# Patient Record
Sex: MALE | Race: WHITE | NOT HISPANIC OR LATINO | URBAN - METROPOLITAN AREA
[De-identification: names, ages, dates, MRNs, and addresses within clinical notes are randomized per-mention and may not be internally consistent; named-entity substitution may affect disease eponyms.]

---

## 2018-05-20 ENCOUNTER — INPATIENT (INPATIENT)
Facility: HOSPITAL | Age: 58
LOS: 14 days | Discharge: HOME | DRG: 693 | End: 2018-06-04
Attending: UROLOGY | Admitting: UROLOGY
Payer: COMMERCIAL

## 2018-05-20 VITALS
WEIGHT: 315 LBS | SYSTOLIC BLOOD PRESSURE: 128 MMHG | OXYGEN SATURATION: 98 % | TEMPERATURE: 98 F | HEIGHT: 69 IN | HEART RATE: 77 BPM | RESPIRATION RATE: 16 BRPM | DIASTOLIC BLOOD PRESSURE: 73 MMHG

## 2018-05-20 DIAGNOSIS — Z87.442 PERSONAL HISTORY OF URINARY CALCULI: ICD-10-CM

## 2018-05-20 LAB
ANION GAP SERPL CALC-SCNC: 18 MMOL/L — HIGH (ref 5–17)
APPEARANCE UR: ABNORMAL
APTT BLD: 22.7 SEC — LOW (ref 27.5–37.4)
BASOPHILS # BLD AUTO: 0 K/UL — SIGNIFICANT CHANGE UP (ref 0–0.2)
BASOPHILS NFR BLD AUTO: 0.3 % — SIGNIFICANT CHANGE UP (ref 0–2)
BILIRUB UR-MCNC: ABNORMAL
BUN SERPL-MCNC: 72 MG/DL — HIGH (ref 7–23)
CALCIUM SERPL-MCNC: 8.2 MG/DL — LOW (ref 8.4–10.5)
CHLORIDE SERPL-SCNC: 96 MMOL/L — SIGNIFICANT CHANGE UP (ref 96–108)
CO2 SERPL-SCNC: 21 MMOL/L — LOW (ref 22–31)
COLOR SPEC: ABNORMAL
CREAT SERPL-MCNC: 5.43 MG/DL — HIGH (ref 0.5–1.3)
DIFF PNL FLD: ABNORMAL
EOSINOPHIL # BLD AUTO: 0.1 K/UL — SIGNIFICANT CHANGE UP (ref 0–0.5)
EOSINOPHIL NFR BLD AUTO: 0.8 % — SIGNIFICANT CHANGE UP (ref 0–6)
GLUCOSE SERPL-MCNC: 102 MG/DL — HIGH (ref 70–99)
GLUCOSE UR QL: NEGATIVE — SIGNIFICANT CHANGE UP
HCT VFR BLD CALC: 38.5 % — LOW (ref 39–50)
HGB BLD-MCNC: 12.6 G/DL — LOW (ref 13–17)
INR BLD: 1.05 RATIO — SIGNIFICANT CHANGE UP (ref 0.88–1.16)
KETONES UR-MCNC: ABNORMAL
LEUKOCYTE ESTERASE UR-ACNC: ABNORMAL
LYMPHOCYTES # BLD AUTO: 1.4 K/UL — SIGNIFICANT CHANGE UP (ref 1–3.3)
LYMPHOCYTES # BLD AUTO: 11.4 % — LOW (ref 13–44)
MCHC RBC-ENTMCNC: 30.8 PG — SIGNIFICANT CHANGE UP (ref 27–34)
MCHC RBC-ENTMCNC: 32.9 GM/DL — SIGNIFICANT CHANGE UP (ref 32–36)
MCV RBC AUTO: 93.6 FL — SIGNIFICANT CHANGE UP (ref 80–100)
MONOCYTES # BLD AUTO: 1 K/UL — HIGH (ref 0–0.9)
MONOCYTES NFR BLD AUTO: 8.7 % — SIGNIFICANT CHANGE UP (ref 2–14)
NEUTROPHILS # BLD AUTO: 9.6 K/UL — HIGH (ref 1.8–7.4)
NEUTROPHILS NFR BLD AUTO: 78.9 % — HIGH (ref 43–77)
NITRITE UR-MCNC: NEGATIVE — SIGNIFICANT CHANGE UP
PH UR: 6 — SIGNIFICANT CHANGE UP (ref 5–8)
PLATELET # BLD AUTO: 246 K/UL — SIGNIFICANT CHANGE UP (ref 150–400)
POTASSIUM SERPL-MCNC: 4.2 MMOL/L — SIGNIFICANT CHANGE UP (ref 3.5–5.3)
POTASSIUM SERPL-SCNC: 4.2 MMOL/L — SIGNIFICANT CHANGE UP (ref 3.5–5.3)
PROT UR-MCNC: 300 MG/DL
PROTHROM AB SERPL-ACNC: 11.5 SEC — SIGNIFICANT CHANGE UP (ref 9.8–12.7)
RBC # BLD: 4.11 M/UL — LOW (ref 4.2–5.8)
RBC # FLD: 13.4 % — SIGNIFICANT CHANGE UP (ref 10.3–14.5)
SODIUM SERPL-SCNC: 135 MMOL/L — SIGNIFICANT CHANGE UP (ref 135–145)
SP GR SPEC: 1.01 — LOW (ref 1.01–1.02)
UROBILINOGEN FLD QL: NEGATIVE — SIGNIFICANT CHANGE UP
WBC # BLD: 12.1 K/UL — HIGH (ref 3.8–10.5)
WBC # FLD AUTO: 12.1 K/UL — HIGH (ref 3.8–10.5)

## 2018-05-20 RX ORDER — ACETAMINOPHEN 500 MG
650 TABLET ORAL EVERY 6 HOURS
Qty: 0 | Refills: 0 | Status: DISCONTINUED | OUTPATIENT
Start: 2018-05-20 | End: 2018-06-04

## 2018-05-20 RX ORDER — SENNA PLUS 8.6 MG/1
2 TABLET ORAL AT BEDTIME
Qty: 0 | Refills: 0 | Status: DISCONTINUED | OUTPATIENT
Start: 2018-05-20 | End: 2018-06-04

## 2018-05-20 RX ORDER — SODIUM CHLORIDE 9 MG/ML
1000 INJECTION, SOLUTION INTRAVENOUS
Qty: 0 | Refills: 0 | Status: DISCONTINUED | OUTPATIENT
Start: 2018-05-20 | End: 2018-05-21

## 2018-05-20 RX ORDER — HEPARIN SODIUM 5000 [USP'U]/ML
5000 INJECTION INTRAVENOUS; SUBCUTANEOUS EVERY 8 HOURS
Qty: 0 | Refills: 0 | Status: DISCONTINUED | OUTPATIENT
Start: 2018-05-20 | End: 2018-06-04

## 2018-05-20 RX ORDER — SODIUM CHLORIDE 9 MG/ML
1000 INJECTION, SOLUTION INTRAVENOUS
Qty: 0 | Refills: 0 | Status: DISCONTINUED | OUTPATIENT
Start: 2018-05-20 | End: 2018-06-04

## 2018-05-20 RX ORDER — OXYCODONE AND ACETAMINOPHEN 5; 325 MG/1; MG/1
2 TABLET ORAL EVERY 6 HOURS
Qty: 0 | Refills: 0 | Status: DISCONTINUED | OUTPATIENT
Start: 2018-05-20 | End: 2018-05-24

## 2018-05-20 RX ORDER — OXYCODONE AND ACETAMINOPHEN 5; 325 MG/1; MG/1
1 TABLET ORAL EVERY 4 HOURS
Qty: 0 | Refills: 0 | Status: DISCONTINUED | OUTPATIENT
Start: 2018-05-20 | End: 2018-05-23

## 2018-05-20 RX ORDER — INSULIN LISPRO 100/ML
VIAL (ML) SUBCUTANEOUS EVERY 6 HOURS
Qty: 0 | Refills: 0 | Status: DISCONTINUED | OUTPATIENT
Start: 2018-05-20 | End: 2018-05-23

## 2018-05-20 RX ORDER — DEXTROSE 50 % IN WATER 50 %
15 SYRINGE (ML) INTRAVENOUS ONCE
Qty: 0 | Refills: 0 | Status: DISCONTINUED | OUTPATIENT
Start: 2018-05-20 | End: 2018-06-04

## 2018-05-20 RX ORDER — DEXTROSE 50 % IN WATER 50 %
12.5 SYRINGE (ML) INTRAVENOUS ONCE
Qty: 0 | Refills: 0 | Status: DISCONTINUED | OUTPATIENT
Start: 2018-05-20 | End: 2018-06-04

## 2018-05-20 RX ORDER — ONDANSETRON 8 MG/1
4 TABLET, FILM COATED ORAL EVERY 6 HOURS
Qty: 0 | Refills: 0 | Status: DISCONTINUED | OUTPATIENT
Start: 2018-05-20 | End: 2018-06-04

## 2018-05-20 RX ORDER — DOCUSATE SODIUM 100 MG
100 CAPSULE ORAL THREE TIMES A DAY
Qty: 0 | Refills: 0 | Status: DISCONTINUED | OUTPATIENT
Start: 2018-05-20 | End: 2018-06-04

## 2018-05-20 RX ORDER — GLUCAGON INJECTION, SOLUTION 0.5 MG/.1ML
1 INJECTION, SOLUTION SUBCUTANEOUS ONCE
Qty: 0 | Refills: 0 | Status: DISCONTINUED | OUTPATIENT
Start: 2018-05-20 | End: 2018-06-04

## 2018-05-20 RX ADMIN — SODIUM CHLORIDE 125 MILLILITER(S): 9 INJECTION, SOLUTION INTRAVENOUS at 21:46

## 2018-05-20 RX ADMIN — HEPARIN SODIUM 5000 UNIT(S): 5000 INJECTION INTRAVENOUS; SUBCUTANEOUS at 23:09

## 2018-05-21 LAB
ANION GAP SERPL CALC-SCNC: 19 MMOL/L — HIGH (ref 5–17)
BUN SERPL-MCNC: 76 MG/DL — HIGH (ref 7–23)
CALCIUM SERPL-MCNC: 8.2 MG/DL — LOW (ref 8.4–10.5)
CHLORIDE SERPL-SCNC: 96 MMOL/L — SIGNIFICANT CHANGE UP (ref 96–108)
CO2 SERPL-SCNC: 20 MMOL/L — LOW (ref 22–31)
CREAT SERPL-MCNC: 6.28 MG/DL — HIGH (ref 0.5–1.3)
CULTURE RESULTS: NO GROWTH — SIGNIFICANT CHANGE UP
GAS PNL BLDA: SIGNIFICANT CHANGE UP
GLUCOSE BLDC GLUCOMTR-MCNC: 111 MG/DL — HIGH (ref 70–99)
GLUCOSE BLDC GLUCOMTR-MCNC: 119 MG/DL — HIGH (ref 70–99)
GLUCOSE BLDC GLUCOMTR-MCNC: 87 MG/DL — SIGNIFICANT CHANGE UP (ref 70–99)
GLUCOSE SERPL-MCNC: 102 MG/DL — HIGH (ref 70–99)
HBA1C BLD-MCNC: 6 % — HIGH (ref 4–5.6)
HCT VFR BLD CALC: 42.4 % — SIGNIFICANT CHANGE UP (ref 39–50)
HGB BLD-MCNC: 13.6 G/DL — SIGNIFICANT CHANGE UP (ref 13–17)
MCHC RBC-ENTMCNC: 30.3 PG — SIGNIFICANT CHANGE UP (ref 27–34)
MCHC RBC-ENTMCNC: 32.2 GM/DL — SIGNIFICANT CHANGE UP (ref 32–36)
MCV RBC AUTO: 94 FL — SIGNIFICANT CHANGE UP (ref 80–100)
PLATELET # BLD AUTO: 257 K/UL — SIGNIFICANT CHANGE UP (ref 150–400)
POTASSIUM SERPL-MCNC: 4.6 MMOL/L — SIGNIFICANT CHANGE UP (ref 3.5–5.3)
POTASSIUM SERPL-SCNC: 4.6 MMOL/L — SIGNIFICANT CHANGE UP (ref 3.5–5.3)
RBC # BLD: 4.5 M/UL — SIGNIFICANT CHANGE UP (ref 4.2–5.8)
RBC # FLD: 13.4 % — SIGNIFICANT CHANGE UP (ref 10.3–14.5)
SODIUM SERPL-SCNC: 135 MMOL/L — SIGNIFICANT CHANGE UP (ref 135–145)
SPECIMEN SOURCE: SIGNIFICANT CHANGE UP
WBC # BLD: 6.9 K/UL — SIGNIFICANT CHANGE UP (ref 3.8–10.5)
WBC # FLD AUTO: 6.9 K/UL — SIGNIFICANT CHANGE UP (ref 3.8–10.5)

## 2018-05-21 PROCEDURE — 99222 1ST HOSP IP/OBS MODERATE 55: CPT

## 2018-05-21 PROCEDURE — 50432 PLMT NEPHROSTOMY CATHETER: CPT | Mod: 50

## 2018-05-21 RX ORDER — SODIUM CHLORIDE 9 MG/ML
1000 INJECTION, SOLUTION INTRAVENOUS
Qty: 0 | Refills: 0 | Status: DISCONTINUED | OUTPATIENT
Start: 2018-05-21 | End: 2018-05-22

## 2018-05-21 RX ORDER — TAMSULOSIN HYDROCHLORIDE 0.4 MG/1
0.4 CAPSULE ORAL DAILY
Qty: 0 | Refills: 0 | Status: DISCONTINUED | OUTPATIENT
Start: 2018-05-21 | End: 2018-06-04

## 2018-05-21 RX ORDER — PIPERACILLIN AND TAZOBACTAM 4; .5 G/20ML; G/20ML
3.38 INJECTION, POWDER, LYOPHILIZED, FOR SOLUTION INTRAVENOUS EVERY 12 HOURS
Qty: 0 | Refills: 0 | Status: DISCONTINUED | OUTPATIENT
Start: 2018-05-21 | End: 2018-05-23

## 2018-05-21 RX ORDER — ACETAMINOPHEN 500 MG
1000 TABLET ORAL ONCE
Qty: 0 | Refills: 0 | Status: COMPLETED | OUTPATIENT
Start: 2018-05-21 | End: 2018-05-21

## 2018-05-21 RX ORDER — AMLODIPINE BESYLATE 2.5 MG/1
10 TABLET ORAL DAILY
Qty: 0 | Refills: 0 | Status: DISCONTINUED | OUTPATIENT
Start: 2018-05-21 | End: 2018-06-04

## 2018-05-21 RX ORDER — ACETAMINOPHEN 500 MG
650 TABLET ORAL ONCE
Qty: 0 | Refills: 0 | Status: COMPLETED | OUTPATIENT
Start: 2018-05-21 | End: 2018-05-21

## 2018-05-21 RX ADMIN — Medication 100 MILLIGRAM(S): at 22:36

## 2018-05-21 RX ADMIN — HEPARIN SODIUM 5000 UNIT(S): 5000 INJECTION INTRAVENOUS; SUBCUTANEOUS at 05:41

## 2018-05-21 RX ADMIN — PIPERACILLIN AND TAZOBACTAM 25 GRAM(S): 4; .5 INJECTION, POWDER, LYOPHILIZED, FOR SOLUTION INTRAVENOUS at 13:00

## 2018-05-21 RX ADMIN — Medication 650 MILLIGRAM(S): at 05:49

## 2018-05-21 RX ADMIN — Medication 400 MILLIGRAM(S): at 11:41

## 2018-05-21 RX ADMIN — Medication 100 MILLIGRAM(S): at 13:34

## 2018-05-21 RX ADMIN — AMLODIPINE BESYLATE 10 MILLIGRAM(S): 2.5 TABLET ORAL at 05:41

## 2018-05-21 RX ADMIN — HEPARIN SODIUM 5000 UNIT(S): 5000 INJECTION INTRAVENOUS; SUBCUTANEOUS at 22:36

## 2018-05-21 RX ADMIN — HEPARIN SODIUM 5000 UNIT(S): 5000 INJECTION INTRAVENOUS; SUBCUTANEOUS at 13:34

## 2018-05-21 RX ADMIN — Medication 1000 MILLIGRAM(S): at 12:15

## 2018-05-21 RX ADMIN — Medication 650 MILLIGRAM(S): at 06:20

## 2018-05-21 RX ADMIN — TAMSULOSIN HYDROCHLORIDE 0.4 MILLIGRAM(S): 0.4 CAPSULE ORAL at 13:34

## 2018-05-21 RX ADMIN — Medication 650 MILLIGRAM(S): at 22:36

## 2018-05-21 NOTE — PROGRESS NOTE ADULT - SUBJECTIVE AND OBJECTIVE BOX
I had discussed with Dr Brooks Navarrete at 2AM regarding the placement of bilateral nephrostomy tubes for patient having oliguria, in the setting ot bilateral ureteral obstruction.  He deemed that bilateral nephrostomy tubes were not emergent at this time, and could be performed in the morning.  He had discussed this with Dr Dupont as well.  Will try to have patient undergo the procedure early this AM.

## 2018-05-21 NOTE — H&P ADULT - FAMILY HISTORY
Father  Still living? Unknown  Family history of prostate cancer in father, Age at diagnosis: Age Unknown  Family history of coronary artery disease, Age at diagnosis: Age Unknown

## 2018-05-21 NOTE — PROGRESS NOTE ADULT - ASSESSMENT
Bilateral ureteral stones and obstruction  - NPO  - labs  - IR for bilat NTs  - urine culture  - pain control prn

## 2018-05-21 NOTE — PROGRESS NOTE ADULT - SUBJECTIVE AND OBJECTIVE BOX
Post op Check    Called by Dr. Hoyt, pt with rigors post procedure. pt seen and examined, also complaining of back spasms and appears uncomfortable. Pt was transferred to PACU from IR recovery suite. blood cultures were sent and patient was started on zosyn and IV tylenol. Pt reassessed and now feeling much better, no longer with rigors, denies any more back spasms. Denies SOB/CP/N/V.     Vital Signs Last 24 Hrs  T(C): 37.1 (21 May 2018 11:41), Max: 37.1 (21 May 2018 11:41)  T(F): 98.8 (21 May 2018 11:41), Max: 98.8 (21 May 2018 11:41)  HR: 101 (21 May 2018 12:00) (75 - 112)  BP: 130/59 (21 May 2018 12:00) (120/75 - 166/110)  BP(mean): 131 (21 May 2018 11:50) (131 - 131)  RR: 25 (21 May 2018 12:00) (16 - 25)  SpO2: 94% (21 May 2018 12:00) (88% - 98%)    I&O's Summary    holman -  R NT-  L NT-    Physical Exam  Gen: NAD, A&Ox3  Pulm: No respiratory distress, no subcostal retractions  CV: RRR, no JVD  Abd: Soft, NT, ND  Back: right NT draining translucent red urine, Left nephrostomy tube draining very purulent material   : +holman                           13.6   6.9   )-----------( 257      ( 21 May 2018 12:03 )             42.4       05-20    135  |  96  |  72<H>  ----------------------------<  102<H>  4.2   |  21<L>  |  5.43<H>    Ca    8.2<L>      20 May 2018 21:46        A/P: 57y Male s/p b/l IR NT placement  DVT prophylaxis/OOB  Incentive spirometry  Strict I&O's via holman and b/l NT  Analgesia and antiemetics as needed  NPO for now  f/u urine and blood cultures  continue zosyn   AM labs  Post op Check    Called by Dr. Hoyt, pt with rigors post procedure. pt seen and examined, also complaining of back spasms and appears uncomfortable. Pt was transferred to PACU from IR recovery suite. blood cultures were sent and patient was started on zosyn and IV tylenol. Pt reassessed and now feeling much better, no longer with rigors, denies any more back spasms. Denies SOB/CP/N/V.     Vital Signs Last 24 Hrs  T(C): 37.1 (21 May 2018 11:41), Max: 37.1 (21 May 2018 11:41)  T(F): 98.8 (21 May 2018 11:41), Max: 98.8 (21 May 2018 11:41)  HR: 101 (21 May 2018 12:00) (75 - 112)  BP: 130/59 (21 May 2018 12:00) (120/75 - 166/110)  BP(mean): 131 (21 May 2018 11:50) (131 - 131)  RR: 25 (21 May 2018 12:00) (16 - 25)  SpO2: 94% (21 May 2018 12:00) (88% - 98%)    I&O's Summary    holman - approx 100cc  R NT- 20cc  L NT- 200cc    Physical Exam  Gen: NAD, A&Ox3  Pulm: No respiratory distress, no subcostal retractions  CV: RRR, no JVD  Abd: Soft, NT, ND  Back: right NT draining translucent red urine, Left nephrostomy tube draining very purulent material   : +holman cloudy yellow                           13.6   6.9   )-----------( 257      ( 21 May 2018 12:03 )             42.4       05-20    135  |  96  |  72<H>  ----------------------------<  102<H>  4.2   |  21<L>  |  5.43<H>    Ca    8.2<L>      20 May 2018 21:46        A/P: 57y Male s/p b/l IR NT placement  DVT prophylaxis/OOB  Incentive spirometry  Strict I&O's via holman and b/l NT  Analgesia and antiemetics as needed  NPO for now  f/u urine and blood cultures  continue zosyn   AM labs

## 2018-05-21 NOTE — PROGRESS NOTE ADULT - SUBJECTIVE AND OBJECTIVE BOX
Interventional Radiology  Pre-Procedure Note      HPI:  This is a 57 year old male with PMHx of nephrolithiasis, DM, gout, lymphedema, obesity, who was transferred from an OSH after found to have bilateral ureteral calculi and SMITH.  Pt fell at home 2 days ago and presented to the OSH, where he had a CT scan done showing a left 1cm distal calculus and a 7mm right proximal calculus.  The urology team had attempted a bilateral stent placement, but had difficulty with cystoscope insertion.  He was transferred to [facility] for bilateral nephrostomy tube placement. Pt presents to IR for bilateral percutaneous nephrostomy tube placement with anesthesia. Pt a&o x 3. Informed consent obtained by Dr Hoyt after risks, benefits, alternatives discussion with pt's comprehension confirmed.    NPO since 8 pm 5/20/18    PAST MEDICAL & SURGICAL HISTORY:  Hx of peripheral neuropathy  Morbid obesity  H/O sleep apnea  Acquired lymphedema of leg  Calculus of kidney and ureter  Diabetes mellitus type II  HTN (hypertension)  Ureteral stents placement, bilateral  Hx of tonsillectomy: at 6 years old      FAMILY HISTORY:  Family history of coronary artery disease (Father)  Family history of prostate cancer in father (Father)      Allergies: No Known Allergies      Current Medications: acetaminophen   Tablet 650 milliGRAM(s) Oral every 6 hours PRN  amLODIPine   Tablet 10 milliGRAM(s) Oral daily  dextrose 40% Gel 15 Gram(s) Oral once PRN  dextrose 5% + sodium chloride 0.45%. 1000 milliLiter(s) IV Continuous <Continuous>  dextrose 5%. 1000 milliLiter(s) IV Continuous <Continuous>  dextrose 50% Injectable 12.5 Gram(s) IV Push once  docusate sodium 100 milliGRAM(s) Oral three times a day  glucagon  Injectable 1 milliGRAM(s) IntraMuscular once PRN  heparin  Injectable 5000 Unit(s) SubCutaneous every 8 hours  insulin lispro (HumaLOG) corrective regimen sliding scale   SubCutaneous every 6 hours  ondansetron Injectable 4 milliGRAM(s) IV Push every 6 hours PRN  oxyCODONE    5 mG/acetaminophen 325 mG 1 Tablet(s) Oral every 4 hours PRN  oxyCODONE    5 mG/acetaminophen 325 mG 2 Tablet(s) Oral every 6 hours PRN  piperacillin/tazobactam IVPB. 3.375 Gram(s) IV Intermittent every 12 hours  senna 2 Tablet(s) Oral at bedtime PRN  tamsulosin 0.4 milliGRAM(s) Oral daily      Labs:   GFR 11 5/20                        12.6   12.1  )-----------( 246      ( 20 May 2018 21:46 )             38.5       05-20    135  |  96  |  72<H>  ----------------------------<  102<H>  4.2   |  21<L>  |  5.43<H>    Ca    8.2<L>      20 May 2018 21:46        PT/INR - ( 20 May 2018 21:46 )   PT: 11.5 sec;   INR: 1.05 ratio         PTT - ( 20 May 2018 21:46 )  PTT:22.7 sec    Blood Bank:   Blood Available Until:    Assessment/Plan:   This is a 57 year old male with CT identified bilateral ureteral calculi, leukocytosis, s/p unsuccessful urologic attempt to place bilateral stents cystoscopically.  Patient presents to IR for bilateral percutaneous nephrostomy tube insertion with anesthesia.    Alvina BARKER BC  ext [id number]  # [phone]

## 2018-05-21 NOTE — H&P ADULT - NSHPLABSRESULTS_GEN_ALL_CORE
12.6   12.1  )-----------( 246      ( 20 May 2018 21:46 )             38.5     05-20    135  |  96  |  72<H>  ----------------------------<  102<H>  4.2   |  21<L>  |  5.43<H>    Ca    8.2<L>      20 May 2018 21:46      PT/INR - ( 20 May 2018 21:46 )   PT: 11.5 sec;   INR: 1.05 ratio         PTT - ( 20 May 2018 21:46 )  PTT:22.7 sec    Urinalysis Basic - ( 20 May 2018 21:46 )    Color: Red / Appearance: Turbid / S.007 / pH: x  Gluc: x / Ketone: Trace  / Bili: Small / Urobili: Negative   Blood: x / Protein: 300 mg/dL / Nitrite: Negative   Leuk Esterase: Large / RBC: >50 /HPF / WBC >50 /HPF   Sq Epi: x / Non Sq Epi: x / Bacteria: Few /HPF    Urine culture: pending results, 100k GNR from OSH    CT (OSH) : cholelithiasis; prominent left hydronephrosis extending to distal ureter where a 1cm calculus is seen.  7mm right proximal calculus with mild hydronephrosis; moderate fecal retention. 12.6   12.1  )-----------( 246      ( 20 May 2018 21:46 )             38.5     05-20    135  |  96  |  72<H>  ----------------------------<  102<H>  4.2   |  21<L>  |  5.43<H>    Ca    8.2<L>      20 May 2018 21:46      PT/INR - ( 20 May 2018 21:46 )   PT: 11.5 sec;   INR: 1.05 ratio         PTT - ( 20 May 2018 21:46 )  PTT:22.7 sec    Urinalysis Basic - ( 20 May 2018 21:46 )    Color: Red / Appearance: Turbid / S.007 / pH: x  Gluc: x / Ketone: Trace  / Bili: Small / Urobili: Negative   Blood: x / Protein: 300 mg/dL / Nitrite: Negative   Leuk Esterase: Large / RBC: >50 /HPF / WBC >50 /HPF   Sq Epi: x / Non Sq Epi: x / Bacteria: Few /HPF    Urine culture: pending results, 100k GNR from OSH    CT chest: no acute intrathoracic pathology    CT abd (OSH) : cholelithiasis; prominent left hydronephrosis extending to distal ureter where a 1cm calculus is seen.  7mm right proximal calculus with mild hydronephrosis; moderate fecal retention.

## 2018-05-21 NOTE — PROGRESS NOTE ADULT - SUBJECTIVE AND OBJECTIVE BOX
Interventional Radiology Brief- Operative Note    Procedure: Bilateral nephrostomy tube placement    Operators: Janay Hoyt    Anesthesia (type): Sedation per anesthesiology attending    Contrast: 25 cc    EBL: Minimal    Findings/Follow up Plan of Care: Under ultrasound and fluoroscopic guidance, bilateral nephrostomy tubes placed via interpolar region on left (8.5 FR APD) and lower pole on right (8.5 FR Colin Carmen) and left to bag drainage. Cloudy brown fluid aspirated from left and clear yellow fluid from right, sent for microbiological analysis. Findings discussed by Dr. Hoyt with Urology at conclusion of procedure.    Specimens Removed: Cloudy brown fluid aspirated from left and clear yellow fluid from right, sent for microbiological analysis.     Implants: left 8.5 FR APD, right 8.5 FR Colin Carmen    Complications: None    Condition/Disposition: Stable/Recovery    Please call Interventional Radiology x [phone] with any questions, concerns, or issues. Interventional Radiology Brief- Operative Note    Procedure: Bilateral nephrostomy tube placement    Operators: Janay Hoyt    Anesthesia (type): Sedation per anesthesiology attending    Contrast: 25 cc    EBL: Minimal    Findings/Follow up Plan of Care: Under ultrasound and fluoroscopic guidance, bilateral nephrostomy tubes placed via interpolar region on left (8.5 FR APD) and lower pole on right (8.5 FR Colin Carmen) and left to bag drainage. Cloudy brown fluid aspirated from left and clear yellow fluid from right, sent for microbiological analysis. There was moderate to severe left hydronephrosis and mild right hydronephrosis. Findings discussed by Dr. Hoyt with Urology at conclusion of procedure.    Specimens Removed: Cloudy brown fluid aspirated from left and clear yellow fluid from right, sent for microbiological analysis.     Implants: left 8.5 FR APD, right 8.5 FR Colin Carmen    Complications: None    Condition/Disposition: Stable/Recovery    Please call Interventional Radiology x [phone] with any questions, concerns, or issues. Interventional Radiology Brief- Operative Note    Procedure: Bilateral nephrostomy tube placement    Operators: Janay Hoyt    Anesthesia (type): Sedation per anesthesiology attending    Contrast: 25 cc    EBL: Minimal    Findings/Follow up Plan of Care: Under ultrasound and fluoroscopic guidance, bilateral nephrostomy tubes placed via interpolar region on left (8.5 FR APD) and lower pole on right (8.5 FR Colin Carmen) and left to bag drainage. Cloudy brown fluid aspirated from left and clear yellow fluid from right, sent for microbiological analysis. There was moderate to severe left hydronephrosis and mild right hydronephrosis. Findings discussed by Dr. Hoyt with Urology at conclusion of procedure.    Specimens Removed: Cloudy brown fluid aspirated from left and clear yellow fluid from right, sent for microbiological analysis.     Implants: left 8.5 FR APD, right 8.5 FR Colin Carmen    Complications: None    Condition/Disposition: Stable/Recovery    Please call Interventional Radiology x [phone] with any questions, concerns, or issues.      Addendum:  Pt seen in PACU at appx 11 am with complaint of chills and lower back pain. he described the pain as similar to muscle spasms he has experienced before. HR 102bpm. /70. Oxygen saturation 93%. Contacted urology PA who arrived to assess patient. IV fluids initiated and IV acetaminophen single dose for pain. Pt to continue to be monitored in PACU.

## 2018-05-21 NOTE — PROGRESS NOTE ADULT - SUBJECTIVE AND OBJECTIVE BOX
UROLOGY DAILY PROGRESS NOTE:     Subjective: Patient seen and examined at bedside. C/o neck pain, which he says is related to fall at outside hospital.  No fevers or chills. No flank or abdominal pain.      Objective:  Vital signs  T(F): , Max: 98.3 (05-20-18 @ 19:55)  HR: 88 (05-21-18 @ 06:12)  BP: 147/78 (05-21-18 @ 06:12)  SpO2: 96% (05-21-18 @ 06:12)  Wt(kg): --    I&O's Summary    20 May 2018 07:01  -  21 May 2018 07:00  --------------------------------------------------------  IN: 875 mL / OUT: 200 mL / NET: 675 mL    I&O's Detail    20 May 2018 07:01  -  21 May 2018 07:00  --------------------------------------------------------  IN:    dextrose 5% + sodium chloride 0.45%.: 875 mL  Total IN: 875 mL    OUT:    Indwelling Catheter - Urethral: 200 mL  Total OUT: 200 mL    Total NET: 675 mL          Gen: NAD  Pulm: No respiratory distress, no subcostal retractions  CV: RRR, no JVD  Abd: Soft, NT, ND  : Bae- scant concentrated urine     Labs:  05-20  12.1  / 38.5  /5.43                           12.6   12.1  )-----------( 246      ( 20 May 2018 21:46 )             38.5     05-20    135  |  96  |  72<H>  ----------------------------<  102<H>  4.2   |  21<L>  |  5.43<H>    Ca    8.2<L>      20 May 2018 21:46      PT/INR - ( 20 May 2018 21:46 )   PT: 11.5 sec;   INR: 1.05 ratio         PTT - ( 20 May 2018 21:46 )  PTT:22.7 sec    Urine Cx:p

## 2018-05-21 NOTE — H&P ADULT - ASSESSMENT
57 year old male with bilateral ureteral obstruction secondary to renal calculi, SMITH    -NPO for IR nephrostomy tube procedure in AM -- discussed with Dr Navarrete  -strict I's and O's, continue foely catheter  -IVF hydration  -insulin sliding scale  -hold losartan in setting of SMITH  -IV antibiotics  -case d/w Dr Dupont

## 2018-05-21 NOTE — H&P ADULT - NSHPPHYSICALEXAM_GEN_ALL_CORE
PHYSICAL EXAM:      Constitutional: well nourished, unkempt    Neck: no JVD    Chest: +right upper chest ecchymosis    Back: no CVAT bilaterally    Gastrointestinal: +upper abdominal ecchymosis, obese, NT/ND    Genitourinary: uncircumcised penis, +holman draining pink urine    Neurological: NAD, A+Ox3    Skin: warm dry

## 2018-05-21 NOTE — H&P ADULT - HISTORY OF PRESENT ILLNESS
This is a 57 year old male with PMHx of nephrolithiasis, DM, gout, lymphedema, obesity, who was transferred from an OSH after found to have bilateral ureteral calculi and SMITH.  Pt fell at home 2 days ago and presented to the OSH, where he had a CT scan done showing a left 1cm distal calculus and a 7mm right proximal calculus.  Patient fell another time and broke the commode.  The urology team had attempted a bilateral stent placement, but had difficulty with cystoscope insertion.  He was transferred to [facility] for bilateral nephrostomy tube placement.  Pt denies fevers/chills, nausea, vomiting, pain.  Currently without any complaints at this time.  Walks with 2 canes at home.

## 2018-05-21 NOTE — H&P ADULT - PMH
Acquired lymphedema of leg    Calculus of kidney and ureter    Diabetes mellitus type II    H/O sleep apnea    HTN (hypertension)    Hx of peripheral neuropathy    Morbid obesity

## 2018-05-22 DIAGNOSIS — N17.9 ACUTE KIDNEY FAILURE, UNSPECIFIED: ICD-10-CM

## 2018-05-22 LAB
ANION GAP SERPL CALC-SCNC: 20 MMOL/L — HIGH (ref 5–17)
BUN SERPL-MCNC: 78 MG/DL — HIGH (ref 7–23)
CALCIUM SERPL-MCNC: 8.3 MG/DL — LOW (ref 8.4–10.5)
CHLORIDE SERPL-SCNC: 96 MMOL/L — SIGNIFICANT CHANGE UP (ref 96–108)
CHLORIDE UR-SCNC: 56 MMOL/L — SIGNIFICANT CHANGE UP
CK SERPL-CCNC: 4975 U/L — HIGH (ref 30–200)
CO2 SERPL-SCNC: 18 MMOL/L — LOW (ref 22–31)
CREAT SERPL-MCNC: 7.09 MG/DL — HIGH (ref 0.5–1.3)
GLUCOSE BLDC GLUCOMTR-MCNC: 102 MG/DL — HIGH (ref 70–99)
GLUCOSE BLDC GLUCOMTR-MCNC: 108 MG/DL — HIGH (ref 70–99)
GLUCOSE BLDC GLUCOMTR-MCNC: 117 MG/DL — HIGH (ref 70–99)
GLUCOSE BLDC GLUCOMTR-MCNC: 123 MG/DL — HIGH (ref 70–99)
GLUCOSE BLDC GLUCOMTR-MCNC: 99 MG/DL — SIGNIFICANT CHANGE UP (ref 70–99)
GLUCOSE SERPL-MCNC: 99 MG/DL — SIGNIFICANT CHANGE UP (ref 70–99)
HCT VFR BLD CALC: 39.2 % — SIGNIFICANT CHANGE UP (ref 39–50)
HGB BLD-MCNC: 12.3 G/DL — LOW (ref 13–17)
MCHC RBC-ENTMCNC: 29.4 PG — SIGNIFICANT CHANGE UP (ref 27–34)
MCHC RBC-ENTMCNC: 31.3 GM/DL — LOW (ref 32–36)
MCV RBC AUTO: 93.9 FL — SIGNIFICANT CHANGE UP (ref 80–100)
PLATELET # BLD AUTO: 271 K/UL — SIGNIFICANT CHANGE UP (ref 150–400)
POTASSIUM SERPL-MCNC: 4.6 MMOL/L — SIGNIFICANT CHANGE UP (ref 3.5–5.3)
POTASSIUM SERPL-SCNC: 4.6 MMOL/L — SIGNIFICANT CHANGE UP (ref 3.5–5.3)
POTASSIUM UR-SCNC: 13 MMOL/L — SIGNIFICANT CHANGE UP
RBC # BLD: 4.17 M/UL — LOW (ref 4.2–5.8)
RBC # FLD: 13.7 % — SIGNIFICANT CHANGE UP (ref 10.3–14.5)
SODIUM SERPL-SCNC: 134 MMOL/L — LOW (ref 135–145)
SODIUM UR-SCNC: 68 MMOL/L — SIGNIFICANT CHANGE UP
WBC # BLD: 11.8 K/UL — HIGH (ref 3.8–10.5)
WBC # FLD AUTO: 11.8 K/UL — HIGH (ref 3.8–10.5)

## 2018-05-22 PROCEDURE — 99231 SBSQ HOSP IP/OBS SF/LOW 25: CPT

## 2018-05-22 PROCEDURE — 76775 US EXAM ABDO BACK WALL LIM: CPT | Mod: 26

## 2018-05-22 PROCEDURE — 99223 1ST HOSP IP/OBS HIGH 75: CPT | Mod: GC

## 2018-05-22 RX ORDER — SODIUM CHLORIDE 9 MG/ML
1000 INJECTION, SOLUTION INTRAVENOUS
Qty: 0 | Refills: 0 | Status: DISCONTINUED | OUTPATIENT
Start: 2018-05-22 | End: 2018-05-27

## 2018-05-22 RX ADMIN — AMLODIPINE BESYLATE 10 MILLIGRAM(S): 2.5 TABLET ORAL at 06:34

## 2018-05-22 RX ADMIN — OXYCODONE AND ACETAMINOPHEN 1 TABLET(S): 5; 325 TABLET ORAL at 10:45

## 2018-05-22 RX ADMIN — HEPARIN SODIUM 5000 UNIT(S): 5000 INJECTION INTRAVENOUS; SUBCUTANEOUS at 14:27

## 2018-05-22 RX ADMIN — PIPERACILLIN AND TAZOBACTAM 25 GRAM(S): 4; .5 INJECTION, POWDER, LYOPHILIZED, FOR SOLUTION INTRAVENOUS at 00:29

## 2018-05-22 RX ADMIN — PIPERACILLIN AND TAZOBACTAM 25 GRAM(S): 4; .5 INJECTION, POWDER, LYOPHILIZED, FOR SOLUTION INTRAVENOUS at 14:28

## 2018-05-22 RX ADMIN — TAMSULOSIN HYDROCHLORIDE 0.4 MILLIGRAM(S): 0.4 CAPSULE ORAL at 14:27

## 2018-05-22 RX ADMIN — OXYCODONE AND ACETAMINOPHEN 1 TABLET(S): 5; 325 TABLET ORAL at 10:12

## 2018-05-22 RX ADMIN — HEPARIN SODIUM 5000 UNIT(S): 5000 INJECTION INTRAVENOUS; SUBCUTANEOUS at 06:34

## 2018-05-22 RX ADMIN — HEPARIN SODIUM 5000 UNIT(S): 5000 INJECTION INTRAVENOUS; SUBCUTANEOUS at 22:41

## 2018-05-22 NOTE — PHYSICAL THERAPY INITIAL EVALUATION ADULT - PLANNED THERAPY INTERVENTIONS, PT EVAL
gait training/GOALS Stair Negotiation Training: Patient will be able to negotiate up & down 1 flight of stairs independently with bilateral rails, step to gait pattern, in 4 weeks./transfer training/bed mobility training

## 2018-05-22 NOTE — CONSULT NOTE ADULT - ATTENDING COMMENTS
CKD, ARF, obstructive uropathy  1.  ARF--not uremic at present but borderline function.  Agrees to acute HD if required.  High potential for rhabdomyolysis contributing to ATN.  Ulytes.  Will chech urine microscopic  2.  Obstructive uropathy--unilateral dilitation.  Mininmal drainage on side of non hydronephrosis  3.  Rhabdomyolysis--volume optimization.  In setting of CKD + 1,2 may determine HD requirement

## 2018-05-22 NOTE — PROGRESS NOTE ADULT - ASSESSMENT
This is a 58 y/o M with bilateral obstructing ureteral calculi s/p bilateral NT placement.    - trend Cr  - follow up NT Cultures and urine Cultures, continue Zosyn (renally dosed)  - Reg diet  - OOB/Amb, Physical therapy consultation

## 2018-05-22 NOTE — PHYSICAL THERAPY INITIAL EVALUATION ADULT - PERTINENT HX OF CURRENT PROBLEM, REHAB EVAL
as per chart review: PMHx of nephrolithiasis, DM, gout, lymphedema, obesity, who was transferred from an OSH after found to have bilateral ureteral calculi and SMITH.  Pt fell at home 2 days ago and presented to the OSH, where he had a CT scan done showing a left 1cm distal calculus and a 7mm right proximal calculus.  Patient fell another time and broke the commode.

## 2018-05-22 NOTE — CONSULT NOTE ADULT - SUBJECTIVE AND OBJECTIVE BOX
[facility] DIVISION OF KIDNEY DISEASES AND HYPERTENSION -- [phone]  -- INITIAL CONSULT NOTE  --------------------------------------------------------------------------------  HPI:  57 year old male with PMHx of nephrolithiasis, DM, gout, lymphedema, obesity, who was transferred from an OSH after found to have bilateral ureteral calculi and SMITH. Pt fell at home(was on floor for 6 hours)  2 days ago and presented to the OSH, where he had a CT scan done showing a left 1cm distal calculus and a 7mm right proximal calculus. The urology team had attempted a bilateral stent placement, but had difficulty with cystoscope insertion. He was transferred to [facility] for bilateral nephrostomy tube placement. Pt had bilateral stent placed by IR on (5/21) and received contrast. Nephrology consulted as pt found to have worsening serum creatinine. As per urology team, pt had initial Scr at 2.6 on (5/18). Pt with Scr 5.4 on (5/20) which further trended up to 7.09 on (5/22).  Pt states he had blood work done at PMD office , 4months ago and was told all labs were WNL. Pt denies any prior kidney disease or seeing a nephrologist. Pt states he is DM for 10yrs and HTN all his life ( currently takes losartan and amlodipine). Denies family history of kidney disease, denies NSAIDs use. Denies history of blood transfusions or known hepatitis disease.     PAST HISTORY  --------------------------------------------------------------------------------  PAST MEDICAL & SURGICAL HISTORY:  Hx of peripheral neuropathy  Morbid obesity  H/O sleep apnea  Acquired lymphedema of leg  Calculus of kidney and ureter  Diabetes mellitus type II  HTN (hypertension)  Ureteral stents placement, bilateral  Hx of tonsillectomy: at 6 years old    FAMILY HISTORY:  Family history of coronary artery disease (Father)  Family history of prostate cancer in father (Father)    PAST SOCIAL HISTORY:    ALLERGIES & MEDICATIONS  --------------------------------------------------------------------------------  Allergies    No Known Allergies    Intolerances      Standing Inpatient Medications  amLODIPine   Tablet 10 milliGRAM(s) Oral daily  dextrose 5%. 1000 milliLiter(s) IV Continuous <Continuous>  dextrose 50% Injectable 12.5 Gram(s) IV Push once  docusate sodium 100 milliGRAM(s) Oral three times a day  heparin  Injectable 5000 Unit(s) SubCutaneous every 8 hours  insulin lispro (HumaLOG) corrective regimen sliding scale   SubCutaneous every 6 hours  lactated ringers. 1000 milliLiter(s) IV Continuous <Continuous>  piperacillin/tazobactam IVPB. 3.375 Gram(s) IV Intermittent every 12 hours  tamsulosin 0.4 milliGRAM(s) Oral daily    PRN Inpatient Medications  acetaminophen   Tablet 650 milliGRAM(s) Oral every 6 hours PRN  dextrose 40% Gel 15 Gram(s) Oral once PRN  glucagon  Injectable 1 milliGRAM(s) IntraMuscular once PRN  ondansetron Injectable 4 milliGRAM(s) IV Push every 6 hours PRN  oxyCODONE    5 mG/acetaminophen 325 mG 1 Tablet(s) Oral every 4 hours PRN  oxyCODONE    5 mG/acetaminophen 325 mG 2 Tablet(s) Oral every 6 hours PRN  senna 2 Tablet(s) Oral at bedtime PRN      REVIEW OF SYSTEMS  --------------------------------------------------------------------------------  Gen: No fevers/chills  Skin: No rashes  Head/Eyes/Ears: Normal hearing,  Normal vision   Respiratory: No dyspnea, cough  CV: No chest pain  GI: No abdominal pain, diarrhea, constipation, nausea, vomiting  MSK: + edema  Heme: No easy bruising or bleeding  Psych: No significant depression    All other systems were reviewed and are negative, except as noted.    VITALS/PHYSICAL EXAM  --------------------------------------------------------------------------------  T(C): 36.9 (05-22-18 @ 06:25), Max: 37.6 (05-22-18 @ 00:35)  HR: 90 (05-22-18 @ 09:16) (89 - 112)  BP: 110/67 (05-22-18 @ 06:25) (107/67 - 166/110)  RR: 18 (05-22-18 @ 06:25) (14 - 25)  SpO2: 96% (05-22-18 @ 09:16) (88% - 100%)  Wt(kg): --  Height (cm): 175.26 (05-20-18 @ 19:55)  Weight (kg): 205 (05-20-18 @ 19:55)  BMI (kg/m2): 66.7 (05-20-18 @ 19:55)  BSA (m2): 2.92 (05-20-18 @ 19:55)      05-21-18 @ 07:01  -  05-22-18 @ 07:00  --------------------------------------------------------  IN: 2475 mL / OUT: 1290 mL / NET: 1185 mL    05-22-18 @ 07:01  -  05-22-18 @ 11:04  --------------------------------------------------------  IN: 0 mL / OUT: 350 mL / NET: -350 mL      Physical Exam:  	Gen: NAD,   	HEENT: MMM  	Pulm: Decreased breath sounds  B/L  	CV: S1S2  	Abd: Obese, soft  	Ext: + non-pitting  LE edema B/L  	Neuro: Awake, alert  	Skin: Warm and dry  	: B/L Nephrostomy tubes and holman catheter    LABS/STUDIES  --------------------------------------------------------------------------------              12.3   11.8  >-----------<  271      [05-22-18 @ 07:53]              39.2     134  |  96  |  78  ----------------------------<  99      [05-22-18 @ 07:53]  4.6   |  18  |  7.09        Ca     8.3     [05-22-18 @ 07:53]      PT/INR: PT 11.5 , INR 1.05       [05-20-18 @ 21:46]  PTT: 22.7       [05-20-18 @ 21:46]      Creatinine Trend:  SCr 7.09 [05-22 @ 07:53]  SCr 6.28 [05-21 @ 12:03]  SCr 5.43 [05-20 @ 21:46]    Urinalysis - [05-20-18 @ 21:46]      Color Red / Appearance Turbid / SG 1.007 / pH 6.0      Gluc Negative / Ketone Trace  / Bili Small / Urobili Negative       Blood Large / Protein 300 / Leuk Est Large / Nitrite Negative      RBC >50 / WBC >50 / Hyaline  / Gran  / Sq Epi  / Non Sq Epi  / Bacteria Few      HbA1c 6.0      [05-21-18 @ 07:53]

## 2018-05-22 NOTE — PROGRESS NOTE ADULT - SUBJECTIVE AND OBJECTIVE BOX
Interventional Radiology Follow- Up Note    S: No acute events overnight.  No further rigors. No pain.    O:  Vitals: T(F): 98.4 (05-22-18 @ 06:25), Max: 99.7 (05-22-18 @ 00:35)  HR: 90 (05-22-18 @ 06:26) (89 - 112)  BP: 110/67 (05-22-18 @ 06:25) (107/67 - 166/110)  RR: 18 (05-22-18 @ 06:25) (14 - 25)  SpO2: 95% (05-22-18 @ 06:26) (88% - 100%)    LABS:  Awaiting AM labs    I&O's Detail    21 May 2018 07:01  -  22 May 2018 07:00  --------------------------------------------------------  IN:    lactated ringers.: 2375 mL  Total IN: 2375 mL    OUT:    Indwelling Catheter - Urethral: 130 mL    Nephrostomy Tube: 950 mL    Nephrostomy Tube: 210 mL  Total OUT: 1290 mL    Total NET: 1085 mL    PHYSICAL EXAM:    General: Awake, alert, in NAD  : Abe. Bilateral nephrostomy tubes to bag. Clearing drainage on left, red tinged drainage on right.    Impression: 57 year old gentleman with bilateral obstructing ureteral calculi s/p bilateral nephrostomy tube placement.    Plan:  -Continue antibiotics  -Follow up urine culture  -Follow up AM labs     Please call IR at extension [phone] with any questions, concerns, or issues regarding above.

## 2018-05-22 NOTE — CONSULT NOTE ADULT - ASSESSMENT
57 year old male with PMHx of nephrolithiasis, DM, gout, lymphedema, obesity, who was transferred from an OSH after found to have bilateral ureteral calculi and SMITH

## 2018-05-22 NOTE — PHYSICAL THERAPY INITIAL EVALUATION ADULT - GAIT TRAINING, PT EVAL
Patient will ambulate 300 feet independently with appropriate assistive device as needed, in 4 weeks.

## 2018-05-22 NOTE — PROGRESS NOTE ADULT - SUBJECTIVE AND OBJECTIVE BOX
Subjective  Left NT with resolving purulence overnight  no fevers or chills  patient reports significantly improved malaise    Objective  Vital signs  T(F): , Max: 99.7 (05-22-18 @ 00:35)  HR: 92 (05-22-18 @ 01:16)  BP: 114/65 (05-22-18 @ 00:35)  SpO2: 96% (05-22-18 @ 01:16)  Bae 50 - clear yellow  Right  - mark anthony yellow  Left  - mark anthony yellow        Gen: NAD  Abd: Soft, non-tender  : flank dressings c/d/i with NTs secure    Labs      05-21 @ 12:03    WBC 6.9   / Hct 42.4  / SCr 6.28     05-20 @ 21:46    WBC 12.1  / Hct 38.5  / SCr 5.43       Urine Cx: ?  Blood Cx: ?    Imaging

## 2018-05-23 LAB
-  AMIKACIN: SIGNIFICANT CHANGE UP
-  AMIKACIN: SIGNIFICANT CHANGE UP
-  AMOXICILLIN/CLAVULANIC ACID: SIGNIFICANT CHANGE UP
-  AMOXICILLIN/CLAVULANIC ACID: SIGNIFICANT CHANGE UP
-  AMPICILLIN/SULBACTAM: SIGNIFICANT CHANGE UP
-  AMPICILLIN/SULBACTAM: SIGNIFICANT CHANGE UP
-  AMPICILLIN: SIGNIFICANT CHANGE UP
-  AMPICILLIN: SIGNIFICANT CHANGE UP
-  AZTREONAM: SIGNIFICANT CHANGE UP
-  AZTREONAM: SIGNIFICANT CHANGE UP
-  CEFAZOLIN: SIGNIFICANT CHANGE UP
-  CEFAZOLIN: SIGNIFICANT CHANGE UP
-  CEFEPIME: SIGNIFICANT CHANGE UP
-  CEFEPIME: SIGNIFICANT CHANGE UP
-  CEFOXITIN: SIGNIFICANT CHANGE UP
-  CEFOXITIN: SIGNIFICANT CHANGE UP
-  CEFTRIAXONE: SIGNIFICANT CHANGE UP
-  CEFTRIAXONE: SIGNIFICANT CHANGE UP
-  CIPROFLOXACIN: SIGNIFICANT CHANGE UP
-  CIPROFLOXACIN: SIGNIFICANT CHANGE UP
-  ERTAPENEM: SIGNIFICANT CHANGE UP
-  ERTAPENEM: SIGNIFICANT CHANGE UP
-  GENTAMICIN: SIGNIFICANT CHANGE UP
-  GENTAMICIN: SIGNIFICANT CHANGE UP
-  LEVOFLOXACIN: SIGNIFICANT CHANGE UP
-  LEVOFLOXACIN: SIGNIFICANT CHANGE UP
-  MEROPENEM: SIGNIFICANT CHANGE UP
-  MEROPENEM: SIGNIFICANT CHANGE UP
-  NITROFURANTOIN: SIGNIFICANT CHANGE UP
-  NITROFURANTOIN: SIGNIFICANT CHANGE UP
-  PIPERACILLIN/TAZOBACTAM: SIGNIFICANT CHANGE UP
-  PIPERACILLIN/TAZOBACTAM: SIGNIFICANT CHANGE UP
-  TOBRAMYCIN: SIGNIFICANT CHANGE UP
-  TOBRAMYCIN: SIGNIFICANT CHANGE UP
-  TRIMETHOPRIM/SULFAMETHOXAZOLE: SIGNIFICANT CHANGE UP
-  TRIMETHOPRIM/SULFAMETHOXAZOLE: SIGNIFICANT CHANGE UP
ANION GAP SERPL CALC-SCNC: 18 MMOL/L — HIGH (ref 5–17)
BUN SERPL-MCNC: 86 MG/DL — HIGH (ref 7–23)
CALCIUM SERPL-MCNC: 8.3 MG/DL — LOW (ref 8.4–10.5)
CALCIUM UR-MCNC: 3 MG/DL — SIGNIFICANT CHANGE UP
CHLORIDE SERPL-SCNC: 96 MMOL/L — SIGNIFICANT CHANGE UP (ref 96–108)
CK SERPL-CCNC: 2698 U/L — HIGH (ref 30–200)
CO2 SERPL-SCNC: 20 MMOL/L — LOW (ref 22–31)
CREAT SERPL-MCNC: 7.03 MG/DL — HIGH (ref 0.5–1.3)
CULTURE RESULTS: SIGNIFICANT CHANGE UP
CULTURE RESULTS: SIGNIFICANT CHANGE UP
GLUCOSE BLDC GLUCOMTR-MCNC: 121 MG/DL — HIGH (ref 70–99)
GLUCOSE BLDC GLUCOMTR-MCNC: 131 MG/DL — HIGH (ref 70–99)
GLUCOSE BLDC GLUCOMTR-MCNC: 137 MG/DL — HIGH (ref 70–99)
GLUCOSE BLDC GLUCOMTR-MCNC: 141 MG/DL — HIGH (ref 70–99)
GLUCOSE SERPL-MCNC: 148 MG/DL — HIGH (ref 70–99)
HCT VFR BLD CALC: 37.1 % — LOW (ref 39–50)
HGB BLD-MCNC: 12.1 G/DL — LOW (ref 13–17)
MCHC RBC-ENTMCNC: 30.5 PG — SIGNIFICANT CHANGE UP (ref 27–34)
MCHC RBC-ENTMCNC: 32.5 GM/DL — SIGNIFICANT CHANGE UP (ref 32–36)
MCV RBC AUTO: 93.9 FL — SIGNIFICANT CHANGE UP (ref 80–100)
METHOD TYPE: SIGNIFICANT CHANGE UP
METHOD TYPE: SIGNIFICANT CHANGE UP
ORGANISM # SPEC MICROSCOPIC CNT: SIGNIFICANT CHANGE UP
PLATELET # BLD AUTO: 279 K/UL — SIGNIFICANT CHANGE UP (ref 150–400)
POTASSIUM SERPL-MCNC: 4.3 MMOL/L — SIGNIFICANT CHANGE UP (ref 3.5–5.3)
POTASSIUM SERPL-SCNC: 4.3 MMOL/L — SIGNIFICANT CHANGE UP (ref 3.5–5.3)
RBC # BLD: 3.95 M/UL — LOW (ref 4.2–5.8)
RBC # FLD: 13.5 % — SIGNIFICANT CHANGE UP (ref 10.3–14.5)
SODIUM SERPL-SCNC: 134 MMOL/L — LOW (ref 135–145)
SPECIMEN SOURCE: SIGNIFICANT CHANGE UP
SPECIMEN SOURCE: SIGNIFICANT CHANGE UP
WBC # BLD: 10.3 K/UL — SIGNIFICANT CHANGE UP (ref 3.8–10.5)
WBC # FLD AUTO: 10.3 K/UL — SIGNIFICANT CHANGE UP (ref 3.8–10.5)

## 2018-05-23 PROCEDURE — 99233 SBSQ HOSP IP/OBS HIGH 50: CPT

## 2018-05-23 PROCEDURE — 99233 SBSQ HOSP IP/OBS HIGH 50: CPT | Mod: GC

## 2018-05-23 RX ORDER — INSULIN LISPRO 100/ML
VIAL (ML) SUBCUTANEOUS
Qty: 0 | Refills: 0 | Status: DISCONTINUED | OUTPATIENT
Start: 2018-05-23 | End: 2018-06-01

## 2018-05-23 RX ADMIN — OXYCODONE AND ACETAMINOPHEN 1 TABLET(S): 5; 325 TABLET ORAL at 21:22

## 2018-05-23 RX ADMIN — PIPERACILLIN AND TAZOBACTAM 25 GRAM(S): 4; .5 INJECTION, POWDER, LYOPHILIZED, FOR SOLUTION INTRAVENOUS at 14:05

## 2018-05-23 RX ADMIN — ONDANSETRON 4 MILLIGRAM(S): 8 TABLET, FILM COATED ORAL at 02:20

## 2018-05-23 RX ADMIN — HEPARIN SODIUM 5000 UNIT(S): 5000 INJECTION INTRAVENOUS; SUBCUTANEOUS at 14:05

## 2018-05-23 RX ADMIN — OXYCODONE AND ACETAMINOPHEN 1 TABLET(S): 5; 325 TABLET ORAL at 22:52

## 2018-05-23 RX ADMIN — Medication 100 MILLIGRAM(S): at 14:05

## 2018-05-23 RX ADMIN — Medication 1 TABLET(S): at 21:22

## 2018-05-23 RX ADMIN — AMLODIPINE BESYLATE 10 MILLIGRAM(S): 2.5 TABLET ORAL at 05:32

## 2018-05-23 RX ADMIN — TAMSULOSIN HYDROCHLORIDE 0.4 MILLIGRAM(S): 0.4 CAPSULE ORAL at 14:05

## 2018-05-23 RX ADMIN — HEPARIN SODIUM 5000 UNIT(S): 5000 INJECTION INTRAVENOUS; SUBCUTANEOUS at 05:32

## 2018-05-23 RX ADMIN — PIPERACILLIN AND TAZOBACTAM 25 GRAM(S): 4; .5 INJECTION, POWDER, LYOPHILIZED, FOR SOLUTION INTRAVENOUS at 00:33

## 2018-05-23 RX ADMIN — HEPARIN SODIUM 5000 UNIT(S): 5000 INJECTION INTRAVENOUS; SUBCUTANEOUS at 21:22

## 2018-05-23 NOTE — PROGRESS NOTE ADULT - SUBJECTIVE AND OBJECTIVE BOX
[facility] DIVISION OF KIDNEY DISEASES AND HYPERTENSION -- [phone]   FOLLOW UP NOTE  --------------------------------------------------------------------------------  HPI:  57 year old male with PMHx of nephrolithiasis, DM, gout, lymphedema, obesity, who was transferred from an OSH after found to have bilateral ureteral calculi and SMITH. Pt fell at home(was on floor for 6 hours)  2 days ago and presented to the OSH, where he had a CT scan done showing a left 1cm distal calculus and a 7mm right proximal calculus. The urology team had attempted a bilateral stent placement, but had difficulty with cystoscope insertion. He was transferred to [facility] for bilateral nephrostomy tube placement. Pt had bilateral stent placed by IR on (5/21) and received contrast.  Pt seen and examined at bedside.   PAST HISTORY  --------------------------------------------------------------------------------  No significant changes to PMH, PSH, FHx, SHx, unless otherwise noted    ALLERGIES & MEDICATIONS  --------------------------------------------------------------------------------  Allergies    No Known Allergies    Intolerances      Standing Inpatient Medications  amLODIPine   Tablet 10 milliGRAM(s) Oral daily  dextrose 5% + sodium chloride 0.45%. 1000 milliLiter(s) IV Continuous <Continuous>  dextrose 5%. 1000 milliLiter(s) IV Continuous <Continuous>  dextrose 50% Injectable 12.5 Gram(s) IV Push once  docusate sodium 100 milliGRAM(s) Oral three times a day  heparin  Injectable 5000 Unit(s) SubCutaneous every 8 hours  insulin lispro (HumaLOG) corrective regimen sliding scale   SubCutaneous three times a day before meals  piperacillin/tazobactam IVPB. 3.375 Gram(s) IV Intermittent every 12 hours  tamsulosin 0.4 milliGRAM(s) Oral daily    PRN Inpatient Medications  acetaminophen   Tablet 650 milliGRAM(s) Oral every 6 hours PRN  dextrose 40% Gel 15 Gram(s) Oral once PRN  glucagon  Injectable 1 milliGRAM(s) IntraMuscular once PRN  ondansetron Injectable 4 milliGRAM(s) IV Push every 6 hours PRN  oxyCODONE    5 mG/acetaminophen 325 mG 1 Tablet(s) Oral every 4 hours PRN  oxyCODONE    5 mG/acetaminophen 325 mG 2 Tablet(s) Oral every 6 hours PRN  senna 2 Tablet(s) Oral at bedtime PRN      REVIEW OF SYSTEMS  --------------------------------------------------------------------------------  General: no fever  CVS: no chest pain  RESP: no sob, no cough  ABD: no abdominal pain  : no dysuria,  MSK: + edema     VITALS/PHYSICAL EXAM  --------------------------------------------------------------------------------  T(C): 37 (05-23-18 @ 05:24), Max: 37.4 (05-23-18 @ 00:37)  HR: 84 (05-23-18 @ 06:27) (78 - 92)  BP: 165/73 (05-23-18 @ 05:24) (133/72 - 165/73)  RR: 18 (05-23-18 @ 05:24) (18 - 18)  SpO2: 94% (05-23-18 @ 06:27) (92% - 98%)  Wt(kg): --        05-22-18 @ 07:01  -  05-23-18 @ 07:00  --------------------------------------------------------  IN: 4135 mL / OUT: 1545 mL / NET: 2590 mL      Physical Exam:  	Gen: NAD,   	HEENT: MMM  	Pulm: Decreased breath sounds  B/L  	CV: S1S2  	Abd: Obese, soft  	Ext: + non-pitting  LE edema B/L  	Neuro: Awake, alert  	Skin: Warm and dry  	: B/L Nephrostomy tubes and holman catheter    LABS/STUDIES  --------------------------------------------------------------------------------              12.3   11.8  >-----------<  271      [05-22-18 @ 07:53]              39.2     134  |  96  |  78  ----------------------------<  99      [05-22-18 @ 07:53]  4.6   |  18  |  7.09        Ca     8.3     [05-22-18 @ 07:53]          CK 4975      [05-22-18 @ 14:21]    Creatinine Trend:  SCr 7.09 [05-22 @ 07:53]  SCr 6.28 [05-21 @ 12:03]  SCr 5.43 [05-20 @ 21:46]    Urinalysis - [05-20-18 @ 21:46]      Color Red / Appearance Turbid / SG 1.007 / pH 6.0      Gluc Negative / Ketone Trace  / Bili Small / Urobili Negative       Blood Large / Protein 300 / Leuk Est Large / Nitrite Negative      RBC >50 / WBC >50 / Hyaline  / Gran  / Sq Epi  / Non Sq Epi  / Bacteria Few    Urine Sodium 68      [05-22-18 @ 17:16]  Urine Potassium 13      [05-22-18 @ 17:16]  Urine Chloride 56      [05-22-18 @ 17:16]    HbA1c 6.0      [05-21-18 @ 07:53]

## 2018-05-23 NOTE — PROGRESS NOTE ADULT - ASSESSMENT
s/p Bilat NT for bilat obstructive stones, SMITH  - trend creat  - f/u renal sono  - labs, CK level  - f/u Renal  - PT   - Cont abx

## 2018-05-23 NOTE — PROGRESS NOTE ADULT - SUBJECTIVE AND OBJECTIVE BOX
UROLOGY DAILY PROGRESS NOTE:     Subjective: Patient seen and examined at bedside. Creat karley yesterday despite bilat NT, CK elevated, Nephro consulted.       Objective:  Vital signs  T(F): , Max: 99.3 (05-23-18 @ 00:37)  HR: 84 (05-23-18 @ 06:27)  BP: 165/73 (05-23-18 @ 05:24)  SpO2: 94% (05-23-18 @ 06:27)  Wt(kg): --    I&O's Summary    21 May 2018 07:01  -  22 May 2018 07:00  --------------------------------------------------------  IN: 2475 mL / OUT: 1290 mL / NET: 1185 mL    22 May 2018 07:01  -  23 May 2018 06:38  --------------------------------------------------------  IN: 4135 mL / OUT: 1545 mL / NET: 2590 mL    I&O's Detail    21 May 2018 07:01  -  22 May 2018 07:00  --------------------------------------------------------  IN:    IV PiggyBack: 100 mL    lactated ringers.: 2375 mL  Total IN: 2475 mL    OUT:    Indwelling Catheter - Urethral: 130 mL    Nephrostomy Tube: 950 mL    Nephrostomy Tube: 210 mL  Total OUT: 1290 mL    Total NET: 1185 mL      22 May 2018 07:01  -  23 May 2018 06:39  --------------------------------------------------------  IN:    dextrose 5% + sodium chloride 0.45%.: 1800 mL    IV PiggyBack: 200 mL    lactated ringers.: 1375 mL    Oral Fluid: 760 mL  Total IN: 4135 mL    OUT:    Indwelling Catheter - Urethral: 420 mL    Nephrostomy Tube: 675 mL    Nephrostomy Tube: 450 mL  Total OUT: 1545 mL    Total NET: 2590 mL          Gen: NAD  Pulm: No respiratory distress, no subcostal retractions  CV: RRR, no JVD  Abd: Soft, NT, ND  Back: bilat NTs draining clear yellow urine  : Bae- clear yellow urine    Labs:  05-22  11.8  / 39.2  /7.09   05-21  6.9   / 42.4  /6.28                           12.3   11.8  )-----------( 271      ( 22 May 2018 07:53 )             39.2     05-22    134<L>  |  96  |  78<H>  ----------------------------<  99  4.6   |  18<L>  |  7.09<H>    Ca    8.3<L>      22 May 2018 07:53          Urine Cx:  Culture - Urine (05.21.18 @ 12:22)    Specimen Source: .Urine Nephrostomy - Left    Culture Results:   50,000 - 99,000 CFU/mL Proteus mirabilis    Culture - Blood (05.21.18 @ 15:48)    Specimen Source: .Blood Blood-Peripheral    Culture Results:   No growth to date.

## 2018-05-23 NOTE — PROGRESS NOTE ADULT - PROBLEM SELECTOR PLAN 1
Pt with SMITH likely ATN now. SMITH multifactorial  in setting of obstructive uropathy, losartan use, contrast use on (5/21)  and rhabdomyolysis.  Pt had initial Scr at 2.6 on (5/18) at OSH. Pt with Scr 5.4 on (5/20) which further trended up to 7.09 on (5/22). No new labs available today for reveiw. Pt non-oliguric.  Follow up renal sonogram. Monitor BMP, strict I/O. No plan for urgent HD at present. Pt agreeable if needed and consent obtained. Avoid any further nephrotoxics, NSAIDs, RCA.

## 2018-05-23 NOTE — PROGRESS NOTE ADULT - SUBJECTIVE AND OBJECTIVE BOX
Interventional Radiology Follow- Up Note    S: No acute events overnight.    O:  Vitals: T(F): 98.6 (05-23-18 @ 05:24), Max: 99.3 (05-23-18 @ 00:37)  HR: 84 (05-23-18 @ 06:27) (78 - 92)  BP: 165/73 (05-23-18 @ 05:24) (133/72 - 165/73)  RR: 18 (05-23-18 @ 05:24) (18 - 18)  SpO2: 94% (05-23-18 @ 06:27) (92% - 98%)    LABS:                        12.3   11.8  )-----------( 271      ( 22 May 2018 07:53 )             39.2     05-22    134<L>  |  96  |  78<H>  ----------------------------<  99  4.6   |  18<L>  |  7.09<H>    Culture - Urine (collected 21 May 2018 12:22)  Source: .Urine Nephrostomy - Left  Final Report (23 May 2018 08:03):    50,000 - 99,000 CFU/mL Proteus mirabilis  Organism: Proteus mirabilis (23 May 2018 08:03)  Organism: Proteus mirabilis (23 May 2018 08:03)    Culture - Urine (collected 21 May 2018 12:21)  Source: .Urine Nephrostomy - Right  Preliminary Report (22 May 2018 11:20):    Moderate Proteus mirabilis    I&O's Detail    OUT:    Indwelling Catheter - Urethral: 420 mL    Nephrostomy Tube: 675 mL    Nephrostomy Tube: 450 mL  Total OUT: 1545 mL    Renal US: Decreased hydronephrosis.    PHYSICAL EXAM:    General: Awake, in NAD  Abdomen: Soft, NT, ND. Bilateral NT and Bae with clear urine    Impression: 57 year old gentleman obstructing bilateral ureteral calculi s/p bilateral NT tube placement. US shows decreased hydronephrosis, although Cr trending up.    Plan:  -Flush NT 5 cc NS daily  -     Please call IR at extension [phone] with any questions, concerns, or issues regarding above. Interventional Radiology Follow- Up Note    S: No acute events overnight.    O:  Vitals: T(F): 98.6 (05-23-18 @ 05:24), Max: 99.3 (05-23-18 @ 00:37)  HR: 84 (05-23-18 @ 06:27) (78 - 92)  BP: 165/73 (05-23-18 @ 05:24) (133/72 - 165/73)  RR: 18 (05-23-18 @ 05:24) (18 - 18)  SpO2: 94% (05-23-18 @ 06:27) (92% - 98%)    LABS:                        12.3   11.8  )-----------( 271      ( 22 May 2018 07:53 )             39.2     05-22    134<L>  |  96  |  78<H>  ----------------------------<  99  4.6   |  18<L>  |  7.09<H>    Culture - Urine (collected 21 May 2018 12:22)  Source: .Urine Nephrostomy - Left  Final Report (23 May 2018 08:03):    50,000 - 99,000 CFU/mL Proteus mirabilis  Organism: Proteus mirabilis (23 May 2018 08:03)  Organism: Proteus mirabilis (23 May 2018 08:03)    Culture - Urine (collected 21 May 2018 12:21)  Source: .Urine Nephrostomy - Right  Preliminary Report (22 May 2018 11:20):    Moderate Proteus mirabilis    I&O's Detail    OUT:    Indwelling Catheter - Urethral: 420 mL    Nephrostomy Tube: 675 mL    Nephrostomy Tube: 450 mL  Total OUT: 1545 mL    Renal US: Decreased hydronephrosis.    PHYSICAL EXAM:    General: Awake, in NAD  Abdomen: Soft, NT, ND. Bilateral NT and Bae with clear urine    Impression: 57 year old gentleman obstructing bilateral ureteral calculi s/p bilateral NT tube placement. US shows decreased hydronephrosis, although Cr trending up.    Plan:  -Flush NT 5 cc NS daily  -Continue antibiotics  -Follow up renal reccs     Please call IR at extension [phone] with any questions, concerns, or issues regarding above. Interventional Radiology Follow- Up Note    S: No acute events overnight.    O:  Vitals: T(F): 98.6 (05-23-18 @ 05:24), Max: 99.3 (05-23-18 @ 00:37)  HR: 84 (05-23-18 @ 06:27) (78 - 92)  BP: 165/73 (05-23-18 @ 05:24) (133/72 - 165/73)  RR: 18 (05-23-18 @ 05:24) (18 - 18)  SpO2: 94% (05-23-18 @ 06:27) (92% - 98%)    LABS:                        12.3   11.8  )-----------( 271      ( 22 May 2018 07:53 )             39.2     05-22    134<L>  |  96  |  78<H>  ----------------------------<  99  4.6   |  18<L>  |  7.09<H>    Culture - Urine (collected 21 May 2018 12:22)  Source: .Urine Nephrostomy - Left  Final Report (23 May 2018 08:03):    50,000 - 99,000 CFU/mL Proteus mirabilis  Organism: Proteus mirabilis (23 May 2018 08:03)  Organism: Proteus mirabilis (23 May 2018 08:03)    Culture - Urine (collected 21 May 2018 12:21)  Source: .Urine Nephrostomy - Right  Preliminary Report (22 May 2018 11:20):    Moderate Proteus mirabilis    I&O's Detail    OUT:    Indwelling Catheter - Urethral: 420 mL    Nephrostomy Tube: 675 mL    Nephrostomy Tube: 450 mL  Total OUT: 1545 mL    Renal US: Decreased hydronephrosis.    PHYSICAL EXAM:    General: Awake, in NAD  Abdomen: Soft, NT, ND. Bilateral NT and Bae with clear urine    Impression: 57 year old gentleman obstructing bilateral ureteral calculi s/p bilateral NT tube placement. US shows decreased hydronephrosis, although Cr trending up.    Plan:  -Flush NT 5 cc NS daily  -Continue antibiotics  -Follow up final culture  -Follow up renal reccs     Please call IR at extension [phone] with any questions, concerns, or issues regarding above.

## 2018-05-24 LAB
CHLORIDE BLDA-SCNC: 106 MMOL/L — SIGNIFICANT CHANGE UP (ref 96–108)
GAS PNL BLDA: SIGNIFICANT CHANGE UP
GLUCOSE BLDA-MCNC: 113 MG/DL — HIGH (ref 70–99)
GLUCOSE BLDC GLUCOMTR-MCNC: 108 MG/DL — HIGH (ref 70–99)
GLUCOSE BLDC GLUCOMTR-MCNC: 126 MG/DL — HIGH (ref 70–99)
GLUCOSE BLDC GLUCOMTR-MCNC: 148 MG/DL — HIGH (ref 70–99)
GLUCOSE BLDC GLUCOMTR-MCNC: 160 MG/DL — HIGH (ref 70–99)
HCT VFR BLDA CALC: 38 % — LOW (ref 39–50)
HGB BLD CALC-MCNC: 12.5 G/DL — LOW (ref 13–17)
POTASSIUM BLDA-SCNC: 4.1 MMOL/L — SIGNIFICANT CHANGE UP (ref 3.5–5.3)
SODIUM BLDA-SCNC: 131 MMOL/L — LOW (ref 135–145)

## 2018-05-24 PROCEDURE — 99233 SBSQ HOSP IP/OBS HIGH 50: CPT | Mod: GC

## 2018-05-24 PROCEDURE — 99233 SBSQ HOSP IP/OBS HIGH 50: CPT

## 2018-05-24 RX ADMIN — HEPARIN SODIUM 5000 UNIT(S): 5000 INJECTION INTRAVENOUS; SUBCUTANEOUS at 15:00

## 2018-05-24 RX ADMIN — OXYCODONE AND ACETAMINOPHEN 2 TABLET(S): 5; 325 TABLET ORAL at 22:37

## 2018-05-24 RX ADMIN — HEPARIN SODIUM 5000 UNIT(S): 5000 INJECTION INTRAVENOUS; SUBCUTANEOUS at 22:07

## 2018-05-24 RX ADMIN — OXYCODONE AND ACETAMINOPHEN 2 TABLET(S): 5; 325 TABLET ORAL at 22:07

## 2018-05-24 RX ADMIN — HEPARIN SODIUM 5000 UNIT(S): 5000 INJECTION INTRAVENOUS; SUBCUTANEOUS at 05:16

## 2018-05-24 RX ADMIN — TAMSULOSIN HYDROCHLORIDE 0.4 MILLIGRAM(S): 0.4 CAPSULE ORAL at 12:23

## 2018-05-24 RX ADMIN — Medication 1 TABLET(S): at 12:23

## 2018-05-24 RX ADMIN — AMLODIPINE BESYLATE 10 MILLIGRAM(S): 2.5 TABLET ORAL at 05:16

## 2018-05-24 RX ADMIN — Medication 1: at 12:26

## 2018-05-24 NOTE — PROGRESS NOTE ADULT - PROBLEM SELECTOR PLAN 1
Pt with SMITH likely ATN now. SMITH multifactorial  in setting of obstructive uropathy, losartan use, contrast use on (5/21)  and rhabdomyolysis.  Pt had initial Scr at 2.6 on (5/18) at OSH. Pt with Scr 5.4 on (5/20) which further trended up to 7.09 on (5/22). Scr elevated however stable on labs yesterday.   Pt non-oliguric.   Monitor BMP, strict I/O. No plan for urgent HD at present. Pt agreeable if needed and consent obtained. Avoid any further nephrotoxics, NSAIDs, RCA

## 2018-05-24 NOTE — PROGRESS NOTE ADULT - SUBJECTIVE AND OBJECTIVE BOX
[facility] DIVISION OF KIDNEY DISEASES AND HYPERTENSION -- [phone]   FOLLOW UP NOTE  --------------------------------------------------------------------------------  HPI:  57 year old male with PMHx of nephrolithiasis, DM, gout, lymphedema, obesity, who was transferred from an OSH after found to have bilateral ureteral calculi and SMITH. Pt fell at home(was on floor for 6 hours) and presented to the OSH, where he had a CT scan done showing a left 1cm distal calculus and a 7mm right proximal calculus. The urology team had attempted a bilateral stent placement, but had difficulty with cystoscope insertion. He was transferred to [facility] for bilateral nephrostomy tube placement. Pt had bilateral stent placed by IR on (5/21) and received contrast. Nephrology consulted for SMITH.  Pt seen and examined at bedside.     PAST HISTORY  --------------------------------------------------------------------------------  No significant changes to PMH, PSH, FHx, SHx, unless otherwise noted    ALLERGIES & MEDICATIONS  --------------------------------------------------------------------------------  Allergies    No Known Allergies    Intolerances      Standing Inpatient Medications  amLODIPine   Tablet 10 milliGRAM(s) Oral daily  amoxicillin  500 milliGRAM(s)/clavulanate 1 Tablet(s) Oral daily  dextrose 5% + sodium chloride 0.45%. 1000 milliLiter(s) IV Continuous <Continuous>  dextrose 5%. 1000 milliLiter(s) IV Continuous <Continuous>  dextrose 50% Injectable 12.5 Gram(s) IV Push once  docusate sodium 100 milliGRAM(s) Oral three times a day  heparin  Injectable 5000 Unit(s) SubCutaneous every 8 hours  insulin lispro (HumaLOG) corrective regimen sliding scale   SubCutaneous three times a day before meals  tamsulosin 0.4 milliGRAM(s) Oral daily    PRN Inpatient Medications  acetaminophen   Tablet 650 milliGRAM(s) Oral every 6 hours PRN  dextrose 40% Gel 15 Gram(s) Oral once PRN  glucagon  Injectable 1 milliGRAM(s) IntraMuscular once PRN  ondansetron Injectable 4 milliGRAM(s) IV Push every 6 hours PRN  oxyCODONE    5 mG/acetaminophen 325 mG 1 Tablet(s) Oral every 4 hours PRN  oxyCODONE    5 mG/acetaminophen 325 mG 2 Tablet(s) Oral every 6 hours PRN  senna 2 Tablet(s) Oral at bedtime PRN      REVIEW OF SYSTEMS  --------------------------------------------------------------------------------  General: no fever  CVS: no chest pain  RESP: no sob, no cough  ABD: no abdominal pain  MSK: + edema     VITALS/PHYSICAL EXAM  --------------------------------------------------------------------------------  T(C): 36.8 (05-24-18 @ 05:14), Max: 36.9 (05-23-18 @ 14:21)  HR: 79 (05-24-18 @ 09:09) (79 - 95)  BP: 126/66 (05-24-18 @ 05:14) (126/66 - 165/74)  RR: 18 (05-24-18 @ 05:14) (18 - 18)  SpO2: 95% (05-24-18 @ 09:09) (93% - 96%)  Wt(kg): --        05-23-18 @ 07:01  -  05-24-18 @ 07:00  --------------------------------------------------------  IN: 4800 mL / OUT: 4025 mL / NET: 775 mL      Physical Exam:  	Gen: NAD,   	HEENT: MMM  	Pulm: Decreased breath sounds  B/L  	CV: S1S2  	Abd: Obese, soft  	Ext: + non-pitting  LE edema B/L  	Neuro: Awake, alert  	Skin: Warm and dry  	: B/L Nephrostomy tubes and holman catheter    LABS/STUDIES  --------------------------------------------------------------------------------              12.1   10.3  >-----------<  279      [05-23-18 @ 08:01]              37.1     134  |  96  |  86  ----------------------------<  148      [05-23-18 @ 08:01]  4.3   |  20  |  7.03        Ca     8.3     [05-23-18 @ 08:01]          CK 2698      [05-23-18 @ 08:01]    Creatinine Trend:  SCr 7.03 [05-23 @ 08:01]  SCr 7.09 [05-22 @ 07:53]  SCr 6.28 [05-21 @ 12:03]  SCr 5.43 [05-20 @ 21:46]    Urinalysis - [05-20-18 @ 21:46]      Color Red / Appearance Turbid / SG 1.007 / pH 6.0      Gluc Negative / Ketone Trace  / Bili Small / Urobili Negative       Blood Large / Protein 300 / Leuk Est Large / Nitrite Negative      RBC >50 / WBC >50 / Hyaline  / Gran  / Sq Epi  / Non Sq Epi  / Bacteria Few    Urine Sodium 68      [05-22-18 @ 17:16]  Urine Potassium 13      [05-22-18 @ 17:16]  Urine Chloride 56      [05-22-18 @ 17:16]    HbA1c 6.0      [05-21-18 @ 07:53]

## 2018-05-24 NOTE — PROGRESS NOTE ADULT - ASSESSMENT
s/p Bilat NT for bilat obstructive stones, SMITH  - trend creat    - labs, CK level  - f/u Renal  - PT - has not been OOB  - Cont abx

## 2018-05-24 NOTE — PROGRESS NOTE ADULT - SUBJECTIVE AND OBJECTIVE BOX
Subjective  feeling well; has not been oob   Objective    Vital signs  T(F): , Max: 98.4 (05-23-18 @ 14:21)  HR: 81 (05-24-18 @ 06:43)  BP: 126/66 (05-24-18 @ 05:14)  SpO2: 95% (05-24-18 @ 06:43)  Wt(kg): --    Output     05-22 @ 07:01  -  05-23 @ 07:00  --------------------------------------------------------  IN: 4135 mL / OUT: 1545 mL / NET: 2590 mL    05-23 @ 07:01  -  05-24 @ 06:51  --------------------------------------------------------  IN: 4800 mL / OUT: 4025 mL / NET: 775 mL        Gen awake alert nad axox3  Abdobese soft ntnd   Back tube in place no hematoma/ ecchymosis        Labs      05-23 @ 08:01    WBC 10.3  / Hct 37.1  / SCr 7.03     05-22 @ 07:53    WBC 11.8  / Hct 39.2  / SCr 7.09       Urine Cx: Culture - Urine (05.21.18 @ 12:22)    -  Tobramycin: S <=2    -  Trimethoprim/Sulfamethoxazole: S <=0.5/9.5    -  Ceftriaxone: S <=1 Enterobacter, Citrobacter, and Serratia may develop resistance during prolonged therapy    -  Ciprofloxacin: S <=0.5    -  Ertapenem: S <=0.5    -  Gentamicin: S 2    -  Levofloxacin: S <=1    -  Meropenem: S <=1    -  Nitrofurantoin: R >64 Should not be used to treat pyelonephritis    -  Piperacillin/Tazobactam: S <=8    -  Amikacin: S <=8    -  Amoxicillin/Clavulanic Acid: S <=8/4    -  Ampicillin: S <=2 These ampicillin results predict results for amoxicillin    -  Ampicillin/Sulbactam: S <=4/2    -  Aztreonam: S <=4    -  Cefazolin: S <=2 This predicts results for oral agents cefaclor, cefdinir, cefpodoxime, cefprozil, cefuroxime axetil, cephalexin and locarbef for uncomplicated UTI. Note that some isolates may be susceptible to these agents while testing resistant to cefazolin.    -  Cefepime: S <=2    -  Cefoxitin: S <=4    Specimen Source: .Urine Nephrostomy - Left    Culture Results:   50,000 - 99,000 CFU/mL Proteus mirabilis    Organism Identification: Proteus mirabilis    Organism: Proteus mirabilis    Method Type: ARTHUR      Blood Cx: Culture - Blood (05.21.18 @ 15:48)    Specimen Source: .Blood Blood-Peripheral    Culture Results:   No growth to date.

## 2018-05-25 LAB
ANION GAP SERPL CALC-SCNC: 15 MMOL/L — SIGNIFICANT CHANGE UP (ref 5–17)
BUN SERPL-MCNC: 96 MG/DL — HIGH (ref 7–23)
CALCIUM SERPL-MCNC: 8.4 MG/DL — SIGNIFICANT CHANGE UP (ref 8.4–10.5)
CHLORIDE SERPL-SCNC: 99 MMOL/L — SIGNIFICANT CHANGE UP (ref 96–108)
CK SERPL-CCNC: 577 U/L — HIGH (ref 30–200)
CO2 SERPL-SCNC: 23 MMOL/L — SIGNIFICANT CHANGE UP (ref 22–31)
CREAT SERPL-MCNC: 6.44 MG/DL — HIGH (ref 0.5–1.3)
GLUCOSE BLDC GLUCOMTR-MCNC: 104 MG/DL — HIGH (ref 70–99)
GLUCOSE BLDC GLUCOMTR-MCNC: 107 MG/DL — HIGH (ref 70–99)
GLUCOSE BLDC GLUCOMTR-MCNC: 117 MG/DL — HIGH (ref 70–99)
GLUCOSE SERPL-MCNC: 137 MG/DL — HIGH (ref 70–99)
HCT VFR BLD CALC: 36.3 % — LOW (ref 39–50)
HGB BLD-MCNC: 11.8 G/DL — LOW (ref 13–17)
MCHC RBC-ENTMCNC: 30.4 PG — SIGNIFICANT CHANGE UP (ref 27–34)
MCHC RBC-ENTMCNC: 32.6 GM/DL — SIGNIFICANT CHANGE UP (ref 32–36)
MCV RBC AUTO: 93.1 FL — SIGNIFICANT CHANGE UP (ref 80–100)
PLATELET # BLD AUTO: 321 K/UL — SIGNIFICANT CHANGE UP (ref 150–400)
POTASSIUM SERPL-MCNC: 4 MMOL/L — SIGNIFICANT CHANGE UP (ref 3.5–5.3)
POTASSIUM SERPL-SCNC: 4 MMOL/L — SIGNIFICANT CHANGE UP (ref 3.5–5.3)
RBC # BLD: 3.9 M/UL — LOW (ref 4.2–5.8)
RBC # FLD: 13.3 % — SIGNIFICANT CHANGE UP (ref 10.3–14.5)
SODIUM SERPL-SCNC: 137 MMOL/L — SIGNIFICANT CHANGE UP (ref 135–145)
WBC # BLD: 10 K/UL — SIGNIFICANT CHANGE UP (ref 3.8–10.5)
WBC # FLD AUTO: 10 K/UL — SIGNIFICANT CHANGE UP (ref 3.8–10.5)

## 2018-05-25 PROCEDURE — 99233 SBSQ HOSP IP/OBS HIGH 50: CPT | Mod: GC

## 2018-05-25 PROCEDURE — 99232 SBSQ HOSP IP/OBS MODERATE 35: CPT

## 2018-05-25 RX ADMIN — TAMSULOSIN HYDROCHLORIDE 0.4 MILLIGRAM(S): 0.4 CAPSULE ORAL at 12:14

## 2018-05-25 RX ADMIN — AMLODIPINE BESYLATE 10 MILLIGRAM(S): 2.5 TABLET ORAL at 05:48

## 2018-05-25 RX ADMIN — Medication 1 TABLET(S): at 12:14

## 2018-05-25 RX ADMIN — HEPARIN SODIUM 5000 UNIT(S): 5000 INJECTION INTRAVENOUS; SUBCUTANEOUS at 05:48

## 2018-05-25 RX ADMIN — Medication 100 MILLIGRAM(S): at 22:08

## 2018-05-25 RX ADMIN — HEPARIN SODIUM 5000 UNIT(S): 5000 INJECTION INTRAVENOUS; SUBCUTANEOUS at 22:08

## 2018-05-25 RX ADMIN — HEPARIN SODIUM 5000 UNIT(S): 5000 INJECTION INTRAVENOUS; SUBCUTANEOUS at 14:03

## 2018-05-25 NOTE — PROGRESS NOTE ADULT - PROBLEM SELECTOR PLAN 1
Pt with SMITH likely ATN now. SMITH multifactorial  in setting of obstructive uropathy, losartan use, contrast use on (5/21)  and rhabdomyolysis.  Pt had initial Scr at 2.6 on (5/18) at OSH. Pt with Scr 5.4 on (5/20) which further trended up to 7.09 on (5/22). Scr improving today.   Pt non-oliguric.   Monitor BMP, strict I/O. No plan for urgent HD at present. Pt agreeable if needed and consent obtained. Avoid any further nephrotoxics, NSAIDs, RCA.

## 2018-05-25 NOTE — PROGRESS NOTE ADULT - SUBJECTIVE AND OBJECTIVE BOX
Subjective  feeling well without complaints   Objective    Vital signs  T(F): , Max: 98.5 (05-24-18 @ 10:09)  HR: 85 (05-25-18 @ 04:25)  BP: 129/70 (05-25-18 @ 04:25)  SpO2: 98% (05-25-18 @ 04:25)  Wt(kg): --    Output     05-24 @ 07:01  -  05-25 @ 07:00  --------------------------------------------------------  IN: 4920 mL / OUT: 3550 mL / NET: 1370 mL        Gen awake alert nad axox3  Abd obese soft ntnd   Back tube in place no hematoma/ ecchymosis     urines yellow     Labs      05-25 @ 05:03    WBC 10.0  / Hct 36.3  / SCr 6.44     05-23 @ 08:01    WBC 10.3  / Hct 37.1  / SCr 7.03       Urine Cx: Culture - Urine (05.21.18 @ 12:22)    -  Piperacillin/Tazobactam: S <=8    -  Tobramycin: S <=2    -  Trimethoprim/Sulfamethoxazole: S <=0.5/9.5    -  Cefoxitin: S <=4    -  Ceftriaxone: S <=1 Enterobacter, Citrobacter, and Serratia may develop resistance during prolonged therapy    -  Ciprofloxacin: S <=0.5    -  Ertapenem: S <=0.5    -  Gentamicin: S 2    -  Levofloxacin: S <=1    -  Meropenem: S <=1    -  Nitrofurantoin: R >64 Should not be used to treat pyelonephritis    -  Amikacin: S <=8    -  Amoxicillin/Clavulanic Acid: S <=8/4    -  Ampicillin: S <=2 These ampicillin results predict results for amoxicillin    -  Ampicillin/Sulbactam: S <=4/2    -  Aztreonam: S <=4    -  Cefazolin: S <=2 This predicts results for oral agents cefaclor, cefdinir, cefpodoxime, cefprozil, cefuroxime axetil, cephalexin and locarbef for uncomplicated UTI. Note that some isolates may be susceptible to these agents while testing resistant to cefazolin.    -  Cefepime: S <=2    Specimen Source: .Urine Nephrostomy - Left    Culture Results:   50,000 - 99,000 CFU/mL Proteus mirabilis    Organism Identification: Proteus mirabilis    Organism: Proteus mirabilis    Method Type: ARTHUR    Culture - Blood (05.21.18 @ 15:48)    Specimen Source: .Blood Blood-Peripheral    Culture Results:   No growth to date.

## 2018-05-25 NOTE — PROGRESS NOTE ADULT - ASSESSMENT
s/p Bilat NT for bilat obstructive stones, SMITH now improving   - trend creat  - labs, CK level  - f/u Renal  - PT - subacute rehab   - Cont oral  abx  dc planning

## 2018-05-25 NOTE — PROGRESS NOTE ADULT - SUBJECTIVE AND OBJECTIVE BOX
[facility] DIVISION OF KIDNEY DISEASES AND HYPERTENSION -- [phone]   FOLLOW UP NOTE  --------------------------------------------------------------------------------  HPI:  57 year old male with PMHx of nephrolithiasis, DM, gout, lymphedema, obesity, who was transferred from an OSH after found to have bilateral ureteral calculi and SMITH. Pt fell at home(was on floor for 6 hours) and presented to the OSH, where he had a CT scan done showing a left 1cm distal calculus and a 7mm right proximal calculus. The urology team had attempted a bilateral stent placement, but had difficulty with cystoscope insertion. He was transferred to [facility] for bilateral nephrostomy tube placement. Pt had bilateral stent placed by IR on (5/21) and received contrast. Nephrology consulted for SMITH.  Pt seen and examined at bedside.     PAST HISTORY  --------------------------------------------------------------------------------  No significant changes to PMH, PSH, FHx, SHx, unless otherwise noted    ALLERGIES & MEDICATIONS  --------------------------------------------------------------------------------  Allergies    No Known Allergies    Intolerances      Standing Inpatient Medications  amLODIPine   Tablet 10 milliGRAM(s) Oral daily  amoxicillin  500 milliGRAM(s)/clavulanate 1 Tablet(s) Oral daily  dextrose 5% + sodium chloride 0.45%. 1000 milliLiter(s) IV Continuous <Continuous>  dextrose 5%. 1000 milliLiter(s) IV Continuous <Continuous>  dextrose 50% Injectable 12.5 Gram(s) IV Push once  docusate sodium 100 milliGRAM(s) Oral three times a day  heparin  Injectable 5000 Unit(s) SubCutaneous every 8 hours  insulin lispro (HumaLOG) corrective regimen sliding scale   SubCutaneous three times a day before meals  tamsulosin 0.4 milliGRAM(s) Oral daily    PRN Inpatient Medications  acetaminophen   Tablet 650 milliGRAM(s) Oral every 6 hours PRN  dextrose 40% Gel 15 Gram(s) Oral once PRN  glucagon  Injectable 1 milliGRAM(s) IntraMuscular once PRN  ondansetron Injectable 4 milliGRAM(s) IV Push every 6 hours PRN  oxyCODONE    5 mG/acetaminophen 325 mG 1 Tablet(s) Oral every 4 hours PRN  oxyCODONE    5 mG/acetaminophen 325 mG 2 Tablet(s) Oral every 6 hours PRN  senna 2 Tablet(s) Oral at bedtime PRN      REVIEW OF SYSTEMS  --------------------------------------------------------------------------------  General: no fever  CVS: no chest pain  RESP: no sob, no cough  ABD: no abdominal pain  : no dysuria,  MSK: + edema     VITALS/PHYSICAL EXAM  --------------------------------------------------------------------------------  T(C): 36.4 (05-25-18 @ 04:25), Max: 36.9 (05-24-18 @ 10:09)  HR: 85 (05-25-18 @ 04:25) (71 - 87)  BP: 129/70 (05-25-18 @ 04:25) (104/67 - 144/88)  RR: 18 (05-25-18 @ 04:25) (18 - 20)  SpO2: 98% (05-25-18 @ 04:25) (92% - 98%)  Wt(kg): --        05-24-18 @ 07:01  -  05-25-18 @ 07:00  --------------------------------------------------------  IN: 4920 mL / OUT: 3550 mL / NET: 1370 mL      Physical Exam:  	  Gen: NAD,   	HEENT: MMM  	Pulm: Decreased breath sounds  B/L  	CV: S1S2  	Abd: Obese, soft  	Ext: + non-pitting  LE edema B/L  	Neuro: Awake, alert  	Skin: Warm and dry  	: B/L Nephrostomy tubes and holman catheter  LABS/STUDIES  --------------------------------------------------------------------------------              11.8   10.0  >-----------<  321      [05-25-18 @ 05:03]              36.3     137  |  99  |  96  ----------------------------<  137      [05-25-18 @ 05:03]  4.0   |  23  |  6.44        Ca     8.4     [05-25-18 @ 05:03]                [05-25-18 @ 05:03]    Creatinine Trend:  SCr 6.44 [05-25 @ 05:03]  SCr 7.03 [05-23 @ 08:01]  SCr 7.09 [05-22 @ 07:53]  SCr 6.28 [05-21 @ 12:03]  SCr 5.43 [05-20 @ 21:46]    Urinalysis - [05-20-18 @ 21:46]      Color Red / Appearance Turbid / SG 1.007 / pH 6.0      Gluc Negative / Ketone Trace  / Bili Small / Urobili Negative       Blood Large / Protein 300 / Leuk Est Large / Nitrite Negative      RBC >50 / WBC >50 / Hyaline  / Gran  / Sq Epi  / Non Sq Epi  / Bacteria Few    Urine Sodium 68      [05-22-18 @ 17:16]  Urine Potassium 13      [05-22-18 @ 17:16]  Urine Chloride 56      [05-22-18 @ 17:16]    HbA1c 6.0      [05-21-18 @ 07:53]

## 2018-05-26 LAB
ANION GAP SERPL CALC-SCNC: 13 MMOL/L — SIGNIFICANT CHANGE UP (ref 5–17)
BUN SERPL-MCNC: 90 MG/DL — HIGH (ref 7–23)
CALCIUM SERPL-MCNC: 8.4 MG/DL — SIGNIFICANT CHANGE UP (ref 8.4–10.5)
CHLORIDE SERPL-SCNC: 100 MMOL/L — SIGNIFICANT CHANGE UP (ref 96–108)
CK SERPL-CCNC: 356 U/L — HIGH (ref 30–200)
CO2 SERPL-SCNC: 24 MMOL/L — SIGNIFICANT CHANGE UP (ref 22–31)
CREAT SERPL-MCNC: 5.24 MG/DL — HIGH (ref 0.5–1.3)
CULTURE RESULTS: SIGNIFICANT CHANGE UP
CULTURE RESULTS: SIGNIFICANT CHANGE UP
GLUCOSE BLDC GLUCOMTR-MCNC: 107 MG/DL — HIGH (ref 70–99)
GLUCOSE BLDC GLUCOMTR-MCNC: 108 MG/DL — HIGH (ref 70–99)
GLUCOSE BLDC GLUCOMTR-MCNC: 108 MG/DL — HIGH (ref 70–99)
GLUCOSE BLDC GLUCOMTR-MCNC: 123 MG/DL — HIGH (ref 70–99)
GLUCOSE SERPL-MCNC: 113 MG/DL — HIGH (ref 70–99)
POTASSIUM SERPL-MCNC: 4.8 MMOL/L — SIGNIFICANT CHANGE UP (ref 3.5–5.3)
POTASSIUM SERPL-SCNC: 4.8 MMOL/L — SIGNIFICANT CHANGE UP (ref 3.5–5.3)
SODIUM SERPL-SCNC: 137 MMOL/L — SIGNIFICANT CHANGE UP (ref 135–145)
SPECIMEN SOURCE: SIGNIFICANT CHANGE UP
SPECIMEN SOURCE: SIGNIFICANT CHANGE UP

## 2018-05-26 RX ORDER — METOPROLOL TARTRATE 50 MG
25 TABLET ORAL ONCE
Qty: 0 | Refills: 0 | Status: COMPLETED | OUTPATIENT
Start: 2018-05-26 | End: 2018-05-26

## 2018-05-26 RX ADMIN — SODIUM CHLORIDE 150 MILLILITER(S): 9 INJECTION, SOLUTION INTRAVENOUS at 06:39

## 2018-05-26 RX ADMIN — Medication 100 MILLIGRAM(S): at 06:39

## 2018-05-26 RX ADMIN — ONDANSETRON 4 MILLIGRAM(S): 8 TABLET, FILM COATED ORAL at 22:40

## 2018-05-26 RX ADMIN — AMLODIPINE BESYLATE 10 MILLIGRAM(S): 2.5 TABLET ORAL at 06:39

## 2018-05-26 RX ADMIN — HEPARIN SODIUM 5000 UNIT(S): 5000 INJECTION INTRAVENOUS; SUBCUTANEOUS at 13:57

## 2018-05-26 RX ADMIN — Medication 1 TABLET(S): at 11:49

## 2018-05-26 RX ADMIN — HEPARIN SODIUM 5000 UNIT(S): 5000 INJECTION INTRAVENOUS; SUBCUTANEOUS at 21:22

## 2018-05-26 RX ADMIN — HEPARIN SODIUM 5000 UNIT(S): 5000 INJECTION INTRAVENOUS; SUBCUTANEOUS at 06:39

## 2018-05-26 RX ADMIN — Medication 25 MILLIGRAM(S): at 18:01

## 2018-05-26 RX ADMIN — TAMSULOSIN HYDROCHLORIDE 0.4 MILLIGRAM(S): 0.4 CAPSULE ORAL at 11:50

## 2018-05-26 NOTE — PROGRESS NOTE ADULT - ASSESSMENT
s/p Bilat NT for bilat obstructive stones, SMITH now improving   - OOB/ambulate with assistance  - Continue to trend Cr and CK level  - F/u Renal recs  - Discharge pending subacute rehab placement

## 2018-05-26 NOTE — PROGRESS NOTE ADULT - SUBJECTIVE AND OBJECTIVE BOX
UROLOGY DAILY PROGRESS NOTE:     Subjective: Patient seen and examined at bedside. No overnight events.       Objective:  Vital signs  T(F): , Max: 98.6 (05-26-18 @ 06:41)  HR: 78 (05-26-18 @ 06:41)  BP: 127/69 (05-26-18 @ 06:41)  SpO2: 95% (05-26-18 @ 06:41)  Wt(kg): --    I&O's Summary    25 May 2018 07:01  -  26 May 2018 07:00  --------------------------------------------------------  IN: 2490 mL / OUT: 5100 mL / NET: -2610 mL      Gen: NAD  Abd: Soft, NT, ND  : Bae draining yellow urine     Labs:  05-25  10.0  / 36.3  /6.44                           11.8   10.0  )-----------( 321      ( 25 May 2018 05:03 )             36.3     05-25    137  |  99  |  96<H>  ----------------------------<  137<H>  4.0   |  23  |  6.44<H>    Ca    8.4      25 May 2018 05:03 UROLOGY DAILY PROGRESS NOTE:     Subjective: Patient seen and examined at bedside. No overnight events.       Objective:  Vital signs  T(F): , Max: 98.6 (05-26-18 @ 06:41)  HR: 78 (05-26-18 @ 06:41)  BP: 127/69 (05-26-18 @ 06:41)  SpO2: 95% (05-26-18 @ 06:41)  Wt(kg): --    I&O's Summary    25 May 2018 07:01  -  26 May 2018 07:00  --------------------------------------------------------  IN: 2490 mL / OUT: 5100 mL / NET: -2610 mL      Gen: AAOx3  Abd: obese, soft, NT   Back: NT in place draining clear urine   : Draining yellow urine       Labs:  05-25  10.0  / 36.3  /6.44                           11.8   10.0  )-----------( 321      ( 25 May 2018 05:03 )             36.3     05-25    137  |  99  |  96<H>  ----------------------------<  137<H>  4.0   |  23  |  6.44<H>    Ca    8.4      25 May 2018 05:03

## 2018-05-27 LAB
ANION GAP SERPL CALC-SCNC: 14 MMOL/L — SIGNIFICANT CHANGE UP (ref 5–17)
BUN SERPL-MCNC: 96 MG/DL — HIGH (ref 7–23)
CALCIUM SERPL-MCNC: 8.6 MG/DL — SIGNIFICANT CHANGE UP (ref 8.4–10.5)
CHLORIDE SERPL-SCNC: 101 MMOL/L — SIGNIFICANT CHANGE UP (ref 96–108)
CO2 SERPL-SCNC: 25 MMOL/L — SIGNIFICANT CHANGE UP (ref 22–31)
CREAT SERPL-MCNC: 4.98 MG/DL — HIGH (ref 0.5–1.3)
GLUCOSE BLDC GLUCOMTR-MCNC: 106 MG/DL — HIGH (ref 70–99)
GLUCOSE BLDC GLUCOMTR-MCNC: 116 MG/DL — HIGH (ref 70–99)
GLUCOSE BLDC GLUCOMTR-MCNC: 124 MG/DL — HIGH (ref 70–99)
GLUCOSE BLDC GLUCOMTR-MCNC: 131 MG/DL — HIGH (ref 70–99)
GLUCOSE SERPL-MCNC: 132 MG/DL — HIGH (ref 70–99)
POTASSIUM SERPL-MCNC: 4.2 MMOL/L — SIGNIFICANT CHANGE UP (ref 3.5–5.3)
POTASSIUM SERPL-SCNC: 4.2 MMOL/L — SIGNIFICANT CHANGE UP (ref 3.5–5.3)
SODIUM SERPL-SCNC: 140 MMOL/L — SIGNIFICANT CHANGE UP (ref 135–145)

## 2018-05-27 RX ORDER — SODIUM CHLORIDE 9 MG/ML
1000 INJECTION, SOLUTION INTRAVENOUS
Qty: 0 | Refills: 0 | Status: DISCONTINUED | OUTPATIENT
Start: 2018-05-27 | End: 2018-05-28

## 2018-05-27 RX ORDER — NYSTATIN CREAM 100000 [USP'U]/G
1 CREAM TOPICAL THREE TIMES A DAY
Qty: 0 | Refills: 0 | Status: DISCONTINUED | OUTPATIENT
Start: 2018-05-27 | End: 2018-06-04

## 2018-05-27 RX ADMIN — AMLODIPINE BESYLATE 10 MILLIGRAM(S): 2.5 TABLET ORAL at 05:18

## 2018-05-27 RX ADMIN — HEPARIN SODIUM 5000 UNIT(S): 5000 INJECTION INTRAVENOUS; SUBCUTANEOUS at 21:59

## 2018-05-27 RX ADMIN — HEPARIN SODIUM 5000 UNIT(S): 5000 INJECTION INTRAVENOUS; SUBCUTANEOUS at 05:18

## 2018-05-27 RX ADMIN — Medication 1 TABLET(S): at 13:40

## 2018-05-27 RX ADMIN — TAMSULOSIN HYDROCHLORIDE 0.4 MILLIGRAM(S): 0.4 CAPSULE ORAL at 13:40

## 2018-05-27 RX ADMIN — HEPARIN SODIUM 5000 UNIT(S): 5000 INJECTION INTRAVENOUS; SUBCUTANEOUS at 13:40

## 2018-05-27 RX ADMIN — Medication 100 MILLIGRAM(S): at 13:40

## 2018-05-27 NOTE — PROGRESS NOTE ADULT - SUBJECTIVE AND OBJECTIVE BOX
No acute events overnight.    T(F): 98.1, Max: 98.8 (05-26-18 @ 21:06)  HR: 80  BP: 165/88  SpO2: 97%    OUT:    Indwelling Catheter - Urethral: 3750 mL    Nephrostomy Tube: 450 mL    Nephrostomy Tube: 2950 mL  Total OUT: 7150 mL    Gen NAD  Abd Soft, obese, NT   Bae, b/l NT clear yellow    05-27 @ 06:44  WBC --    / Hct --    / SCr 4.98     05-26 @ 08:26  WBC --    / Hct --    / SCr 5.24

## 2018-05-28 LAB
ANION GAP SERPL CALC-SCNC: 13 MMOL/L — SIGNIFICANT CHANGE UP (ref 5–17)
BUN SERPL-MCNC: 91 MG/DL — HIGH (ref 7–23)
CALCIUM SERPL-MCNC: 8.8 MG/DL — SIGNIFICANT CHANGE UP (ref 8.4–10.5)
CHLORIDE SERPL-SCNC: 101 MMOL/L — SIGNIFICANT CHANGE UP (ref 96–108)
CO2 SERPL-SCNC: 23 MMOL/L — SIGNIFICANT CHANGE UP (ref 22–31)
CREAT SERPL-MCNC: 4.26 MG/DL — HIGH (ref 0.5–1.3)
GLUCOSE BLDC GLUCOMTR-MCNC: 111 MG/DL — HIGH (ref 70–99)
GLUCOSE BLDC GLUCOMTR-MCNC: 124 MG/DL — HIGH (ref 70–99)
GLUCOSE BLDC GLUCOMTR-MCNC: 98 MG/DL — SIGNIFICANT CHANGE UP (ref 70–99)
GLUCOSE BLDC GLUCOMTR-MCNC: 99 MG/DL — SIGNIFICANT CHANGE UP (ref 70–99)
GLUCOSE SERPL-MCNC: 104 MG/DL — HIGH (ref 70–99)
POTASSIUM SERPL-MCNC: 4.1 MMOL/L — SIGNIFICANT CHANGE UP (ref 3.5–5.3)
POTASSIUM SERPL-SCNC: 4.1 MMOL/L — SIGNIFICANT CHANGE UP (ref 3.5–5.3)
SODIUM SERPL-SCNC: 137 MMOL/L — SIGNIFICANT CHANGE UP (ref 135–145)

## 2018-05-28 RX ADMIN — AMLODIPINE BESYLATE 10 MILLIGRAM(S): 2.5 TABLET ORAL at 04:58

## 2018-05-28 RX ADMIN — HEPARIN SODIUM 5000 UNIT(S): 5000 INJECTION INTRAVENOUS; SUBCUTANEOUS at 13:52

## 2018-05-28 RX ADMIN — HEPARIN SODIUM 5000 UNIT(S): 5000 INJECTION INTRAVENOUS; SUBCUTANEOUS at 04:58

## 2018-05-28 RX ADMIN — HEPARIN SODIUM 5000 UNIT(S): 5000 INJECTION INTRAVENOUS; SUBCUTANEOUS at 21:30

## 2018-05-28 RX ADMIN — Medication 1 TABLET(S): at 13:52

## 2018-05-28 RX ADMIN — TAMSULOSIN HYDROCHLORIDE 0.4 MILLIGRAM(S): 0.4 CAPSULE ORAL at 13:53

## 2018-05-28 NOTE — PROGRESS NOTE ADULT - ASSESSMENT
s/p Bilat NT for bilat obstructive stones, SMITH now improving     - OOB/ambulate with assistance  - Continue to trend Cr and CK level  - F/u Renal recs  - Discharge pending subacute rehab placement s/p Bilat NT for bilat obstructive stones, SMITH now improving     - OOB/ambulate with assistance  - Continue to trend Cr and CK level  - F/u Renal recs  - abx until tomorrow  - Discharge pending subacute rehab placement

## 2018-05-28 NOTE — PROGRESS NOTE ADULT - SUBJECTIVE AND OBJECTIVE BOX
UROLOGY DAILY PROGRESS NOTE:     Subjective: Patient seen and examined at bedside. No overnight events.   Voiding well     Objective:  Vital signs  T(F): , Max: 98.2 (05-27-18 @ 22:07)  HR: 84 (05-28-18 @ 05:13)  BP: 135/83 (05-28-18 @ 05:13)  SpO2: 95% (05-28-18 @ 05:13)  Wt(kg): --    I&O's Summary    27 May 2018 07:01  -  28 May 2018 07:00  --------------------------------------------------------  IN: 2990 mL / OUT: 5725 mL / NET: -2735 mL        Gen: NAD  Pulm: No respiratory distress, no subcostal retractions  CV: RRR, no JVD  Abd: Soft, NT, ND  Back: Bilat NT's draining clear yellow urine, dressings changed    Labs:  05-28  x     / x     /4.26   05-27  x     / x     /4.98       05-28    137  |  101  |  91<H>  ----------------------------<  104<H>  4.1   |  23  |  4.26<H>    Ca    8.8      28 May 2018 06:57          Urine Cx:  Culture - Urine (05.21.18 @ 12:22)    -  Amikacin: S <=8    -  Amoxicillin/Clavulanic Acid: S <=8/4    -  Ampicillin: S <=2 These ampicillin results predict results for amoxicillin    -  Ampicillin/Sulbactam: S <=4/2    -  Aztreonam: S <=4    -  Cefazolin: S <=2 This predicts results for oral agents cefaclor, cefdinir, cefpodoxime, cefprozil, cefuroxime axetil, cephalexin and locarbef for uncomplicated UTI. Note that some isolates may be susceptible to these agents while testing resistant to cefazolin.    -  Cefepime: S <=2    -  Cefoxitin: S <=4    -  Ceftriaxone: S <=1 Enterobacter, Citrobacter, and Serratia may develop resistance during prolonged therapy    -  Ciprofloxacin: S <=0.5    -  Ertapenem: S <=0.5    -  Gentamicin: S 2    -  Levofloxacin: S <=1    -  Meropenem: S <=1    -  Nitrofurantoin: R >64 Should not be used to treat pyelonephritis    -  Piperacillin/Tazobactam: S <=8    -  Tobramycin: S <=2    -  Trimethoprim/Sulfamethoxazole: S <=0.5/9.5    Specimen Source: .Urine Nephrostomy - Left    Culture Results:   50,000 - 99,000 CFU/mL Proteus mirabilis    Organism Identification: Proteus mirabilis    Organism: Proteus mirabilis    Method Type: ARTHUR

## 2018-05-29 ENCOUNTER — TRANSCRIPTION ENCOUNTER (OUTPATIENT)
Age: 58
End: 2018-05-29

## 2018-05-29 LAB
ANION GAP SERPL CALC-SCNC: 13 MMOL/L — SIGNIFICANT CHANGE UP (ref 5–17)
BUN SERPL-MCNC: 87 MG/DL — HIGH (ref 7–23)
CALCIUM SERPL-MCNC: 8.7 MG/DL — SIGNIFICANT CHANGE UP (ref 8.4–10.5)
CHLORIDE SERPL-SCNC: 100 MMOL/L — SIGNIFICANT CHANGE UP (ref 96–108)
CO2 SERPL-SCNC: 25 MMOL/L — SIGNIFICANT CHANGE UP (ref 22–31)
CREAT SERPL-MCNC: 3.61 MG/DL — HIGH (ref 0.5–1.3)
GLUCOSE BLDC GLUCOMTR-MCNC: 102 MG/DL — HIGH (ref 70–99)
GLUCOSE BLDC GLUCOMTR-MCNC: 113 MG/DL — HIGH (ref 70–99)
GLUCOSE BLDC GLUCOMTR-MCNC: 114 MG/DL — HIGH (ref 70–99)
GLUCOSE BLDC GLUCOMTR-MCNC: 165 MG/DL — HIGH (ref 70–99)
GLUCOSE SERPL-MCNC: 121 MG/DL — HIGH (ref 70–99)
POTASSIUM SERPL-MCNC: 4.3 MMOL/L — SIGNIFICANT CHANGE UP (ref 3.5–5.3)
POTASSIUM SERPL-SCNC: 4.3 MMOL/L — SIGNIFICANT CHANGE UP (ref 3.5–5.3)
SODIUM SERPL-SCNC: 138 MMOL/L — SIGNIFICANT CHANGE UP (ref 135–145)

## 2018-05-29 PROCEDURE — 99232 SBSQ HOSP IP/OBS MODERATE 35: CPT | Mod: GC

## 2018-05-29 PROCEDURE — 99232 SBSQ HOSP IP/OBS MODERATE 35: CPT

## 2018-05-29 RX ORDER — DOCUSATE SODIUM 100 MG
1 CAPSULE ORAL
Qty: 0 | Refills: 0 | COMMUNITY
Start: 2018-05-29

## 2018-05-29 RX ORDER — SENNA PLUS 8.6 MG/1
2 TABLET ORAL
Qty: 0 | Refills: 0 | COMMUNITY
Start: 2018-05-29

## 2018-05-29 RX ADMIN — Medication 1 TABLET(S): at 13:11

## 2018-05-29 RX ADMIN — HEPARIN SODIUM 5000 UNIT(S): 5000 INJECTION INTRAVENOUS; SUBCUTANEOUS at 21:05

## 2018-05-29 RX ADMIN — TAMSULOSIN HYDROCHLORIDE 0.4 MILLIGRAM(S): 0.4 CAPSULE ORAL at 13:12

## 2018-05-29 RX ADMIN — HEPARIN SODIUM 5000 UNIT(S): 5000 INJECTION INTRAVENOUS; SUBCUTANEOUS at 05:38

## 2018-05-29 RX ADMIN — HEPARIN SODIUM 5000 UNIT(S): 5000 INJECTION INTRAVENOUS; SUBCUTANEOUS at 13:12

## 2018-05-29 RX ADMIN — AMLODIPINE BESYLATE 10 MILLIGRAM(S): 2.5 TABLET ORAL at 05:38

## 2018-05-29 NOTE — PROGRESS NOTE ADULT - SUBJECTIVE AND OBJECTIVE BOX
UROLOGY DAILY PROGRESS NOTE:     Subjective: Patient seen and examined at bedside. No acute events overnight      Objective:  Vital signs  T(F): , Max: 98.6 (05-28-18 @ 10:31)  HR: 89 (05-29-18 @ 05:36)  BP: 161/85 (05-29-18 @ 05:36)  SpO2: 97% (05-29-18 @ 05:36)  Wt(kg): --    Output     I&O's Detail    27 May 2018 07:01  -  28 May 2018 07:00  --------------------------------------------------------  IN:    Oral Fluid: 2090 mL    sodium chloride 0.45%: 900 mL  Total IN: 2990 mL    OUT:    Indwelling Catheter - Urethral: 1050 mL    Nephrostomy Tube: 300 mL    Nephrostomy Tube: 2400 mL    Voided: 1975 mL  Total OUT: 5725 mL    Total NET: -2735 mL      28 May 2018 07:01  -  29 May 2018 06:22  --------------------------------------------------------  IN:    Oral Fluid: 2460 mL  Total IN: 2460 mL    OUT:    Nephrostomy Tube: 2325 mL    Nephrostomy Tube: 175 mL    Voided: 2400 mL  Total OUT: 4900 mL    Total NET: -2440 mL          Physical Exam:  Gen: NAD  Abd: obese soft ntnd  Back: L nephrostomy in place clear urine r nephrostmy in place light pink   : voiding     Labs:  05-28  x     / x     /4.26   05-27  x     / x     /4.98       05-28    137  |  101  |  91<H>  ----------------------------<  104<H>  4.1   |  23  |  4.26<H>    Ca    8.8      28 May 2018 06:57            Urine Cx:

## 2018-05-29 NOTE — DISCHARGE NOTE ADULT - CARE PROVIDERS DIRECT ADDRESSES
[email or web address]

## 2018-05-29 NOTE — DISCHARGE NOTE ADULT - HOSPITAL COURSE
This is a 57 year old male who was transferred from an OSH after found to have bilateral obstructing ureteral calculi.  He had bilateral nephrostomy tubes placed the day after admission, and did well after the procedure.  His creatinine initially karley from 5.4 to 7.09 at its peak.  Nephrology was consulted and mentioned no urgent HD needed and that his SMITH was multifactorial.  Eventually, his creatinine has come down to 3.6.  Bae catheter was removed and patient was voiding well.  He was started on zosyn and switched to augmentin for proteus in his urine cultures; he finished his antibiotic course during admission.  Physical therapy recommended subacute rehab.  He was discharged to rehab with instructions for follow up. This is a 57 year old male who was transferred from an OSH after found to have bilateral obstructing ureteral calculi.  He had bilateral nephrostomy tubes placed the day after admission, and did well after the procedure.  His creatinine initially karley from 5.4 to 7.09 at its peak.  Nephrology was consulted and mentioned no urgent HD needed and that his SMITH was multifactorial.  Eventually, his creatinine has come down to 3.6.  Nephrostomy tubes were found to be out of place, and were replaced with bilateral nephroureteral tubes 6/1.  B/l tubes were capped, and patient had mild back pain that was tolerable.  Bae catheter was removed and patient was voiding well.  He was started on zosyn and switched to augmentin for proteus in his urine cultures; he finished his antibiotic course during admission.  Physical therapy recommended subacute rehab.  He was discharged to rehab with instructions for follow up.

## 2018-05-29 NOTE — DISCHARGE NOTE ADULT - MEDICATION SUMMARY - MEDICATIONS TO STOP TAKING
I will STOP taking the medications listed below when I get home from the hospital:    penicillin V potassium 500 mg oral tablet  -- 1 tab(s) by mouth 3 times a day  -- Finish all this medication unless otherwise directed by prescriber.  Take medication on an empty stomach 1 hour before or 2 to 3 hours after a meal unless otherwise directed by your doctor.

## 2018-05-29 NOTE — DISCHARGE NOTE ADULT - REASON FOR ADMISSION
bilateral ureteral calculi, SMITH bilateral ureteral calculi causing renal failure requiring nephrostomy tube placement

## 2018-05-29 NOTE — DISCHARGE NOTE ADULT - PROVIDER TOKENS
TOKEN:'[id number]' TOKEN:'[id number]',FREE:[LAST:[Radiology],FIRST:[Interventional],PHONE:[phone]],FAX:[(   )    -],ADDRESS:[[address] [place] [address]]] TOKEN:'[id number]',FREE:[LAST:[Radiology],FIRST:[Interventional],PHONE:[phone]],FAX:[(   )    -],ADDRESS:[[address] [place] [address]]],TOKEN:'[id number]'

## 2018-05-29 NOTE — DISCHARGE NOTE ADULT - PLAN OF CARE
s/p bilateral ureteral nephrostomy tube placement keep bilateral nephrostomy tubes, and follow up as an outpatient with Dr Teixeira for definitive kidney stone management creatinine decreasing appropriately, avoid nephrotoxic drugs continue amlodipine consistent carbohydrate diet creatinine decreasing appropriately keep bilateral nephroureteral tubes, and follow up as an outpatient with Dr Teixeira for definitive kidney stone management.  Follow up with interventional radiology in 3-4 months for nephroureteral tube exchange. Follow up with department of nephrology within 1 month

## 2018-05-29 NOTE — PROGRESS NOTE ADULT - SUBJECTIVE AND OBJECTIVE BOX
[facility] DIVISION OF KIDNEY DISEASES AND HYPERTENSION -- [phone]   FOLLOW UP NOTE  --------------------------------------------------------------------------------  HPI:  57 year old male with PMHx of nephrolithiasis, DM, gout, lymphedema, obesity, who was transferred from an OSH after found to have bilateral ureteral calculi and SMITH. Pt fell at home(was on floor for 6 hours) and presented to the OSH, where he had a CT scan done showing a left 1cm distal calculus and a 7mm right proximal calculus. The urology team had attempted a bilateral stent placement, but had difficulty with cystoscope insertion. He was transferred to [facility] for bilateral nephrostomy tube placement. Pt had bilateral stent placed by IR on (5/21) and received contrast. Nephrology consulted for SMITH.  Pt seen and examined at bedside.     PAST HISTORY  --------------------------------------------------------------------------------  No significant changes to PMH, PSH, FHx, SHx, unless otherwise noted    ALLERGIES & MEDICATIONS  --------------------------------------------------------------------------------  Allergies    No Known Allergies    Intolerances      Standing Inpatient Medications  amLODIPine   Tablet 10 milliGRAM(s) Oral daily  amoxicillin  500 milliGRAM(s)/clavulanate 1 Tablet(s) Oral daily  dextrose 5%. 1000 milliLiter(s) IV Continuous <Continuous>  dextrose 50% Injectable 12.5 Gram(s) IV Push once  docusate sodium 100 milliGRAM(s) Oral three times a day  heparin  Injectable 5000 Unit(s) SubCutaneous every 8 hours  insulin lispro (HumaLOG) corrective regimen sliding scale   SubCutaneous three times a day before meals  tamsulosin 0.4 milliGRAM(s) Oral daily    PRN Inpatient Medications  acetaminophen   Tablet 650 milliGRAM(s) Oral every 6 hours PRN  dextrose 40% Gel 15 Gram(s) Oral once PRN  glucagon  Injectable 1 milliGRAM(s) IntraMuscular once PRN  nystatin Powder 1 Application(s) Topical three times a day PRN  ondansetron Injectable 4 milliGRAM(s) IV Push every 6 hours PRN  senna 2 Tablet(s) Oral at bedtime PRN      REVIEW OF SYSTEMS  --------------------------------------------------------------------------------  General: no fever  CVS: no chest pain  RESP: no sob, no cough  ABD: no abdominal pain  : no dysuria,  MSK: + edema     VITALS/PHYSICAL EXAM  --------------------------------------------------------------------------------  T(C): 36.8 (05-29-18 @ 05:36), Max: 37 (05-28-18 @ 10:31)  HR: 89 (05-29-18 @ 05:36) (79 - 93)  BP: 161/85 (05-29-18 @ 05:36) (140/87 - 170/76)  RR: 17 (05-29-18 @ 05:36) (16 - 19)  SpO2: 97% (05-29-18 @ 05:36) (94% - 98%)  Wt(kg): --        05-28-18 @ 07:01  -  05-29-18 @ 07:00  --------------------------------------------------------  IN: 2460 mL / OUT: 4900 mL / NET: -2440 mL      Physical Exam:  	Gen: NAD,   	HEENT: MMM  	Pulm: Decreased breath sounds  B/L  	CV: S1S2  	Abd: Obese, soft  	Ext: + non-pitting  LE edema B/L  	Neuro: Awake, alert  	Skin: Warm and dry  	: B/L Nephrostomy tubes and holman catheter  LABS/STUDIES  --------------------------------------------------------------------------------    138  |  100  |  87  ----------------------------<  121      [05-29-18 @ 06:34]  4.3   |  25  |  3.61        Ca     8.7     [05-29-18 @ 06:34]            Creatinine Trend:  SCr 3.61 [05-29 @ 06:34]  SCr 4.26 [05-28 @ 06:57]  SCr 4.98 [05-27 @ 06:44]  SCr 5.24 [05-26 @ 08:26]  SCr 6.44 [05-25 @ 05:03]    Urinalysis - [05-20-18 @ 21:46]      Color Red / Appearance Turbid / SG 1.007 / pH 6.0      Gluc Negative / Ketone Trace  / Bili Small / Urobili Negative       Blood Large / Protein 300 / Leuk Est Large / Nitrite Negative      RBC >50 / WBC >50 / Hyaline  / Gran  / Sq Epi  / Non Sq Epi  / Bacteria Few    Urine Sodium 68      [05-22-18 @ 17:16]  Urine Potassium 13      [05-22-18 @ 17:16]  Urine Chloride 56      [05-22-18 @ 17:16]    HbA1c 6.0      [05-21-18 @ 07:53]

## 2018-05-29 NOTE — DISCHARGE NOTE ADULT - MEDICATION SUMMARY - MEDICATIONS TO TAKE
I will START or STAY ON the medications listed below when I get home from the hospital:    Flomax 0.4 mg oral capsule  -- 1 cap(s) by mouth once a day  -- Indication: For Home medication    Hyzaar 100 mg-25 mg oral tablet  -- 1 tab(s) by mouth once a day  -- Indication: For Home medication     amLODIPine 10 mg oral tablet  -- 1 tab(s) by mouth once a day  -- Indication: For Home medication     docusate sodium 100 mg oral capsule  -- 1 cap(s) by mouth 3 times a day  -- Indication: For stool softener    senna oral tablet  -- 2 tab(s) by mouth once a day (at bedtime), As needed, Constipation  -- Indication: For stool softener I will START or STAY ON the medications listed below when I get home from the hospital:    Flomax 0.4 mg oral capsule  -- 1 cap(s) by mouth once a day  -- Indication: For Home medication    amLODIPine 10 mg oral tablet  -- 1 tab(s) by mouth once a day  -- Indication: For Home medication     docusate sodium 100 mg oral capsule  -- 1 cap(s) by mouth 3 times a day  -- Indication: For stool softener    senna oral tablet  -- 2 tab(s) by mouth once a day (at bedtime), As needed, Constipation  -- Indication: For stool softener

## 2018-05-29 NOTE — DISCHARGE NOTE ADULT - CARE PROVIDER_API CALL
Thierry Teixeira), Urology  [address] [place] [address]  Phone: [phone]  Fax: [phone] Thierry Teixeira), Urology  [address] [address]  Phone: [phone]  Fax: [phone]    Radiology, Interventional  [address] [facility] [address]  Phone: [phone]  Fax: (   )    - Thierry Teixeira), Urology  [address] [facility]  [address]  [place] [address]  Phone: [phone]  Fax: [phone]    Radiology, Interventional  [address] [facility] [address]  Phone: [phone]  Fax: (   )    -    Yarelis Beaver), Nephrology  [address]  [address] [place] [address]  Phone: [phone]  Fax: [phone]

## 2018-05-29 NOTE — PROGRESS NOTE ADULT - ASSESSMENT
s/p Bilat NT for bilat obstructive stones, SMITH now improving     - OOB/ambulate with assistance  - Continue to trend Cr and CK level  - F/u Renal recs  - abx complete today   - Discharge pending subacute rehab placement  DVT prophylaxis

## 2018-05-29 NOTE — DISCHARGE NOTE ADULT - ADDITIONAL INSTRUCTIONS
Please follow up with Dr Teixeira for definitive kidney stone management Please follow up with Dr Teixeira for definitive kidney stone management, call [phone] to set up an appointment Please follow up with Dr Teixeira for definitive kidney stone management, call [phone] to set up an appointment.    IMPORTANT: You have bilateral nephroureteral tubes that are capped.  Please be mindful that they need to be changed in 3-4 months by interventional radiology.  Follow up with interventional radiology in 3-4 months for nephroureteral tube exchange.  The number is [phone]. Please follow up with Dr Teixeira for definitive kidney stone management, call [phone] to set up an appointment.    IMPORTANT: You have bilateral nephroureteral tubes that are capped.  Please be mindful that they need to be changed in 3-4 months by interventional radiology.  Follow up with interventional radiology in 3-4 months for nephroureteral tube exchange.  The number is [phone].    Please also follow up with the department of nephrology for elevated creatinine level.  Call [phone] to set up an appointment within 1 month.

## 2018-05-29 NOTE — DISCHARGE NOTE ADULT - PATIENT PORTAL LINK FT
You can access the [facility] Patient Portal, offered by [facility], by registering with the following website: [email or web address]

## 2018-05-29 NOTE — PROGRESS NOTE ADULT - PROBLEM SELECTOR PLAN 1
Pt with SMITH likely ATN now. SMITH multifactorial  in setting of obstructive uropathy, losartan use, contrast use on (5/21)  and rhabdomyolysis.  Pt had initial Scr at 2.6 on (5/18) at OSH. Pt with Scr 5.4 on (5/20) which further trended up to 7.09 on (5/22). Scr improving.   Pt non-oliguric.   Monitor BMP, strict I/O.  Avoid any further nephrotoxics, NSAIDs, RCA

## 2018-05-30 LAB
ANION GAP SERPL CALC-SCNC: 14 MMOL/L — SIGNIFICANT CHANGE UP (ref 5–17)
BUN SERPL-MCNC: 80 MG/DL — HIGH (ref 7–23)
CALCIUM SERPL-MCNC: 9 MG/DL — SIGNIFICANT CHANGE UP (ref 8.4–10.5)
CHLORIDE SERPL-SCNC: 100 MMOL/L — SIGNIFICANT CHANGE UP (ref 96–108)
CO2 SERPL-SCNC: 25 MMOL/L — SIGNIFICANT CHANGE UP (ref 22–31)
CREAT SERPL-MCNC: 3.04 MG/DL — HIGH (ref 0.5–1.3)
GLUCOSE BLDC GLUCOMTR-MCNC: 104 MG/DL — HIGH (ref 70–99)
GLUCOSE BLDC GLUCOMTR-MCNC: 109 MG/DL — HIGH (ref 70–99)
GLUCOSE BLDC GLUCOMTR-MCNC: 116 MG/DL — HIGH (ref 70–99)
GLUCOSE BLDC GLUCOMTR-MCNC: 128 MG/DL — HIGH (ref 70–99)
GLUCOSE SERPL-MCNC: 116 MG/DL — HIGH (ref 70–99)
POTASSIUM SERPL-MCNC: 4.4 MMOL/L — SIGNIFICANT CHANGE UP (ref 3.5–5.3)
POTASSIUM SERPL-SCNC: 4.4 MMOL/L — SIGNIFICANT CHANGE UP (ref 3.5–5.3)
SODIUM SERPL-SCNC: 139 MMOL/L — SIGNIFICANT CHANGE UP (ref 135–145)

## 2018-05-30 PROCEDURE — 99232 SBSQ HOSP IP/OBS MODERATE 35: CPT

## 2018-05-30 RX ADMIN — HEPARIN SODIUM 5000 UNIT(S): 5000 INJECTION INTRAVENOUS; SUBCUTANEOUS at 14:41

## 2018-05-30 RX ADMIN — HEPARIN SODIUM 5000 UNIT(S): 5000 INJECTION INTRAVENOUS; SUBCUTANEOUS at 22:04

## 2018-05-30 RX ADMIN — HEPARIN SODIUM 5000 UNIT(S): 5000 INJECTION INTRAVENOUS; SUBCUTANEOUS at 06:07

## 2018-05-30 RX ADMIN — TAMSULOSIN HYDROCHLORIDE 0.4 MILLIGRAM(S): 0.4 CAPSULE ORAL at 14:40

## 2018-05-30 RX ADMIN — AMLODIPINE BESYLATE 10 MILLIGRAM(S): 2.5 TABLET ORAL at 06:07

## 2018-05-30 NOTE — PROGRESS NOTE ADULT - ASSESSMENT
s/p Bilat NT for bilat obstructive stones, SMITH now improving     - OOB/ambulate with assistance  - Continue to trend Cr and CK level  - F/u Renal recs  - Discharge pending subacute rehab placement  DVT prophylaxis  shower/sponge bath encouraged

## 2018-05-30 NOTE — PROGRESS NOTE ADULT - SUBJECTIVE AND OBJECTIVE BOX
UROLOGY DAILY PROGRESS NOTE:     Subjective: Patient seen and examined at bedside. Awaiting rehab placement no acute events overnight       Objective:  Vital signs  T(F): , Max: 98.4 (05-29-18 @ 22:18)  HR: 90 (05-30-18 @ 06:02)  BP: 129/82 (05-30-18 @ 06:02)  SpO2: 93% (05-30-18 @ 06:02)  Wt(kg): --    Output     I&O's Detail    28 May 2018 07:01  -  29 May 2018 07:00  --------------------------------------------------------  IN:    Oral Fluid: 2460 mL  Total IN: 2460 mL    OUT:    Nephrostomy Tube: 2325 mL    Nephrostomy Tube: 175 mL    Voided: 2400 mL  Total OUT: 4900 mL    Total NET: -2440 mL      29 May 2018 07:01  -  30 May 2018 06:37  --------------------------------------------------------  IN:    Oral Fluid: 1380 mL  Total IN: 1380 mL    OUT:    Nephrostomy Tube: 150 mL    Nephrostomy Tube: 2050 mL    Voided: 1600 mL  Total OUT: 3800 mL    Total NET: -2420 mL          Physical Exam:  Gen: NAD  Abd: obeses soft NTND   Back: B/l nephrostomy tubes C/D/I clear yellow output   : voiding     Labs:  05-29  x     / x     /3.61   05-28  x     / x     /4.26       05-29    138  |  100  |  87<H>  ----------------------------<  121<H>  4.3   |  25  |  3.61<H>    Ca    8.7      29 May 2018 06:34            Urine Cx:

## 2018-05-31 LAB
GLUCOSE BLDC GLUCOMTR-MCNC: 100 MG/DL — HIGH (ref 70–99)
GLUCOSE BLDC GLUCOMTR-MCNC: 105 MG/DL — HIGH (ref 70–99)
GLUCOSE BLDC GLUCOMTR-MCNC: 111 MG/DL — HIGH (ref 70–99)
GLUCOSE BLDC GLUCOMTR-MCNC: 147 MG/DL — HIGH (ref 70–99)

## 2018-05-31 PROCEDURE — 99232 SBSQ HOSP IP/OBS MODERATE 35: CPT

## 2018-05-31 PROCEDURE — 74176 CT ABD & PELVIS W/O CONTRAST: CPT | Mod: 26

## 2018-05-31 RX ORDER — NYSTATIN CREAM 100000 [USP'U]/G
1 CREAM TOPICAL
Qty: 0 | Refills: 0 | Status: DISCONTINUED | OUTPATIENT
Start: 2018-05-31 | End: 2018-06-04

## 2018-05-31 RX ORDER — SODIUM CHLORIDE 9 MG/ML
1000 INJECTION, SOLUTION INTRAVENOUS
Qty: 0 | Refills: 0 | Status: DISCONTINUED | OUTPATIENT
Start: 2018-05-31 | End: 2018-06-02

## 2018-05-31 RX ORDER — ACETAMINOPHEN 500 MG
650 TABLET ORAL EVERY 6 HOURS
Qty: 0 | Refills: 0 | Status: DISCONTINUED | OUTPATIENT
Start: 2018-05-31 | End: 2018-06-04

## 2018-05-31 RX ADMIN — Medication 650 MILLIGRAM(S): at 14:03

## 2018-05-31 RX ADMIN — NYSTATIN CREAM 1 APPLICATION(S): 100000 CREAM TOPICAL at 05:39

## 2018-05-31 RX ADMIN — AMLODIPINE BESYLATE 10 MILLIGRAM(S): 2.5 TABLET ORAL at 05:38

## 2018-05-31 RX ADMIN — HEPARIN SODIUM 5000 UNIT(S): 5000 INJECTION INTRAVENOUS; SUBCUTANEOUS at 21:51

## 2018-05-31 RX ADMIN — HEPARIN SODIUM 5000 UNIT(S): 5000 INJECTION INTRAVENOUS; SUBCUTANEOUS at 13:33

## 2018-05-31 RX ADMIN — TAMSULOSIN HYDROCHLORIDE 0.4 MILLIGRAM(S): 0.4 CAPSULE ORAL at 13:33

## 2018-05-31 RX ADMIN — HEPARIN SODIUM 5000 UNIT(S): 5000 INJECTION INTRAVENOUS; SUBCUTANEOUS at 05:38

## 2018-05-31 RX ADMIN — Medication 650 MILLIGRAM(S): at 13:33

## 2018-05-31 RX ADMIN — NYSTATIN CREAM 1 APPLICATION(S): 100000 CREAM TOPICAL at 21:52

## 2018-05-31 NOTE — PROGRESS NOTE ADULT - SUBJECTIVE AND OBJECTIVE BOX
Subjective  feeling well without complaints; right nt with dec output   Objective    Vital signs  T(F): , Max: 99.5 (05-30-18 @ 16:54)  HR: 93 (05-31-18 @ 05:33)  BP: 155/82 (05-31-18 @ 05:33)  SpO2: 94% (05-31-18 @ 05:33)  Wt(kg): --    Output     05-30 @ 07:01  -  05-31 @ 07:00  --------------------------------------------------------  IN: 800 mL / OUT: 3375 mL / NET: -2575 mL        Gen awake alert nad axox3  Abd obese soft ntnd   Back tube in place no hematoma/ ecchymosis  both flush easily       Labs      05-30 @ 08:21    WBC --    / Hct --    / SCr 3.04           Imaging

## 2018-05-31 NOTE — PROGRESS NOTE ADULT - ASSESSMENT
s/p Bilat NT for bilat obstructive stones, SMITH now improving   - noncon ct to ck location of nt prior to dc   - OOB/ambulate with assistance  - Continue to trend Cr and CK level  - F/u Renal recs  - Discharge pending subacute rehab placement  DVT prophylaxis  shower/sponge bath encouraged

## 2018-06-01 LAB
ANION GAP SERPL CALC-SCNC: 12 MMOL/L — SIGNIFICANT CHANGE UP (ref 5–17)
APTT BLD: 26.3 SEC — LOW (ref 27.5–37.4)
BUN SERPL-MCNC: 71 MG/DL — HIGH (ref 7–23)
CALCIUM SERPL-MCNC: 8.5 MG/DL — SIGNIFICANT CHANGE UP (ref 8.4–10.5)
CHLORIDE SERPL-SCNC: 102 MMOL/L — SIGNIFICANT CHANGE UP (ref 96–108)
CO2 SERPL-SCNC: 25 MMOL/L — SIGNIFICANT CHANGE UP (ref 22–31)
CREAT SERPL-MCNC: 2.58 MG/DL — HIGH (ref 0.5–1.3)
GLUCOSE BLDC GLUCOMTR-MCNC: 104 MG/DL — HIGH (ref 70–99)
GLUCOSE BLDC GLUCOMTR-MCNC: 113 MG/DL — HIGH (ref 70–99)
GLUCOSE BLDC GLUCOMTR-MCNC: 140 MG/DL — HIGH (ref 70–99)
GLUCOSE BLDC GLUCOMTR-MCNC: 153 MG/DL — HIGH (ref 70–99)
GLUCOSE SERPL-MCNC: 123 MG/DL — HIGH (ref 70–99)
HCT VFR BLD CALC: 31.4 % — LOW (ref 39–50)
HGB BLD-MCNC: 10.2 G/DL — LOW (ref 13–17)
INR BLD: 1.19 RATIO — HIGH (ref 0.88–1.16)
MCHC RBC-ENTMCNC: 30.6 PG — SIGNIFICANT CHANGE UP (ref 27–34)
MCHC RBC-ENTMCNC: 32.6 GM/DL — SIGNIFICANT CHANGE UP (ref 32–36)
MCV RBC AUTO: 94 FL — SIGNIFICANT CHANGE UP (ref 80–100)
PLATELET # BLD AUTO: 303 K/UL — SIGNIFICANT CHANGE UP (ref 150–400)
POTASSIUM SERPL-MCNC: 4.4 MMOL/L — SIGNIFICANT CHANGE UP (ref 3.5–5.3)
POTASSIUM SERPL-SCNC: 4.4 MMOL/L — SIGNIFICANT CHANGE UP (ref 3.5–5.3)
PROTHROM AB SERPL-ACNC: 12.9 SEC — HIGH (ref 9.8–12.7)
RBC # BLD: 3.34 M/UL — LOW (ref 4.2–5.8)
RBC # FLD: 12.9 % — SIGNIFICANT CHANGE UP (ref 10.3–14.5)
SODIUM SERPL-SCNC: 139 MMOL/L — SIGNIFICANT CHANGE UP (ref 135–145)
WBC # BLD: 15.5 K/UL — HIGH (ref 3.8–10.5)
WBC # FLD AUTO: 15.5 K/UL — HIGH (ref 3.8–10.5)

## 2018-06-01 PROCEDURE — 99232 SBSQ HOSP IP/OBS MODERATE 35: CPT | Mod: GC

## 2018-06-01 PROCEDURE — 99232 SBSQ HOSP IP/OBS MODERATE 35: CPT

## 2018-06-01 PROCEDURE — 50434 CONVERT NEPHROSTOMY CATHETER: CPT | Mod: RT

## 2018-06-01 RX ORDER — DEXTROSE 50 % IN WATER 50 %
12.5 SYRINGE (ML) INTRAVENOUS ONCE
Qty: 0 | Refills: 0 | Status: DISCONTINUED | OUTPATIENT
Start: 2018-06-01 | End: 2018-06-01

## 2018-06-01 RX ORDER — DEXTROSE 50 % IN WATER 50 %
12.5 SYRINGE (ML) INTRAVENOUS ONCE
Qty: 0 | Refills: 0 | Status: DISCONTINUED | OUTPATIENT
Start: 2018-06-01 | End: 2018-06-04

## 2018-06-01 RX ORDER — INSULIN LISPRO 100/ML
VIAL (ML) SUBCUTANEOUS
Qty: 0 | Refills: 0 | Status: DISCONTINUED | OUTPATIENT
Start: 2018-06-01 | End: 2018-06-04

## 2018-06-01 RX ORDER — DEXTROSE 50 % IN WATER 50 %
25 SYRINGE (ML) INTRAVENOUS ONCE
Qty: 0 | Refills: 0 | Status: DISCONTINUED | OUTPATIENT
Start: 2018-06-01 | End: 2018-06-01

## 2018-06-01 RX ORDER — INSULIN LISPRO 100/ML
VIAL (ML) SUBCUTANEOUS EVERY 6 HOURS
Qty: 0 | Refills: 0 | Status: DISCONTINUED | OUTPATIENT
Start: 2018-06-01 | End: 2018-06-01

## 2018-06-01 RX ORDER — DEXTROSE 50 % IN WATER 50 %
15 SYRINGE (ML) INTRAVENOUS ONCE
Qty: 0 | Refills: 0 | Status: DISCONTINUED | OUTPATIENT
Start: 2018-06-01 | End: 2018-06-01

## 2018-06-01 RX ORDER — SODIUM CHLORIDE 9 MG/ML
1000 INJECTION, SOLUTION INTRAVENOUS
Qty: 0 | Refills: 0 | Status: DISCONTINUED | OUTPATIENT
Start: 2018-06-01 | End: 2018-06-01

## 2018-06-01 RX ORDER — SODIUM CHLORIDE 9 MG/ML
1000 INJECTION, SOLUTION INTRAVENOUS
Qty: 0 | Refills: 0 | Status: DISCONTINUED | OUTPATIENT
Start: 2018-06-01 | End: 2018-06-04

## 2018-06-01 RX ORDER — INSULIN LISPRO 100/ML
VIAL (ML) SUBCUTANEOUS AT BEDTIME
Qty: 0 | Refills: 0 | Status: DISCONTINUED | OUTPATIENT
Start: 2018-06-01 | End: 2018-06-04

## 2018-06-01 RX ORDER — GLUCAGON INJECTION, SOLUTION 0.5 MG/.1ML
1 INJECTION, SOLUTION SUBCUTANEOUS ONCE
Qty: 0 | Refills: 0 | Status: DISCONTINUED | OUTPATIENT
Start: 2018-06-01 | End: 2018-06-04

## 2018-06-01 RX ORDER — GLUCAGON INJECTION, SOLUTION 0.5 MG/.1ML
1 INJECTION, SOLUTION SUBCUTANEOUS ONCE
Qty: 0 | Refills: 0 | Status: DISCONTINUED | OUTPATIENT
Start: 2018-06-01 | End: 2018-06-01

## 2018-06-01 RX ORDER — DEXTROSE 50 % IN WATER 50 %
25 SYRINGE (ML) INTRAVENOUS ONCE
Qty: 0 | Refills: 0 | Status: DISCONTINUED | OUTPATIENT
Start: 2018-06-01 | End: 2018-06-04

## 2018-06-01 RX ORDER — METOCLOPRAMIDE HCL 10 MG
10 TABLET ORAL ONCE
Qty: 0 | Refills: 0 | Status: COMPLETED | OUTPATIENT
Start: 2018-06-01 | End: 2018-06-01

## 2018-06-01 RX ORDER — DEXTROSE 50 % IN WATER 50 %
15 SYRINGE (ML) INTRAVENOUS ONCE
Qty: 0 | Refills: 0 | Status: DISCONTINUED | OUTPATIENT
Start: 2018-06-01 | End: 2018-06-04

## 2018-06-01 RX ORDER — OXYBUTYNIN CHLORIDE 5 MG
5 TABLET ORAL ONCE
Qty: 0 | Refills: 0 | Status: COMPLETED | OUTPATIENT
Start: 2018-06-01 | End: 2018-06-01

## 2018-06-01 RX ADMIN — Medication 5 MILLIGRAM(S): at 18:49

## 2018-06-01 RX ADMIN — AMLODIPINE BESYLATE 10 MILLIGRAM(S): 2.5 TABLET ORAL at 05:49

## 2018-06-01 RX ADMIN — HEPARIN SODIUM 5000 UNIT(S): 5000 INJECTION INTRAVENOUS; SUBCUTANEOUS at 05:49

## 2018-06-01 RX ADMIN — ONDANSETRON 4 MILLIGRAM(S): 8 TABLET, FILM COATED ORAL at 00:07

## 2018-06-01 RX ADMIN — TAMSULOSIN HYDROCHLORIDE 0.4 MILLIGRAM(S): 0.4 CAPSULE ORAL at 18:43

## 2018-06-01 RX ADMIN — SODIUM CHLORIDE 125 MILLILITER(S): 9 INJECTION, SOLUTION INTRAVENOUS at 12:25

## 2018-06-01 RX ADMIN — NYSTATIN CREAM 1 APPLICATION(S): 100000 CREAM TOPICAL at 18:44

## 2018-06-01 RX ADMIN — Medication 650 MILLIGRAM(S): at 22:30

## 2018-06-01 RX ADMIN — HEPARIN SODIUM 5000 UNIT(S): 5000 INJECTION INTRAVENOUS; SUBCUTANEOUS at 22:30

## 2018-06-01 RX ADMIN — Medication 650 MILLIGRAM(S): at 23:00

## 2018-06-01 RX ADMIN — Medication 10 MILLIGRAM(S): at 01:58

## 2018-06-01 NOTE — PROGRESS NOTE ADULT - NSHPATTENDINGPLANDISCUSS_GEN_ALL_CORE
patient
patient
team
med team
urology team
urology team
urology team, hospitalist
urology team, hospitalist

## 2018-06-01 NOTE — PROGRESS NOTE ADULT - PROBLEM SELECTOR PLAN 1
Pt with SMITH likely ATN now. SMITH multifactorial  in setting of obstructive uropathy, losartan use, contrast use on (5/21)  and rhabdomyolysis.  Pt had initial Scr at 2.6 on (5/18) at OSH. Pt with Scr 5.4 on (5/20) which peaked to  7.09 on (5/22). Scr now improving. No new labs today for review.   Pt non-oliguric.   Monitor BMP, strict I/O.  Avoid any further nephrotoxics, NSAIDs, RCA

## 2018-06-01 NOTE — PROGRESS NOTE ADULT - SUBJECTIVE AND OBJECTIVE BOX
[facility] DIVISION OF KIDNEY DISEASES AND HYPERTENSION -- [phone]   FOLLOW UP NOTE  --------------------------------------------------------------------------------  HPI:  57 year old male with PMHx of nephrolithiasis, DM, gout, lymphedema, obesity, who was transferred from an OSH after found to have bilateral ureteral calculi and SMITH. Pt fell at home(was on floor for 6 hours) and presented to the OSH, where he had a CT scan done showing a left 1cm distal calculus and a 7mm right proximal calculus. The urology team had attempted a bilateral stent placement, but had difficulty with cystoscope insertion. He was transferred to [facility] for bilateral nephrostomy tube placement. Pt had bilateral stent placed by IR on (5/21) and received contrast. Nephrology consulted for SMITH.  Pt seen and examined at bedside.     PAST HISTORY  --------------------------------------------------------------------------------  No significant changes to PMH, PSH, FHx, SHx, unless otherwise noted    ALLERGIES & MEDICATIONS  --------------------------------------------------------------------------------  Allergies    No Known Allergies    Intolerances      Standing Inpatient Medications  amLODIPine   Tablet 10 milliGRAM(s) Oral daily  dextrose 5% + sodium chloride 0.45%. 1000 milliLiter(s) IV Continuous <Continuous>  dextrose 5%. 1000 milliLiter(s) IV Continuous <Continuous>  dextrose 50% Injectable 12.5 Gram(s) IV Push once  docusate sodium 100 milliGRAM(s) Oral three times a day  heparin  Injectable 5000 Unit(s) SubCutaneous every 8 hours  insulin lispro (HumaLOG) corrective regimen sliding scale   SubCutaneous three times a day before meals  nystatin Powder 1 Application(s) Topical two times a day  tamsulosin 0.4 milliGRAM(s) Oral daily    PRN Inpatient Medications  acetaminophen   Tablet 650 milliGRAM(s) Oral every 6 hours PRN  acetaminophen   Tablet. 650 milliGRAM(s) Oral every 6 hours PRN  dextrose 40% Gel 15 Gram(s) Oral once PRN  glucagon  Injectable 1 milliGRAM(s) IntraMuscular once PRN  nystatin Powder 1 Application(s) Topical three times a day PRN  ondansetron Injectable 4 milliGRAM(s) IV Push every 6 hours PRN  senna 2 Tablet(s) Oral at bedtime PRN      REVIEW OF SYSTEMS  --------------------------------------------------------------------------------  General: no fever  CVS: no chest pain  RESP: no sob, no cough  ABD: no abdominal pain  : no dysuria,  MSK: + edema     VITALS/PHYSICAL EXAM  --------------------------------------------------------------------------------  T(C): 36.8 (06-01-18 @ 05:43), Max: 36.9 (06-01-18 @ 01:23)  HR: 91 (06-01-18 @ 05:43) (84 - 97)  BP: 145/82 (06-01-18 @ 05:43) (132/70 - 154/85)  RR: 18 (06-01-18 @ 05:43) (16 - 18)  SpO2: 96% (06-01-18 @ 05:43) (94% - 96%)  Wt(kg): --  Height (cm): 175.26 (06-01-18 @ 07:43)  Weight (kg): 205 (06-01-18 @ 07:43)  BMI (kg/m2): 66.7 (06-01-18 @ 07:43)  BSA (m2): 2.92 (06-01-18 @ 07:43)      05-31-18 @ 07:01  -  06-01-18 @ 07:00  --------------------------------------------------------  IN: 1960 mL / OUT: 3675 mL / NET: -1715 mL      Physical Exam:  	Gen: NAD,   	HEENT: MMM  	Pulm: Decreased breath sounds  B/L  	CV: S1S2  	Abd: Obese, soft  	Ext: + non-pitting  LE edema B/L  	Neuro: Awake, alert  	Skin: Warm and dry  	: B/L Nephrostomy tubes and holman catheter    LABS/STUDIES  --------------------------------------------------------------------------------                Creatinine Trend:  SCr 3.04 [05-30 @ 08:21]  SCr 3.61 [05-29 @ 06:34]  SCr 4.26 [05-28 @ 06:57]  SCr 4.98 [05-27 @ 06:44]  SCr 5.24 [05-26 @ 08:26]    Urinalysis - [05-20-18 @ 21:46]      Color Red / Appearance Turbid / SG 1.007 / pH 6.0      Gluc Negative / Ketone Trace  / Bili Small / Urobili Negative       Blood Large / Protein 300 / Leuk Est Large / Nitrite Negative      RBC >50 / WBC >50 / Hyaline  / Gran  / Sq Epi  / Non Sq Epi  / Bacteria Few      HbA1c 6.0      [05-21-18 @ 07:53]

## 2018-06-01 NOTE — PROGRESS NOTE ADULT - SUBJECTIVE AND OBJECTIVE BOX
Interventional Radiology Brief- Operative Note    Procedure: Bilateral nephroureteral catheter placement    Operators: Evelina    Anesthesia (type): MAC    Contrast: 20cc Omni    EBL: minimal    Findings/Follow up Plan of Care: Previously inserted left nephrostomy was withdrawn from the renal collecting system. Successful insertion of an 8 Fr 26cm nephrosureterostomy via new percutaneous access. Filling defect in left renal collecting system seen post insertion consistent with thrombus. Right 8 Fr nephrostomy successfully converted to an 8 FR 26cm nephroureterostomy. Pt tolerated procedure without difficulty. Recommend flushing each drain with 10cc sterile NS every 8 hours. Full report to follow.    Specimens Removed: samples of urine collected for microbiology    Implants: 8 Fr 26 cm nephroureterostomy catheters    Complications: nonocclusive thrombus in left renal collecting system post insertion. no major complications    Condition/Disposition: stable / pacu    Please call Interventional Radiology x [phone] with any questions, concerns, or issues.

## 2018-06-01 NOTE — PROGRESS NOTE ADULT - SUBJECTIVE AND OBJECTIVE BOX
UROLOGY DAILY PROGRESS NOTE:     Subjective: Patient seen and examined at bedside.   CT scan b/l nephrostomy tubes out of place    Objective:  Vital signs  T(F): , Max: 98.5 (06-01-18 @ 01:23)  HR: 91 (06-01-18 @ 05:43)  BP: 145/82 (06-01-18 @ 05:43)  SpO2: 96% (06-01-18 @ 05:43)  Wt(kg): --    Output     I&O's Detail    31 May 2018 07:01  -  01 Jun 2018 07:00  --------------------------------------------------------  IN:    dextrose 5% + sodium chloride 0.45%.: 500 mL    Oral Fluid: 960 mL  Total IN: 1460 mL    OUT:    Nephrostomy Tube: 75 mL    Voided: 3600 mL  Total OUT: 3675 mL    Total NET: -2215 mL          Physical Exam:  Gen: NAD  Abd: obese soft NTND  Back: nephrostomy tubes in back dressing C/D/I out of place minimal drainage   : voiding     Labs:  05-30  x     / x     /3.04       05-30    139  |  100  |  80<H>  ----------------------------<  116<H>  4.4   |  25  |  3.04<H>    Ca    9.0      30 May 2018 08:21            Urine Cx:

## 2018-06-01 NOTE — PROGRESS NOTE ADULT - ASSESSMENT
s/p Bilat NT for bilat obstructive stones, SMITH now improving   - noncon ct nephrostomy out of place  -IR for b/l nephro ureteral stents    OOB/ambulate with assistance  - Continue to trend Cr and CK level  - F/u Renal recs  - Discharge pending subacute rehab placement  -DVT prophylaxis

## 2018-06-01 NOTE — DIETITIAN INITIAL EVALUATION ADULT. - +GENDER
This note was copied from the mother's chart.  LC left phone number on mother's white board for mother to call for asst as needed.Told mother what time LC leaves the floor. Mother also told that LC can see when she calls [employer] phone and if LC does not answer, she is busy but will come as soon as possible.     Statement Selected

## 2018-06-01 NOTE — CHART NOTE - NSCHARTNOTEFT_GEN_A_CORE
Interventional Radiology Pre-Procedure Note      This is a 58yo male who presents s/p bilateral NT for bilateral obstructive stones, SMITH now improving   CT demonstrates nephrostomy out of place, patient presents today to IR for bilateral nephro-ureteral stents placements.     PAST MEDICAL & SURGICAL HISTORY:  Hx of peripheral neuropathy  Morbid obesity  H/O sleep apnea  Acquired lymphedema of leg  Calculus of kidney and ureter  Diabetes mellitus type II  HTN (hypertension)  Ureteral stents placement, bilateral  Hx of tonsillectomy: at 6 years old       Allergies: No Known Allergies      LABS:                      10.2   15.5  )-----------( 303      ( 01 Jun 2018 08:49 )             31.4     06-01    139  |  102  |  71<H>  ----------------------------<  123<H>  4.4   |  25  |  2.58<H>    Ca    8.5      01 Jun 2018 08:49      PT/INR - ( 01 Jun 2018 08:49 )   PT: 12.9 sec;   INR: 1.19 ratio         PTT - ( 01 Jun 2018 08:49 )  PTT:26.3 sec    Procedure: Bilateral nephro-ureteral stents placements    Procedure/ risks/ benefits were explained, informed consent obtained from patient, verbalizes understanding.    Paula Staples PA-C  [phone]

## 2018-06-01 NOTE — CHART NOTE - NSCHARTNOTEFT_GEN_A_CORE
Upon Nutritional Assessment by the Registered Dietitian your patient was determined to meet criteria / has evidence of the following diagnosis/diagnoses:          [ ]  Mild Protein Calorie Malnutrition        [ ]  Moderate Protein Calorie Malnutrition        [ ] Severe Protein Calorie Malnutrition        [ ] Unspecified Protein Calorie Malnutrition        [ ] Underweight / BMI <19        [x ] Morbid Obesity / BMI > 40      Findings as based on:  [x ] Comprehensive nutrition assessment: BMI 66.7  [ ] Nutrition Focused Physical Exam  [ ] Other:       Nutrition Plan/Recommendations:  Weight Management diet ed        PROVIDER Section:     By signing this assessment you are acknowledging and agree with the diagnosis/diagnoses assigned by the Registered Dietitian    Comments:

## 2018-06-02 LAB
GLUCOSE BLDC GLUCOMTR-MCNC: 123 MG/DL — HIGH (ref 70–99)
GLUCOSE BLDC GLUCOMTR-MCNC: 134 MG/DL — HIGH (ref 70–99)
GLUCOSE BLDC GLUCOMTR-MCNC: 138 MG/DL — HIGH (ref 70–99)
GLUCOSE BLDC GLUCOMTR-MCNC: 157 MG/DL — HIGH (ref 70–99)

## 2018-06-02 PROCEDURE — 99232 SBSQ HOSP IP/OBS MODERATE 35: CPT

## 2018-06-02 RX ADMIN — Medication 650 MILLIGRAM(S): at 05:26

## 2018-06-02 RX ADMIN — Medication 650 MILLIGRAM(S): at 17:19

## 2018-06-02 RX ADMIN — HEPARIN SODIUM 5000 UNIT(S): 5000 INJECTION INTRAVENOUS; SUBCUTANEOUS at 05:26

## 2018-06-02 RX ADMIN — Medication 650 MILLIGRAM(S): at 05:56

## 2018-06-02 RX ADMIN — Medication 100 MILLIGRAM(S): at 14:01

## 2018-06-02 RX ADMIN — AMLODIPINE BESYLATE 10 MILLIGRAM(S): 2.5 TABLET ORAL at 05:26

## 2018-06-02 RX ADMIN — Medication 650 MILLIGRAM(S): at 17:49

## 2018-06-02 RX ADMIN — NYSTATIN CREAM 1 APPLICATION(S): 100000 CREAM TOPICAL at 17:18

## 2018-06-02 RX ADMIN — TAMSULOSIN HYDROCHLORIDE 0.4 MILLIGRAM(S): 0.4 CAPSULE ORAL at 11:43

## 2018-06-02 RX ADMIN — HEPARIN SODIUM 5000 UNIT(S): 5000 INJECTION INTRAVENOUS; SUBCUTANEOUS at 22:06

## 2018-06-02 RX ADMIN — NYSTATIN CREAM 1 APPLICATION(S): 100000 CREAM TOPICAL at 05:25

## 2018-06-02 RX ADMIN — HEPARIN SODIUM 5000 UNIT(S): 5000 INJECTION INTRAVENOUS; SUBCUTANEOUS at 14:01

## 2018-06-02 NOTE — PROGRESS NOTE ADULT - ASSESSMENT
s/p Bilat NT for bilat obstructive stones, converted to Neph-U tubes      OOB/ambulate with assistance  - Continue to trend Cr and CK level  - F/u Renal recs  - Discharge pending subacute rehab placement  -DVT prophylaxis

## 2018-06-02 NOTE — PROGRESS NOTE ADULT - SUBJECTIVE AND OBJECTIVE BOX
UROLOGY DAILY PROGRESS NOTE:     Subjective: Patient seen and examined at bedside. No overnight events. Bilateral neph-u tubes placed yesterday, clamped.       Objective:  Vital signs  T(F): , Max: 98.5 (06-01-18 @ 21:17)  HR: 90 (06-02-18 @ 05:00)  BP: 172/82 (06-02-18 @ 05:00)  SpO2: 93% (06-02-18 @ 05:00)  Wt(kg): --    I&O's Summary    31 May 2018 07:01  -  01 Jun 2018 07:00  --------------------------------------------------------  IN: 1960 mL / OUT: 3675 mL / NET: -1715 mL    01 Jun 2018 07:01  -  02 Jun 2018 06:18  --------------------------------------------------------  IN: 3295 mL / OUT: 3600 mL / NET: -305 mL    I&O's Detail    31 May 2018 07:01  -  01 Jun 2018 07:00  --------------------------------------------------------  IN:    dextrose 5% + sodium chloride 0.45%.: 1000 mL    Oral Fluid: 960 mL  Total IN: 1960 mL    OUT:    Nephrostomy Tube: 75 mL    Voided: 3600 mL  Total OUT: 3675 mL    Total NET: -1715 mL      01 Jun 2018 07:01  -  02 Jun 2018 06:18  --------------------------------------------------------  IN:    dextrose 5% + sodium chloride 0.45%.: 2375 mL    Oral Fluid: 920 mL  Total IN: 3295 mL    OUT:    Nephrostomy Tube: 200 mL    Nephrostomy Tube: 400 mL    Voided: 3000 mL  Total OUT: 3600 mL    Total NET: -305 mL          Gen: NAD  Pulm: No respiratory distress, no subcostal retractions  CV: RRR, no JVD  Abd: Soft, NT, ND  Back: Bilat NTs clamped nontender     Labs:  06-01  15.5  / 31.4  /2.58                           10.2   15.5  )-----------( 303      ( 01 Jun 2018 08:49 )             31.4     06-01    139  |  102  |  71<H>  ----------------------------<  123<H>  4.4   |  25  |  2.58<H>    Ca    8.5      01 Jun 2018 08:49      PT/INR - ( 01 Jun 2018 08:49 )   PT: 12.9 sec;   INR: 1.19 ratio         PTT - ( 01 Jun 2018 08:49 )  PTT:26.3 sec

## 2018-06-03 LAB
CULTURE RESULTS: SIGNIFICANT CHANGE UP
CULTURE RESULTS: SIGNIFICANT CHANGE UP
GLUCOSE BLDC GLUCOMTR-MCNC: 113 MG/DL — HIGH (ref 70–99)
GLUCOSE BLDC GLUCOMTR-MCNC: 121 MG/DL — HIGH (ref 70–99)
SPECIMEN SOURCE: SIGNIFICANT CHANGE UP
SPECIMEN SOURCE: SIGNIFICANT CHANGE UP

## 2018-06-03 PROCEDURE — 99232 SBSQ HOSP IP/OBS MODERATE 35: CPT

## 2018-06-03 RX ORDER — ACETAMINOPHEN 500 MG
1000 TABLET ORAL ONCE
Qty: 0 | Refills: 0 | Status: COMPLETED | OUTPATIENT
Start: 2018-06-03 | End: 2018-06-03

## 2018-06-03 RX ADMIN — NYSTATIN CREAM 1 APPLICATION(S): 100000 CREAM TOPICAL at 05:22

## 2018-06-03 RX ADMIN — HEPARIN SODIUM 5000 UNIT(S): 5000 INJECTION INTRAVENOUS; SUBCUTANEOUS at 21:31

## 2018-06-03 RX ADMIN — Medication 1000 MILLIGRAM(S): at 01:03

## 2018-06-03 RX ADMIN — AMLODIPINE BESYLATE 10 MILLIGRAM(S): 2.5 TABLET ORAL at 05:28

## 2018-06-03 RX ADMIN — ONDANSETRON 4 MILLIGRAM(S): 8 TABLET, FILM COATED ORAL at 00:22

## 2018-06-03 RX ADMIN — HEPARIN SODIUM 5000 UNIT(S): 5000 INJECTION INTRAVENOUS; SUBCUTANEOUS at 12:22

## 2018-06-03 RX ADMIN — Medication 650 MILLIGRAM(S): at 16:37

## 2018-06-03 RX ADMIN — NYSTATIN CREAM 1 APPLICATION(S): 100000 CREAM TOPICAL at 18:02

## 2018-06-03 RX ADMIN — Medication 650 MILLIGRAM(S): at 17:07

## 2018-06-03 RX ADMIN — TAMSULOSIN HYDROCHLORIDE 0.4 MILLIGRAM(S): 0.4 CAPSULE ORAL at 12:22

## 2018-06-03 RX ADMIN — Medication 400 MILLIGRAM(S): at 00:33

## 2018-06-03 RX ADMIN — HEPARIN SODIUM 5000 UNIT(S): 5000 INJECTION INTRAVENOUS; SUBCUTANEOUS at 05:28

## 2018-06-03 NOTE — PROGRESS NOTE ADULT - ASSESSMENT
57 year old male 57 year old male s/p bilateral nephrostomy tube placement replaced with nephroureteral tubes for bilateral obstructing calculi, SMITH resolving    -monitor voids  -analgesia as needed  -OOB, DVT prophylaxis  -disposition planning to rehab

## 2018-06-03 NOTE — PROGRESS NOTE ADULT - SUBJECTIVE AND OBJECTIVE BOX
The patient was seen and examined at bedside.  Denies complaints of chest pain, shortness of breath, nausea, acute pain.    T(C): 37.1 (06-03-18 @ 05:14), Max: 37.1 (06-03-18 @ 05:14)  HR: 93 (06-03-18 @ 05:14) (68 - 98)  BP: 156/84 (06-03-18 @ 05:14) (132/85 - 156/84)  RR: 18 (06-03-18 @ 05:14) (17 - 18)  SpO2: 93% (06-03-18 @ 05:14) (91% - 94%)  Wt(kg): --    Physical Exam:    General: NAD, A+Ox3  Abdomen: soft, non-tender, non-distended  Back: bilateral nephrostomy tube dressings changed      06-02 @ 07:01  -  06-03 @ 07:00  --------------------------------------------------------  IN: 2890 mL / OUT: 3100 mL / NET: -210 mL The patient was seen and examined at bedside.  Denies complaints of chest pain, shortness of breath, nausea, acute pain.    T(C): 37.1 (06-03-18 @ 05:14), Max: 37.1 (06-03-18 @ 05:14)  HR: 93 (06-03-18 @ 05:14) (68 - 98)  BP: 156/84 (06-03-18 @ 05:14) (132/85 - 156/84)  RR: 18 (06-03-18 @ 05:14) (17 - 18)  SpO2: 93% (06-03-18 @ 05:14) (91% - 94%)  Wt(kg): --    Physical Exam:    General: NAD, A+Ox3  Abdomen: soft, non-tender, non-distended  Back: bilateral nephrostomy tube dressings changed    CAPILLARY BLOOD GLUCOSE    POCT Blood Glucose.: 113 mg/dL (03 Jun 2018 07:40)  POCT Blood Glucose.: 157 mg/dL (02 Jun 2018 21:48)  POCT Blood Glucose.: 123 mg/dL (02 Jun 2018 17:40)  POCT Blood Glucose.: 138 mg/dL (02 Jun 2018 11:47)        06-02 @ 07:01  -  06-03 @ 07:00  --------------------------------------------------------  IN: 2890 mL / OUT: 3100 mL / NET: -210 mL    void-1500cc

## 2018-06-04 VITALS
SYSTOLIC BLOOD PRESSURE: 154 MMHG | DIASTOLIC BLOOD PRESSURE: 81 MMHG | RESPIRATION RATE: 18 BRPM | OXYGEN SATURATION: 95 % | TEMPERATURE: 98 F | HEART RATE: 82 BPM

## 2018-06-04 LAB
ANION GAP SERPL CALC-SCNC: 11 MMOL/L — SIGNIFICANT CHANGE UP (ref 5–17)
BUN SERPL-MCNC: 58 MG/DL — HIGH (ref 7–23)
CALCIUM SERPL-MCNC: 8.8 MG/DL — SIGNIFICANT CHANGE UP (ref 8.4–10.5)
CHLORIDE SERPL-SCNC: 100 MMOL/L — SIGNIFICANT CHANGE UP (ref 96–108)
CO2 SERPL-SCNC: 26 MMOL/L — SIGNIFICANT CHANGE UP (ref 22–31)
CREAT SERPL-MCNC: 2.31 MG/DL — HIGH (ref 0.5–1.3)
GLUCOSE BLDC GLUCOMTR-MCNC: 109 MG/DL — HIGH (ref 70–99)
GLUCOSE BLDC GLUCOMTR-MCNC: 116 MG/DL — HIGH (ref 70–99)
GLUCOSE BLDC GLUCOMTR-MCNC: 117 MG/DL — HIGH (ref 70–99)
GLUCOSE SERPL-MCNC: 126 MG/DL — HIGH (ref 70–99)
POTASSIUM SERPL-MCNC: 4.7 MMOL/L — SIGNIFICANT CHANGE UP (ref 3.5–5.3)
POTASSIUM SERPL-SCNC: 4.7 MMOL/L — SIGNIFICANT CHANGE UP (ref 3.5–5.3)
SODIUM SERPL-SCNC: 137 MMOL/L — SIGNIFICANT CHANGE UP (ref 135–145)

## 2018-06-04 PROCEDURE — 82330 ASSAY OF CALCIUM: CPT

## 2018-06-04 PROCEDURE — 85014 HEMATOCRIT: CPT

## 2018-06-04 PROCEDURE — 82550 ASSAY OF CK (CPK): CPT

## 2018-06-04 PROCEDURE — 94660 CPAP INITIATION&MGMT: CPT

## 2018-06-04 PROCEDURE — C1729: CPT

## 2018-06-04 PROCEDURE — 87040 BLOOD CULTURE FOR BACTERIA: CPT

## 2018-06-04 PROCEDURE — 82435 ASSAY OF BLOOD CHLORIDE: CPT

## 2018-06-04 PROCEDURE — C1769: CPT

## 2018-06-04 PROCEDURE — 97530 THERAPEUTIC ACTIVITIES: CPT

## 2018-06-04 PROCEDURE — 81001 URINALYSIS AUTO W/SCOPE: CPT

## 2018-06-04 PROCEDURE — 85730 THROMBOPLASTIN TIME PARTIAL: CPT

## 2018-06-04 PROCEDURE — 97161 PT EVAL LOW COMPLEX 20 MIN: CPT

## 2018-06-04 PROCEDURE — 80048 BASIC METABOLIC PNL TOTAL CA: CPT

## 2018-06-04 PROCEDURE — 82803 BLOOD GASES ANY COMBINATION: CPT

## 2018-06-04 PROCEDURE — C1887: CPT

## 2018-06-04 PROCEDURE — 74176 CT ABD & PELVIS W/O CONTRAST: CPT

## 2018-06-04 PROCEDURE — 84133 ASSAY OF URINE POTASSIUM: CPT

## 2018-06-04 PROCEDURE — C1894: CPT

## 2018-06-04 PROCEDURE — 85027 COMPLETE CBC AUTOMATED: CPT

## 2018-06-04 PROCEDURE — 50434 CONVERT NEPHROSTOMY CATHETER: CPT

## 2018-06-04 PROCEDURE — 97116 GAIT TRAINING THERAPY: CPT

## 2018-06-04 PROCEDURE — 83605 ASSAY OF LACTIC ACID: CPT

## 2018-06-04 PROCEDURE — 82340 ASSAY OF CALCIUM IN URINE: CPT

## 2018-06-04 PROCEDURE — 82436 ASSAY OF URINE CHLORIDE: CPT

## 2018-06-04 PROCEDURE — C2625: CPT

## 2018-06-04 PROCEDURE — 82947 ASSAY GLUCOSE BLOOD QUANT: CPT

## 2018-06-04 PROCEDURE — 82962 GLUCOSE BLOOD TEST: CPT

## 2018-06-04 PROCEDURE — 36600 WITHDRAWAL OF ARTERIAL BLOOD: CPT

## 2018-06-04 PROCEDURE — 84132 ASSAY OF SERUM POTASSIUM: CPT

## 2018-06-04 PROCEDURE — 83036 HEMOGLOBIN GLYCOSYLATED A1C: CPT

## 2018-06-04 PROCEDURE — 84295 ASSAY OF SERUM SODIUM: CPT

## 2018-06-04 PROCEDURE — 87086 URINE CULTURE/COLONY COUNT: CPT

## 2018-06-04 PROCEDURE — 99232 SBSQ HOSP IP/OBS MODERATE 35: CPT | Mod: GC

## 2018-06-04 PROCEDURE — 99231 SBSQ HOSP IP/OBS SF/LOW 25: CPT

## 2018-06-04 PROCEDURE — 87186 SC STD MICRODIL/AGAR DIL: CPT

## 2018-06-04 PROCEDURE — 84300 ASSAY OF URINE SODIUM: CPT

## 2018-06-04 PROCEDURE — 85610 PROTHROMBIN TIME: CPT

## 2018-06-04 PROCEDURE — 50432 PLMT NEPHROSTOMY CATHETER: CPT

## 2018-06-04 PROCEDURE — 76775 US EXAM ABDO BACK WALL LIM: CPT

## 2018-06-04 RX ORDER — ONDANSETRON 8 MG/1
4 TABLET, FILM COATED ORAL ONCE
Qty: 0 | Refills: 0 | Status: COMPLETED | OUTPATIENT
Start: 2018-06-04 | End: 2018-06-04

## 2018-06-04 RX ORDER — LOSARTAN/HYDROCHLOROTHIAZIDE 100MG-25MG
1 TABLET ORAL
Qty: 0 | Refills: 0 | COMMUNITY

## 2018-06-04 RX ADMIN — HEPARIN SODIUM 5000 UNIT(S): 5000 INJECTION INTRAVENOUS; SUBCUTANEOUS at 05:12

## 2018-06-04 RX ADMIN — ONDANSETRON 4 MILLIGRAM(S): 8 TABLET, FILM COATED ORAL at 19:26

## 2018-06-04 RX ADMIN — TAMSULOSIN HYDROCHLORIDE 0.4 MILLIGRAM(S): 0.4 CAPSULE ORAL at 12:00

## 2018-06-04 RX ADMIN — Medication 650 MILLIGRAM(S): at 05:13

## 2018-06-04 RX ADMIN — NYSTATIN CREAM 1 APPLICATION(S): 100000 CREAM TOPICAL at 18:01

## 2018-06-04 RX ADMIN — NYSTATIN CREAM 1 APPLICATION(S): 100000 CREAM TOPICAL at 05:15

## 2018-06-04 RX ADMIN — AMLODIPINE BESYLATE 10 MILLIGRAM(S): 2.5 TABLET ORAL at 05:13

## 2018-06-04 RX ADMIN — HEPARIN SODIUM 5000 UNIT(S): 5000 INJECTION INTRAVENOUS; SUBCUTANEOUS at 12:00

## 2018-06-04 NOTE — PROGRESS NOTE ADULT - SUBJECTIVE AND OBJECTIVE BOX
[facility] DIVISION OF KIDNEY DISEASES AND HYPERTENSION -- FOLLOW UP NOTE  --------------------------------------------------------------------------------  Chief Complaint:  SMITH    24 hour events/subjective:  Patient reports feeling well, reports some improving back pain and has been urinating without discomfort.        PAST HISTORY  --------------------------------------------------------------------------------  No significant changes to PMH, PSH, FHx, SHx, unless otherwise noted    ALLERGIES & MEDICATIONS  --------------------------------------------------------------------------------  Allergies    No Known Allergies    Intolerances      Standing Inpatient Medications  amLODIPine   Tablet 10 milliGRAM(s) Oral daily  dextrose 5%. 1000 milliLiter(s) IV Continuous <Continuous>  dextrose 5%. 1000 milliLiter(s) IV Continuous <Continuous>  dextrose 50% Injectable 12.5 Gram(s) IV Push once  dextrose 50% Injectable 25 Gram(s) IV Push once  dextrose 50% Injectable 12.5 Gram(s) IV Push once  dextrose 50% Injectable 25 Gram(s) IV Push once  dextrose 50% Injectable 25 Gram(s) IV Push once  docusate sodium 100 milliGRAM(s) Oral three times a day  heparin  Injectable 5000 Unit(s) SubCutaneous every 8 hours  insulin lispro (HumaLOG) corrective regimen sliding scale   SubCutaneous three times a day before meals  insulin lispro (HumaLOG) corrective regimen sliding scale   SubCutaneous at bedtime  nystatin Powder 1 Application(s) Topical two times a day  tamsulosin 0.4 milliGRAM(s) Oral daily    PRN Inpatient Medications  acetaminophen   Tablet 650 milliGRAM(s) Oral every 6 hours PRN  acetaminophen   Tablet. 650 milliGRAM(s) Oral every 6 hours PRN  dextrose 40% Gel 15 Gram(s) Oral once PRN  dextrose 40% Gel 15 Gram(s) Oral once PRN  glucagon  Injectable 1 milliGRAM(s) IntraMuscular once PRN  glucagon  Injectable 1 milliGRAM(s) IntraMuscular once PRN  nystatin Powder 1 Application(s) Topical three times a day PRN  ondansetron Injectable 4 milliGRAM(s) IV Push every 6 hours PRN  senna 2 Tablet(s) Oral at bedtime PRN      REVIEW OF SYSTEMS  --------------------------------------------------------------------------------  Gen: No fevers/chills  Skin: No rashes  Head/Eyes/Ears/Mouth: No headache  Respiratory: No dyspnea  CV: No chest pain  GI: No abdominal pain  : No dysuria  MSK: No edema, + back pain  Neuro: No dizziness/lightheadedness    VITALS/PHYSICAL EXAM  --------------------------------------------------------------------------------  T(C): 37 (06-04-18 @ 05:22), Max: 37.2 (06-03-18 @ 21:35)  HR: 93 (06-04-18 @ 05:22) (80 - 93)  BP: 147/84 (06-04-18 @ 05:22) (117/75 - 147/84)  RR: 18 (06-04-18 @ 05:22) (18 - 18)  SpO2: 93% (06-04-18 @ 05:22) (90% - 94%)  Wt(kg): --        06-03-18 @ 07:01  -  06-04-18 @ 07:00  --------------------------------------------------------  IN: 1000 mL / OUT: 3650 mL / NET: -2650 mL    06-04-18 @ 07:01  -  06-04-18 @ 09:00  --------------------------------------------------------  IN: 0 mL / OUT: 400 mL / NET: -400 mL      Physical Exam:  	Gen: NAD, morbidly obese  	HEENT: MMM  	Pulm: CTA B/L  	CV: RRR, S1S2  	Abd: +BS, soft, nontender/nondistended  	: No suprapubic tenderness  	UE:  no edema  	LE:  +nonpitting edema bilaterally with compression stockinsg  	Neuro: No focal deficits  	Psych: Normal affect and mood  	Skin: Warm    LABS/STUDIES  --------------------------------------------------------------------------------      Creatinine Trend:  SCr 2.58 [06-01 @ 08:49]  SCr 3.04 [05-30 @ 08:21]  SCr 3.61 [05-29 @ 06:34]  SCr 4.26 [05-28 @ 06:57]  SCr 4.98 [05-27 @ 06:44]

## 2018-06-04 NOTE — PROGRESS NOTE ADULT - ATTENDING COMMENTS
Patient seen and examined.  Agree with above  Encouraged patient to work with Physical Therapy.
Patient seen and examined.  Agree with above evaluation and management plan.
Patient seen and examined.  Agree with above.  Awaiting SNF placement.
Patient seen and examined.  Agree with above.  Encouraged OOB.
patient seen and above noted, agree with plan. Will monitor labs and NT output.
patient seen and examined, agree with plan as above.
patient will be seen and followed up by Dr. Teixeira
Feeling improved.  Robust UO  1.  ARF--improving.  NO HD required  2.  Obstructive uropathy--decompression effective  3.  Rhabdomyolysis--likely contributor to 1.  Volume optimization.  Avoid hypoPO4.  Check TSH
I have seen this patient with the fellow and agree with their assessment and plan. In addition, crt peaked at 7mg/dl and last crt checked few days ago was 2.5 range. Please check a renal panel before dc to see if needs CKD and Nephrology follow up and for drug dosing.     Yarelis Beaver MD  Cell   Pager   Office 
No new complaints  1.  ARF--likely multifactorial including rhabdomyolysis and obstructive uropathy.  Non oliguric.  Significant improvement toward chronic baseline
No new complaints  1.  ARF--non oliguric.  UO from nephrostomies, holman catheter.  Likely combined ATN+ obstructive uropathy  2.  Obstructive uropathy--urinary decompression.  May need resolution of 3/other related ATN  3.  Rhabdomyolysis--volume optimization
No new complaints  1.  ARF--trending renal function.  NO HD requirements at present.  Following, Cr, symptoms  2.  Obstructive uropathy--good urine output from holman, nephrostomies  3.  Rhabdomyolysis--likely ATN contributor.  Volume optimization to reduce further pigment damage
Patient seen and examined.  Agree with above evaluation and management plan.
Patient seen and examined.  Agree with above evaluation and management plan.
feeling improved  1.  Obstructive uropathy--considerably improved with urinary decompression  2.  ARF--non oliguric, no HD required

## 2018-06-04 NOTE — PROGRESS NOTE ADULT - SUBJECTIVE AND OBJECTIVE BOX
Interventional Radiology Follow- Up Note      57y Male bilateral obstructing calculi, SMITH s/p bilateral nephrostomy tube placement replaced with nephroureteral tubes  on 6/1   in Interventional Radiology with Dr. Hoyt. Reports back pain with urination 8/10. Denies hematuria.      Vitals: T(F): 98.1 (06-04-18 @ 09:07), Max: 99 (06-03-18 @ 21:35)  HR: 95 (06-04-18 @ 09:07) (80 - 95)  BP: 109/62 (06-04-18 @ 09:07) (109/62 - 147/84)  RR: 18 (06-04-18 @ 09:07) (18 - 18)  SpO2: 92% (06-04-18 @ 09:07) (90% - 94%)  Wt(kg): --    output : 3100cc of urine output.     PHYSICAL EXAM:  General: Nontoxic, in NAD  Neuro:  Alert & oriented x 3  b/l Flank: dressing c/d/i. B/l nephroureteral stents capped.      Impression: 57y Male PMHX of bilateral obstructing calculi, SMITH s/p bilateral nephrostomy tube placement replaced with nephroureteral tubes  on 6/1   Plan:  -continue to monitor urine out put    - consider repeating BMP  --trend vitals, labs  -will discuss with IR attending  -Care per primary team     Please call IR at extension [phone] or [phone] with any questions, concerns, or issues regarding above. Interventional Radiology Follow- Up Note      57y Male bilateral obstructing calculi, SMITH s/p bilateral nephrostomy tube placement replaced with nephroureteral tubes  on 6/1   in Interventional Radiology with Dr. Hoyt. Reports back pain with urination 8/10. Denies hematuria.      Vitals: T(F): 98.1 (06-04-18 @ 09:07), Max: 99 (06-03-18 @ 21:35)  HR: 95 (06-04-18 @ 09:07) (80 - 95)  BP: 109/62 (06-04-18 @ 09:07) (109/62 - 147/84)  RR: 18 (06-04-18 @ 09:07) (18 - 18)  SpO2: 92% (06-04-18 @ 09:07) (90% - 94%)  Wt(kg): --    output : 3100cc of urine output.     PHYSICAL EXAM:  General: Nontoxic, in NAD  Neuro:  Alert & oriented x 3  b/l Flank: dressing c/d/i. B/l nephroureteral stents capped.      Impression: 57y Male PMHX of bilateral obstructing calculi, SMITH s/p bilateral nephrostomy tube placement replaced with nephroureteral tubes  on 6/1   Plan:  -continue to monitor urine out put    - consider repeating BMP  --trend vitals, labs  -will discuss with IR attending  - Follow up with IR in 3 to 4 months for exchange. Call [phone]  -Care per primary team     Please call IR at extension [phone] or [phone] with any questions, concerns, or issues regarding above. Interventional Radiology Follow- Up Note      57y Male bilateral obstructing calculi, SMITH s/p bilateral nephrostomy tube placement replaced with nephroureteral tubes  on 6/1   in Interventional Radiology with Dr. Hoyt. Reports back pain with urination 8/10. Denies hematuria.      Vitals: T(F): 98.1 (06-04-18 @ 09:07), Max: 99 (06-03-18 @ 21:35)  HR: 95 (06-04-18 @ 09:07) (80 - 95)  BP: 109/62 (06-04-18 @ 09:07) (109/62 - 147/84)  RR: 18 (06-04-18 @ 09:07) (18 - 18)  SpO2: 92% (06-04-18 @ 09:07) (90% - 94%)  Wt(kg): --    output : 3100cc of urine output.     PHYSICAL EXAM:  General: Nontoxic, in NAD  Neuro:  Alert & oriented x 3  b/l Flank: dressing c/d/i. B/l nephroureteral stents capped.      Impression: 57y Male PMHX of bilateral obstructing calculi, SMITH s/p bilateral nephrostomy tube placement replaced with nephroureteral tubes  on 6/1   Plan:  -continue to monitor urine out put    - consider repeating BMP.   --trend vitals, labs  -will discuss with IR attending  - Follow up with IR in 3 to 4 months for exchange. Call [phone]  - d/w Team  -Care per primary team     Please call IR at extension [phone] or [phone] with any questions, concerns, or issues regarding above.

## 2018-06-04 NOTE — PROGRESS NOTE ADULT - SUBJECTIVE AND OBJECTIVE BOX
UROLOGY DAILY PROGRESS NOTE:     Subjective: Patient seen and examined at bedside. No overnight events.       Objective:  Vital signs  T(F): , Max: 99 (06-03-18 @ 21:35)  HR: 93 (06-04-18 @ 05:22)  BP: 147/84 (06-04-18 @ 05:22)  SpO2: 93% (06-04-18 @ 05:22)  Wt(kg): --    I&O's Summary    02 Jun 2018 07:01  -  03 Jun 2018 07:00  --------------------------------------------------------  IN: 2890 mL / OUT: 3100 mL / NET: -210 mL    03 Jun 2018 07:01  -  04 Jun 2018 06:47  --------------------------------------------------------  IN: 1000 mL / OUT: 3650 mL / NET: -2650 mL        Gen: NAD  Pulm: No respiratory distress, no subcostal retractions  CV: RRR, no JVD  Abd: Soft, NT, ND  Back: bilateral clamped NT in place

## 2018-06-23 ENCOUNTER — EMERGENCY (EMERGENCY)
Facility: HOSPITAL | Age: 58
LOS: 1 days | Discharge: HOME | End: 2018-06-23
Attending: EMERGENCY MEDICINE | Admitting: EMERGENCY MEDICINE
Payer: COMMERCIAL

## 2018-06-23 VITALS
WEIGHT: 315 LBS | RESPIRATION RATE: 20 BRPM | OXYGEN SATURATION: 97 % | TEMPERATURE: 98 F | SYSTOLIC BLOOD PRESSURE: 124 MMHG | HEIGHT: 69 IN | HEART RATE: 88 BPM | DIASTOLIC BLOOD PRESSURE: 60 MMHG

## 2018-06-23 VITALS
RESPIRATION RATE: 18 BRPM | SYSTOLIC BLOOD PRESSURE: 138 MMHG | TEMPERATURE: 98 F | DIASTOLIC BLOOD PRESSURE: 77 MMHG | HEART RATE: 91 BPM

## 2018-06-23 LAB
ABO RH CONFIRMATION: SIGNIFICANT CHANGE UP
ALBUMIN SERPL ELPH-MCNC: 2.8 G/DL — LOW (ref 3.3–5)
ALP SERPL-CCNC: 61 U/L — SIGNIFICANT CHANGE UP (ref 30–120)
ALT FLD-CCNC: 22 U/L DA — SIGNIFICANT CHANGE UP (ref 10–60)
ANION GAP SERPL CALC-SCNC: 9 MMOL/L — SIGNIFICANT CHANGE UP (ref 5–17)
APPEARANCE UR: ABNORMAL
APTT BLD: 29.7 SEC — SIGNIFICANT CHANGE UP (ref 27.5–37.4)
AST SERPL-CCNC: 12 U/L — SIGNIFICANT CHANGE UP (ref 10–40)
BACTERIA # UR AUTO: ABNORMAL
BASOPHILS # BLD AUTO: 0.1 K/UL — SIGNIFICANT CHANGE UP (ref 0–0.2)
BASOPHILS NFR BLD AUTO: 0.6 % — SIGNIFICANT CHANGE UP (ref 0–2)
BILIRUB SERPL-MCNC: 0.2 MG/DL — SIGNIFICANT CHANGE UP (ref 0.2–1.2)
BILIRUB UR-MCNC: NEGATIVE — SIGNIFICANT CHANGE UP
BLD GP AB SCN SERPL QL: SIGNIFICANT CHANGE UP
BUN SERPL-MCNC: 39 MG/DL — HIGH (ref 7–23)
CALCIUM SERPL-MCNC: 8.4 MG/DL — SIGNIFICANT CHANGE UP (ref 8.4–10.5)
CHLORIDE SERPL-SCNC: 102 MMOL/L — SIGNIFICANT CHANGE UP (ref 96–108)
CO2 SERPL-SCNC: 26 MMOL/L — SIGNIFICANT CHANGE UP (ref 22–31)
COLOR SPEC: YELLOW — SIGNIFICANT CHANGE UP
CREAT SERPL-MCNC: 2.17 MG/DL — HIGH (ref 0.5–1.3)
DIFF PNL FLD: ABNORMAL
EOSINOPHIL # BLD AUTO: 0.3 K/UL — SIGNIFICANT CHANGE UP (ref 0–0.5)
EOSINOPHIL NFR BLD AUTO: 2.4 % — SIGNIFICANT CHANGE UP (ref 0–6)
EPI CELLS # UR: SIGNIFICANT CHANGE UP
GLUCOSE SERPL-MCNC: 107 MG/DL — HIGH (ref 70–99)
GLUCOSE UR QL: NEGATIVE MG/DL — SIGNIFICANT CHANGE UP
HCT VFR BLD CALC: 22.5 % — LOW (ref 39–50)
HGB BLD-MCNC: 7 G/DL — CRITICAL LOW (ref 13–17)
INR BLD: 1.13 RATIO — SIGNIFICANT CHANGE UP (ref 0.88–1.16)
KETONES UR-MCNC: NEGATIVE — SIGNIFICANT CHANGE UP
LEUKOCYTE ESTERASE UR-ACNC: ABNORMAL
LYMPHOCYTES # BLD AUTO: 1.7 K/UL — SIGNIFICANT CHANGE UP (ref 1–3.3)
LYMPHOCYTES # BLD AUTO: 16.3 % — SIGNIFICANT CHANGE UP (ref 13–44)
MCHC RBC-ENTMCNC: 28.8 PG — SIGNIFICANT CHANGE UP (ref 27–34)
MCHC RBC-ENTMCNC: 31.1 GM/DL — LOW (ref 32–36)
MCV RBC AUTO: 92.7 FL — SIGNIFICANT CHANGE UP (ref 80–100)
MONOCYTES # BLD AUTO: 0.7 K/UL — SIGNIFICANT CHANGE UP (ref 0–0.9)
MONOCYTES NFR BLD AUTO: 6.7 % — SIGNIFICANT CHANGE UP (ref 2–14)
NEUTROPHILS # BLD AUTO: 7.8 K/UL — HIGH (ref 1.8–7.4)
NEUTROPHILS NFR BLD AUTO: 74 % — SIGNIFICANT CHANGE UP (ref 43–77)
NITRITE UR-MCNC: NEGATIVE — SIGNIFICANT CHANGE UP
PH UR: 7 — SIGNIFICANT CHANGE UP (ref 5–8)
PLATELET # BLD AUTO: 306 K/UL — SIGNIFICANT CHANGE UP (ref 150–400)
POTASSIUM SERPL-MCNC: 3.9 MMOL/L — SIGNIFICANT CHANGE UP (ref 3.5–5.3)
POTASSIUM SERPL-SCNC: 3.9 MMOL/L — SIGNIFICANT CHANGE UP (ref 3.5–5.3)
PROT SERPL-MCNC: 6.9 G/DL — SIGNIFICANT CHANGE UP (ref 6–8.3)
PROT UR-MCNC: 100 MG/DL
PROTHROM AB SERPL-ACNC: 12.4 SEC — SIGNIFICANT CHANGE UP (ref 9.8–12.7)
RBC # BLD: 2.43 M/UL — LOW (ref 4.2–5.8)
RBC # FLD: 13.7 % — SIGNIFICANT CHANGE UP (ref 10.3–14.5)
RBC CASTS # UR COMP ASSIST: ABNORMAL /HPF (ref 0–4)
SODIUM SERPL-SCNC: 137 MMOL/L — SIGNIFICANT CHANGE UP (ref 135–145)
SP GR SPEC: 1 — LOW (ref 1.01–1.02)
UROBILINOGEN FLD QL: NEGATIVE MG/DL — SIGNIFICANT CHANGE UP
WBC # BLD: 10.6 K/UL — HIGH (ref 3.8–10.5)
WBC # FLD AUTO: 10.6 K/UL — HIGH (ref 3.8–10.5)
WBC UR QL: ABNORMAL

## 2018-06-23 PROCEDURE — 99284 EMERGENCY DEPT VISIT MOD MDM: CPT

## 2018-06-23 PROCEDURE — 81001 URINALYSIS AUTO W/SCOPE: CPT

## 2018-06-23 PROCEDURE — 86850 RBC ANTIBODY SCREEN: CPT

## 2018-06-23 PROCEDURE — P9016: CPT

## 2018-06-23 PROCEDURE — 86900 BLOOD TYPING SEROLOGIC ABO: CPT

## 2018-06-23 PROCEDURE — 36415 COLL VENOUS BLD VENIPUNCTURE: CPT

## 2018-06-23 PROCEDURE — 80053 COMPREHEN METABOLIC PANEL: CPT

## 2018-06-23 PROCEDURE — 93010 ELECTROCARDIOGRAM REPORT: CPT

## 2018-06-23 PROCEDURE — 36430 TRANSFUSION BLD/BLD COMPNT: CPT

## 2018-06-23 PROCEDURE — 85027 COMPLETE CBC AUTOMATED: CPT

## 2018-06-23 PROCEDURE — 93005 ELECTROCARDIOGRAM TRACING: CPT

## 2018-06-23 PROCEDURE — 86901 BLOOD TYPING SEROLOGIC RH(D): CPT

## 2018-06-23 PROCEDURE — 85730 THROMBOPLASTIN TIME PARTIAL: CPT

## 2018-06-23 PROCEDURE — 99285 EMERGENCY DEPT VISIT HI MDM: CPT | Mod: 25

## 2018-06-23 PROCEDURE — 86923 COMPATIBILITY TEST ELECTRIC: CPT

## 2018-06-23 PROCEDURE — 85610 PROTHROMBIN TIME: CPT

## 2018-06-23 RX ORDER — SODIUM CHLORIDE 9 MG/ML
1000 INJECTION INTRAMUSCULAR; INTRAVENOUS; SUBCUTANEOUS
Qty: 0 | Refills: 0 | Status: DISCONTINUED | OUTPATIENT
Start: 2018-06-23 | End: 2018-06-27

## 2018-06-23 RX ADMIN — SODIUM CHLORIDE 75 MILLILITER(S): 9 INJECTION INTRAMUSCULAR; INTRAVENOUS; SUBCUTANEOUS at 10:26

## 2018-06-23 NOTE — ED ADULT NURSE NOTE - OBJECTIVE STATEMENT
Presents to ED via amb from Rehab. Pt states "I have abnormal labs & I might need a transfusion" O/E pt is A & O . Color fair. Skin warm & dry to touch. Pt is obese with bilat lower extremity lymphodema. Lungs clear - diminished at bases. Pt has Hx: bilat renal colic-has bilat nephrostomy tubes in place. Initially told he was in renal failure which is correcting itself. No dialysis required as yet.

## 2018-06-23 NOTE — ED PROVIDER NOTE - CHPI ED SYMPTOMS NEG
no tingling/no pain/no fever/no vomiting/no numbness/no nausea/no chills/no decreased eating/drinking/no dizziness

## 2018-06-23 NOTE — ED PROVIDER NOTE - OBJECTIVE STATEMENT
59 yo male with anemia sent from [facility] for transfusion today. Was hospitalized last month with obstructive uropathy creatinine of 6, bilateral kidney stones, had bilateral nephrostomy tubes placed. Admitted to [facility] for rehab in preparation for stone surgery. Denies fever, pain, hematuria, or other symptom.  MD Zacarias

## 2018-06-23 NOTE — ED ADULT NURSE REASSESSMENT NOTE - NS ED NURSE REASSESS COMMENT FT1
1148-Transfusion initiated- 1U  #[id number] O+  - VSS    1205- Transfusion in progress without averse affects noted. VSS

## 2018-06-23 NOTE — ED PROVIDER NOTE - NONTENDER LOCATION
left upper quadrant/right upper quadrant/bilat neph tubes in place/right lower quadrant/left costovertebral angle/left lower quadrant/umbilical/suprapubic/right costovertebral angle/periumbilical

## 2018-06-23 NOTE — ED PROVIDER NOTE - MEDICAL DECISION MAKING DETAILS
57 yo male with anemia 2ndary to Renal failure sent from [facility] for transfusion. Plan - labs, transfuse. May or may not need admission depending on other lab results.

## 2018-06-28 ENCOUNTER — INPATIENT (INPATIENT)
Facility: HOSPITAL | Age: 58
LOS: 15 days | Discharge: HOME | DRG: 356 | End: 2018-07-14
Attending: INTERNAL MEDICINE | Admitting: INTERNAL MEDICINE
Payer: COMMERCIAL

## 2018-06-28 VITALS
DIASTOLIC BLOOD PRESSURE: 92 MMHG | OXYGEN SATURATION: 96 % | WEIGHT: 315 LBS | HEIGHT: 69 IN | SYSTOLIC BLOOD PRESSURE: 140 MMHG | TEMPERATURE: 98 F | RESPIRATION RATE: 16 BRPM | HEART RATE: 97 BPM

## 2018-06-28 DIAGNOSIS — D64.9 ANEMIA, UNSPECIFIED: ICD-10-CM

## 2018-06-28 DIAGNOSIS — E66.01 MORBID (SEVERE) OBESITY DUE TO EXCESS CALORIES: ICD-10-CM

## 2018-06-28 DIAGNOSIS — Z86.69 PERSONAL HISTORY OF OTHER DISEASES OF THE NERVOUS SYSTEM AND SENSE ORGANS: ICD-10-CM

## 2018-06-28 DIAGNOSIS — K92.2 GASTROINTESTINAL HEMORRHAGE, UNSPECIFIED: ICD-10-CM

## 2018-06-28 DIAGNOSIS — I10 ESSENTIAL (PRIMARY) HYPERTENSION: ICD-10-CM

## 2018-06-28 DIAGNOSIS — E11.21 TYPE 2 DIABETES MELLITUS WITH DIABETIC NEPHROPATHY: ICD-10-CM

## 2018-06-28 DIAGNOSIS — I89.0 LYMPHEDEMA, NOT ELSEWHERE CLASSIFIED: ICD-10-CM

## 2018-06-28 LAB
ALBUMIN SERPL ELPH-MCNC: 2.6 G/DL — LOW (ref 3.3–5)
ALP SERPL-CCNC: 50 U/L — SIGNIFICANT CHANGE UP (ref 30–120)
ALT FLD-CCNC: 16 U/L DA — SIGNIFICANT CHANGE UP (ref 10–60)
ANION GAP SERPL CALC-SCNC: 10 MMOL/L — SIGNIFICANT CHANGE UP (ref 5–17)
APPEARANCE UR: CLEAR — SIGNIFICANT CHANGE UP
APTT BLD: 26.2 SEC — LOW (ref 27.5–37.4)
AST SERPL-CCNC: 11 U/L — SIGNIFICANT CHANGE UP (ref 10–40)
BASOPHILS # BLD AUTO: 0.02 K/UL — SIGNIFICANT CHANGE UP (ref 0–0.2)
BASOPHILS NFR BLD AUTO: 0.1 % — SIGNIFICANT CHANGE UP (ref 0–2)
BILIRUB SERPL-MCNC: 0.2 MG/DL — SIGNIFICANT CHANGE UP (ref 0.2–1.2)
BILIRUB UR-MCNC: NEGATIVE — SIGNIFICANT CHANGE UP
BLD GP AB SCN SERPL QL: SIGNIFICANT CHANGE UP
BUN SERPL-MCNC: 54 MG/DL — HIGH (ref 7–23)
CALCIUM SERPL-MCNC: 8.4 MG/DL — SIGNIFICANT CHANGE UP (ref 8.4–10.5)
CHLORIDE SERPL-SCNC: 104 MMOL/L — SIGNIFICANT CHANGE UP (ref 96–108)
CO2 SERPL-SCNC: 24 MMOL/L — SIGNIFICANT CHANGE UP (ref 22–31)
COLOR SPEC: YELLOW — SIGNIFICANT CHANGE UP
CREAT SERPL-MCNC: 2.12 MG/DL — HIGH (ref 0.5–1.3)
DIFF PNL FLD: ABNORMAL
EOSINOPHIL # BLD AUTO: 0.09 K/UL — SIGNIFICANT CHANGE UP (ref 0–0.5)
EOSINOPHIL NFR BLD AUTO: 0.6 % — SIGNIFICANT CHANGE UP (ref 0–6)
EPI CELLS # UR: SIGNIFICANT CHANGE UP
GLUCOSE SERPL-MCNC: 124 MG/DL — HIGH (ref 70–99)
GLUCOSE UR QL: NEGATIVE MG/DL — SIGNIFICANT CHANGE UP
HCT VFR BLD CALC: 16.1 % — CRITICAL LOW (ref 39–50)
HGB BLD-MCNC: 4.9 G/DL — CRITICAL LOW (ref 13–17)
IMM GRANULOCYTES NFR BLD AUTO: 1.3 % — SIGNIFICANT CHANGE UP (ref 0–1.5)
INR BLD: 1.15 RATIO — SIGNIFICANT CHANGE UP (ref 0.88–1.16)
KETONES UR-MCNC: NEGATIVE — SIGNIFICANT CHANGE UP
LEUKOCYTE ESTERASE UR-ACNC: ABNORMAL
LYMPHOCYTES # BLD AUTO: 14.4 % — SIGNIFICANT CHANGE UP (ref 13–44)
LYMPHOCYTES # BLD AUTO: 2.12 K/UL — SIGNIFICANT CHANGE UP (ref 1–3.3)
MCHC RBC-ENTMCNC: 28.7 PG — SIGNIFICANT CHANGE UP (ref 27–34)
MCHC RBC-ENTMCNC: 30.4 GM/DL — LOW (ref 32–36)
MCV RBC AUTO: 94.2 FL — SIGNIFICANT CHANGE UP (ref 80–100)
MONOCYTES # BLD AUTO: 1.02 K/UL — HIGH (ref 0–0.9)
MONOCYTES NFR BLD AUTO: 6.9 % — SIGNIFICANT CHANGE UP (ref 2–14)
NEUTROPHILS # BLD AUTO: 11.27 K/UL — HIGH (ref 1.8–7.4)
NEUTROPHILS NFR BLD AUTO: 76.7 % — SIGNIFICANT CHANGE UP (ref 43–77)
NITRITE UR-MCNC: NEGATIVE — SIGNIFICANT CHANGE UP
NRBC # BLD: 0 /100 WBCS — SIGNIFICANT CHANGE UP (ref 0–0)
OB PNL STL: POSITIVE
PH UR: 7 — SIGNIFICANT CHANGE UP (ref 5–8)
PLATELET # BLD AUTO: 281 K/UL — SIGNIFICANT CHANGE UP (ref 150–400)
POTASSIUM SERPL-MCNC: 4.7 MMOL/L — SIGNIFICANT CHANGE UP (ref 3.5–5.3)
POTASSIUM SERPL-SCNC: 4.7 MMOL/L — SIGNIFICANT CHANGE UP (ref 3.5–5.3)
PROT SERPL-MCNC: 6.1 G/DL — SIGNIFICANT CHANGE UP (ref 6–8.3)
PROT UR-MCNC: 30 MG/DL
PROTHROM AB SERPL-ACNC: 12.6 SEC — SIGNIFICANT CHANGE UP (ref 9.8–12.7)
RBC # BLD: 1.71 M/UL — LOW (ref 4.2–5.8)
RBC # FLD: 15.1 % — HIGH (ref 10.3–14.5)
RBC CASTS # UR COMP ASSIST: SIGNIFICANT CHANGE UP /HPF (ref 0–4)
SODIUM SERPL-SCNC: 138 MMOL/L — SIGNIFICANT CHANGE UP (ref 135–145)
SP GR SPEC: 1 — LOW (ref 1.01–1.02)
UROBILINOGEN FLD QL: NEGATIVE MG/DL — SIGNIFICANT CHANGE UP
WBC # BLD: 14.71 K/UL — HIGH (ref 3.8–10.5)
WBC # FLD AUTO: 14.71 K/UL — HIGH (ref 3.8–10.5)
WBC UR QL: ABNORMAL

## 2018-06-28 PROCEDURE — 99285 EMERGENCY DEPT VISIT HI MDM: CPT

## 2018-06-28 PROCEDURE — 71045 X-RAY EXAM CHEST 1 VIEW: CPT | Mod: 26,77

## 2018-06-28 PROCEDURE — 71045 X-RAY EXAM CHEST 1 VIEW: CPT | Mod: 26

## 2018-06-28 PROCEDURE — 93010 ELECTROCARDIOGRAM REPORT: CPT

## 2018-06-28 RX ORDER — DEXTROSE 50 % IN WATER 50 %
15 SYRINGE (ML) INTRAVENOUS ONCE
Qty: 0 | Refills: 0 | Status: DISCONTINUED | OUTPATIENT
Start: 2018-06-28 | End: 2018-06-30

## 2018-06-28 RX ORDER — INSULIN LISPRO 100/ML
VIAL (ML) SUBCUTANEOUS
Qty: 0 | Refills: 0 | Status: DISCONTINUED | OUTPATIENT
Start: 2018-06-28 | End: 2018-06-30

## 2018-06-28 RX ORDER — SODIUM CHLORIDE 9 MG/ML
1000 INJECTION, SOLUTION INTRAVENOUS
Qty: 0 | Refills: 0 | Status: DISCONTINUED | OUTPATIENT
Start: 2018-06-28 | End: 2018-06-30

## 2018-06-28 RX ORDER — AMLODIPINE BESYLATE 2.5 MG/1
10 TABLET ORAL DAILY
Qty: 0 | Refills: 0 | Status: DISCONTINUED | OUTPATIENT
Start: 2018-06-28 | End: 2018-06-30

## 2018-06-28 RX ORDER — PANTOPRAZOLE SODIUM 20 MG/1
40 TABLET, DELAYED RELEASE ORAL EVERY 12 HOURS
Qty: 0 | Refills: 0 | Status: DISCONTINUED | OUTPATIENT
Start: 2018-06-28 | End: 2018-06-30

## 2018-06-28 RX ORDER — DEXTROSE 50 % IN WATER 50 %
12.5 SYRINGE (ML) INTRAVENOUS ONCE
Qty: 0 | Refills: 0 | Status: DISCONTINUED | OUTPATIENT
Start: 2018-06-28 | End: 2018-06-30

## 2018-06-28 RX ORDER — TAMSULOSIN HYDROCHLORIDE 0.4 MG/1
0.4 CAPSULE ORAL AT BEDTIME
Qty: 0 | Refills: 0 | Status: DISCONTINUED | OUTPATIENT
Start: 2018-06-28 | End: 2018-06-30

## 2018-06-28 RX ORDER — SERTRALINE 25 MG/1
25 TABLET, FILM COATED ORAL DAILY
Qty: 0 | Refills: 0 | Status: DISCONTINUED | OUTPATIENT
Start: 2018-06-28 | End: 2018-06-29

## 2018-06-28 RX ORDER — ONDANSETRON 8 MG/1
4 TABLET, FILM COATED ORAL ONCE
Qty: 0 | Refills: 0 | Status: COMPLETED | OUTPATIENT
Start: 2018-06-28 | End: 2018-06-30

## 2018-06-28 RX ORDER — DEXTROSE 50 % IN WATER 50 %
25 SYRINGE (ML) INTRAVENOUS ONCE
Qty: 0 | Refills: 0 | Status: DISCONTINUED | OUTPATIENT
Start: 2018-06-28 | End: 2018-06-30

## 2018-06-28 RX ORDER — GLUCAGON INJECTION, SOLUTION 0.5 MG/.1ML
1 INJECTION, SOLUTION SUBCUTANEOUS ONCE
Qty: 0 | Refills: 0 | Status: DISCONTINUED | OUTPATIENT
Start: 2018-06-28 | End: 2018-06-30

## 2018-06-28 RX ORDER — SODIUM CHLORIDE 9 MG/ML
3 INJECTION INTRAMUSCULAR; INTRAVENOUS; SUBCUTANEOUS ONCE
Qty: 0 | Refills: 0 | Status: COMPLETED | OUTPATIENT
Start: 2018-06-28 | End: 2018-06-28

## 2018-06-28 RX ORDER — SODIUM CHLORIDE 9 MG/ML
1000 INJECTION INTRAMUSCULAR; INTRAVENOUS; SUBCUTANEOUS
Qty: 0 | Refills: 0 | Status: DISCONTINUED | OUTPATIENT
Start: 2018-06-28 | End: 2018-06-30

## 2018-06-28 RX ADMIN — SODIUM CHLORIDE 3 MILLILITER(S): 9 INJECTION INTRAMUSCULAR; INTRAVENOUS; SUBCUTANEOUS at 18:52

## 2018-06-28 RX ADMIN — SODIUM CHLORIDE 125 MILLILITER(S): 9 INJECTION INTRAMUSCULAR; INTRAVENOUS; SUBCUTANEOUS at 18:51

## 2018-06-28 NOTE — ED PROVIDER NOTE - PROGRESS NOTE DETAILS
case dw Tristan and Selma.  All results were explained to patient and/or family and a copy of all available results given.  pt wife and sister were present.

## 2018-06-28 NOTE — H&P ADULT - ASSESSMENT
59 y/o M pt w/ PMHx morbid obesity, DM, HTN, peripheral neuropathy, calculus of kidney and ureter, sleep apnea, acquired lymphedema of leg and PSHx nephrostomy bilateral kidneys presents to the ED BIBA from [facility] c/o bloody diarrhea x 3 since yesterday, describes it as red. Also endorsing nausea. States that he had a blood transfusion at [facility] on 6/23 and was given an injection of Procrit yesterday. States hemoglobin was above 7 yesterday and today it is below 7, approx 5.  Pt states he resides at [facility] for rehabilitation due to a fall 2 months ago. Pt denies vomiting, abd pain, fever or any other complaints at this time.  IN [facility] ED hemoglobin 4.9, and had central line placed and ordered 2 units PRBC.vital signs stable    admitted for acute on ch anemia due to GI bl;eed with SMITH with CKD with h/o ureteral stents with MOrbid obesity , DM2 , HTN, peripheral neuropathy ,    for PRBC, GI consult , npo  , pantoprazole, started in ED, nephro consult

## 2018-06-28 NOTE — ED PROVIDER NOTE - OBJECTIVE STATEMENT
59 y/o M pt w/ PMHx morbid obesity, DM, HTN, peripheral neuropathy, calculus of kidney and ureter, sleep apnea, acquired lymphedema of leg and PSHx nephrostomy bilateral kidneys presents to the ED BIBA from [facility] c/o bloody diarrhea x 3 since yesterday, describes it as red. Also endorsing nausea. States that he had a blood transfusion at [facility] on 6/23 and was given an injection of Procrit yesterday. States hemoglobin was above 7 yesterday and today it is below 7, approx 5.  Pt states he resides at [facility] for rehabilitation due to a fall 2 months ago. Pt denies vomiting, abd pain, fever or any other complaints at this time.    PMD: Dr. Ivory ([place])

## 2018-06-28 NOTE — PROCEDURE NOTE - NSPROCDETAILS_GEN_ALL_CORE
sterile dressing applied/ultrasound guidance/guidewire recovered/lumen(s) aspirated and flushed/sterile technique, catheter placed

## 2018-06-28 NOTE — H&P ADULT - NEUROLOGICAL DETAILS
deep reflexes intact/cranial nerves intact/responds to pain/responds to verbal commands/sensation intact/alert and oriented x 3

## 2018-06-28 NOTE — ED ADULT NURSE NOTE - OBJECTIVE STATEMENT
Presents to ED via amb from [facility]. Pt is A & O. Pt has HX: anemia. States he has been having bloody diarrhea  Color pale . Skin warm & dry to touch. Lungs clear - diminished at bases. Abd- obese , soft, nontender. CM- RSR without ectopics. Pt has lymphedema bilat lower extremities. Has bilat renal stents in place. Dsgs dry.

## 2018-06-28 NOTE — H&P ADULT - HISTORY OF PRESENT ILLNESS
57 y/o M pt w/ PMHx morbid obesity, DM, HTN, peripheral neuropathy, calculus of kidney and ureter, sleep apnea, acquired lymphedema of leg and PSHx nephrostomy bilateral kidneys presents to the ED BIBA from [facility] c/o bloody diarrhea x 3 since yesterday, describes it as red. Also endorsing nausea. States that he had a blood transfusion at [facility] on 6/23 and was given an injection of Procrit yesterday. States hemoglobin was above 7 yesterday and today it is below 7, approx 5.  Pt states he resides at [facility] for rehabilitation due to a fall 2 months ago. Pt denies vomiting, abd pain, fever or any other complaints at this time.  IN [facility] ED hemoglobin 4.9, and had central line placed and ordered 2 units PRBC.vital signs stable

## 2018-06-28 NOTE — ED PROVIDER NOTE - NS_ ATTENDINGSCRIBEDETAILS _ED_A_ED_FT
Discharge Call Back      Person Contacted: patient    Patient Condition: Improved    If applicable, have you made a follow-up appointment or received a call for follow up? No    Recommendation: Other     Do you have any questions about your care in the Emergency Room, pain control or discharge instructions? no              
Rocael Mon MD - The scribe's documentation has been prepared under my direction and personally reviewed by me in its entirety. I confirm that the note above accurately reflects all work, treatment, procedures, and medical decision making performed by me.

## 2018-06-28 NOTE — ED PROVIDER NOTE - CRITICAL CARE PROVIDED
additional history taking/consult w/ pt's family directly relating to pts condition/documentation/direct patient care (not related to procedure)/interpretation of diagnostic studies/consultation with other physicians

## 2018-06-28 NOTE — ED PROVIDER NOTE - CONSTITUTIONAL, MLM
normal... Well appearing, obese, awake, alert, oriented to person, place, time/situation and in no apparent distress. Well appearing, morbidly obese, awake, alert, oriented to person, place, time/situation and in no apparent distress. - - -

## 2018-06-29 ENCOUNTER — TRANSCRIPTION ENCOUNTER (OUTPATIENT)
Age: 58
End: 2018-06-29

## 2018-06-29 DIAGNOSIS — G47.30 SLEEP APNEA, UNSPECIFIED: ICD-10-CM

## 2018-06-29 DIAGNOSIS — D50.8 OTHER IRON DEFICIENCY ANEMIAS: ICD-10-CM

## 2018-06-29 LAB
ANION GAP SERPL CALC-SCNC: 9 MMOL/L — SIGNIFICANT CHANGE UP (ref 5–17)
BUN SERPL-MCNC: 53 MG/DL — HIGH (ref 7–23)
CALCIUM SERPL-MCNC: 8.2 MG/DL — LOW (ref 8.4–10.5)
CHLORIDE SERPL-SCNC: 106 MMOL/L — SIGNIFICANT CHANGE UP (ref 96–108)
CHOLEST SERPL-MCNC: 103 MG/DL — SIGNIFICANT CHANGE UP (ref 10–199)
CO2 SERPL-SCNC: 25 MMOL/L — SIGNIFICANT CHANGE UP (ref 22–31)
CREAT SERPL-MCNC: 2.13 MG/DL — HIGH (ref 0.5–1.3)
GLUCOSE BLDC GLUCOMTR-MCNC: 101 MG/DL — HIGH (ref 70–99)
GLUCOSE BLDC GLUCOMTR-MCNC: 113 MG/DL — HIGH (ref 70–99)
GLUCOSE BLDC GLUCOMTR-MCNC: 136 MG/DL — HIGH (ref 70–99)
GLUCOSE SERPL-MCNC: 123 MG/DL — HIGH (ref 70–99)
HBA1C BLD-MCNC: 5.5 % — SIGNIFICANT CHANGE UP (ref 4–5.6)
HCT VFR BLD CALC: 16.4 % — CRITICAL LOW (ref 39–50)
HCT VFR BLD CALC: 18.6 % — CRITICAL LOW (ref 39–50)
HDLC SERPL-MCNC: 17 MG/DL — LOW (ref 40–125)
HGB BLD-MCNC: 5 G/DL — CRITICAL LOW (ref 13–17)
HGB BLD-MCNC: 5.8 G/DL — CRITICAL LOW (ref 13–17)
IRON SATN MFR SERPL: 16 % — SIGNIFICANT CHANGE UP (ref 16–55)
IRON SATN MFR SERPL: 38 UG/DL — LOW (ref 45–165)
LIPID PNL WITH DIRECT LDL SERPL: 49 MG/DL — SIGNIFICANT CHANGE UP
MCHC RBC-ENTMCNC: 28.4 PG — SIGNIFICANT CHANGE UP (ref 27–34)
MCHC RBC-ENTMCNC: 28.9 PG — SIGNIFICANT CHANGE UP (ref 27–34)
MCHC RBC-ENTMCNC: 30.5 GM/DL — LOW (ref 32–36)
MCHC RBC-ENTMCNC: 31.2 GM/DL — LOW (ref 32–36)
MCV RBC AUTO: 92.5 FL — SIGNIFICANT CHANGE UP (ref 80–100)
MCV RBC AUTO: 93.2 FL — SIGNIFICANT CHANGE UP (ref 80–100)
NRBC # BLD: 0 /100 WBCS — SIGNIFICANT CHANGE UP (ref 0–0)
NRBC # BLD: 0 /100 WBCS — SIGNIFICANT CHANGE UP (ref 0–0)
PLATELET # BLD AUTO: 220 K/UL — SIGNIFICANT CHANGE UP (ref 150–400)
PLATELET # BLD AUTO: 248 K/UL — SIGNIFICANT CHANGE UP (ref 150–400)
POTASSIUM SERPL-MCNC: 4.4 MMOL/L — SIGNIFICANT CHANGE UP (ref 3.5–5.3)
POTASSIUM SERPL-SCNC: 4.4 MMOL/L — SIGNIFICANT CHANGE UP (ref 3.5–5.3)
RBC # BLD: 1.74 M/UL — LOW (ref 4.2–5.8)
RBC # BLD: 1.76 M/UL — LOW (ref 4.2–5.8)
RBC # BLD: 2.01 M/UL — LOW (ref 4.2–5.8)
RBC # FLD: 15.4 % — HIGH (ref 10.3–14.5)
RBC # FLD: 15.5 % — HIGH (ref 10.3–14.5)
RETICS #: 120.8 K/UL — SIGNIFICANT CHANGE UP (ref 25–125)
RETICS/RBC NFR: 6.9 % — HIGH (ref 0.5–2.5)
SODIUM SERPL-SCNC: 140 MMOL/L — SIGNIFICANT CHANGE UP (ref 135–145)
TIBC SERPL-MCNC: 238 UG/DL — SIGNIFICANT CHANGE UP (ref 220–430)
TOTAL CHOLESTEROL/HDL RATIO MEASUREMENT: 6.1 RATIO — SIGNIFICANT CHANGE UP (ref 3.4–9.6)
TRANSFERRIN SERPL-MCNC: 195 MG/DL — LOW (ref 200–360)
TRIGL SERPL-MCNC: 187 MG/DL — HIGH (ref 10–149)
UIBC SERPL-MCNC: 200 UG/DL — SIGNIFICANT CHANGE UP (ref 110–370)
WBC # BLD: 11.62 K/UL — HIGH (ref 3.8–10.5)
WBC # BLD: 9.99 K/UL — SIGNIFICANT CHANGE UP (ref 3.8–10.5)
WBC # FLD AUTO: 11.62 K/UL — HIGH (ref 3.8–10.5)
WBC # FLD AUTO: 9.99 K/UL — SIGNIFICANT CHANGE UP (ref 3.8–10.5)

## 2018-06-29 PROCEDURE — 74176 CT ABD & PELVIS W/O CONTRAST: CPT | Mod: 26

## 2018-06-29 RX ORDER — LACTOBACILLUS ACIDOPHILUS 100MM CELL
1 CAPSULE ORAL
Qty: 0 | Refills: 0 | Status: DISCONTINUED | OUTPATIENT
Start: 2018-06-29 | End: 2018-06-30

## 2018-06-29 RX ORDER — SERTRALINE 25 MG/1
1 TABLET, FILM COATED ORAL
Qty: 0 | Refills: 0 | COMMUNITY

## 2018-06-29 RX ORDER — NYSTATIN CREAM 100000 [USP'U]/G
1 CREAM TOPICAL
Qty: 0 | Refills: 0 | Status: DISCONTINUED | OUTPATIENT
Start: 2018-06-29 | End: 2018-06-30

## 2018-06-29 RX ORDER — BACITRACIN ZINC 500 UNIT/G
1 OINTMENT IN PACKET (EA) TOPICAL EVERY 12 HOURS
Qty: 0 | Refills: 0 | Status: DISCONTINUED | OUTPATIENT
Start: 2018-06-29 | End: 2018-06-30

## 2018-06-29 RX ADMIN — Medication 1 APPLICATION(S): at 21:53

## 2018-06-29 RX ADMIN — AMLODIPINE BESYLATE 10 MILLIGRAM(S): 2.5 TABLET ORAL at 05:32

## 2018-06-29 RX ADMIN — PANTOPRAZOLE SODIUM 40 MILLIGRAM(S): 20 TABLET, DELAYED RELEASE ORAL at 05:32

## 2018-06-29 RX ADMIN — PANTOPRAZOLE SODIUM 40 MILLIGRAM(S): 20 TABLET, DELAYED RELEASE ORAL at 17:20

## 2018-06-29 RX ADMIN — NYSTATIN CREAM 1 APPLICATION(S): 100000 CREAM TOPICAL at 17:21

## 2018-06-29 RX ADMIN — Medication 1 TABLET(S): at 09:54

## 2018-06-29 RX ADMIN — Medication 1 TABLET(S): at 17:21

## 2018-06-29 RX ADMIN — Medication 1 TABLET(S): at 21:53

## 2018-06-29 RX ADMIN — TAMSULOSIN HYDROCHLORIDE 0.4 MILLIGRAM(S): 0.4 CAPSULE ORAL at 21:53

## 2018-06-29 RX ADMIN — Medication 1 TABLET(S): at 12:47

## 2018-06-29 NOTE — CONSULT NOTE ADULT - SUBJECTIVE AND OBJECTIVE BOX
PULMONARY/CRITICAL CARE        Patient is a 58y old  Male who presents with a chief complaint of "bloody diarrhea (2018 04:16)    BRIEF HOSPITAL COURSE: ***History of Present Illness: The patient is a 58 year old male with a history of HTN, DM, lymphedema, peripheral neuropathy, kidney stone, MEGHA who presents from rehab with bloody diarrhea. He was in hospital , noted to be anemic to 7, received 1 unit pRBC. He noted bloody stool for past 2-3 days. No chest pain, shortness of breath, dizziness, palpitations. Prior to this, walking without symptoms. No prior cardiac testing.  Intolerant of cpap in past.    Events last 24 hours: ***    PAST MEDICAL & SURGICAL HISTORY:  Hx of peripheral neuropathy  Morbid obesity  H/O sleep apnea  Acquired lymphedema of leg  Calculus of kidney and ureter  Diabetes mellitus type II  HTN (hypertension)  Ureteral stents placement, bilateral  Hx of tonsillectomy: at 6 years old    Allergies    No Known Allergies    Intolerances      FAMILY HISTORY: Denies cigs, etoh  Family history of coronary artery disease  Family history of prostate cancer in father      Review of Systems:  CONSTITUTIONAL: No fever, chills, or fatigue  EYES: No eye pain, visual disturbances, or discharge  ENMT:  No difficulty hearing, tinnitus, vertigo; No sinus or throat pain  NECK: No pain or stiffness  RESPIRATORY: No cough, wheezing, chills or hemoptysis; No shortness of breath  CARDIOVASCULAR: No chest pain, palpitations, dizziness, or leg swelling  GASTROINTESTINAL: No abdominal or epigastric pain. No nausea, vomiting, or hematemesis; No diarrhea or constipation. had melena  and  hematochezia.  GENITOURINARY: No dysuria, frequency, hematuria, or incontinence  NEUROLOGICAL: No headaches, memory loss, loss of strength, numbness, or tremors  SKIN: No itching, burning, rashes, or lesions   MUSCULOSKELETAL: No joint pain or swelling; No muscle, back, or extremity pain  PSYCHIATRIC: No depression, anxiety, mood swings, or difficulty sleeping      Medications:    amLODIPine   Tablet 10 milliGRAM(s) Oral daily  tamsulosin 0.4 milliGRAM(s) Oral at bedtime      ondansetron Injectable 4 milliGRAM(s) IV Push once        pantoprazole  Injectable 40 milliGRAM(s) IV Push every 12 hours      dextrose 40% Gel 15 Gram(s) Oral once PRN  dextrose 50% Injectable 12.5 Gram(s) IV Push once  dextrose 50% Injectable 25 Gram(s) IV Push once  dextrose 50% Injectable 25 Gram(s) IV Push once  glucagon  Injectable 1 milliGRAM(s) IntraMuscular once PRN  insulin lispro (HumaLOG) corrective regimen sliding scale   SubCutaneous three times a day before meals    dextrose 5%. 1000 milliLiter(s) IV Continuous <Continuous>  sodium chloride 0.9%. 1000 milliLiter(s) IV Continuous <Continuous>      nystatin Powder 1 Application(s) Topical two times a day    lactobacillus acidophilus 1 Tablet(s) Oral four times a day with meals          ICU Vital Signs Last 24 Hrs  T(C): 36.7 (2018 12:54), Max: 36.8 (2018 16:56)  T(F): 98.1 (2018 12:54), Max: 98.3 (2018 01:42)  HR: 80 (2018 11:06) (80 - 98)  BP: 147/70 (2018 11:06) (131/58 - 159/69)  BP(mean): 91 (2018 11:06) (78 - 91)  ABP: --  ABP(mean): --  RR: 15 (2018 11:06) (13 - 21)  SpO2: 100% (2018 11:06) (96% - 100%)    Vital Signs Last 24 Hrs  T(C): 36.7 (2018 12:54), Max: 36.8 (2018 16:56)  T(F): 98.1 (2018 12:54), Max: 98.3 (2018 01:42)  HR: 80 (2018 11:06) (80 - 98)  BP: 147/70 (2018 11:06) (131/58 - 159/69)  BP(mean): 91 (2018 11:06) (78 - 91)  RR: 15 (2018 11:06) (13 - 21)  SpO2: 100% (2018 11:06) (96% - 100%)        I&O's Detail    2018 07:01  -  2018 07:00  --------------------------------------------------------  IN:    Packed Red Blood Cells: 235 mL    sodium chloride 0.9%.: 500 mL  Total IN: 735 mL    OUT:    Voided: 300 mL  Total OUT: 300 mL    Total NET: 435 mL            LABS:                        5.0    11.62 )-----------( 248      ( 2018 05:16 )             16.4         140  |  106  |  53<H>  ----------------------------<  123<H>  4.4   |  25  |  2.13<H>    Ca    8.2<L>      2018 05:16    TPro  6.1  /  Alb  2.6<L>  /  TBili  0.2  /  DBili  x   /  AST  11  /  ALT  16  /  AlkPhos  50            CAPILLARY BLOOD GLUCOSE      POCT Blood Glucose.: 113 mg/dL (2018 12:39)    PT/INR - ( 2018 18:49 )   PT: 12.6 sec;   INR: 1.15 ratio         PTT - ( 2018 18:49 )  PTT:26.2 sec  Urinalysis Basic - ( 2018 18:49 )    Color: Yellow / Appearance: Clear / S.005 / pH: x  Gluc: x / Ketone: Negative  / Bili: Negative / Urobili: Negative mg/dL   Blood: x / Protein: 30 mg/dL / Nitrite: Negative   Leuk Esterase: Moderate / RBC: 0-2 /HPF / WBC 6-10   Sq Epi: x / Non Sq Epi: Occasional / Bacteria: x      CULTURES:      Physical Examination:  OBese male, nad    General: No acute distress.      HEENT: Pupils equal, reactive to light.  Symmetric. Crowded oropharynx    PULM: Clear to auscultation bilaterally, no significant sputum production    CVS: Regular rate and rhythm, no murmurs, rubs, or gallops    ABD: Soft, nondistended, nontender, normoactive bowel sounds, no masses    EXT: brawny edema, nontender    SKIN: Warm and well perfused, no rashes noted.    NEURO: Alert, oriented, interactive, nonfocal    RADIOLOGY: ***< from: Xray Chest 1 View- PORTABLE-Urgent (18 @ 22:22) >    EXAM:  XR CHEST PORTABLE URGENT 1V                                  PROCEDURE DATE:  2018          INTERPRETATION:  XR CHEST PORTABLE URGENT 1V    Single AP view    HISTORY:  Shortness of Breath, Central line placement    Comparison:  Same day chest x-ray    The cardiac silhouette is within normal limits. The lungs are clear. No   pleural abnormality. Right IJ line tip in the SVC. No pneumothorax.    IMPRESSION: Right IJ line tip in the SVC. No pneumothorax.                        KATERYNA VEGA M.D., ATTENDING RADIOLOGIST    < end of copied text >      CRITICAL CARE TIME SPENT: ***

## 2018-06-29 NOTE — CONSULT NOTE ADULT - SUBJECTIVE AND OBJECTIVE BOX
[facility]  Rasheed Jacobson PA-C  [address] [place] [address]  [phone]      Chief Complaint:  Patient is a 58y old  Male who presents with a chief complaint of "bloody diarrhea 59 y/o M pt w/ PMHx morbid obesity, DM, HTN, peripheral neuropathy, calculus of kidney and ureter, sleep apnea, acquired lymphedema of leg and PSHx nephrostomy bilateral kidneys presents to the ED BIBA from [facility] c/o bloody diarrhea x 3 since yesterday, describes it as red. Also endorsing nausea. States that he had a blood transfusion at [facility] on  and was given an injection of Procrit yesterday. States hemoglobin was above 7 yesterday and today it is below 7, approx 5.  Pt states he resides at [facility] for rehabilitation due to a fall 2 months ago. Pt denies vomiting, abd pain, fever or any other complaints at this time.  IN [facility] ED hemoglobin 4.9, and had central line placed and ordered 2 units PRBC.vital signs stable    Allergies:  No Known Allergies      Medications:  amLODIPine   Tablet 10 milliGRAM(s) Oral daily  dextrose 40% Gel 15 Gram(s) Oral once PRN  dextrose 5%. 1000 milliLiter(s) IV Continuous <Continuous>  dextrose 50% Injectable 12.5 Gram(s) IV Push once  dextrose 50% Injectable 25 Gram(s) IV Push once  dextrose 50% Injectable 25 Gram(s) IV Push once  glucagon  Injectable 1 milliGRAM(s) IntraMuscular once PRN  insulin lispro (HumaLOG) corrective regimen sliding scale   SubCutaneous three times a day before meals  nystatin Powder 1 Application(s) Topical two times a day  ondansetron Injectable 4 milliGRAM(s) IV Push once  pantoprazole  Injectable 40 milliGRAM(s) IV Push every 12 hours  sertraline 25 milliGRAM(s) Oral daily  sodium chloride 0.9%. 1000 milliLiter(s) IV Continuous <Continuous>  tamsulosin 0.4 milliGRAM(s) Oral at bedtime      PMHX/PSHX:  Hx of peripheral neuropathy  Morbid obesity  H/O sleep apnea  Acquired lymphedema of leg  Calculus of kidney and ureter  Diabetes mellitus type II  HTN (hypertension)  Ureteral stents placement, bilateral  Hx of tonsillectomy      Family history:  Family history of coronary artery disease  Family history of prostate cancer in father      Social History:     ROS:     General:  No wt loss, fevers, chills, night sweats, fatigue,   Eyes:  Good vision, no reported pain  ENT:  No sore throat, pain, runny nose, dysphagia  CV:  No pain, palpitations, hypo/hypertension  Resp:  No dyspnea, cough, tachypnea, wheezing  GI:  No pain, No nausea, No vomiting, No diarrhea, No constipation, No weight loss, No fever, No pruritis, No rectal bleeding, No tarry stools, No dysphagia,  :  No pain, bleeding, incontinence, nocturia  Muscle:  No pain, weakness  Neuro:  No weakness, tingling, memory problems  Psych:  No fatigue, insomnia, mood problems, depression  Endocrine:  No polyuria, polydipsia, cold/heat intolerance  Heme:  No petechiae, ecchymosis, easy bruisability  Skin:  No rash, tattoos, scars, edema      PHYSICAL EXAM:   Vital Signs:  Vital Signs Last 24 Hrs  T(C): 36.8 (2018 04:00), Max: 36.8 (2018 16:56)  T(F): 98.2 (2018 04:00), Max: 98.3 (2018 01:42)  HR: 81 (2018 06:00) (81 - 98)  BP: 134/74 (2018 06:00) (131/58 - 159/69)  BP(mean): 90 (2018 06:00) (78 - 90)  RR: 16 (2018 06:00) (16 - 21)  SpO2: 97% (2018 06:00) (96% - 100%)  Daily Height in cm: 175.26 (2018 16:56)    Daily Weight in k.3 (2018 06:00)    GENERAL:  Appears stated age, well-groomed, well-nourished, no distress  HEENT:  NC/AT,  conjunctivae clear and pink, no thyromegaly, nodules, adenopathy, no JVD, sclera -anicteric  CHEST:  Full & symmetric excursion, no increased effort, breath sounds clear  HEART:  Regular rhythm, S1, S2, no murmur/rub/S3/S4, no abdominal bruit, no edema  ABDOMEN:  Soft, non-tender, non-distended, normoactive bowel sounds,  no masses ,no hepato-splenomegaly, no signs of chronic liver disease  EXTEREMITIES:  no cyanosis,clubbing or edema  SKIN:  No rash/erythema/ecchymoses/petechiae/wounds/abscess/warm/dry  NEURO:  Alert, oriented, no asterixis, no tremor, no encephalopathy    LABS:                        4.9    14.71 )-----------( 281      ( 2018 18:49 )             16.1     06-29    140  |  106  |  53<H>  ----------------------------<  123<H>  4.4   |  25  |  2.13<H>    Ca    8.2<L>      2018 05:16    TPro  6.1  /  Alb  2.6<L>  /  TBili  0.2  /  DBili  x   /  AST  11  /  ALT  16  /  AlkPhos  50  06-28    LIVER FUNCTIONS - ( 2018 18:49 )  Alb: 2.6 g/dL / Pro: 6.1 g/dL / ALK PHOS: 50 U/L / ALT: 16 U/L DA / AST: 11 U/L / GGT: x           PT/INR - ( 2018 18:49 )   PT: 12.6 sec;   INR: 1.15 ratio         PTT - ( 2018 18:49 )  PTT:26.2 sec  Urinalysis Basic - ( 2018 18:49 )    Color: Yellow / Appearance: Clear / S.005 / pH: x  Gluc: x / Ketone: Negative  / Bili: Negative / Urobili: Negative mg/dL   Blood: x / Protein: 30 mg/dL / Nitrite: Negative   Leuk Esterase: Moderate / RBC: 0-2 /HPF / WBC 6-10   Sq Epi: x / Non Sq Epi: Occasional / Bacteria: x          Imaging:

## 2018-06-29 NOTE — PROGRESS NOTE ADULT - ASSESSMENT
59 y/o M pt w/ PMHx morbid obesity, DM, HTN, peripheral neuropathy, calculus of kidney and ureter, sleep apnea, acquired lymphedema of leg and PSHx nephrostomy bilateral kidneys presents to the ED BIBA from [facility] c/o bloody diarrhea x 3 since yesterday, describes it as red. Also endorsing nausea. States that he had a blood transfusion at [facility] on 6/23 and was given an injection of Procrit yesterday. States hemoglobin was above 7 yesterday and today it is below 7, approx 5.  Pt states he resides at [facility] for rehabilitation due to a fall 2 months ago. Pt denies vomiting, abd pain, fever or any other complaints at this time.  IN [facility] ED hemoglobin 4.9, and had central line placed and ordered 2 units PRBC.vital signs stable    admitted for acute on ch anemia due to GI bl;eed with SMITH with CKD with h/o ureteral stents with MOrbid obesity , DM2 , HTN, peripheral neuropathy ,    for PRBC, GI consult , npo  , pantoprazole, started in ED, nephro consult , and pulm consult called   6/29 hemoglobin still low 2 units PRBc,   patient is hemodyanamically stable for emegency endoscopy, and is intermediat risk for procedure, pulmonary clearance Dr Feng

## 2018-06-29 NOTE — PROGRESS NOTE ADULT - PROBLEM SELECTOR PLAN 3
Subjective:      Patient ID: Lesa Holder is a 89 y.o. female.    Chief Complaint: Cough (coughing off and on) and Leg Pain (both legs off and on)    HPI: 89y/oAAF, here for follow up of:  -dementia  -HTN  Here with her .  He ensures she takes all her medicines correctly.  At present, pt has no complaints.  No CP,VILLARREAL,PND,orthopnea,edema.    Review of Systems   All other systems reviewed and are negative.      Objective:   /74 (BP Location: Left arm, Patient Position: Sitting, BP Method: Manual)   Pulse 72   Temp 98.6 °F (37 °C) (Oral)   Resp 16   Ht 5' (1.524 m)   Wt 70.3 kg (154 lb 15.7 oz)   SpO2 97%   BMI 30.27 kg/m²     Physical Exam   Constitutional: She appears well-developed and well-nourished.   HENT:   Head: Normocephalic and atraumatic.   Right Ear: External ear normal.   Left Ear: External ear normal.   Nose: Nose normal.   Mouth/Throat: Oropharynx is clear and moist.   Eyes: Conjunctivae and EOM are normal. Pupils are equal, round, and reactive to light.   Neck: Normal range of motion. Neck supple.   Cardiovascular: Normal rate, regular rhythm and normal heart sounds.    Pulmonary/Chest: Effort normal and breath sounds normal.   Abdominal: Soft. Bowel sounds are normal.   Musculoskeletal: Normal range of motion.   Neurological: She is alert.   Skin: Skin is warm and dry.   Psychiatric: She has a normal mood and affect. Her behavior is normal.   Nursing note and vitals reviewed.      Assessment:     1. Essential hypertension    2. Alzheimer's dementia without behavioral disturbance, unspecified timing of dementia onset    Stable, so far....  Plan:     Essential hypertension  -     Comprehensive metabolic panel; Future; Expected date: 11/07/2017  -     CBC auto differential; Future; Expected date: 11/07/2017  -     Urinalysis; Future; Expected date: 11/05/2017    Alzheimer's dementia without behavioral disturbance, unspecified timing of dementia onset  -     Comprehensive metabolic  panel; Future; Expected date: 11/07/2017  -     CBC auto differential; Future; Expected date: 11/07/2017       ssi

## 2018-06-29 NOTE — CONSULT NOTE ADULT - PROBLEM SELECTOR RECOMMENDATION 3
gi pcr   stool studies   upper gastrointestinal endoscopy and colon when stable  moinitor cbc and transfuse
GI fu

## 2018-06-29 NOTE — CONSULT NOTE ADULT - SUBJECTIVE AND OBJECTIVE BOX
Patient is a 58y old  Male who presents with a chief complaint of "bloody diarrhea (2018 04:16)    HPI:  59 y/o M pt w/ PMHx morbid obesity, DM, HTN, peripheral neuropathy, calculus of kidney and ureter, sleep apnea, acquired lymphedema of leg and PSHx nephrostomy bilateral kidneys presents to the ED BIBA from [facility] c/o bloody diarrhea x 3 since yesterday, describes it as red. Also endorsing nausea. States that he had a blood transfusion at [facility] on  and was given an injection of Procrit yesterday. States hemoglobin was above 7 yesterday and today it is below 7, approx 5.  Pt states he resides at [facility] for rehabilitation due to a fall 2 months ago. Pt denies vomiting, abd pain, fever or any other complaints at this time.  [place] [facility] [place] 4.9, and had central line placed and ordered 2 units PRBC.vital signs stable (2018 22:42)    Renal consult called for SMITH. Notes from last admission at [facility] noted. Pt had b/l PCN and ureteral stents for obstructive uropathy. PCN now capped.    + Anemia, GI bleed. Pt's wife at bedside.       PAST MEDICAL HISTORY:  Hx of peripheral neuropathy  Morbid obesity  H/O sleep apnea  Acquired lymphedema of leg  Calculus of kidney and ureter  Diabetes mellitus type II  HTN (hypertension)      PAST SURGICAL HISTORY:  Ureteral stents placement, bilateral  Hx of tonsillectomy      FAMILY HISTORY:  Family history of coronary artery disease  Family history of prostate cancer in father      SOCIAL HISTORY:    Allergies    No Known Allergies    Intolerances      Home Medications:  amLODIPine 10 mg oral tablet: 1 tab(s) orally once a day (2018 12:43)  bacitracin 500 units/g topical ointment: Apply topically to affected area 2 times a day nephrostomy tube (2018 12:43)  Bactrim  mg-160 mg oral tablet: 1 tab(s) orally 2 times a day (2018 12:43)  Colace 100 mg oral capsule: 1 cap(s) orally 3 times a day (2018 12:43)  Flomax 0.4 mg oral capsule: 1 cap(s) orally once a day (2018 12:43)  gas relief: 180 milligram(s)  2 times a day (2018 12:43)  lactulose: 30 milliliter(s) orally 3 times a week, As Needed (2018 12:43)  Milk of Magnesia: 30 milliliter(s) orally 3 times a week, As Needed (2018 12:43)  Procrit 10,000 units/mL preservative-free injectable solution: injectable every 7 days (2018 12:43)  Senna 8.6 mg oral tablet: 2 tab(s) orally once a day (2018 12:43)  Tylenol 500 mg oral tablet: 1 tab(s) orally every 6 hours, As Needed (2018 12:43)  Zofran ODT 4 mg oral tablet, disintegratin tab(s) orally 6 times a day, As Needed (2018 12:43)    MEDICATIONS  (STANDING):  amLODIPine   Tablet 10 milliGRAM(s) Oral daily  BACItracin   Ointment 1 Application(s) Topical every 12 hours  dextrose 5%. 1000 milliLiter(s) (50 mL/Hr) IV Continuous <Continuous>  dextrose 50% Injectable 12.5 Gram(s) IV Push once  dextrose 50% Injectable 25 Gram(s) IV Push once  dextrose 50% Injectable 25 Gram(s) IV Push once  insulin lispro (HumaLOG) corrective regimen sliding scale   SubCutaneous three times a day before meals  lactobacillus acidophilus 1 Tablet(s) Oral four times a day with meals  nystatin Powder 1 Application(s) Topical two times a day  ondansetron Injectable 4 milliGRAM(s) IV Push once  pantoprazole  Injectable 40 milliGRAM(s) IV Push every 12 hours  sodium chloride 0.9%. 1000 milliLiter(s) (125 mL/Hr) IV Continuous <Continuous>  tamsulosin 0.4 milliGRAM(s) Oral at bedtime    MEDICATIONS  (PRN):  dextrose 40% Gel 15 Gram(s) Oral once PRN Blood Glucose LESS THAN 70 milliGRAM(s)/deciliter  glucagon  Injectable 1 milliGRAM(s) IntraMuscular once PRN Glucose LESS THAN 70 milligrams/deciliter      REVIEW OF SYSTEMS:  General: NAD, obese  Respiratory: No cough, SOB  Cardiovascular: No CP or Palpitations	  Gastrointestinal: obese   Genitourinary: No urinary complaints	  Musculoskeletal: No new rash or lesions	      T(F): 98.1 (18 @ 12:54), Max: 98.3 (18 @ 01:42)  HR: 87 (18 @ 14:00) (79 - 98)  BP: 109/49 (18 @ 14:00) (109/49 - 159/69)  RR: 16 (18 @ 14:00) (13 - 21)  SpO2: 100% (18 @ 14:00) (96% - 100%)  Wt(kg): --    PHYSICAL EXAM:  General: NAD  Respiratory: b/l air entry  Cardiovascular: S1 S2  Gastrointestinal: soft, obese, b/l PCN  Extremities: edema            140  |  106  |  53<H>  ----------------------------<  123<H>  4.4   |  25  |  2.13<H>    Ca    8.2<L>      2018 05:16    TPro  6.1  /  Alb  2.6<L>  /  TBili  0.2  /  DBili  x   /  AST  11  /  ALT  16  /  AlkPhos  50                            5.0    11.62 )-----------( 248      ( 2018 05:16 )             16.4       Hematocrit: 16.4 % ( @ 05:16)  Hemoglobin: 5.0 g/dL ( @ 05:16)  Calcium, Total Serum: 8.2 mg/dL ( @ 05:16)  Potassium, Serum: 4.4 mmol/L ( @ 05:16)      Creatinine, Serum: 2.13 ( @ 05:16)  Creatinine, Serum: 2.12 ( @ 18:49)      Urinalysis Basic - ( 2018 18:49 )    Color: Yellow / Appearance: Clear / S.005 / pH: x  Gluc: x / Ketone: Negative  / Bili: Negative / Urobili: Negative mg/dL   Blood: x / Protein: 30 mg/dL / Nitrite: Negative   Leuk Esterase: Moderate / RBC: 0-2 /HPF / WBC 6-10   Sq Epi: x / Non Sq Epi: Occasional / Bacteria: x      LIVER FUNCTIONS - ( 2018 18:49 )  Alb: 2.6 g/dL / Pro: 6.1 g/dL / ALK PHOS: 50 U/L / ALT: 16 U/L DA / AST: 11 U/L / GGT: x                       I&O's Detail    2018 07:01  -  2018 07:00  --------------------------------------------------------  IN:    Packed Red Blood Cells: 235 mL    sodium chloride 0.9%.: 500 mL  Total IN: 735 mL    OUT:    Voided: 300 mL  Total OUT: 300 mL    Total NET: 435 mL

## 2018-06-29 NOTE — CONSULT NOTE ADULT - SUBJECTIVE AND OBJECTIVE BOX
History of Present Illness: The patient is a 58 year old male with a history of HTN, DM, lymphedema, peripheral neuropathy, kidney stone, MEGHA who presents from rehab with bloody diarrhea. He was in hospital 6/23, noted to be anemic to 7, received 1 unit pRBC. He noted bloody stool for past 2-3 days. No chest pain, shortness of breath, dizziness, palpitations. Prior to this, walking without symptoms. No prior cardiac testing.    Past Medical/Surgical History:    Medications:    Family History: Non-contributory family history of premature cardiovascular atherosclerotic disease    Social History: No tobacco, alcohol or drug use    Review of Systems:  General: No fevers, chills, weight loss or gain  Skin: No rashes, color changes  Cardiovascular: No chest pain, orthopnea  Respiratory: No shortness of breath, cough  Gastrointestinal: No nausea, abdominal pain  Genitourinary: No incontinence, pain with urination  Musculoskeletal: No pain, swelling, decreased range of motion  Neurological: No headache, weakness  Psychiatric: No depression, anxiety  Endocrine: No weight loss or gain, increased thirst  All other systems are comprehensively negative.    Physical Exam:  Vitals:        Vital Signs Last 24 Hrs  T(C): 36.7 (29 Jun 2018 07:47), Max: 36.8 (28 Jun 2018 16:56)  T(F): 98 (29 Jun 2018 07:47), Max: 98.3 (29 Jun 2018 01:42)  HR: 81 (29 Jun 2018 08:00) (81 - 98)  BP: 138/66 (29 Jun 2018 08:00) (131/58 - 159/69)  BP(mean): 86 (29 Jun 2018 08:00) (78 - 90)  RR: 13 (29 Jun 2018 08:00) (13 - 21)  SpO2: 100% (29 Jun 2018 08:00) (96% - 100%)  General: NAD  HEENT: MMM  Neck: No JVD, no carotid bruit  Lungs: CTAB  CV: RRR, nl S1/S2, no M/R/G  Abdomen: S/NT/ND, +BS  Extremities: 1+ LE edema, no cyanosis  Neuro: AAOx3, non-focal  Skin: No rash    Labs:                        5.0    11.62 )-----------( 248      ( 29 Jun 2018 05:16 )             16.4     06-29    140  |  106  |  53<H>  ----------------------------<  123<H>  4.4   |  25  |  2.13<H>    Ca    8.2<L>      29 Jun 2018 05:16    TPro  6.1  /  Alb  2.6<L>  /  TBili  0.2  /  DBili  x   /  AST  11  /  ALT  16  /  AlkPhos  50  06-28        PT/INR - ( 28 Jun 2018 18:49 )   PT: 12.6 sec;   INR: 1.15 ratio         PTT - ( 28 Jun 2018 18:49 )  PTT:26.2 sec    ECG: NSR, normal axis, no ST abnormality

## 2018-06-29 NOTE — PROGRESS NOTE ADULT - SUBJECTIVE AND OBJECTIVE BOX
Patient is a 58y old  Male who presents with a chief complaint of "bloody diarrhea (2018 04:16)      INTERVAL HPI/OVERNIGHT EVENTS:  no acute event overnight  Home Medications:  amLODIPine 10 mg oral tablet: 1 tab(s) orally once a day (2018 17:06)  bacitracin 500 units/g topical ointment: Apply topically to affected area 2 times a day nephrostomy tube (2018 22:10)  Bactrim  mg-160 mg oral tablet: 1 tab(s) orally 2 times a day (2018 17:06)  Colace 100 mg oral capsule: 1 cap(s) orally 3 times a day (2018 17:06)  Flomax 0.4 mg oral capsule: 1 cap(s) orally once a day (2018 17:06)  gas relief: 180 milligram(s)  2 times a day (2018 22:10)  lactulose: 30 milliliter(s) orally 3 times a week, As Needed (2018 17:06)  Milk of Magnesia: 30 milliliter(s) orally 3 times a week, As Needed (2018 17:06)  Procrit 10,000 units/mL preservative-free injectable solution: injectable every 7 days (2018 17:06)  Senna 8.6 mg oral tablet: 2 tab(s) orally once a day (2018 17:06)  sertraline 25 mg oral tablet: 1 tab(s) orally once a day (at bedtime) (2018 17:06)  Tylenol 500 mg oral tablet: 1 tab(s) orally every 6 hours, As Needed (2018 17:06)  Zofran ODT 4 mg oral tablet, disintegratin tab(s) orally 6 times a day, As Needed (2018 17:06)      MEDICATIONS  (STANDING):  amLODIPine   Tablet 10 milliGRAM(s) Oral daily  dextrose 5%. 1000 milliLiter(s) (50 mL/Hr) IV Continuous <Continuous>  dextrose 50% Injectable 12.5 Gram(s) IV Push once  dextrose 50% Injectable 25 Gram(s) IV Push once  dextrose 50% Injectable 25 Gram(s) IV Push once  insulin lispro (HumaLOG) corrective regimen sliding scale   SubCutaneous three times a day before meals  lactobacillus acidophilus 1 Tablet(s) Oral four times a day with meals  nystatin Powder 1 Application(s) Topical two times a day  ondansetron Injectable 4 milliGRAM(s) IV Push once  pantoprazole  Injectable 40 milliGRAM(s) IV Push every 12 hours  sertraline 25 milliGRAM(s) Oral daily  sodium chloride 0.9%. 1000 milliLiter(s) (125 mL/Hr) IV Continuous <Continuous>  tamsulosin 0.4 milliGRAM(s) Oral at bedtime    MEDICATIONS  (PRN):  dextrose 40% Gel 15 Gram(s) Oral once PRN Blood Glucose LESS THAN 70 milliGRAM(s)/deciliter  glucagon  Injectable 1 milliGRAM(s) IntraMuscular once PRN Glucose LESS THAN 70 milligrams/deciliter      Allergies    No Known Allergies    Intolerances        REVIEW OF SYSTEMS:  CONSTITUTIONAL: No fever, has  fatigue  EYES: No eye pain, visual disturbances, or discharge  ENMT:  No difficulty hearing, tinnitus, vertigo; No sinus or throat pain  NECK: No pain or stiffness  BREASTS: No pain, masses, or nipple discharge  RESPIRATORY: No cough, wheezing, chills or hemoptysis; No shortness of breath  CARDIOVASCULAR: No chest pain, palpitations, dizziness, or leg swelling  GASTROINTESTINAL: No abdominal or epigastric pain. No nausea, vomiting, or hematemesis; No diarrhea or constipation. No melena or hematochezia.  GENITOURINARY: No dysuria, frequency, hematuria, or incontinence  NEUROLOGICAL: No headaches, memory loss, loss of strength, numbness, or tremors  SKIN: No itching, burning, rashes, or lesions   ENDOCRINE: No heat or cold intolerance; No hair loss  MUSCULOSKELETAL: No joint pain or swelling; No muscle, back, or extremity pain  PSYCHIATRIC: No depression, anxiety, mood swings, or difficulty sleeping  HEME/LYMPH: No easy bruising, or bleeding gums  ALLERGY AND IMMUNOLOGIC: No hives or eczema    Vital Signs Last 24 Hrs  T(C): 36.7 (2018 07:47), Max: 36.8 (2018 16:56)  T(F): 98 (2018 07:47), Max: 98.3 (2018 01:42)  HR: 81 (2018 08:00) (81 - 98)  BP: 138/66 (2018 08:00) (131/58 - 159/69)  BP(mean): 86 (2018 08:00) (78 - 90)  RR: 13 (2018 08:00) (13 - 21)  SpO2: 100% (2018 08:00) (96% - 100%)    PHYSICAL EXAM:  GENERAL: morbidly obese well-groomed, well-developed  HEAD:  Atraumatic, Normocephalic  EYES: EOMI, PERRLA, conjunctiva and sclera clear  ENMT: Moist mucous membranes,   NECK: Supple, No JVD, Normal thyroid  NERVOUS SYSTEM:  Alert & Oriented X3, Good concentration; Motor Strength 5/5 B/L upper and lower extremities; DTRs 2+ intact and symmetric  CHEST/LUNG: Clear to percussion bilaterally; No rales, rhonchi, wheezing, or rubs  HEART: Regular rate and rhythm; No murmurs, rubs, or gallops  ABDOMEN: Soft, Nontender, Nondistended; Bowel sounds present  EXTREMITIES:  2+ Peripheral Pulses, No clubbing, cyanosis, has ch edema legs  SKIN: No rashes or lesions    LABS:                        5.0    11.62 )-----------( 248      ( 2018 05:16 )             16.4     06-29    140  |  106  |  53<H>  ----------------------------<  123<H>  4.4   |  25  |  2.13<H>    Ca    8.2<L>      2018 05:16    TPro  6.1  /  Alb  2.6<L>  /  TBili  0.2  /  DBili  x   /  AST  11  /  ALT  16  /  AlkPhos  50  06-28    PT/INR - ( 2018 18:49 )   PT: 12.6 sec;   INR: 1.15 ratio         PTT - ( 2018 18:49 )  PTT:26.2 sec  Urinalysis Basic - ( 2018 18:49 )    Color: Yellow / Appearance: Clear / S.005 / pH: x  Gluc: x / Ketone: Negative  / Bili: Negative / Urobili: Negative mg/dL   Blood: x / Protein: 30 mg/dL / Nitrite: Negative   Leuk Esterase: Moderate / RBC: 0-2 /HPF / WBC 6-10   Sq Epi: x / Non Sq Epi: Occasional / Bacteria: x      CAPILLARY BLOOD GLUCOSE      POCT Blood Glucose.: 136 mg/dL (2018 05:55)          I&O's Summary    2018 07:01  -  2018 07:00  --------------------------------------------------------  IN: 735 mL / OUT: 300 mL / NET: 435 mL        RADIOLOGY & ADDITIONAL TESTS:    Imaging Personally Reviewed:  [x ] YES  [ ] NO    Consultant(s) Notes Reviewed:  [ x] YES  [ ] NO    Care Discussed with Consultants/Other Providers [x ] YES  [ ] NO

## 2018-06-30 ENCOUNTER — RESULT REVIEW (OUTPATIENT)
Age: 58
End: 2018-06-30

## 2018-06-30 LAB
ANION GAP SERPL CALC-SCNC: 7 MMOL/L — SIGNIFICANT CHANGE UP (ref 5–17)
BUN SERPL-MCNC: 41 MG/DL — HIGH (ref 7–23)
CALCIUM SERPL-MCNC: 8.1 MG/DL — LOW (ref 8.4–10.5)
CHLORIDE SERPL-SCNC: 110 MMOL/L — HIGH (ref 96–108)
CO2 SERPL-SCNC: 26 MMOL/L — SIGNIFICANT CHANGE UP (ref 22–31)
CREAT SERPL-MCNC: 1.62 MG/DL — HIGH (ref 0.5–1.3)
CULTURE RESULTS: SIGNIFICANT CHANGE UP
FERRITIN SERPL-MCNC: 79 NG/ML — SIGNIFICANT CHANGE UP (ref 30–400)
FOLATE SERPL-MCNC: 12.8 NG/ML — SIGNIFICANT CHANGE UP
GLUCOSE BLDC GLUCOMTR-MCNC: 110 MG/DL — HIGH (ref 70–99)
GLUCOSE BLDC GLUCOMTR-MCNC: 114 MG/DL — HIGH (ref 70–99)
GLUCOSE BLDC GLUCOMTR-MCNC: 133 MG/DL — HIGH (ref 70–99)
GLUCOSE BLDC GLUCOMTR-MCNC: 139 MG/DL — HIGH (ref 70–99)
GLUCOSE SERPL-MCNC: 105 MG/DL — HIGH (ref 70–99)
HAPTOGLOB SERPL-MCNC: 272 MG/DL — HIGH (ref 34–200)
HCT VFR BLD CALC: 21 % — CRITICAL LOW (ref 39–50)
HCT VFR BLD CALC: 21.5 % — LOW (ref 39–50)
HCT VFR BLD CALC: 21.6 % — LOW (ref 39–50)
HCT VFR BLD CALC: 22 % — LOW (ref 39–50)
HGB BLD-MCNC: 6.6 G/DL — CRITICAL LOW (ref 13–17)
HGB BLD-MCNC: 6.9 G/DL — CRITICAL LOW (ref 13–17)
HGB BLD-MCNC: 7 G/DL — CRITICAL LOW (ref 13–17)
HGB BLD-MCNC: 7.1 G/DL — LOW (ref 13–17)
LDH SERPL L TO P-CCNC: 158 U/L — SIGNIFICANT CHANGE UP (ref 50–242)
LDH SERPL L TO P-CCNC: 357 U/L — HIGH (ref 50–242)
MCHC RBC-ENTMCNC: 29.5 PG — SIGNIFICANT CHANGE UP (ref 27–34)
MCHC RBC-ENTMCNC: 29.5 PG — SIGNIFICANT CHANGE UP (ref 27–34)
MCHC RBC-ENTMCNC: 29.9 PG — SIGNIFICANT CHANGE UP (ref 27–34)
MCHC RBC-ENTMCNC: 31.9 GM/DL — LOW (ref 32–36)
MCHC RBC-ENTMCNC: 32.3 GM/DL — SIGNIFICANT CHANGE UP (ref 32–36)
MCHC RBC-ENTMCNC: 32.6 GM/DL — SIGNIFICANT CHANGE UP (ref 32–36)
MCV RBC AUTO: 91.3 FL — SIGNIFICANT CHANGE UP (ref 80–100)
MCV RBC AUTO: 91.9 FL — SIGNIFICANT CHANGE UP (ref 80–100)
MCV RBC AUTO: 92.3 FL — SIGNIFICANT CHANGE UP (ref 80–100)
NRBC # BLD: 0 /100 WBCS — SIGNIFICANT CHANGE UP (ref 0–0)
NRBC # BLD: 0 /100 WBCS — SIGNIFICANT CHANGE UP (ref 0–0)
PLATELET # BLD AUTO: 186 K/UL — SIGNIFICANT CHANGE UP (ref 150–400)
PLATELET # BLD AUTO: 190 K/UL — SIGNIFICANT CHANGE UP (ref 150–400)
PLATELET # BLD AUTO: 203 K/UL — SIGNIFICANT CHANGE UP (ref 150–400)
POTASSIUM SERPL-MCNC: 4.4 MMOL/L — SIGNIFICANT CHANGE UP (ref 3.5–5.3)
POTASSIUM SERPL-SCNC: 4.4 MMOL/L — SIGNIFICANT CHANGE UP (ref 3.5–5.3)
RBC # BLD: 2.34 M/UL — LOW (ref 4.2–5.8)
RBC # BLD: 2.34 M/UL — LOW (ref 4.2–5.8)
RBC # BLD: 2.41 M/UL — LOW (ref 4.2–5.8)
RBC # FLD: 16 % — HIGH (ref 10.3–14.5)
RBC # FLD: 16.1 % — HIGH (ref 10.3–14.5)
RBC # FLD: 16.3 % — HIGH (ref 10.3–14.5)
SODIUM SERPL-SCNC: 143 MMOL/L — SIGNIFICANT CHANGE UP (ref 135–145)
SPECIMEN SOURCE: SIGNIFICANT CHANGE UP
VIT B12 SERPL-MCNC: 375 PG/ML — SIGNIFICANT CHANGE UP (ref 232–1245)
WBC # BLD: 11.78 K/UL — HIGH (ref 3.8–10.5)
WBC # BLD: 12.01 K/UL — HIGH (ref 3.8–10.5)
WBC # BLD: 9.72 K/UL — SIGNIFICANT CHANGE UP (ref 3.8–10.5)
WBC # FLD AUTO: 11.78 K/UL — HIGH (ref 3.8–10.5)
WBC # FLD AUTO: 12.01 K/UL — HIGH (ref 3.8–10.5)
WBC # FLD AUTO: 9.72 K/UL — SIGNIFICANT CHANGE UP (ref 3.8–10.5)

## 2018-06-30 PROCEDURE — 12345: CPT | Mod: NC

## 2018-06-30 PROCEDURE — 88312 SPECIAL STAINS GROUP 1: CPT | Mod: 26

## 2018-06-30 PROCEDURE — 71045 X-RAY EXAM CHEST 1 VIEW: CPT | Mod: 26

## 2018-06-30 PROCEDURE — 88305 TISSUE EXAM BY PATHOLOGIST: CPT | Mod: 26

## 2018-06-30 RX ORDER — ONDANSETRON 8 MG/1
4 TABLET, FILM COATED ORAL ONCE
Qty: 0 | Refills: 0 | Status: DISCONTINUED | OUTPATIENT
Start: 2018-06-30 | End: 2018-07-05

## 2018-06-30 RX ORDER — PREGABALIN 225 MG/1
1000 CAPSULE ORAL DAILY
Qty: 0 | Refills: 0 | Status: DISCONTINUED | OUTPATIENT
Start: 2018-06-30 | End: 2018-06-30

## 2018-06-30 RX ORDER — FOLIC ACID 0.8 MG
1 TABLET ORAL DAILY
Qty: 0 | Refills: 0 | Status: DISCONTINUED | OUTPATIENT
Start: 2018-06-30 | End: 2018-06-30

## 2018-06-30 RX ORDER — DEXTROSE 50 % IN WATER 50 %
12.5 SYRINGE (ML) INTRAVENOUS ONCE
Qty: 0 | Refills: 0 | Status: DISCONTINUED | OUTPATIENT
Start: 2018-06-30 | End: 2018-07-11

## 2018-06-30 RX ORDER — PROPOFOL 10 MG/ML
20 INJECTION, EMULSION INTRAVENOUS
Qty: 1000 | Refills: 0 | Status: DISCONTINUED | OUTPATIENT
Start: 2018-06-30 | End: 2018-06-30

## 2018-06-30 RX ORDER — NYSTATIN CREAM 100000 [USP'U]/G
1 CREAM TOPICAL
Qty: 0 | Refills: 0 | Status: DISCONTINUED | OUTPATIENT
Start: 2018-06-30 | End: 2018-07-11

## 2018-06-30 RX ORDER — CALCIUM GLUCONATE 100 MG/ML
2 VIAL (ML) INTRAVENOUS ONCE
Qty: 0 | Refills: 0 | Status: COMPLETED | OUTPATIENT
Start: 2018-06-30 | End: 2018-06-30

## 2018-06-30 RX ORDER — SODIUM CHLORIDE 9 MG/ML
1000 INJECTION INTRAMUSCULAR; INTRAVENOUS; SUBCUTANEOUS
Qty: 0 | Refills: 0 | Status: DISCONTINUED | OUTPATIENT
Start: 2018-06-30 | End: 2018-06-30

## 2018-06-30 RX ORDER — TAMSULOSIN HYDROCHLORIDE 0.4 MG/1
0.4 CAPSULE ORAL AT BEDTIME
Qty: 0 | Refills: 0 | Status: DISCONTINUED | OUTPATIENT
Start: 2018-06-30 | End: 2018-07-11

## 2018-06-30 RX ORDER — SODIUM CHLORIDE 9 MG/ML
1000 INJECTION, SOLUTION INTRAVENOUS
Qty: 0 | Refills: 0 | Status: DISCONTINUED | OUTPATIENT
Start: 2018-06-30 | End: 2018-07-05

## 2018-06-30 RX ORDER — INSULIN LISPRO 100/ML
VIAL (ML) SUBCUTANEOUS EVERY 6 HOURS
Qty: 0 | Refills: 0 | Status: DISCONTINUED | OUTPATIENT
Start: 2018-06-30 | End: 2018-06-30

## 2018-06-30 RX ORDER — BACITRACIN ZINC 500 UNIT/G
1 OINTMENT IN PACKET (EA) TOPICAL EVERY 12 HOURS
Qty: 0 | Refills: 0 | Status: DISCONTINUED | OUTPATIENT
Start: 2018-06-30 | End: 2018-07-11

## 2018-06-30 RX ORDER — FENTANYL CITRATE 50 UG/ML
50 INJECTION INTRAVENOUS
Qty: 0 | Refills: 0 | Status: DISCONTINUED | OUTPATIENT
Start: 2018-06-30 | End: 2018-07-03

## 2018-06-30 RX ORDER — SUCRALFATE 1 G
1 TABLET ORAL EVERY 6 HOURS
Qty: 0 | Refills: 0 | Status: DISCONTINUED | OUTPATIENT
Start: 2018-06-30 | End: 2018-07-11

## 2018-06-30 RX ORDER — PREGABALIN 225 MG/1
1000 CAPSULE ORAL DAILY
Qty: 0 | Refills: 0 | Status: COMPLETED | OUTPATIENT
Start: 2018-06-30 | End: 2018-07-04

## 2018-06-30 RX ORDER — ACETAMINOPHEN 500 MG
1000 TABLET ORAL ONCE
Qty: 0 | Refills: 0 | Status: COMPLETED | OUTPATIENT
Start: 2018-06-30 | End: 2018-06-30

## 2018-06-30 RX ORDER — PANTOPRAZOLE SODIUM 20 MG/1
8 TABLET, DELAYED RELEASE ORAL
Qty: 80 | Refills: 0 | Status: DISCONTINUED | OUTPATIENT
Start: 2018-06-30 | End: 2018-07-05

## 2018-06-30 RX ORDER — SODIUM CHLORIDE 9 MG/ML
1000 INJECTION INTRAMUSCULAR; INTRAVENOUS; SUBCUTANEOUS
Qty: 0 | Refills: 0 | Status: DISCONTINUED | OUTPATIENT
Start: 2018-06-30 | End: 2018-07-01

## 2018-06-30 RX ORDER — INSULIN LISPRO 100/ML
VIAL (ML) SUBCUTANEOUS
Qty: 0 | Refills: 0 | Status: DISCONTINUED | OUTPATIENT
Start: 2018-06-30 | End: 2018-07-11

## 2018-06-30 RX ORDER — FOLIC ACID 0.8 MG
1 TABLET ORAL DAILY
Qty: 0 | Refills: 0 | Status: DISCONTINUED | OUTPATIENT
Start: 2018-06-30 | End: 2018-07-11

## 2018-06-30 RX ORDER — SODIUM CHLORIDE 9 MG/ML
1000 INJECTION, SOLUTION INTRAVENOUS
Qty: 0 | Refills: 0 | Status: DISCONTINUED | OUTPATIENT
Start: 2018-06-30 | End: 2018-06-30

## 2018-06-30 RX ORDER — PROPOFOL 10 MG/ML
20 INJECTION, EMULSION INTRAVENOUS
Qty: 1000 | Refills: 0 | Status: DISCONTINUED | OUTPATIENT
Start: 2018-06-30 | End: 2018-07-02

## 2018-06-30 RX ORDER — GLUCAGON INJECTION, SOLUTION 0.5 MG/.1ML
1 INJECTION, SOLUTION SUBCUTANEOUS ONCE
Qty: 0 | Refills: 0 | Status: DISCONTINUED | OUTPATIENT
Start: 2018-06-30 | End: 2018-07-11

## 2018-06-30 RX ORDER — DEXTROSE 50 % IN WATER 50 %
15 SYRINGE (ML) INTRAVENOUS ONCE
Qty: 0 | Refills: 0 | Status: DISCONTINUED | OUTPATIENT
Start: 2018-06-30 | End: 2018-07-11

## 2018-06-30 RX ORDER — AMLODIPINE BESYLATE 2.5 MG/1
10 TABLET ORAL DAILY
Qty: 0 | Refills: 0 | Status: DISCONTINUED | OUTPATIENT
Start: 2018-06-30 | End: 2018-07-11

## 2018-06-30 RX ADMIN — SODIUM CHLORIDE 75 MILLILITER(S): 9 INJECTION, SOLUTION INTRAVENOUS at 14:00

## 2018-06-30 RX ADMIN — Medication 1 MILLIGRAM(S): at 17:08

## 2018-06-30 RX ADMIN — SODIUM CHLORIDE 75 MILLILITER(S): 9 INJECTION INTRAMUSCULAR; INTRAVENOUS; SUBCUTANEOUS at 17:29

## 2018-06-30 RX ADMIN — Medication 1 APPLICATION(S): at 17:11

## 2018-06-30 RX ADMIN — PANTOPRAZOLE SODIUM 40 MILLIGRAM(S): 20 TABLET, DELAYED RELEASE ORAL at 05:51

## 2018-06-30 RX ADMIN — TAMSULOSIN HYDROCHLORIDE 0.4 MILLIGRAM(S): 0.4 CAPSULE ORAL at 23:01

## 2018-06-30 RX ADMIN — SODIUM CHLORIDE 150 MILLILITER(S): 9 INJECTION INTRAMUSCULAR; INTRAVENOUS; SUBCUTANEOUS at 12:00

## 2018-06-30 RX ADMIN — FENTANYL CITRATE 50 MICROGRAM(S): 50 INJECTION INTRAVENOUS at 22:18

## 2018-06-30 RX ADMIN — FENTANYL CITRATE 50 MICROGRAM(S): 50 INJECTION INTRAVENOUS at 21:43

## 2018-06-30 RX ADMIN — Medication 1 TABLET(S): at 08:53

## 2018-06-30 RX ADMIN — Medication 1 GRAM(S): at 17:10

## 2018-06-30 RX ADMIN — Medication 1 APPLICATION(S): at 05:51

## 2018-06-30 RX ADMIN — NYSTATIN CREAM 1 APPLICATION(S): 100000 CREAM TOPICAL at 17:07

## 2018-06-30 RX ADMIN — NYSTATIN CREAM 1 APPLICATION(S): 100000 CREAM TOPICAL at 05:51

## 2018-06-30 RX ADMIN — PREGABALIN 1000 MICROGRAM(S): 225 CAPSULE ORAL at 12:00

## 2018-06-30 RX ADMIN — PANTOPRAZOLE SODIUM 10 MG/HR: 20 TABLET, DELAYED RELEASE ORAL at 15:15

## 2018-06-30 RX ADMIN — Medication 1 GRAM(S): at 23:01

## 2018-06-30 RX ADMIN — PROPOFOL 22.69 MICROGRAM(S)/KG/MIN: 10 INJECTION, EMULSION INTRAVENOUS at 17:07

## 2018-06-30 RX ADMIN — Medication 400 MILLIGRAM(S): at 20:29

## 2018-06-30 RX ADMIN — PREGABALIN 1000 MICROGRAM(S): 225 CAPSULE ORAL at 17:06

## 2018-06-30 RX ADMIN — Medication 200 GRAM(S): at 20:59

## 2018-06-30 RX ADMIN — PROPOFOL 22.69 MICROGRAM(S)/KG/MIN: 10 INJECTION, EMULSION INTRAVENOUS at 20:07

## 2018-06-30 RX ADMIN — ONDANSETRON 4 MILLIGRAM(S): 8 TABLET, FILM COATED ORAL at 08:52

## 2018-06-30 RX ADMIN — PROPOFOL 22.69 MICROGRAM(S)/KG/MIN: 10 INJECTION, EMULSION INTRAVENOUS at 22:17

## 2018-06-30 RX ADMIN — PROPOFOL 22.69 MICROGRAM(S)/KG/MIN: 10 INJECTION, EMULSION INTRAVENOUS at 13:58

## 2018-06-30 NOTE — PROGRESS NOTE ADULT - SUBJECTIVE AND OBJECTIVE BOX
PULMONARY/CRITICAL CARE      INTERVAL HPI/OVERNIGHT EVENTS: Had zoe stool with drop in HCT. No sob, pain. Wore cpap.    58y MaleHPI:  59 y/o M pt w/ PMHx morbid obesity, DM, HTN, peripheral neuropathy, calculus of kidney and ureter, sleep apnea, acquired lymphedema of leg and PSHx nephrostomy bilateral kidneys presents to the ED BIBA from [facility] c/o bloody diarrhea x 3 since yesterday, describes it as red. Also endorsing nausea. States that he had a blood transfusion at [facility] on  and was given an injection of Procrit yesterday. States hemoglobin was above 7 yesterday and today it is below 7, approx 5.  Pt states he resides at [facility] for rehabilitation due to a fall 2 months ago. Pt denies vomiting, abd pain, fever or any other complaints at this time.  [place] [facility], and had central line placed and ordered 2 units PRBC.vital signs stable (2018 22:42)        PAST MEDICAL & SURGICAL HISTORY:  Hx of peripheral neuropathy  Morbid obesity  H/O sleep apnea  Acquired lymphedema of leg  Calculus of kidney and ureter  Diabetes mellitus type II  HTN (hypertension)  Ureteral stents placement, bilateral  Hx of tonsillectomy: at 6 years old        ICU Vital Signs Last 24 Hrs  T(C): 36.9 (2018 04:44), Max: 36.9 (2018 16:03)  T(F): 98.5 (2018 04:44), Max: 98.5 (2018 16:03)  HR: 82 (2018 06:00) (71 - 88)  BP: 108/60 (2018 06:00) (108/60 - 161/72)  BP(mean): 75 (2018 06:00) (66 - 99)  ABP: --  ABP(mean): --  RR: 21 (2018 06:00) (9 - 21)  SpO2: 95% (2018 06:00) (95% - 100%)    Qtts:     I&O's Summary    2018 07:01  -  2018 07:00  --------------------------------------------------------  IN: 1508 mL / OUT: 2475 mL / NET: -967 mL            REVIEW OF SYSTEMS:    CONSTITUTIONAL: No fever, weight loss, or fatigue  EYES: No eye pain, visual disturbances, or discharge  ENMT:  No difficulty hearing, tinnitus, vertigo; No sinus or throat pain  NECK: No pain or stiffness  BREASTS: No pain, masses, or nipple discharge  RESPIRATORY: No cough, wheezing, chills or hemoptysis; No shortness of breath  CARDIOVASCULAR: No chest pain, palpitations, dizziness, or leg swelling  GASTROINTESTINAL: No abdominal or epigastric pain. No nausea, vomiting, or hematemesis; No diarrhea or constipation. had  melena or hematochezia.  GENITOURINARY: No dysuria, frequency, hematuria, or incontinence  NEUROLOGICAL: No headaches, memory loss, loss of strength, numbness, or tremors  SKIN: No itching, burning, rashes, or lesions   LYMPH NODES: No enlarged glands  ENDOCRINE: No heat or cold intolerance; No hair loss  MUSCULOSKELETAL: No joint pain or swelling; No muscle, back, or extremity pain, no calf tenderness  PSYCHIATRIC: No depression, anxiety, mood swings, or difficulty sleeping  HEME/LYMPH: No easy bruising, or bleeding gums  ALLERGY AND IMMUNOLOGIC: No hives or eczema      PHYSICAL EXAM:    GENERAL: NAD, well-groomed, well-developed, NAD OBese male  HEAD:  Atraumatic, Normocephalic  EYES: EOMI, PERRLA, conjunctiva and sclera clear  ENMT: No tonsillar erythema, exudates, or enlargement; Moist mucous membranes, Good dentition, No lesions  NECK: Supple, No JVD, Normal thyroid  NERVOUS SYSTEM:  Alert & Oriented X3, Good concentration; Motor Strength 5/5 B/L upper and lower extremities  CHEST/LUNG: Clear to percussion bilaterally; No rales, rhonchi, wheezing, or rubs  HEART: Regular rate and rhythm; No murmurs, rubs, or gallops  ABDOMEN: Soft, Nontender, Nondistended; Bowel sounds present  EXTREMITIES:  2+ Peripheral Pulses, No clubbing, cyanosis, Has brawny edema  LYMPH: No lymphadenopathy noted  SKIN: No rashes or lesions        LABS:                        6.6    x     )-----------( x        ( 2018 02:56 )             21.0         140  |  106  |  53<H>  ----------------------------<  123<H>  4.4   |  25  |  2.13<H>    Ca    8.2<L>      2018 05:16    TPro  6.1  /  Alb  2.6<L>  /  TBili  0.2  /  DBili  x   /  AST  11  /  ALT  16  /  AlkPhos  50      PT/INR - ( 2018 18:49 )   PT: 12.6 sec;   INR: 1.15 ratio         PTT - ( 2018 18:49 )  PTT:26.2 sec  Urinalysis Basic - ( 2018 18:49 )    Color: Yellow / Appearance: Clear / S.005 / pH: x  Gluc: x / Ketone: Negative  / Bili: Negative / Urobili: Negative mg/dL   Blood: x / Protein: 30 mg/dL / Nitrite: Negative   Leuk Esterase: Moderate / RBC: 0-2 /HPF / WBC 6-10   Sq Epi: x / Non Sq Epi: Occasional / Bacteria: x        vanco through     RADIOLOGY & ADDITIONAL STUDIES:      CRITICAL CARE TIME SPENT:

## 2018-06-30 NOTE — PROGRESS NOTE ADULT - SUBJECTIVE AND OBJECTIVE BOX
Patient is a 58y Male with a known history of :  Sleep apnea (G47.30)  Other iron deficiency anemia (D50.8)  Morbid obesity (E66.01)  H/O sleep apnea (Z86.69)  Lymphedema (I89.0)  Essential hypertension (I10)  Type 2 diabetes mellitus with diabetic nephropathy, unspecified whether long term insulin use (E11.21)  Anemia (D64.9)  GI bleeding (K92.2)    HPI:  57 y/o M pt w/ PMHx morbid obesity, DM, HTN, peripheral neuropathy, calculus of kidney and ureter, sleep apnea, acquired lymphedema of leg and PSHx nephrostomy bilateral kidneys presents to the ED BIBA from [facility] c/o bloody diarrhea x 3 since yesterday, describes it as red. Also endorsing nausea. States that he had a blood transfusion at [facility] on 6/23 and was given an injection of Procrit yesterday. States hemoglobin was above 7 yesterday and today it is below 7, approx 5.  Pt states he resides at [facility] for rehabilitation due to a fall 2 months ago. Pt denies vomiting, abd pain, fever or any other complaints at this time.  [place] [facility] [place].9, and had central line placed and ordered 2 units PRBC.vital signs stable (28 Jun 2018 22:42)      REVIEW OF SYSTEMS:    CONSTITUTIONAL: No fever, weight loss, or fatigue  EYES: No eye pain, visual disturbances, or discharge  ENMT:  No difficulty hearing, tinnitus, vertigo; No sinus or throat pain  NECK: No pain or stiffness  BREASTS: No pain, masses, or nipple discharge  RESPIRATORY: No cough, wheezing, chills or hemoptysis; No shortness of breath  CARDIOVASCULAR: No chest pain, palpitations, dizziness, or leg swelling  GASTROINTESTINAL: No abdominal or epigastric pain. No nausea, vomiting, or hematemesis; No diarrhea or constipation. No melena or hematochezia.  GENITOURINARY: No dysuria, frequency, hematuria, or incontinence  NEUROLOGICAL: No headaches, memory loss, loss of strength, numbness, or tremors  SKIN: No itching, burning, rashes, or lesions   LYMPH NODES: No enlarged glands  ENDOCRINE: No heat or cold intolerance; No hair loss  MUSCULOSKELETAL: No joint pain or swelling; No muscle, back, or extremity pain  PSYCHIATRIC: No depression, anxiety, mood swings, or difficulty sleeping  HEME/LYMPH: No easy bruising, or bleeding gums  ALLERGY AND IMMUNOLOGIC: No hives or eczema    MEDICATIONS  (STANDING):  lactated ringers. 1000 milliLiter(s) (75 mL/Hr) IV Continuous <Continuous>  pantoprazole Infusion 8 mG/Hr (10 mL/Hr) IV Continuous <Continuous>  propofol Infusion 20 MICROgram(s)/kG/Min (22.692 mL/Hr) IV Continuous <Continuous>  sucralfate suspension 1 Gram(s) Oral every 6 hours    MEDICATIONS  (PRN):      ALLERGIES: No Known Allergies      FAMILY HISTORY:  Family history of coronary artery disease  Family history of prostate cancer in father      Social history:  Alochol:   Smoking:   Drug Use:   Marital Status:     PHYSICAL EXAMINATION:  -----------------------------  T(C): 36.7 (06-30-18 @ 13:45), Max: 36.9 (06-29-18 @ 16:03)  HR: 105 (06-30-18 @ 15:00) (66 - 134)  BP: 141/80 (06-30-18 @ 15:00) (103/59 - 187/112)  RR: 14 (06-30-18 @ 15:00) (9 - 21)  SpO2: 100% (06-30-18 @ 15:00) (93% - 100%)  Wt(kg): --    06-29 @ 07:01  -  06-30 @ 07:00  --------------------------------------------------------  IN:    Packed Red Blood Cells: 1272 mL    sodium chloride 0.9%: 640 mL  Total IN: 1912 mL    OUT:    Voided: 2475 mL  Total OUT: 2475 mL    Total NET: -563 mL      06-30 @ 07:01  -  06-30 @ 15:16  --------------------------------------------------------  IN:    lactated ringers.: 1500 mL    sodium chloride 0.9%: 750 mL  Total IN: 2250 mL    OUT:    Estimated Blood Loss: 500 mL    Voided: 775 mL  Total OUT: 1275 mL    Total NET: 975 mL            Constitutional: well developed, normal appearance, well groomed, well nourished, no deformities and no acute distress.   Eyes: the conjunctiva exhibited no abnormalities and the eyelids demonstrated no xanthelasmas.   HEENT: normal oral mucosa, no oral pallor and no oral cyanosis.   Neck: normal jugular venous A waves present, normal jugular venous V waves present and no jugular venous rodríguez A waves.   Pulmonary: no respiratory distress, normal respiratory rhythm and effort, no accessory muscle use and lungs were clear to auscultation bilaterally. Anteriorly  Cardiovascular: heart rate and rhythm were normal, normal S1 and S2 and no murmur, gallop, rub, heave or thrill are present.    Musculoskeletal: the gait could not be assessed.   Extremities: B/L lymphedema   Skin: normal skin color and pigmentation, no rash, no venous stasis, no skin lesions, no skin ulcer and no xanthoma was observed.      LABS:   --------  06-30    143  |  110<H>  |  41<H>  ----------------------------<  105<H>  4.4   |  26  |  1.62<H>    Ca    8.1<L>      30 Jun 2018 09:37    TPro  6.1  /  Alb  2.6<L>  /  TBili  0.2  /  DBili  x   /  AST  11  /  ALT  16  /  AlkPhos  50  06-28                         6.9    9.72  )-----------( 203      ( 30 Jun 2018 09:37 )             21.6     PT/INR - ( 28 Jun 2018 18:49 )   PT: 12.6 sec;   INR: 1.15 ratio         PTT - ( 28 Jun 2018 18:49 )  PTT:26.2 sec      103 mg/dL, 49 mg/dL, 17 mg/dL<L>, 187 mg/dL<H>    Culture Results:   GI PCR Results: NOT detected  *******Please Note:*******  GI panel PCR evaluates for:  Campylobacter, Plesiomonas shigelloides, Salmonella,  Vibrio, Yersinia enterocolitica, Enteroaggregative  Escherichia coli (EAEC), Enteropathogenic E.coli (EPEC),  Enterotoxigenic E. coli (ETEC) lt/st, Shiga-like  toxin-producing E. coli (STEC) stx1/stx2,  Shigella/ Enteroinvasive E. coli (EIEC), Cryptosporidium,  Cyclospora cayetanensis, Entamoeba histolytica,  Giardia lamblia, Adenovirus F 40/41, Astrovirus,  Norovirus GI/GII, Rotavirus A, Sapovirus (06-30 @ 12:05)    06-30 @ 12:05    Organism --   Gram Stain Blood -- Gram Stain --  Specimen Source .Stool Feces  Culture-Blood --        Radiology:

## 2018-06-30 NOTE — PROGRESS NOTE ADULT - SUBJECTIVE AND OBJECTIVE BOX
[INTERVAL HX: ]  Patient seen and examined;  Chart reviewed and events noted;   Since in hospital s/p 6U PRBC.   Hgb this 3AM was 6.6 but was after 5U PRBC. Pt received additional 1U after CBC to complete 6th unit.   This AM however had 2 further dark bowel movements.   Pt awake, alert, denies CP, no SOB, no pain.   Used CPAP overnight    MEDICATIONS  (STANDING):  amLODIPine   Tablet 10 milliGRAM(s) Oral daily  BACItracin   Ointment 1 Application(s) Topical every 12 hours  cyanocobalamin Injectable 1000 MICROGram(s) IntraMuscular daily  dextrose 5%. 1000 milliLiter(s) (50 mL/Hr) IV Continuous <Continuous>  dextrose 50% Injectable 12.5 Gram(s) IV Push once  dextrose 50% Injectable 25 Gram(s) IV Push once  dextrose 50% Injectable 25 Gram(s) IV Push once  insulin lispro (HumaLOG) corrective regimen sliding scale   SubCutaneous every 6 hours  lactobacillus acidophilus 1 Tablet(s) Oral four times a day with meals  nystatin Powder 1 Application(s) Topical two times a day  ondansetron Injectable 4 milliGRAM(s) IV Push once  pantoprazole  Injectable 40 milliGRAM(s) IV Push every 12 hours  sodium chloride 0.9%. 1000 milliLiter(s) (150 mL/Hr) IV Continuous <Continuous>  tamsulosin 0.4 milliGRAM(s) Oral at bedtime    MEDICATIONS  (PRN):  dextrose 40% Gel 15 Gram(s) Oral once PRN Blood Glucose LESS THAN 70 milliGRAM(s)/deciliter  glucagon  Injectable 1 milliGRAM(s) IntraMuscular once PRN Glucose LESS THAN 70 milligrams/deciliter      Vital Signs Last 24 Hrs  T(C): 36.9 (30 Jun 2018 04:44), Max: 36.9 (29 Jun 2018 16:03)  T(F): 98.5 (30 Jun 2018 04:44), Max: 98.5 (29 Jun 2018 16:03)  HR: 82 (30 Jun 2018 06:00) (71 - 88)  BP: 108/60 (30 Jun 2018 06:00) (108/60 - 161/72)  BP(mean): 75 (30 Jun 2018 06:00) (66 - 99)  RR: 21 (30 Jun 2018 06:00) (9 - 21)  SpO2: 95% (30 Jun 2018 06:00) (95% - 100%)    [PHYSICAL EXAM]  General: adult in NAD,  WN,  WD, morbidley obeses  HEENT: clear oropharynx, anicteric sclera, pink conjunctivae.  Neck: supple, no masses.  CV: normal S1S2, no murmur, no rubs, no gallops.  Lungs: clear to auscultation, no wheezes, no rales, no rhonchi.  Abdomen: soft, non-tender, moderately-distended, no hepatosplenomegaly, normal BS, no guarding. BL nephrostomy drains.  Ext: no clubbing, no cyanosis, +edema, venous stasis changes  Skin: no rashes,  no petechiae, no venous stasis changes.  Neuro: alert and oriented X3, no focal motor deficits.  LN: no SC JENNIFFER.      [LABS:]                        6.6    x     )-----------( x        ( 30 Jun 2018 02:56 )             21.0     06-29    140  |  106  |  53<H>  ----------------------------<  123<H>  4.4   |  25  |  2.13<H>    Ca    8.2<L>      29 Jun 2018 05:16    TPro  6.1  /  Alb  2.6<L>  /  TBili  0.2  /  DBili  x   /  AST  11  /  ALT  16  /  AlkPhos  50  06-28    PT/INR - ( 28 Jun 2018 18:49 )   PT: 12.6 sec;   INR: 1.15 ratio    PTT - ( 28 Jun 2018 18:49 )  PTT:26.2 sec    Ferritin, Serum (06.29.18 @ 14:57)    Ferritin, Serum: 79: Note: New reference ranges effective 04/16/2018. ng/mL  Iron with Total Binding Capacity (06.29.18 @ 15:08)    Iron - Total Binding Capacity.: 238 ug/dL    % Saturation, Iron: 16 %    Iron Total, Serum: 38 ug/dL    Unsaturated Iron Binding Capacity: 200 ug/dL    Reticulocyte Count (06.29.18 @ 08:51)    RBC Count: 1.74 M/uL    Reticulocyte Percent: 6.9 %    Absolute Reticulocytes: 120.8 K/uL    Vitamin B12, Serum (06.29.18 @ 14:57)    Vitamin B12, Serum: 375: Note: Reference Range Change on 12/18/2017. pg/mL    Folate, Serum (06.29.18 @ 14:57)    Folate, Serum: 12.8: Note: New reference ranges effective 04/16/2018. ng/mL        [RADIOLOGY STUDIES:]    < from: CT Abdomen and Pelvis No Cont (06.29.18 @ 18:40) >  EXAM:  CT ABDOMEN AND PELVIS                        PROCEDURE DATE:  06/29/2018    INTERPRETATION:  Non contrast CT of the abdomen and pelvis     COMPARISON: 5/31/2018.    CLINICAL HISTORY: Anemia. Assess for intra-abdominal retroperitoneal   hemorrhage.. Recent bilateral nephrostomies and nephroureteral stents.    Technique: contiguous axial images were obtained with 5.0 mm slice   thickness without intravenous contrast administration, which limits the   visualization of the intra-abdominal structures.      Coronal and sagittal reformats were also submitted for interpretation.      FINDINGS:   Bilateral percutaneous nephrostomy catheters in place. No hydronephrosis or malpositioning seen. Bilateral nephroureteral stents noted with proximal and within the renal pelvis and distal and within bladder.   Bladder not distended.  No cortical medullary mass or perinephric hematoma.  The lung bases are clear.     The visualized portions of the heart are normal.    There is no free intra-abdominal air or ascites.   No evidence of  intraperitoneal or retroperitoneal hematoma seen.  There is cholelithiasis without secondary signs of acute cholecystitis.  The unopacified liver, spleen, pancreas, adrenal glandsare normal.     There is no intra or extrahepatic biliary ductal dilatation.    The stomach, duodenum, small, and large bowel and appendix are normal.    Prostate and seminal vesicles within normal limits.    There are no retroperitoneal masses or abnormal lymphadenopathy.  The retroperitoneal vascular structures are normal.     The bones and soft tissues are within normal limits.    IMPRESSION:         No evidence of abdominal pelvic intraperitoneal or retroperitoneal   hemorrhage.    HANSEL SLATER, ATTENDING RADIOLOGIST  This document has been electronically signed. Jun 29 2018  6:53PM  < end of copied text >

## 2018-06-30 NOTE — PROGRESS NOTE ADULT - ASSESSMENT
57 y/o man with PMH of nephrolithiasis s/p BL nephrostomies, Bactrim who presented with Hgb 4.9 after 2 weeks of melanotic stools.    no h/o anemia in the past. Recently transfused 1 unit PRBC, 1 week ago for Hgb of 7 ( sent to ER for transfusion from [place]) and was given dose of Procrit.     Likely GIB, as melanotic stools , s/p 6th unit of PRBC this AM.   Repeat CBC after 5th was only 6.6.   Pt continues to have melanotic/dark stools this AM.   GI workup is pending.     B12 is low normal. Carlos benefit from B12 replacement.   Likely iron deficient, as Ferritin is low given likely concurrent inflammatory condition.       PLAN:  Transfuse PRBC to maintain Hgb >7.  Continue monitoring in [facility].     To undergo nuclear bleeding scan.     GFR is 40, possible candidate for procrit ( to be d/w nephrology).  However with current condition, with active bleeding, and borderline iron, not likely to be as helpful for pt short-term.   Long term, may be beneficial to help maintain Hgb, if Hgb does not improve with other measures.     Start b12 injections 1000mcg daily.   Start Folic acid 1mg daily, as likely to have increased RBC turnover/production in near future.     Await further GI mgmt, and imaging.   IR intervention if bleeding cannot be stopped endoscopically.

## 2018-06-30 NOTE — PROGRESS NOTE ADULT - ASSESSMENT
57 y/o M pt w/ PMHx morbid obesity, DM, HTN, peripheral neuropathy, calculus of kidney and ureter, sleep apnea, acquired lymphedema of leg and PSHx nephrostomy bilateral kidneys presents to the ED BIBA from [facility] c/o bloody diarrhea x 3 since yesterday, describes it as red. Also endorsing nausea. States that he had a blood transfusion at [facility] on 6/23 and was given an injection of Procrit yesterday. States hemoglobin was above 7 yesterday and today it is below 7, approx 5.  Pt states he resides at [facility] for rehabilitation due to a fall 2 months ago. Pt denies vomiting, abd pain, fever or any other complaints at this time.  IN [facility] [place] hemoglobin 4.9, and had central line placed and ordered 2 units PRBC.vital signs stable    admitted for acute on ch anemia due to GI bl;eed with SMITH with CKD with h/o ureteral stents with MOrbid obesity , DM2 , HTN, peripheral neuropathy ,    for PRBC, GI consult , npo  , pantoprazole, started in ED, nephro consult , and pulm consult called   6/29 hemoglobin still low received 6 units PRBc,   patient is hemodyanamically stable for emegency endoscopy, and is intermediat risk for procedure, pulmonary clearance Dr Feng  d/w patient and wife by bed side, risk and benefits  d/w DR Hudson,  DR Feng as pulmonary critical care.

## 2018-06-30 NOTE — PROGRESS NOTE ADULT - PROBLEM SELECTOR PLAN 3
Needs colonoscopy  May need Ir embolizaation  GI to reevaluate Needs colonoscopy  May need Ir embolizaation  GI to reevaluate  Nuclear GI bleeding scan ordered
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
## 2018-06-30 NOTE — PROGRESS NOTE ADULT - SUBJECTIVE AND OBJECTIVE BOX
Patient is a 58y old  Male who presents with a chief complaint of "bloody diarrhea (2018 04:16)      INTERVAL HPI/OVERNIGHT EVENTS:,having melena, hemodyanamically stable    Home Medications:  amLODIPine 10 mg oral tablet: 1 tab(s) orally once a day (2018 12:43)  bacitracin 500 units/g topical ointment: Apply topically to affected area 2 times a day nephrostomy tube (2018 12:43)  Bactrim  mg-160 mg oral tablet: 1 tab(s) orally 2 times a day (2018 12:43)  Colace 100 mg oral capsule: 1 cap(s) orally 3 times a day (2018 12:43)  Flomax 0.4 mg oral capsule: 1 cap(s) orally once a day (2018 12:43)  gas relief: 180 milligram(s)  2 times a day (2018 12:43)  lactulose: 30 milliliter(s) orally 3 times a week, As Needed (2018 12:43)  Milk of Magnesia: 30 milliliter(s) orally 3 times a week, As Needed (2018 12:43)  Procrit 10,000 units/mL preservative-free injectable solution: injectable every 7 days (2018 12:43)  Senna 8.6 mg oral tablet: 2 tab(s) orally once a day (2018 12:43)  Tylenol 500 mg oral tablet: 1 tab(s) orally every 6 hours, As Needed (2018 12:43)  Zofran ODT 4 mg oral tablet, disintegratin tab(s) orally 6 times a day, As Needed (2018 12:43)      MEDICATIONS  (STANDING):  amLODIPine   Tablet 10 milliGRAM(s) Oral daily  BACItracin   Ointment 1 Application(s) Topical every 12 hours  cyanocobalamin Injectable 1000 MICROGram(s) IntraMuscular daily  dextrose 5%. 1000 milliLiter(s) (50 mL/Hr) IV Continuous <Continuous>  dextrose 50% Injectable 12.5 Gram(s) IV Push once  dextrose 50% Injectable 25 Gram(s) IV Push once  dextrose 50% Injectable 25 Gram(s) IV Push once  folic acid 1 milliGRAM(s) Oral daily  insulin lispro (HumaLOG) corrective regimen sliding scale   SubCutaneous every 6 hours  lactobacillus acidophilus 1 Tablet(s) Oral four times a day with meals  nystatin Powder 1 Application(s) Topical two times a day  pantoprazole  Injectable 40 milliGRAM(s) IV Push every 12 hours  sodium chloride 0.9%. 1000 milliLiter(s) (150 mL/Hr) IV Continuous <Continuous>  tamsulosin 0.4 milliGRAM(s) Oral at bedtime    MEDICATIONS  (PRN):  dextrose 40% Gel 15 Gram(s) Oral once PRN Blood Glucose LESS THAN 70 milliGRAM(s)/deciliter  glucagon  Injectable 1 milliGRAM(s) IntraMuscular once PRN Glucose LESS THAN 70 milligrams/deciliter      Allergies    No Known Allergies    Intolerances        REVIEW OF SYSTEMS:  CONSTITUTIONAL: No fever, weight loss, or fatigue  EYES: No eye pain, visual disturbances, or discharge  ENMT:  No difficulty hearing, tinnitus, vertigo; No sinus or throat pain  NECK: No pain or stiffness  BREASTS: No pain, masses, or nipple discharge  RESPIRATORY: No cough, wheezing, chills or hemoptysis; No shortness of breath  CARDIOVASCULAR: No chest pain, palpitations, dizziness, or leg swelling  GASTROINTESTINAL: No abdominal or epigastric pain. No nausea, vomiting, or hematemesis; No diarrhea or constipation. has  melena and hematochezia.  GENITOURINARY: No dysuria, frequency, hematuria, or incontinence  NEUROLOGICAL: No headaches, memory loss, loss of strength, numbness, or tremors  SKIN: No itching, burning, rashes, or lesions   ENDOCRINE: No heat or cold intolerance; No hair loss  MUSCULOSKELETAL: No joint pain or swelling; No muscle, back, or extremity pain  PSYCHIATRIC: No depression, anxiety, mood swings, or difficulty sleeping  HEME/LYMPH: No easy bruising, or bleeding gums  ALLERGY AND IMMUNOLOGIC: No hives or eczema    Vital Signs Last 24 Hrs  T(C): 36.7 (2018 08:35), Max: 36.9 (2018 16:03)  T(F): 98.1 (2018 08:35), Max: 98.5 (2018 16:03)  HR: 91 (2018 10:00) (66 - 91)  BP: 118/59 (2018 10:00) (108/60 - 161/72)  BP(mean): 74 (2018 10:00) (66 - 99)  RR: 10 (2018 10:00) (9 - 21)  SpO2: 99% (2018 10:00) (95% - 100%)    PHYSICAL EXAM:  GENERAL:obese  well-groomed, well-developed  HEAD:  Atraumatic, Normocephalic  EYES: EOMI, PERRLA, conjunctiva and sclera clear  ENMT: Moist mucous membranes,   NECK: Supple, No JVD, Normal thyroid  NERVOUS SYSTEM:  Alert & Oriented X3, Good concentration; Motor Strength 5/5 B/L upper and lower extremities; DTRs 2+ intact and symmetric  CHEST/LUNG: Clear to percussion bilaterally; No rales, rhonchi, wheezing, or rubs  HEART: Regular rate and rhythm; No murmurs, rubs, or gallops  ABDOMEN: Soft, Nontender, Nondistended; Bowel sounds present  EXTREMITIES:  2+ Peripheral Pulses, No clubbing, cyanosis, or edema  SKIN: No rashes or lesions, palle    LABS:                        6.9    9.72  )-----------( 203      ( 2018 09:37 )             21.6     06-30    143  |  110<H>  |  41<H>  ----------------------------<  105<H>  4.4   |  26  |  1.62<H>    Ca    8.1<L>      2018 09:37    TPro  6.1  /  Alb  2.6<L>  /  TBili  0.2  /  DBili  x   /  AST  11  /  ALT  16  /  AlkPhos  50  06-28    PT/INR - ( 2018 18:49 )   PT: 12.6 sec;   INR: 1.15 ratio         PTT - ( 2018 18:49 )  PTT:26.2 sec  Urinalysis Basic - ( 2018 18:49 )    Color: Yellow / Appearance: Clear / S.005 / pH: x  Gluc: x / Ketone: Negative  / Bili: Negative / Urobili: Negative mg/dL   Blood: x / Protein: 30 mg/dL / Nitrite: Negative   Leuk Esterase: Moderate / RBC: 0-2 /HPF / WBC 6-10   Sq Epi: x / Non Sq Epi: Occasional / Bacteria: x      CAPILLARY BLOOD GLUCOSE      POCT Blood Glucose.: 110 mg/dL (2018 06:44)  POCT Blood Glucose.: 101 mg/dL (2018 23:40)  POCT Blood Glucose.: 113 mg/dL (2018 12:39)          I&O's Summary    2018 07:  -  2018 07:00  --------------------------------------------------------  IN: 1912 mL / OUT: 2475 mL / NET: -563 mL    2018 07:01  -  2018 11:20  --------------------------------------------------------  IN: 450 mL / OUT: 775 mL / NET: -325 mL        RADIOLOGY & ADDITIONAL TESTS:    Imaging Personally Reviewed:  [x ] YES  [ ] NO    Consultant(s) Notes Reviewed:  [x ] YES  [ ] NO    Care Discussed with Consultants/Other Providers [ x] YES  [ ] NO

## 2018-06-30 NOTE — PROGRESS NOTE ADULT - SUBJECTIVE AND OBJECTIVE BOX
Patient seen in follow up for SMITH. PMHx morbid obesity, DM, HTN, peripheral neuropathy, calculus of kidney and ureter, sleep apnea, lymphedema of legs bilateral capped PCNs and ureteral stents for obstructive uropathy. In ICU with respiratory failure, G.I. bleed. Receiving IVF and pRBC.      MEDICATIONS  (STANDING):  amLODIPine   Tablet 10 milliGRAM(s) Oral daily  BACItracin   Ointment 1 Application(s) Topical every 12 hours  calcium gluconate IVPB 2 Gram(s) IV Intermittent once  cyanocobalamin Injectable 1000 MICROGram(s) IntraMuscular daily  dextrose 5%. 1000 milliLiter(s) (50 mL/Hr) IV Continuous <Continuous>  dextrose 50% Injectable 12.5 Gram(s) IV Push once  folic acid 1 milliGRAM(s) Oral daily  insulin lispro (HumaLOG) corrective regimen sliding scale   SubCutaneous three times a day before meals  nystatin Powder 1 Application(s) Topical two times a day  ondansetron Injectable 4 milliGRAM(s) IV Push once  pantoprazole Infusion 8 mG/Hr (10 mL/Hr) IV Continuous <Continuous>  propofol Infusion 20 MICROgram(s)/kG/Min (22.692 mL/Hr) IV Continuous <Continuous>  sodium chloride 0.9%. 1000 milliLiter(s) (75 mL/Hr) IV Continuous <Continuous>  sucralfate suspension 1 Gram(s) Oral every 6 hours  tamsulosin 0.4 milliGRAM(s) Oral at bedtime    MEDICATIONS  (PRN):  dextrose 40% Gel 15 Gram(s) Oral once PRN Blood Glucose LESS THAN 70 milliGRAM(s)/deciliter  fentaNYL    Injectable 50 MICROGram(s) IV Push every 1 hour PRN Mod pain (4-6) or Vent complinace  glucagon  Injectable 1 milliGRAM(s) IntraMuscular once PRN Glucose LESS THAN 70 milligrams/deciliter    T(C): 37.9 (06-30-18 @ 20:00), Max: 37.9 (06-30-18 @ 20:00)  HR: 90 (06-30-18 @ 20:00) (66 - 134)  BP: 125/74 (06-30-18 @ 20:00) (103/59 - 187/112)  RR: 13 (06-30-18 @ 20:00) (9 - 21)  SpO2: 100% (06-30-18 @ 20:00) (93% - 100%)  Wt(kg): --  I&O's Detail    29 Jun 2018 07:01  -  30 Jun 2018 07:00  --------------------------------------------------------  IN:    Packed Red Blood Cells: 1272 mL    sodium chloride 0.9%: 640 mL  Total IN: 1912 mL    OUT:    Voided: 2475 mL  Total OUT: 2475 mL    Total NET: -563 mL      30 Jun 2018 07:01  -  30 Jun 2018 20:57  --------------------------------------------------------  IN:    lactated ringers.: 1500 mL    Packed Red Blood Cells: 284 mL    pantoprazole Infusion: 50 mL    propofol Infusion: 45.4 mL    propofol Infusion: 90.8 mL    sodium chloride 0.9%: 750 mL    sodium chloride 0.9%.: 225 mL  Total IN: 2945.2 mL    OUT:    Estimated Blood Loss: 500 mL    Voided: 1075 mL  Total OUT: 1575 mL    Total NET: 1370.2 mL      PHYSICAL EXAM:  General: NAD, vented, sedated  Respiratory: b/l air entry  Cardiovascular: S1 S2 reg  Gastrointestinal: soft, large, nothing palpable  Extremities:  chronic edema with stasis, johnson[pport stockings    CBC Full  -  ( 30 Jun 2018 16:24 )  WBC Count : 12.01 K/uL  Hemoglobin : 7.0 g/dL  Hematocrit : 21.5 %  Platelet Count - Automated : 190 K/uL  Mean Cell Volume : 91.9 fl  Mean Cell Hemoglobin : 29.9 pg  Mean Cell Hemoglobin Concentration : 32.6 gm/dL  Auto Neutrophil # : x  Auto Lymphocyte # : x  Auto Monocyte # : x  Auto Eosinophil # : x  Auto Basophil # : x  Auto Neutrophil % : x  Auto Lymphocyte % : x  Auto Monocyte % : x  Auto Eosinophil % : x  Auto Basophil % : x    06-30    143  |  110<H>  |  41<H>  ----------------------------<  105<H>  4.4   |  26  |  1.62<H>    Ca    8.1<L>      30 Jun 2018 09:37          Sodium, Serum: 143 (06-30 @ 09:37)  Sodium, Serum: 140 (06-29 @ 05:16)  Sodium, Serum: 138 (06-28 @ 18:49)    Creatinine, Serum: 1.62 (06-30 @ 09:37)  Creatinine, Serum: 2.13 (06-29 @ 05:16)  Creatinine, Serum: 2.12 (06-28 @ 18:49)    Potassium, Serum: 4.4 (06-30 @ 09:37)  Potassium, Serum: 4.4 (06-29 @ 05:16)  Potassium, Serum: 4.7 (06-28 @ 18:49)    Hemoglobin: 7.0 (06-30 @ 16:24)  Hemoglobin: 6.9 (06-30 @ 09:37)  Hemoglobin: 6.6 (06-30 @ 02:56)  Hemoglobin: 5.8 (06-29 @ 15:41)  Hemoglobin: 5.0 (06-29 @ 05:16)  Hemoglobin: 4.9 (06-28 @ 18:49)

## 2018-06-30 NOTE — DIETITIAN INITIAL EVALUATION ADULT. - OTHER INFO
Pt was assessed for SCU length of stay policy: Pt c hx morbid obesity, DM, HTN, peripheral neuropathy, calculus of kidney and ureter, sleep apnea, lymphedema of leg and  nephrostomy bilateral kidneys presents  from [facility] c/o bloody diarrhea x 3  Pt found to have GI bleed. Pt currently NPO and s/p transfusion. Per [facility] orders pt was on renal/consistent carbohydrate diet. He states he lost 40# over 2 months 2/2 multiple hospitalizations. Weight loss not significant (pt morbidly obese) He states that he was "borderline" DM: HgbA1C 5.5 and he was not on meds. Encouraged pt to continue consistent  for glycemic control/encourage weight loss. Pt continues to have workup to find source of bleeding.  Noted: b/l buttocks and sacrum stage I. edema b/l legs 3+

## 2018-06-30 NOTE — CHART NOTE - NSCHARTNOTEFT_GEN_A_CORE
pt started actively bleeding with profuse melena and no response sudden change from yesterday he is aware of plan for emergency  upper gastrointestinal endoscopy and risk of anesthesia and we will proceed as planned anesthesia and medicine is aware

## 2018-06-30 NOTE — PROGRESS NOTE ADULT - ASSESSMENT
G.I. bleed. Respiratory failure. Morbid obesity. Bilateral capped PCNs and bilateral ureteral stents. Hemodynamic SMITH on CKD improving.  Continue with care as per CCM and G.I. Monitor renal indices, avoid nephrotoxins, replete electrolytes PRN.  D/w house staff.

## 2018-06-30 NOTE — CHART NOTE - NSCHARTNOTEFT_GEN_A_CORE
Time of evaluation: 3:30 pm    Called to evaluate patient for restraints: intuabted on vent, attempting to pull out ETT tube    Behavior: agitated     Other interventions attempted: propaolfol drip       REVIEW OF SYSTEMS:  CONSTITUTIONAL: [  ] fever   [  ] fatigue  EYES: [  ] visual disturbances  ENMT:  [  ]difficulty hearing  [  ] throat pain  RESPIRATORY:  [  ]cough  [   ] wheezing  [   ] hemoptysis [  ] shortness of breath  CARDIOVASCULAR: [  ]chest pain  [  ] palpitations   GASTROINTESTINAL: [  ]  abdominal pain [  ] nausea [  ] vomiting  [  ] diarrhea  [  ] constipation  GENITOURINARY: [  ] dysuria [  ] frequency  [  ] hematuria [  ] incontinence  NEUROLOGICAL: [  ] headaches [  ] new numbness  SKIN: [  ]itching [  ] new rash   MUSCULOSKELETAL: [  ] back pain  [  ] extremity pain  PSYCHIATRIC: [  ] depression  [  ] anxiety  [  ] mood swings [  ] difficulty sleeping  ALLERGY AND IMMUNOLOGIC: [  ] hives    [X  ]Unable to perfrom ROS due to: intubated on vent   [  ] ROS reviewed and all negative    PAST MEDICAL & SURGICAL HISTORY:  Hx of peripheral neuropathy  Morbid obesity  H/O sleep apnea  Acquired lymphedema of leg  Calculus of kidney and ureter  Diabetes mellitus type II  HTN (hypertension)  Ureteral stents placement, bilateral  Hx of tonsillectomy: at 6 years old    MEDICATIONS  (STANDING):  amLODIPine   Tablet 10 milliGRAM(s) Oral daily  BACItracin   Ointment 1 Application(s) Topical every 12 hours  cyanocobalamin Injectable 1000 MICROGram(s) IntraMuscular daily  dextrose 5%. 1000 milliLiter(s) (50 mL/Hr) IV Continuous <Continuous>  dextrose 50% Injectable 12.5 Gram(s) IV Push once  folic acid 1 milliGRAM(s) Oral daily  insulin lispro (HumaLOG) corrective regimen sliding scale   SubCutaneous three times a day before meals  lactated ringers. 1000 milliLiter(s) (75 mL/Hr) IV Continuous <Continuous>  nystatin Powder 1 Application(s) Topical two times a day  ondansetron Injectable 4 milliGRAM(s) IV Push once  pantoprazole Infusion 8 mG/Hr (10 mL/Hr) IV Continuous <Continuous>  propofol Infusion 20 MICROgram(s)/kG/Min (22.692 mL/Hr) IV Continuous <Continuous>  sodium chloride 0.9%. 1000 milliLiter(s) (150 mL/Hr) IV Continuous <Continuous>  sucralfate suspension 1 Gram(s) Oral every 6 hours  tamsulosin 0.4 milliGRAM(s) Oral at bedtime    MEDICATIONS  (PRN):  dextrose 40% Gel 15 Gram(s) Oral once PRN Blood Glucose LESS THAN 70 milliGRAM(s)/deciliter  glucagon  Injectable 1 milliGRAM(s) IntraMuscular once PRN Glucose LESS THAN 70 milligrams/deciliter                          6.9    9.72  )-----------( 203      ( 30 Jun 2018 09:37 )             21.6     06-30    143  |  110<H>  |  41<H>  ----------------------------<  105<H>  4.4   |  26  |  1.62<H>    Ca    8.1<L>      30 Jun 2018 09:37    TPro  6.1  /  Alb  2.6<L>  /  TBili  0.2  /  DBili  x   /  AST  11  /  ALT  16  /  AlkPhos  50  06-28      Vital Signs Last 24 Hrs  T(C): 36.7 (30 Jun 2018 13:45), Max: 36.9 (29 Jun 2018 20:06)  T(F): 98 (30 Jun 2018 13:45), Max: 98.5 (30 Jun 2018 04:44)  HR: 98 (30 Jun 2018 15:10) (66 - 134)  BP: 139/83 (30 Jun 2018 15:10) (103/59 - 187/112)  BP(mean): 100 (30 Jun 2018 15:10) (70 - 100)  RR: 14 (30 Jun 2018 15:10) (10 - 21)  SpO2: 100% (30 Jun 2018 15:10) (93% - 100%)    Physical Exam:  GENERAL:  obese, on vent   HEAD:    NERVOUS SYSTEM:    CHEST/LUNG:  Air entr decrease at the base  HEART: S1 ,S2  ABDOMEN:  distended, non-tender,   EXTREMITIES:    SKIN:         [X ] I have evaluated this patient and have determined that restraints are warranted to optimize medical care. Patient was assessed for current physical and psychological risk factors as well as special needs. There are no medical conditions or limitations that would place this patient at risk while in restraints.    Type of restraint: b/l wrist     Behavioral criteria for discontinuation of restraint: extubation     Attending notified: guero

## 2018-06-30 NOTE — PROGRESS NOTE ADULT - ASSESSMENT
The patient is a 58 year old male with a history of HTN, DM, lymphedema, peripheral neuropathy, kidney stone, MEGHA who presents from rehab with bloody diarrhea.    6/30/18  Wife and good friend at bedside.  Recently back from upper GI endoscopy.  Still intubated on Propofol.    Plan:  - Continue amlodipine for HTN  - Acute renal insufficiency - improving  - Continue IVF  - Follow labs  - Being followed by GI, Renal, Hem/Onc, Pulmonary.

## 2018-07-01 DIAGNOSIS — K29.71 GASTRITIS, UNSPECIFIED, WITH BLEEDING: ICD-10-CM

## 2018-07-01 LAB
ALBUMIN SERPL ELPH-MCNC: 2.2 G/DL — LOW (ref 3.3–5)
ALP SERPL-CCNC: 40 U/L — SIGNIFICANT CHANGE UP (ref 30–120)
ALT FLD-CCNC: 15 U/L DA — SIGNIFICANT CHANGE UP (ref 10–60)
ANION GAP SERPL CALC-SCNC: 8 MMOL/L — SIGNIFICANT CHANGE UP (ref 5–17)
APTT BLD: 26.5 SEC — LOW (ref 27.5–37.4)
AST SERPL-CCNC: 10 U/L — SIGNIFICANT CHANGE UP (ref 10–40)
BASE EXCESS BLDA CALC-SCNC: -1.6 MMOL/L — SIGNIFICANT CHANGE UP (ref -2–2)
BILIRUB SERPL-MCNC: 0.3 MG/DL — SIGNIFICANT CHANGE UP (ref 0.2–1.2)
BLOOD GAS SOURCE: SIGNIFICANT CHANGE UP
BUN SERPL-MCNC: 47 MG/DL — HIGH (ref 7–23)
CALCIUM SERPL-MCNC: 8.2 MG/DL — LOW (ref 8.4–10.5)
CHLORIDE SERPL-SCNC: 111 MMOL/L — HIGH (ref 96–108)
CO2 SERPL-SCNC: 24 MMOL/L — SIGNIFICANT CHANGE UP (ref 22–31)
CREAT SERPL-MCNC: 1.83 MG/DL — HIGH (ref 0.5–1.3)
GLUCOSE BLDC GLUCOMTR-MCNC: 102 MG/DL — HIGH (ref 70–99)
GLUCOSE BLDC GLUCOMTR-MCNC: 104 MG/DL — HIGH (ref 70–99)
GLUCOSE BLDC GLUCOMTR-MCNC: 104 MG/DL — HIGH (ref 70–99)
GLUCOSE BLDC GLUCOMTR-MCNC: 105 MG/DL — HIGH (ref 70–99)
GLUCOSE SERPL-MCNC: 112 MG/DL — HIGH (ref 70–99)
HCO3 BLDA-SCNC: 23 MMOL/L — SIGNIFICANT CHANGE UP (ref 21–29)
HCT VFR BLD CALC: 21.9 % — LOW (ref 39–50)
HCT VFR BLD CALC: 23.1 % — LOW (ref 39–50)
HCT VFR BLD CALC: 25.5 % — LOW (ref 39–50)
HGB BLD-MCNC: 7.1 G/DL — LOW (ref 13–17)
HGB BLD-MCNC: 7.4 G/DL — LOW (ref 13–17)
HGB BLD-MCNC: 8.4 G/DL — LOW (ref 13–17)
HOROWITZ INDEX BLDA+IHG-RTO: 40 — SIGNIFICANT CHANGE UP
INR BLD: 1.14 RATIO — SIGNIFICANT CHANGE UP (ref 0.88–1.16)
MCHC RBC-ENTMCNC: 29.4 PG — SIGNIFICANT CHANGE UP (ref 27–34)
MCHC RBC-ENTMCNC: 29.7 PG — SIGNIFICANT CHANGE UP (ref 27–34)
MCHC RBC-ENTMCNC: 30 PG — SIGNIFICANT CHANGE UP (ref 27–34)
MCHC RBC-ENTMCNC: 32 GM/DL — SIGNIFICANT CHANGE UP (ref 32–36)
MCHC RBC-ENTMCNC: 32.4 GM/DL — SIGNIFICANT CHANGE UP (ref 32–36)
MCHC RBC-ENTMCNC: 32.9 GM/DL — SIGNIFICANT CHANGE UP (ref 32–36)
MCV RBC AUTO: 91.1 FL — SIGNIFICANT CHANGE UP (ref 80–100)
MCV RBC AUTO: 91.6 FL — SIGNIFICANT CHANGE UP (ref 80–100)
MCV RBC AUTO: 91.7 FL — SIGNIFICANT CHANGE UP (ref 80–100)
NRBC # BLD: 0 /100 WBCS — SIGNIFICANT CHANGE UP (ref 0–0)
PCO2 BLDA: 41 MMHG — SIGNIFICANT CHANGE UP (ref 32–46)
PH BLD: 7.37 — SIGNIFICANT CHANGE UP (ref 7.35–7.45)
PLATELET # BLD AUTO: 171 K/UL — SIGNIFICANT CHANGE UP (ref 150–400)
PLATELET # BLD AUTO: 173 K/UL — SIGNIFICANT CHANGE UP (ref 150–400)
PLATELET # BLD AUTO: 182 K/UL — SIGNIFICANT CHANGE UP (ref 150–400)
PO2 BLDA: 94 MMHG — SIGNIFICANT CHANGE UP (ref 74–108)
POTASSIUM SERPL-MCNC: 4.2 MMOL/L — SIGNIFICANT CHANGE UP (ref 3.5–5.3)
POTASSIUM SERPL-SCNC: 4.2 MMOL/L — SIGNIFICANT CHANGE UP (ref 3.5–5.3)
PROT SERPL-MCNC: 4.9 G/DL — LOW (ref 6–8.3)
PROTHROM AB SERPL-ACNC: 12.5 SEC — SIGNIFICANT CHANGE UP (ref 9.8–12.7)
RBC # BLD: 2.39 M/UL — LOW (ref 4.2–5.8)
RBC # BLD: 2.52 M/UL — LOW (ref 4.2–5.8)
RBC # BLD: 2.8 M/UL — LOW (ref 4.2–5.8)
RBC # FLD: 16.2 % — HIGH (ref 10.3–14.5)
RBC # FLD: 16.8 % — HIGH (ref 10.3–14.5)
RBC # FLD: 17 % — HIGH (ref 10.3–14.5)
SAO2 % BLDA: 98 % — HIGH (ref 92–96)
SODIUM SERPL-SCNC: 143 MMOL/L — SIGNIFICANT CHANGE UP (ref 135–145)
WBC # BLD: 10.9 K/UL — HIGH (ref 3.8–10.5)
WBC # BLD: 10.98 K/UL — HIGH (ref 3.8–10.5)
WBC # BLD: 9.91 K/UL — SIGNIFICANT CHANGE UP (ref 3.8–10.5)
WBC # FLD AUTO: 10.9 K/UL — HIGH (ref 3.8–10.5)
WBC # FLD AUTO: 10.98 K/UL — HIGH (ref 3.8–10.5)
WBC # FLD AUTO: 9.91 K/UL — SIGNIFICANT CHANGE UP (ref 3.8–10.5)

## 2018-07-01 RX ORDER — IRON SUCROSE 20 MG/ML
100 INJECTION, SOLUTION INTRAVENOUS EVERY 24 HOURS
Qty: 0 | Refills: 0 | Status: COMPLETED | OUTPATIENT
Start: 2018-07-01 | End: 2018-07-03

## 2018-07-01 RX ADMIN — PROPOFOL 22.69 MICROGRAM(S)/KG/MIN: 10 INJECTION, EMULSION INTRAVENOUS at 07:14

## 2018-07-01 RX ADMIN — Medication 1 APPLICATION(S): at 18:03

## 2018-07-01 RX ADMIN — NYSTATIN CREAM 1 APPLICATION(S): 100000 CREAM TOPICAL at 18:04

## 2018-07-01 RX ADMIN — Medication 1 MILLIGRAM(S): at 11:18

## 2018-07-01 RX ADMIN — Medication 1 APPLICATION(S): at 05:58

## 2018-07-01 RX ADMIN — PREGABALIN 1000 MICROGRAM(S): 225 CAPSULE ORAL at 11:18

## 2018-07-01 RX ADMIN — PROPOFOL 22.69 MICROGRAM(S)/KG/MIN: 10 INJECTION, EMULSION INTRAVENOUS at 09:30

## 2018-07-01 RX ADMIN — PANTOPRAZOLE SODIUM 10 MG/HR: 20 TABLET, DELAYED RELEASE ORAL at 11:19

## 2018-07-01 RX ADMIN — PANTOPRAZOLE SODIUM 10 MG/HR: 20 TABLET, DELAYED RELEASE ORAL at 01:45

## 2018-07-01 RX ADMIN — TAMSULOSIN HYDROCHLORIDE 0.4 MILLIGRAM(S): 0.4 CAPSULE ORAL at 21:34

## 2018-07-01 RX ADMIN — Medication 1 GRAM(S): at 05:58

## 2018-07-01 RX ADMIN — PROPOFOL 22.69 MICROGRAM(S)/KG/MIN: 10 INJECTION, EMULSION INTRAVENOUS at 00:56

## 2018-07-01 RX ADMIN — IRON SUCROSE 100 MILLIGRAM(S): 20 INJECTION, SOLUTION INTRAVENOUS at 14:07

## 2018-07-01 RX ADMIN — SODIUM CHLORIDE 75 MILLILITER(S): 9 INJECTION INTRAMUSCULAR; INTRAVENOUS; SUBCUTANEOUS at 07:37

## 2018-07-01 RX ADMIN — Medication 1 GRAM(S): at 11:18

## 2018-07-01 RX ADMIN — Medication 1 GRAM(S): at 23:30

## 2018-07-01 RX ADMIN — NYSTATIN CREAM 1 APPLICATION(S): 100000 CREAM TOPICAL at 05:58

## 2018-07-01 RX ADMIN — Medication 1 GRAM(S): at 18:03

## 2018-07-01 RX ADMIN — PROPOFOL 22.69 MICROGRAM(S)/KG/MIN: 10 INJECTION, EMULSION INTRAVENOUS at 04:53

## 2018-07-01 RX ADMIN — PROPOFOL 22.69 MICROGRAM(S)/KG/MIN: 10 INJECTION, EMULSION INTRAVENOUS at 11:17

## 2018-07-01 NOTE — PROGRESS NOTE ADULT - ASSESSMENT
59 y/o M pt w/ PMHx morbid obesity, DM, HTN, peripheral neuropathy, calculus of kidney and ureter, sleep apnea, acquired lymphedema of leg and PSHx nephrostomy bilateral kidneys presents to the ED BIBA from [facility] c/o bloody diarrhea x 3 since yesterday, describes it as red. Also endorsing nausea. States that he had a blood transfusion at [facility] on 6/23 and was given an injection of Procrit yesterday. States hemoglobin was above 7 yesterday and today it is below 7, approx 5.  Pt states he resides at [facility] for rehabilitation due to a fall 2 months ago. Pt denies vomiting, abd pain, fever or any other complaints at this time.  IN [facility] ED hemoglobin 4.9, and had central line placed and ordered 2 units PRBC.vital signs stable    admitted for acute on ch anemia due to GI bl;eed with SMITH with CKD with h/o ureteral stents with MOrbid obesity , DM2 , HTN, peripheral neuropathy ,    for PRBC, GI consult , npo  , pantoprazole, started in ED, nephro consult , and pulm consult called   6/29 hemoglobin still low received 6 units PRBc,   patient is hemodyanamically stable for emegency endoscopy, and is intermediat risk for procedure, pulmonary clearance Dr Feng  d/w patient and wife by bed side, risk and benefits  d/w DR Reddy Martin as pulmonary critical care.  6/30 had EGd had bleeding duodenal ulcer , hemostatised in procedure , had post op respiratory failure and kept intubated till 7/1, extubated on 7/1, hemodyanamically stable, saturating well, no active bleeding, receiving PRBC, on regimen for gastritis, PUD,  discussed with Dr reddy Martin.  Surgical consult If bleeding recurs

## 2018-07-01 NOTE — PROGRESS NOTE ADULT - SUBJECTIVE AND OBJECTIVE BOX
Patient is a 58y old  Male who presents with a chief complaint of "bloody diarrhea (29 Jun 2018 04:16)      BRIEF HOSPITAL COURSE: 58 year old male PMHx Morbid Obesity, MEGHA, LE lymph Edema, DM2, HTN, Peripheral Neuropathy, Recent obstructive uropathy from calculus of kidney and ureter, s/p B/L nephrostomies now capped and ureteral stents.  From Rehab with GIB requiring multiple transfusions over course of admission.    Events last 24 hours: s/p EGD with Large Duodonal ulcer noted s/p Injection, Remains intubated for Airway protection, Episode of SVT tonight ~7sec resolved on own, 2 episodes of melena stools, 1 unit PRBC's s/p EGD with poor response, Additional unit given.    PAST MEDICAL & SURGICAL HISTORY:  Hx of peripheral neuropathy  Morbid obesity  H/O sleep apnea  Acquired lymphedema of leg  Calculus of kidney and ureter  Diabetes mellitus type II  HTN (hypertension)  Ureteral stents placement, bilateral  Hx of tonsillectomy: at 6 years old      Review of Systems:  CONSTITUTIONAL: No fever, chills, or fatigue  EYES: No eye pain, visual disturbances, or discharge  ENMT:  No difficulty hearing, tinnitus, vertigo; No sinus or throat pain  NECK: No pain or stiffness  RESPIRATORY: No cough, wheezing, chills or hemoptysis; No shortness of breath  CARDIOVASCULAR: No chest pain, palpitations, dizziness, or leg swelling  GASTROINTESTINAL: No abdominal or epigastric pain. No nausea, vomiting, or hematemesis; No diarrhea or constipation. No melena or hematochezia.  GENITOURINARY: No dysuria, frequency, hematuria, or incontinence  NEUROLOGICAL: No headaches, memory loss, loss of strength, numbness, or tremors  SKIN: No itching, burning, rashes, or lesions   MUSCULOSKELETAL: No joint pain or swelling; No muscle, back, or extremity pain  PSYCHIATRIC: No depression, anxiety, mood swings, or difficulty sleeping      Medications:    amLODIPine   Tablet 10 milliGRAM(s) Oral daily  tamsulosin 0.4 milliGRAM(s) Oral at bedtime      fentaNYL    Injectable 50 MICROGram(s) IV Push every 1 hour PRN  ondansetron Injectable 4 milliGRAM(s) IV Push once  propofol Infusion 20 MICROgram(s)/kG/Min IV Continuous <Continuous>        pantoprazole Infusion 8 mG/Hr IV Continuous <Continuous>  sucralfate suspension 1 Gram(s) Oral every 6 hours      dextrose 40% Gel 15 Gram(s) Oral once PRN  dextrose 50% Injectable 12.5 Gram(s) IV Push once  glucagon  Injectable 1 milliGRAM(s) IntraMuscular once PRN  insulin lispro (HumaLOG) corrective regimen sliding scale   SubCutaneous three times a day before meals    cyanocobalamin Injectable 1000 MICROGram(s) IntraMuscular daily  dextrose 5%. 1000 milliLiter(s) IV Continuous <Continuous>  folic acid 1 milliGRAM(s) Oral daily  sodium chloride 0.9%. 1000 milliLiter(s) IV Continuous <Continuous>      BACItracin   Ointment 1 Application(s) Topical every 12 hours  nystatin Powder 1 Application(s) Topical two times a day        Mode: AC/ CMV (Assist Control/ Continuous Mandatory Ventilation)  RR (machine): 12  TV (machine): 700  FiO2: 50  PEEP: 5  ITime: 1  MAP: 11  PIP: 27      ICU Vital Signs Last 24 Hrs  T(C): 36.7 (01 Jul 2018 04:34), Max: 37.9 (30 Jun 2018 20:00)  T(F): 98 (01 Jul 2018 04:34), Max: 100.2 (30 Jun 2018 20:00)  HR: 65 (01 Jul 2018 04:34) (65 - 134)  BP: 118/68 (01 Jul 2018 04:34) (103/59 - 187/112)  BP(mean): 82 (01 Jul 2018 04:34) (70 - 100)  ABP: --  ABP(mean): --  RR: 13 (01 Jul 2018 04:34) (10 - 21)  SpO2: 99% (01 Jul 2018 04:34) (93% - 100%)          I&O's Detail    29 Jun 2018 07:01  -  30 Jun 2018 07:00  --------------------------------------------------------  IN:    Packed Red Blood Cells: 1272 mL    sodium chloride 0.9%: 640 mL  Total IN: 1912 mL    OUT:    Voided: 2475 mL  Total OUT: 2475 mL    Total NET: -563 mL      30 Jun 2018 07:01  -  01 Jul 2018 05:30  --------------------------------------------------------  IN:    lactated ringers.: 1500 mL    Packed Red Blood Cells: 842 mL    pantoprazole Infusion: 140 mL    propofol Infusion: 560.5 mL    propofol Infusion: 45.4 mL    sodium chloride 0.9%: 750 mL    sodium chloride 0.9%.: 750 mL  Total IN: 4587.9 mL    OUT:    Estimated Blood Loss: 500 mL    Voided: 1075 mL  Total OUT: 1575 mL    Total NET: 3012.9 mL            LABS:                        7.1    11.78 )-----------( 186      ( 30 Jun 2018 23:35 )             22.0     06-30    143  |  110<H>  |  41<H>  ----------------------------<  105<H>  4.4   |  26  |  1.62<H>    Ca    8.1<L>      30 Jun 2018 09:37            CAPILLARY BLOOD GLUCOSE      POCT Blood Glucose.: 139 mg/dL (30 Jun 2018 23:21)        CULTURES:  Culture Results:   GI PCR Results: NOT detected  *******Please Note:*******  GI panel PCR evaluates for:  Campylobacter, Plesiomonas shigelloides, Salmonella,  Vibrio, Yersinia enterocolitica, Enteroaggregative  Escherichia coli (EAEC), Enteropathogenic E.coli (EPEC),  Enterotoxigenic E. coli (ETEC) lt/st, Shiga-like  toxin-producing E. coli (STEC) stx1/stx2,  Shigella/ Enteroinvasive E. coli (EIEC), Cryptosporidium,  Cyclospora cayetanensis, Entamoeba histolytica,  Giardia lamblia, Adenovirus F 40/41, Astrovirus,  Norovirus GI/GII, Rotavirus A, Sapovirus (06-30 @ 12:05)      Physical Examination:    General:  Awake.  Alert, oriented, interactive, nonfocal    HEENT: Pupils equal, reactive to light.  Symmetric. No JVD.    PULM: Clear to auscultation bilaterally, no significant sputum production    CVS: Regular rate and rhythm, no murmurs, rubs, or gallops    ABD: Soft, nondistended, nontender, normoactive bowel sounds, no masses    EXT: No edema, nontender    SKIN: Warm and well perfused, no rashes noted.    RADIOLOGY: ***    CRITICAL CARE TIME SPENT: ***  (Assessing presenting problems of acute illness, which pose high probability of life threatening deterioration or end organ damage/dysfunction, as well as medical decision making including initiating plan of care, reviewing data, reviewing radiologic exams, discussing with multidisciplinary team,  discussing goals of care with patient/family, and writing this note.  Non-inclusive of procedures performed) Patient is a 58y old  Male who presents with a chief complaint of "bloody diarrhea (29 Jun 2018 04:16)      BRIEF HOSPITAL COURSE: 58 year old male PMHx Morbid Obesity, MEGHA, LE lymph Edema, DM2, HTN, Peripheral Neuropathy, Recent obstructive uropathy from calculus of kidney and ureter, s/p B/L nephrostomies now capped and ureteral stents.  From Rehab with GIB requiring multiple transfusions over course of admission.    Events last 24 hours: s/p EGD with Large Duodonal ulcer noted s/p Injection, Remains intubated for Airway protection, Episode of SVT tonight ~7sec resolved on own, 2 episodes of melena stools, 1 unit PRBC's s/p EGD with poor response, Additional unit given.    PAST MEDICAL & SURGICAL HISTORY:  Hx of peripheral neuropathy  Morbid obesity  H/O sleep apnea  Acquired lymphedema of leg  Calculus of kidney and ureter  Diabetes mellitus type II  HTN (hypertension)  Ureteral stents placement, bilateral  Hx of tonsillectomy: at 6 years old      Review of Systems: Unable to access Vent / sedated       Medications:  amLODIPine   Tablet 10 milliGRAM(s) Oral daily  tamsulosin 0.4 milliGRAM(s) Oral at bedtime  fentaNYL    Injectable 50 MICROGram(s) IV Push every 1 hour PRN  ondansetron Injectable 4 milliGRAM(s) IV Push once  propofol Infusion 20 MICROgram(s)/kG/Min IV Continuous <Continuous>  pantoprazole Infusion 8 mG/Hr IV Continuous <Continuous>  sucralfate suspension 1 Gram(s) Oral every 6 hours  dextrose 40% Gel 15 Gram(s) Oral once PRN  dextrose 50% Injectable 12.5 Gram(s) IV Push once  glucagon  Injectable 1 milliGRAM(s) IntraMuscular once PRN  insulin lispro (HumaLOG) corrective regimen sliding scale   SubCutaneous three times a day before meals  cyanocobalamin Injectable 1000 MICROGram(s) IntraMuscular daily  dextrose 5%. 1000 milliLiter(s) IV Continuous <Continuous>  folic acid 1 milliGRAM(s) Oral daily  sodium chloride 0.9%. 1000 milliLiter(s) IV Continuous <Continuous>  BACItracin   Ointment 1 Application(s) Topical every 12 hours  nystatin Powder 1 Application(s) Topical two times a day        Mode: AC/ CMV (Assist Control/ Continuous Mandatory Ventilation)  RR (machine): 12  TV (machine): 700  FiO2: 50  PEEP: 5  ITime: 1  MAP: 11  PIP: 27      ICU Vital Signs Last 24 Hrs  T(C): 36.7 (01 Jul 2018 04:34), Max: 37.9 (30 Jun 2018 20:00)  T(F): 98 (01 Jul 2018 04:34), Max: 100.2 (30 Jun 2018 20:00)  HR: 65 (01 Jul 2018 04:34) (65 - 134)  BP: 118/68 (01 Jul 2018 04:34) (103/59 - 187/112)  BP(mean): 82 (01 Jul 2018 04:34) (70 - 100)  RR: 13 (01 Jul 2018 04:34) (10 - 21)  SpO2: 99% (01 Jul 2018 04:34) (93% - 100%)          I&O's Detail    29 Jun 2018 07:01  -  30 Jun 2018 07:00  --------------------------------------------------------  IN:    Packed Red Blood Cells: 1272 mL    sodium chloride 0.9%: 640 mL  Total IN: 1912 mL    OUT:    Voided: 2475 mL  Total OUT: 2475 mL    Total NET: -563 mL      30 Jun 2018 07:01  -  01 Jul 2018 05:30  --------------------------------------------------------  IN:    lactated ringers.: 1500 mL    Packed Red Blood Cells: 842 mL    pantoprazole Infusion: 140 mL    propofol Infusion: 560.5 mL    propofol Infusion: 45.4 mL    sodium chloride 0.9%: 750 mL    sodium chloride 0.9%.: 750 mL  Total IN: 4587.9 mL    OUT:    Estimated Blood Loss: 500 mL    Voided: 1075 mL  Total OUT: 1575 mL    Total NET: 3012.9 mL            LABS:                        7.1    11.78 )-----------( 186      ( 30 Jun 2018 23:35 )             22.0     06-30    143  |  110<H>  |  41<H>  ----------------------------<  105<H>  4.4   |  26  |  1.62<H>    Ca    8.1<L>      30 Jun 2018 09:37            CAPILLARY BLOOD GLUCOSE      POCT Blood Glucose.: 139 mg/dL (30 Jun 2018 23:21)        CULTURES:  Culture Results:   GI PCR Results: NOT detected  *******Please Note:*******  GI panel PCR evaluates for:  Campylobacter, Plesiomonas shigelloides, Salmonella,  Vibrio, Yersinia enterocolitica, Enteroaggregative  Escherichia coli (EAEC), Enteropathogenic E.coli (EPEC),  Enterotoxigenic E. coli (ETEC) lt/st, Shiga-like  toxin-producing E. coli (STEC) stx1/stx2,  Shigella/ Enteroinvasive E. coli (EIEC), Cryptosporidium,  Cyclospora cayetanensis, Entamoeba histolytica,  Giardia lamblia, Adenovirus F 40/41, Astrovirus,  Norovirus GI/GII, Rotavirus A, Sapovirus (06-30 @ 12:05)          Physical Examination:    General:  Intubated Sedated RASS -2, Obese     HEENT: Pupils equal, reactive to light.  Symmetric. No JVD. ET / NGT     PULM: Clear to auscultation but diminished BS  bilaterally, no significant sputum production    CVS: Regular rate and rhythm, no murmurs, rubs, or gallops    ABD: Soft, nondistended, nontender, normoactive bowel sounds, no masses    EXT: No edema, nontender    SKIN: Warm and well perfused, no rashes noted.        RADIOLOGY:   < from: Xray Chest 1 View- PORTABLE-Urgent (06.30.18 @ 16:30) >  EXAM:  XR CHEST PORTABLE URGENT 1V                                  PROCEDURE DATE:  06/30/2018          INTERPRETATION:  Chest portable.    Clinical History: Nasogastric tube placement.    Comparison: 6/28/2018.    Single AP view submitted.    Nasogastric tube is below the level of the hemidiaphragm in the left   upper quadrant.    Right central venous catheter is present, tip at the level of the distal   superior vena cava.  Endotracheal tube is present tip above the level of the tyron.    The evaluation of the cardiomediastinal silhouette is limited on portable   technique.    There is left perihilar subsegmental atelectasis/fibrosis.  No lobar lung consolidation or significant pleural effusion is noted.    Impression:    < end of copied text >    CRITICAL CARE TIME SPENT: 50 minutes   (Assessing presenting problems of acute illness, which pose high probability of life threatening deterioration or end organ damage/dysfunction, as well as medical decision making including initiating plan of care, reviewing data, reviewing radiologic exams, discussing with multidisciplinary team,  discussing goals of care with patient/family, and writing this note.  Non-inclusive of procedures performed)

## 2018-07-01 NOTE — PROGRESS NOTE ADULT - ASSESSMENT
The patient is a 58 year old male with a history of HTN, DM, lymphedema, peripheral neuropathy, kidney stone, MEGHA who presents from rehab with bloody diarrhea.    7/1/18  Still intubated on Propofol.  S/P upper GI endoscopy.    Plan:  - Continue amlodipine for HTN  - Acute renal insufficiency.  - Continue IVF  - Follow labs  - Wean respirator per Pulmonary.  - Being followed by GI, Renal, Hem/Onc, Pulmonary.

## 2018-07-01 NOTE — PROGRESS NOTE ADULT - ASSESSMENT
G.I. bleed. Morbid obesity. Bilateral capped PCNs and bilateral ureteral stents. Hemodynamic SMITH on CKD improved.  Continue with care as per CCM and G.I. Monitor renal indices, avoid nephrotoxins, replete electrolytes PRN.

## 2018-07-01 NOTE — PROGRESS NOTE ADULT - SUBJECTIVE AND OBJECTIVE BOX
INTERVAL HPI/OVERNIGHT EVENTS:  HPI:  No new overnight event.  No N/V/D.  no melena this am ngt cclear hgb stable  MEDICATIONS  (STANDING):  amLODIPine   Tablet 10 milliGRAM(s) Oral daily  BACItracin   Ointment 1 Application(s) Topical every 12 hours  cyanocobalamin Injectable 1000 MICROGram(s) IntraMuscular daily  dextrose 5%. 1000 milliLiter(s) (50 mL/Hr) IV Continuous <Continuous>  dextrose 50% Injectable 12.5 Gram(s) IV Push once  folic acid 1 milliGRAM(s) Oral daily  insulin lispro (HumaLOG) corrective regimen sliding scale   SubCutaneous three times a day before meals  iron sucrose Injectable 100 milliGRAM(s) IV Push every 24 hours  nystatin Powder 1 Application(s) Topical two times a day  ondansetron Injectable 4 milliGRAM(s) IV Push once  pantoprazole Infusion 8 mG/Hr (10 mL/Hr) IV Continuous <Continuous>  propofol Infusion 20 MICROgram(s)/kG/Min (22.692 mL/Hr) IV Continuous <Continuous>  sodium chloride 0.9%. 1000 milliLiter(s) (75 mL/Hr) IV Continuous <Continuous>  sucralfate suspension 1 Gram(s) Oral every 6 hours  tamsulosin 0.4 milliGRAM(s) Oral at bedtime    MEDICATIONS  (PRN):  dextrose 40% Gel 15 Gram(s) Oral once PRN Blood Glucose LESS THAN 70 milliGRAM(s)/deciliter  fentaNYL    Injectable 50 MICROGram(s) IV Push every 1 hour PRN Mod pain (4-6) or Vent complinace  glucagon  Injectable 1 milliGRAM(s) IntraMuscular once PRN Glucose LESS THAN 70 milligrams/deciliter      Allergies    No Known Allergies    Intolerances          General:  No wt loss, fevers, chills, night sweats, fatigue,   Eyes:  Good vision, no reported pain  ENT:  No sore throat, pain, runny nose, dysphagia  CV:  No pain, palpitations, hypo/hypertension  Resp:  No dyspnea, cough, tachypnea, wheezing  GI:  No pain, No nausea, No vomiting, No diarrhea, No constipation, No weight loss, No fever, No pruritis, No rectal bleeding, No tarry stools, No dysphagia,  :  No pain, bleeding, incontinence, nocturia  Muscle:  No pain, weakness  Neuro:  No weakness, tingling, memory problems  Psych:  No fatigue, insomnia, mood problems, depression  Endocrine:  No polyuria, polydipsia, cold/heat intolerance  Heme:  No petechiae, ecchymosis, easy bruisability  Skin:  No rash, tattoos, scars, edema      PHYSICAL EXAM:   Vital Signs:  Vital Signs Last 24 Hrs  T(C): 36.4 (2018 09:00), Max: 37.9 (2018 20:00)  T(F): 97.6 (2018 09:00), Max: 100.2 (2018 20:00)  HR: 54 (2018 12:00) (54 - 134)  BP: 134/74 (2018 12:00) (103/62 - 187/112)  BP(mean): 91 (2018 12:00) (73 - 100)  RR: 12 (2018 12:00) (12 - 19)  SpO2: 100% (2018 12:00) (93% - 100%)  Daily     Daily Weight in k (2018 06:18)I&O's Summary    2018 07:  -  2018 07:00  --------------------------------------------------------  IN: 4868.3 mL / OUT: 3125 mL / NET: 1743.3 mL    2018 07:  -  2018 13:47  --------------------------------------------------------  IN: 544 mL / OUT: 0 mL / NET: 544 mL        GENERAL:  Appears stated age, well-groomed, well-nourished, no distress  HEENT:  NC/AT,  conjunctivae clear and pink, no thyromegaly, nodules, adenopathy, no JVD, sclera -anicteric  CHEST:  Full & symmetric excursion, no increased effort, breath sounds clear  HEART:  Regular rhythm, S1, S2, no murmur/rub/S3/S4, no abdominal bruit, no edema intubated  ABDOMEN:  Soft, non-tender, non-distended, normoactive bowel sounds,  no masses ,no hepato-splenomegaly, no signs of chronic liver disease ngt  EXTEREMITIES:  no cyanosis,clubbing or edema  SKIN:  No rash/erythema/ecchymoses/petechiae/wounds/abscess/warm/dry  NEURO:  Alert, oriented, no asterixis, no tremor, no encephalopathy      LABS:                        7.1    9.91  )-----------( 171      ( 2018 11:59 )             21.9     07    143  |  111<H>  |  47<H>  ----------------------------<  112<H>  4.2   |  24  |  1.83<H>    Ca    8.2<L>      2018 07:00    TPro  4.9<L>  /  Alb  2.2<L>  /  TBili  0.3  /  DBili  x   /  AST  10  /  ALT  15  /  AlkPhos  40      PT/INR - ( 2018 07:43 )   PT: 12.5 sec;   INR: 1.14 ratio         PTT - ( 2018 07:43 )  PTT:26.5 sec    amylase   lipase  RADIOLOGY & ADDITIONAL TESTS:

## 2018-07-01 NOTE — PROGRESS NOTE ADULT - SUBJECTIVE AND OBJECTIVE BOX
Patient is a 58y Male with a known history of :  Sleep apnea (G47.30)  Other iron deficiency anemia (D50.8)  Morbid obesity (E66.01)  H/O sleep apnea (Z86.69)  Lymphedema (I89.0)  Essential hypertension (I10)  Type 2 diabetes mellitus with diabetic nephropathy, unspecified whether long term insulin use (E11.21)  Anemia (D64.9)  GI bleeding (K92.2)    HPI:  59 y/o M pt w/ PMHx morbid obesity, DM, HTN, peripheral neuropathy, calculus of kidney and ureter, sleep apnea, acquired lymphedema of leg and PSHx nephrostomy bilateral kidneys presents to the ED BIBA from [facility] c/o bloody diarrhea x 3 since yesterday, describes it as red. Also endorsing nausea. States that he had a blood transfusion at [facility] on 6/23 and was given an injection of Procrit yesterday. States hemoglobin was above 7 yesterday and today it is below 7, approx 5.  Pt states he resides at [facility] for rehabilitation due to a fall 2 months ago. Pt denies vomiting, abd pain, fever or any other complaints at this time.  IN [facility] hemoglobin 4.9, and had central line placed and ordered 2 units PRBC.vital signs stable (28 Jun 2018 22:42)      REVIEW OF SYSTEMS:    CONSTITUTIONAL: No fever, weight loss, or fatigue  EYES: No eye pain, visual disturbances, or discharge  ENMT:  No difficulty hearing, tinnitus, vertigo; No sinus or throat pain  NECK: No pain or stiffness  BREASTS: No pain, masses, or nipple discharge  RESPIRATORY: No cough, wheezing, chills or hemoptysis; No shortness of breath  CARDIOVASCULAR: No chest pain, palpitations, dizziness, or leg swelling  GASTROINTESTINAL: No abdominal or epigastric pain. No nausea, vomiting, or hematemesis; No diarrhea or constipation. No melena or hematochezia.  GENITOURINARY: No dysuria, frequency, hematuria, or incontinence  NEUROLOGICAL: No headaches, memory loss, loss of strength, numbness, or tremors  SKIN: No itching, burning, rashes, or lesions   LYMPH NODES: No enlarged glands  ENDOCRINE: No heat or cold intolerance; No hair loss  MUSCULOSKELETAL: No joint pain or swelling; No muscle, back, or extremity pain  PSYCHIATRIC: No depression, anxiety, mood swings, or difficulty sleeping  HEME/LYMPH: No easy bruising, or bleeding gums  ALLERGY AND IMMUNOLOGIC: No hives or eczema    MEDICATIONS  (STANDING):  amLODIPine   Tablet 10 milliGRAM(s) Oral daily  BACItracin   Ointment 1 Application(s) Topical every 12 hours  cyanocobalamin Injectable 1000 MICROGram(s) IntraMuscular daily  dextrose 5%. 1000 milliLiter(s) (50 mL/Hr) IV Continuous <Continuous>  dextrose 50% Injectable 12.5 Gram(s) IV Push once  folic acid 1 milliGRAM(s) Oral daily  insulin lispro (HumaLOG) corrective regimen sliding scale   SubCutaneous three times a day before meals  iron sucrose Injectable 100 milliGRAM(s) IV Push every 24 hours  nystatin Powder 1 Application(s) Topical two times a day  ondansetron Injectable 4 milliGRAM(s) IV Push once  pantoprazole Infusion 8 mG/Hr (10 mL/Hr) IV Continuous <Continuous>  propofol Infusion 20 MICROgram(s)/kG/Min (22.692 mL/Hr) IV Continuous <Continuous>  sodium chloride 0.9%. 1000 milliLiter(s) (75 mL/Hr) IV Continuous <Continuous>  sucralfate suspension 1 Gram(s) Oral every 6 hours  tamsulosin 0.4 milliGRAM(s) Oral at bedtime    MEDICATIONS  (PRN):  dextrose 40% Gel 15 Gram(s) Oral once PRN Blood Glucose LESS THAN 70 milliGRAM(s)/deciliter  fentaNYL    Injectable 50 MICROGram(s) IV Push every 1 hour PRN Mod pain (4-6) or Vent complinace  glucagon  Injectable 1 milliGRAM(s) IntraMuscular once PRN Glucose LESS THAN 70 milligrams/deciliter      ALLERGIES: No Known Allergies      FAMILY HISTORY:  Family history of coronary artery disease  Family history of prostate cancer in father      Social history:  Alochol:   Smoking:   Drug Use:   Marital Status:     PHYSICAL EXAMINATION:  -----------------------------  T(C): 36.4 (07-01-18 @ 09:00), Max: 37.9 (06-30-18 @ 20:00)  HR: 58 (07-01-18 @ 09:00) (58 - 134)  BP: 114/64 (07-01-18 @ 09:00) (103/59 - 187/112)  RR: 12 (07-01-18 @ 09:00) (12 - 19)  SpO2: 100% (07-01-18 @ 09:00) (93% - 100%)  Wt(kg): --    06-30 @ 07:01  -  07-01 @ 07:00  --------------------------------------------------------  IN:    lactated ringers.: 1500 mL    Packed Red Blood Cells: 842 mL    pantoprazole Infusion: 150 mL    propofol Infusion: 605.9 mL    propofol Infusion: 45.4 mL    sodium chloride 0.9%: 750 mL    sodium chloride 0.9%.: 975 mL  Total IN: 4868.3 mL    OUT:    Estimated Blood Loss: 500 mL    Nasoenteral Tube: 250 mL    Voided: 2375 mL  Total OUT: 3125 mL    Total NET: 1743.3 mL      07-01 @ 07:01  -  07-01 @ 10:03  --------------------------------------------------------  IN:    pantoprazole Infusion: 30 mL    propofol Infusion: 153 mL    sodium chloride 0.9%.: 225 mL  Total IN: 408 mL    OUT:  Total OUT: 0 mL    Total NET: 408 mL            Constitutional: well developed, normal appearance, well groomed, well nourished, no deformities and no acute distress.   Eyes: the conjunctiva exhibited no abnormalities and the eyelids demonstrated no xanthelasmas.   HEENT: normal oral mucosa, no oral pallor and no oral cyanosis.   Neck: normal jugular venous A waves present, normal jugular venous V waves present and no jugular venous rodríguez A waves.   Pulmonary: B/L scattered rhonchi anteriorly.   Cardiovascular: heart rate and rhythm were normal, normal S1 and S2 and no murmur, gallop, rub, heave or thrill are present.   Musculoskeletal: the gait could not be assessed..  Extremities: B/L compression stockings for lymphedema.   Skin: normal skin color and pigmentation, no rash, no venous stasis, no skin lesions, no skin ulcer and no xanthoma was observed.      LABS:   --------  07-01    143  |  111<H>  |  47<H>  ----------------------------<  112<H>  4.2   |  24  |  1.83<H>    Ca    8.2<L>      01 Jul 2018 07:00    TPro  4.9<L>  /  Alb  2.2<L>  /  TBili  0.3  /  DBili  x   /  AST  10  /  ALT  15  /  AlkPhos  40  07-01                         7.4    10.98 )-----------( 182      ( 01 Jul 2018 07:00 )             23.1     PT/INR - ( 01 Jul 2018 07:43 )   PT: 12.5 sec;   INR: 1.14 ratio         PTT - ( 01 Jul 2018 07:43 )  PTT:26.5 sec      103 mg/dL, 49 mg/dL, 17 mg/dL<L>, 187 mg/dL<H>    Culture Results:   GI PCR Results: NOT detected  *******Please Note:*******  GI panel PCR evaluates for:  Campylobacter, Plesiomonas shigelloides, Salmonella,  Vibrio, Yersinia enterocolitica, Enteroaggregative  Escherichia coli (EAEC), Enteropathogenic E.coli (EPEC),  Enterotoxigenic E. coli (ETEC) lt/st, Shiga-like  toxin-producing E. coli (STEC) stx1/stx2,  Shigella/ Enteroinvasive E. coli (EIEC), Cryptosporidium,  Cyclospora cayetanensis, Entamoeba histolytica,  Giardia lamblia, Adenovirus F 40/41, Astrovirus,  Norovirus GI/GII, Rotavirus A, Sapovirus (06-30 @ 12:05)    06-30 @ 12:05    Organism --   Gram Stain Blood -- Gram Stain --  Specimen Source .Stool Feces  Culture-Blood --        Radiology:

## 2018-07-01 NOTE — PROGRESS NOTE ADULT - ASSESSMENT
58 year old male PMHx Morbid Obesity, MEGHA, LE lymph Edema, DM2, HTN, Peripheral Neuropathy, Recent obstructive uropathy from calculus of kidney and ureter, s/p B/L nephrostomies now capped and ureteral stents.  From Rehab with GIB requiring multiple transfusions over course of admission.    POD 0 s/p EGD with Large Duodonal ulcer noted s/p Injection    Post-Op Respiratory Failure - Remains intubated for Airway protection, maintain CMV with lung protective settings, Vent Bundle reviewed,  Sedation with diprivan and fentanyl added tonight for vent compliance.    Episode of SVT tonight ~7sec resolved on own, cont to monitor will Check Lytes     ABLA - likely rebleeding 2 episodes of melena stools, 1 unit PRBC's s/p EGD with poor response, Additional unit given awaiting AM labs     may need repeat EGD or IR intervention      PPI Gtt and carafate via NGT / Keep NPO except meds.    Watch lytes / UOP - supplemented 2g Calcium Gluconate as has gotten 8 units PRBC's - additional unit given tonight     No AC SCD's     No contraindication to continued soft wrist restraints to prevent unplanned extubation - attending aware.

## 2018-07-01 NOTE — PROGRESS NOTE ADULT - SUBJECTIVE AND OBJECTIVE BOX
Patient is a 58y old  Male who presents with a chief complaint of "bloody diarrhea (2018 04:16)      INTERVAL HPI/OVERNIGHT EVENTS:  pt had post op respiratory failure as obese and Elena, extubated on ,  Home Medications:  amLODIPine 10 mg oral tablet: 1 tab(s) orally once a day (2018 12:43)  bacitracin 500 units/g topical ointment: Apply topically to affected area 2 times a day nephrostomy tube (2018 12:43)  Bactrim  mg-160 mg oral tablet: 1 tab(s) orally 2 times a day (2018 12:43)  Colace 100 mg oral capsule: 1 cap(s) orally 3 times a day (2018 12:43)  Flomax 0.4 mg oral capsule: 1 cap(s) orally once a day (2018 12:43)  gas relief: 180 milligram(s)  2 times a day (2018 12:43)  lactulose: 30 milliliter(s) orally 3 times a week, As Needed (2018 12:43)  Milk of Magnesia: 30 milliliter(s) orally 3 times a week, As Needed (2018 12:43)  Procrit 10,000 units/mL preservative-free injectable solution: injectable every 7 days (2018 12:43)  Senna 8.6 mg oral tablet: 2 tab(s) orally once a day (2018 12:43)  Tylenol 500 mg oral tablet: 1 tab(s) orally every 6 hours, As Needed (2018 12:43)  Zofran ODT 4 mg oral tablet, disintegratin tab(s) orally 6 times a day, As Needed (2018 12:43)      MEDICATIONS  (STANDING):  amLODIPine   Tablet 10 milliGRAM(s) Oral daily  BACItracin   Ointment 1 Application(s) Topical every 12 hours  cyanocobalamin Injectable 1000 MICROGram(s) IntraMuscular daily  dextrose 5%. 1000 milliLiter(s) (50 mL/Hr) IV Continuous <Continuous>  dextrose 50% Injectable 12.5 Gram(s) IV Push once  folic acid 1 milliGRAM(s) Oral daily  insulin lispro (HumaLOG) corrective regimen sliding scale   SubCutaneous three times a day before meals  iron sucrose Injectable 100 milliGRAM(s) IV Push every 24 hours  nystatin Powder 1 Application(s) Topical two times a day  ondansetron Injectable 4 milliGRAM(s) IV Push once  pantoprazole Infusion 8 mG/Hr (10 mL/Hr) IV Continuous <Continuous>  propofol Infusion 20 MICROgram(s)/kG/Min (22.692 mL/Hr) IV Continuous <Continuous>  sucralfate suspension 1 Gram(s) Oral every 6 hours  tamsulosin 0.4 milliGRAM(s) Oral at bedtime    MEDICATIONS  (PRN):  dextrose 40% Gel 15 Gram(s) Oral once PRN Blood Glucose LESS THAN 70 milliGRAM(s)/deciliter  fentaNYL    Injectable 50 MICROGram(s) IV Push every 1 hour PRN Mod pain (4-6) or Vent complinace  glucagon  Injectable 1 milliGRAM(s) IntraMuscular once PRN Glucose LESS THAN 70 milligrams/deciliter      Allergies    No Known Allergies    Intolerances        REVIEW OF SYSTEMS:  CONSTITUTIONAL: No fever, weight loss, has  fatigue  EYES: No eye pain, visual disturbances, or discharge  ENMT:  No difficulty hearing, tinnitus, vertigo; No sinus or throat pain  NECK: No pain or stiffness  BREASTS: No pain, masses, or nipple discharge  RESPIRATORY: No cough, wheezing, chills or hemoptysis; No shortness of breath  CARDIOVASCULAR: No chest pain, palpitations, dizziness, or leg swelling  GASTROINTESTINAL: No abdominal or epigastric pain. No nausea, vomiting, or hematemesis; No diarrhea or constipation. No melena or hematochezia.  GENITOURINARY: No dysuria, frequency, hematuria, or incontinence  NEUROLOGICAL: No headaches, memory loss, loss of strength, numbness, or tremors  SKIN: No itching, burning, rashes, or lesions   ENDOCRINE: No heat or cold intolerance; No hair loss  MUSCULOSKELETAL: No joint pain or swelling; No muscle, back, or extremity pain  PSYCHIATRIC: No depression, anxiety, mood swings, or difficulty sleeping  HEME/LYMPH: No easy bruising, or bleeding gums  ALLERGY AND IMMUNOLOGIC: No hives or eczema    Vital Signs Last 24 Hrs  T(C): 36.4 (2018 09:00), Max: 37.9 (2018 20:00)  T(F): 97.6 (2018 09:00), Max: 100.2 (2018 20:00)  HR: 54 (2018 12:00) (54 - 99)  BP: 134/74 (2018 12:00) (103/62 - 142/82)  BP(mean): 91 (2018 12:00) (73 - 100)  RR: 12 (2018 12:00) (12 - 15)  SpO2: 100% (2018 12:00) (98% - 100%)    PHYSICAL EXAM:  GENERAL: obese, well-groomed, well-developed  HEAD:  Atraumatic, Normocephalic  EYES: EOMI, PERRLA, conjunctiva and sclera clear  ENMT: Moist mucous membranes,   NECK: Supple, No JVD, Normal thyroid  NERVOUS SYSTEM:  Alert & Oriented X3, Good concentration; Motor Strength 5/5 B/L upper and lower extremities; DTRs 2+ intact and symmetric  CHEST/LUNG: Clear to percussion bilaterally; No rales, rhonchi, wheezing, or rubs, s/p extubation  HEART: Regular rate and rhythm; No murmurs, rubs, or gallops  ABDOMEN: Soft, Nontender, Nondistended; Bowel sounds present  EXTREMITIES:  2+ Peripheral Pulses, No clubbing, cyanosis, or edema  SKIN: No rashes or lesions    LABS:                        7.1    9.91  )-----------( 171      ( 2018 11:59 )             21.9     07-01    143  |  111<H>  |  47<H>  ----------------------------<  112<H>  4.2   |  24  |  1.83<H>    Ca    8.2<L>      2018 07:00    TPro  4.9<L>  /  Alb  2.2<L>  /  TBili  0.3  /  DBili  x   /  AST  10  /  ALT  15  /  AlkPhos  40  07-    PT/INR - ( 2018 07:43 )   PT: 12.5 sec;   INR: 1.14 ratio         PTT - ( 2018 07:43 )  PTT:26.5 sec    CAPILLARY BLOOD GLUCOSE      POCT Blood Glucose.: 104 mg/dL (2018 11:58)  POCT Blood Glucose.: 105 mg/dL (2018 05:35)  POCT Blood Glucose.: 139 mg/dL (2018 23:21)  POCT Blood Glucose.: 133 mg/dL (2018 19:31)        GI PCR Panel, Stool (collected 18 @ 12:05)  Source: .Stool Feces  Final Report (18 @ 14:20):    GI PCR Results: NOT detected    *******Please Note:*******    GI panel PCR evaluates for:    Campylobacter, Plesiomonas shigelloides, Salmonella,    Vibrio, Yersinia enterocolitica, Enteroaggregative    Escherichia coli (EAEC), Enteropathogenic E.coli (EPEC),    Enterotoxigenic E. coli (ETEC) lt/st, Shiga-like    toxin-producing E. coli (STEC) stx1/stx2,    Shigella/ Enteroinvasive E. coli (EIEC), Cryptosporidium,    Cyclospora cayetanensis, Entamoeba histolytica,    Giardia lamblia, Adenovirus F 40/41, Astrovirus,    Norovirus GI/GII, Rotavirus A, Sapovirus        I&O's Summary    2018 07:  -  2018 07:00  --------------------------------------------------------  IN: 4868.3 mL / OUT: 3125 mL / NET: 1743.3 mL    2018 07:  -  2018 15:01  --------------------------------------------------------  IN: 544 mL / OUT: 0 mL / NET: 544 mL        RADIOLOGY & ADDITIONAL TESTS:    Imaging Personally Reviewed:  [x ] YES  [ ] NO    Consultant(s) Notes Reviewed:  x ] YES  [ ] NO    Care Discussed with Consultants/Other Providers x[ ] YES  [ ] NO

## 2018-07-01 NOTE — PROGRESS NOTE ADULT - ASSESSMENT
59 y/o man with PMH of nephrolithiasis s/p BL nephrostomies, Bactrim who presented with Hgb 4.9 after 2 weeks of melanotic stools.    no h/o anemia in the past. Recently transfused 1 unit PRBC, 1 week ago for Hgb of 7 ( sent to ER for transfusion from [facility]) and was given dose of Procrit.     Likely GIB, as melanotic stools , s/p 9th unit of PRBC, given over several day.   PT and PTT are adequate.   Pt continues to have melanotic/dark stools this AM.   GI workup with EGD showed large duodenal ulcer.     B12 is low normal. Would benefit from B12 replacement.   Likely iron deficient, as Ferritin is low given likely concurrent inflammatory condition.   Fe def with concurrent anemia of chronic disease.       PLAN:  Transfuse PRBC to maintain Hgb >7.  Continue monitoring in [facility].   Continue close CBC monitoring.     Anemia due to CKD.   GFR is 40, possible candidate for procrit ( to be d/w nephrology).  However with current condition, with active bleeding, and borderline iron, not likely to be as helpful for pt short-term.   Long term, may be beneficial to help maintain Hgb, if Hgb does not improve with other measures.     Start b12 injections 1000mcg daily.   Start Folic acid 1mg daily, as likely to have increased RBC turnover/production in near future.   start Venofer 100mg daily x3.

## 2018-07-01 NOTE — PROGRESS NOTE ADULT - SUBJECTIVE AND OBJECTIVE BOX
[INTERVAL HX: ]  Patient seen and examined;  Chart reviewed and events noted;   s/p EGD with large duodenal ulcer.   Hgb has been stable at ~7 since transfusions.   had melanotic stools  intubated still, on propofol for sedation. BP stable.     MEDICATIONS  (STANDING):  amLODIPine   Tablet 10 milliGRAM(s) Oral daily  BACItracin   Ointment 1 Application(s) Topical every 12 hours  cyanocobalamin Injectable 1000 MICROGram(s) IntraMuscular daily  dextrose 5%. 1000 milliLiter(s) (50 mL/Hr) IV Continuous <Continuous>  dextrose 50% Injectable 12.5 Gram(s) IV Push once  folic acid 1 milliGRAM(s) Oral daily  insulin lispro (HumaLOG) corrective regimen sliding scale   SubCutaneous three times a day before meals  nystatin Powder 1 Application(s) Topical two times a day  ondansetron Injectable 4 milliGRAM(s) IV Push once  pantoprazole Infusion 8 mG/Hr (10 mL/Hr) IV Continuous <Continuous>  propofol Infusion 20 MICROgram(s)/kG/Min (22.692 mL/Hr) IV Continuous <Continuous>  sodium chloride 0.9%. 1000 milliLiter(s) (75 mL/Hr) IV Continuous <Continuous>  sucralfate suspension 1 Gram(s) Oral every 6 hours  tamsulosin 0.4 milliGRAM(s) Oral at bedtime    MEDICATIONS  (PRN):  dextrose 40% Gel 15 Gram(s) Oral once PRN Blood Glucose LESS THAN 70 milliGRAM(s)/deciliter  fentaNYL    Injectable 50 MICROGram(s) IV Push every 1 hour PRN Mod pain (4-6) or Vent complinace  glucagon  Injectable 1 milliGRAM(s) IntraMuscular once PRN Glucose LESS THAN 70 milligrams/deciliter      Vital Signs Last 24 Hrs  T(C): 36.7 (01 Jul 2018 06:18), Max: 37.9 (30 Jun 2018 20:00)  T(F): 98 (01 Jul 2018 06:18), Max: 100.2 (30 Jun 2018 20:00)  HR: 60 (01 Jul 2018 08:00) (59 - 134)  BP: 124/67 (01 Jul 2018 08:00) (103/59 - 187/112)  BP(mean): 82 (01 Jul 2018 08:00) (73 - 100)  RR: 12 (01 Jul 2018 08:00) (10 - 19)  SpO2: 100% (01 Jul 2018 08:00) (93% - 100%)    [PHYSICAL EXAM]  General: adult in NAD,  WN,  WD. morbidly obese  HEENT: ETT in place, OGT in place, anicteric sclera, pink conjunctivae.  Neck: supple, no masses.  CV: normal S1S2, no murmur, no rubs, no gallops.  Lungs: clear to auscultation, no wheezes, no rales, no rhonchi.  Abdomen: soft, non-tender, mild-distended, obese, normal BS, no guarding.  Ext: no clubbing, no cyanosis, + edema.  Skin: no rashes,  no petechiae, + venous stasis changes.  Neuro: sedated, no focal motor deficits.  LN: no SC JENNIFFER.      [LABS:]                        7.4    10.98 )-----------( 182      ( 01 Jul 2018 07:00 )             23.1     07-01    143  |  111<H>  |  47<H>  ----------------------------<  112<H>  4.2   |  24  |  1.83<H>    Ca    8.2<L>      01 Jul 2018 07:00    TPro  4.9<L>  /  Alb  2.2<L>  /  TBili  0.3  /  DBili  x   /  AST  10  /  ALT  15  /  AlkPhos  40  07-01    PT/INR - ( 01 Jul 2018 07:43 )   PT: 12.5 sec;   INR: 1.14 ratio         PTT - ( 01 Jul 2018 07:43 )  PTT:26.5 sec      [RADIOLOGY STUDIES:]

## 2018-07-01 NOTE — PROGRESS NOTE ADULT - ASSESSMENT
Pt with GI bleed due to large DU. Kept intubated due to bleeding, but hemodynamically stable.   Has sleep apnea but intolerant of cpap.  Still  anemic

## 2018-07-01 NOTE — PROGRESS NOTE ADULT - SUBJECTIVE AND OBJECTIVE BOX
Patient seen in follow up for SMITH on CKD. Extubated, on nasal O2. Bae out, voids in urinal.  Has bilateral capped PCNs and ureteral stents for obstructive uropathy.    PMH:  Hx of peripheral neuropathy  Morbid obesity  H/O sleep apnea  Acquired lymphedema of leg  Calculus of kidney and ureter  Diabetes mellitus type II  HTN (hypertension)    MEDICATIONS  (STANDING):  amLODIPine   Tablet 10 milliGRAM(s) Oral daily  BACItracin   Ointment 1 Application(s) Topical every 12 hours  cyanocobalamin Injectable 1000 MICROGram(s) IntraMuscular daily  dextrose 5%. 1000 milliLiter(s) (50 mL/Hr) IV Continuous <Continuous>  dextrose 50% Injectable 12.5 Gram(s) IV Push once  folic acid 1 milliGRAM(s) Oral daily  insulin lispro (HumaLOG) corrective regimen sliding scale   SubCutaneous three times a day before meals  iron sucrose Injectable 100 milliGRAM(s) IV Push every 24 hours  nystatin Powder 1 Application(s) Topical two times a day  ondansetron Injectable 4 milliGRAM(s) IV Push once  pantoprazole Infusion 8 mG/Hr (10 mL/Hr) IV Continuous <Continuous>  propofol Infusion 20 MICROgram(s)/kG/Min (22.692 mL/Hr) IV Continuous <Continuous>  sucralfate suspension 1 Gram(s) Oral every 6 hours  tamsulosin 0.4 milliGRAM(s) Oral at bedtime    MEDICATIONS  (PRN):  dextrose 40% Gel 15 Gram(s) Oral once PRN Blood Glucose LESS THAN 70 milliGRAM(s)/deciliter  fentaNYL    Injectable 50 MICROGram(s) IV Push every 1 hour PRN Mod pain (4-6) or Vent complinace  glucagon  Injectable 1 milliGRAM(s) IntraMuscular once PRN Glucose LESS THAN 70 milligrams/deciliter    T(C): 36.9 (07-01-18 @ 16:00), Max: 37.9 (06-30-18 @ 20:00)  HR: 85 (07-01-18 @ 18:00) (54 - 134)  BP: 150/63 (07-01-18 @ 18:00) (103/59 - 187/112)  RR: 18 (07-01-18 @ 18:00) (11 - 22)  SpO2: 83% (07-01-18 @ 18:00) (83% - 100%)  Wt(kg): --  I&O's Detail    30 Jun 2018 07:01  -  01 Jul 2018 07:00  --------------------------------------------------------  IN:    lactated ringers.: 1500 mL    Packed Red Blood Cells: 842 mL    pantoprazole Infusion: 150 mL    propofol Infusion: 605.9 mL    propofol Infusion: 45.4 mL    sodium chloride 0.9%: 750 mL    sodium chloride 0.9%: 975 mL  Total IN: 4868.3 mL    OUT:    Estimated Blood Loss: 500 mL    Nasoenteral Tube: 250 mL    Voided: 2375 mL  Total OUT: 3125 mL    Total NET: 1743.3 mL      01 Jul 2018 07:01  -  01 Jul 2018 22:50  --------------------------------------------------------  IN:    Packed Red Blood Cells: 344 mL    pantoprazole Infusion: 120 mL    propofol Infusion: 357 mL    sodium chloride 0.9%: 300 mL  Total IN: 1121 mL    OUT:    Voided: 550 mL  Total OUT: 550 mL    Total NET: 571 mL      PHYSICAL EXAM:  General: NAD, AAO x3  Respiratory: b/l air entry  Cardiovascular: S1 S2 reg  Gastrointestinal: soft, large, nothing palpable  Extremities:  chronic edema with stasis, support stockings    CBC Full  -  ( 01 Jul 2018 21:46 )  WBC Count : 10.90 K/uL  Hemoglobin : 8.4 g/dL  Hematocrit : 25.5 %  Platelet Count - Automated : 173 K/uL  Mean Cell Volume : 91.1 fl  Mean Cell Hemoglobin : 30.0 pg  Mean Cell Hemoglobin Concentration : 32.9 gm/dL  Auto Neutrophil # : x  Auto Lymphocyte # : x  Auto Monocyte # : x  Auto Eosinophil # : x  Auto Basophil # : x  Auto Neutrophil % : x  Auto Lymphocyte % : x  Auto Monocyte % : x  Auto Eosinophil % : x  Auto Basophil % : x    07-01    143  |  111<H>  |  47<H>  ----------------------------<  112<H>  4.2   |  24  |  1.83<H>    Ca    8.2<L>      01 Jul 2018 07:00    TPro  4.9<L>  /  Alb  2.2<L>  /  TBili  0.3  /  DBili  x   /  AST  10  /  ALT  15  /  AlkPhos  40  07-01    ABG - ( 01 Jul 2018 13:48 )  pH, Arterial: x     pH, Blood: 7.37  /  pCO2: 41    /  pO2: 94    / HCO3: 23    / Base Excess: -1.6  /  SaO2: 98                  Sodium, Serum: 143 (07-01 @ 07:00)  Sodium, Serum: 143 (06-30 @ 09:37)  Sodium, Serum: 140 (06-29 @ 05:16)    Creatinine, Serum: 1.83 (07-01 @ 07:00)  Creatinine, Serum: 1.62 (06-30 @ 09:37)  Creatinine, Serum: 2.13 (06-29 @ 05:16)    Potassium, Serum: 4.2 (07-01 @ 07:00)  Potassium, Serum: 4.4 (06-30 @ 09:37)  Potassium, Serum: 4.4 (06-29 @ 05:16)    Hemoglobin: 8.4 (07-01 @ 21:46)  Hemoglobin: 7.1 (07-01 @ 11:59)  Hemoglobin: 7.4 (07-01 @ 07:00)  Hemoglobin: 7.1 (06-30 @ 23:35)  Hemoglobin: 7.0 (06-30 @ 16:24)  Hemoglobin: 6.9 (06-30 @ 09:37)  Hemoglobin: 6.6 (06-30 @ 02:56)  Hemoglobin: 5.8 (06-29 @ 15:41)  Hemoglobin: 5.0 (06-29 @ 05:16)

## 2018-07-01 NOTE — PROGRESS NOTE ADULT - SUBJECTIVE AND OBJECTIVE BOX
PULMONARY/CRITICAL CARE      INTERVAL HPI/OVERNIGHT EVENTS:  Events noted. Pt. intubated post egd due to large bleeding DU. Had some melanotic stools but BP stable.  58y MaleHPI:  57 y/o M pt w/ PMHx morbid obesity, DM, HTN, peripheral neuropathy, calculus of kidney and ureter, sleep apnea, acquired lymphedema of leg and PSHx nephrostomy bilateral kidneys presents to the ED BIBA from [facility] c/o bloody diarrhea x 3 since yesterday, describes it as red. Also endorsing nausea. States that he had a blood transfusion at [facility] on 6/23 and was given an injection of Procrit yesterday. States hemoglobin was above 7 yesterday and today it is below 7, approx 5.  Pt states he resides at [facility] for rehabilitation due to a fall 2 months ago. Pt denies vomiting, abd pain, fever or any other complaints at this time.  IN [facility] hemoglobin 4.9, and had central line placed and ordered 2 units PRBC.vital signs stable (28 Jun 2018 22:42)        PAST MEDICAL & SURGICAL HISTORY:  Hx of peripheral neuropathy  Morbid obesity  H/O sleep apnea  Acquired lymphedema of leg  Calculus of kidney and ureter  Diabetes mellitus type II  HTN (hypertension)  Ureteral stents placement, bilateral  Hx of tonsillectomy: at 6 years old        ICU Vital Signs Last 24 Hrs  T(C): 36.7 (01 Jul 2018 06:18), Max: 37.9 (30 Jun 2018 20:00)  T(F): 98 (01 Jul 2018 06:18), Max: 100.2 (30 Jun 2018 20:00)  HR: 67 (01 Jul 2018 06:22) (65 - 134)  BP: 122/89 (01 Jul 2018 06:22) (103/59 - 187/112)  BP(mean): 96 (01 Jul 2018 06:22) (70 - 100)  ABP: --  ABP(mean): --  RR: 13 (01 Jul 2018 06:22) (10 - 19)  SpO2: 98% (01 Jul 2018 06:22) (93% - 100%)    Qtts:     I&O's Summary    30 Jun 2018 07:01  -  01 Jul 2018 07:00  --------------------------------------------------------  IN: 4868.3 mL / OUT: 3125 mL / NET: 1743.3 mL        Mode: AC/ CMV (Assist Control/ Continuous Mandatory Ventilation)  RR (machine): 12  TV (machine): 700  FiO2: 50  PEEP: 5  ITime: 1  MAP: 11  PIP: 32    REVIEW OF SYSTEMS:    CONSTITUTIONAL: No fever, weight loss, or fatigue  EYES: No eye pain, visual disturbances, or discharge  ENMT:  No difficulty hearing, tinnitus, vertigo; No sinus or throat pain  NECK: No pain or stiffness  BREASTS: No pain, masses, or nipple discharge  RESPIRATORY: No cough, wheezing, chills or hemoptysis; No shortness of breath  CARDIOVASCULAR: No chest pain, palpitations, dizziness, or leg swelling  GASTROINTESTINAL: No abdominal or epigastric pain. No nausea, vomiting, or hematemesis; No diarrhea or constipation. had  melena or hematochezia.  GENITOURINARY: No dysuria, frequency, hematuria, or incontinence  NEUROLOGICAL: No headaches, memory loss, loss of strength, numbness, or tremors  SKIN: No itching, burning, rashes, or lesions   LYMPH NODES: No enlarged glands  ENDOCRINE: No heat or cold intolerance; No hair loss  MUSCULOSKELETAL: No joint pain or swelling; No muscle, back, or extremity pain, no calf tenderness  PSYCHIATRIC: No depression, anxiety, mood swings, or difficulty sleeping  HEME/LYMPH: No easy bruising, or bleeding gums  ALLERGY AND IMMUNOLOGIC: No hives or eczema      PHYSICAL EXAM:    GENERAL: NAD, well-groomed, well-developed, NAD OBese male  HEAD:  Atraumatic, Normocephalic  EYES: EOMI, PERRLA, conjunctiva and sclera clear  ENMT: No tonsillar erythema, exudates, or enlargement; Moist mucous membranes, Good dentition, No lesions  NECK: Supple, No JVD, Normal thyroid  NERVOUS SYSTEM:  sedated on vent  CHEST/LUNG: Clear to percussion bilaterally; No rales, rhonchi, wheezing, or rubs  Oraly intubated  HEART: Regular rate and rhythm; No murmurs, rubs, or gallops  ABDOMEN: Soft, Nontender, Nondistended; Bowel sounds present  EXTREMITIES:  2+ Peripheral Pulses, No clubbing, cyanosis, Has brawny edema  LYMPH: No lymphadenopathy noted  SKIN: No rashes or lesions          LABS:                        7.4    10.98 )-----------( 182      ( 01 Jul 2018 07:00 )             23.1     07-01    143  |  111<H>  |  47<H>  ----------------------------<  112<H>  4.2   |  24  |  1.83<H>    Ca    8.2<L>      01 Jul 2018 07:00    TPro  4.9<L>  /  Alb  2.2<L>  /  TBili  0.3  /  DBili  x   /  AST  10  /  ALT  15  /  AlkPhos  40  07-01          vanco through     RADIOLOGY & ADDITIONAL STUDIES:< from: Xray Chest 1 View- PORTABLE-Urgent (06.30.18 @ 16:30) >    EXAM:  XR CHEST PORTABLE URGENT 1V                                  PROCEDURE DATE:  06/30/2018          INTERPRETATION:  Chest portable.    Clinical History: Nasogastric tube placement.    Comparison: 6/28/2018.    Single AP view submitted.    Nasogastric tube is below the level of the hemidiaphragm in the left   upper quadrant.    Right central venous catheter is present, tip at the level of the distal   superior vena cava.  Endotracheal tube is present tip above the level of the tyron.    The evaluation of the cardiomediastinal silhouette is limited on portable   technique.    There is left perihilar subsegmental atelectasis/fibrosis.  No lobar lung consolidation or significant pleural effusion is noted.    Impression:    Findings as discussed above.    < end of copied text >        CRITICAL CARE TIME SPENT:

## 2018-07-02 DIAGNOSIS — D64.9 ANEMIA, UNSPECIFIED: ICD-10-CM

## 2018-07-02 LAB
ALBUMIN SERPL ELPH-MCNC: 2.4 G/DL — LOW (ref 3.3–5)
ALP SERPL-CCNC: 44 U/L — SIGNIFICANT CHANGE UP (ref 30–120)
ALT FLD-CCNC: 17 U/L DA — SIGNIFICANT CHANGE UP (ref 10–60)
ANION GAP SERPL CALC-SCNC: 7 MMOL/L — SIGNIFICANT CHANGE UP (ref 5–17)
AST SERPL-CCNC: 14 U/L — SIGNIFICANT CHANGE UP (ref 10–40)
BILIRUB SERPL-MCNC: 0.6 MG/DL — SIGNIFICANT CHANGE UP (ref 0.2–1.2)
BUN SERPL-MCNC: 37 MG/DL — HIGH (ref 7–23)
CALCIUM SERPL-MCNC: 8.2 MG/DL — LOW (ref 8.4–10.5)
CHLORIDE SERPL-SCNC: 112 MMOL/L — HIGH (ref 96–108)
CO2 SERPL-SCNC: 25 MMOL/L — SIGNIFICANT CHANGE UP (ref 22–31)
CREAT SERPL-MCNC: 1.71 MG/DL — HIGH (ref 0.5–1.3)
GLUCOSE BLDC GLUCOMTR-MCNC: 104 MG/DL — HIGH (ref 70–99)
GLUCOSE BLDC GLUCOMTR-MCNC: 111 MG/DL — HIGH (ref 70–99)
GLUCOSE BLDC GLUCOMTR-MCNC: 115 MG/DL — HIGH (ref 70–99)
GLUCOSE BLDC GLUCOMTR-MCNC: 99 MG/DL — SIGNIFICANT CHANGE UP (ref 70–99)
GLUCOSE SERPL-MCNC: 93 MG/DL — SIGNIFICANT CHANGE UP (ref 70–99)
HCT VFR BLD CALC: 25.2 % — LOW (ref 39–50)
HCT VFR BLD CALC: 25.5 % — LOW (ref 39–50)
HCT VFR BLD CALC: 27.9 % — LOW (ref 39–50)
HGB BLD-MCNC: 8.1 G/DL — LOW (ref 13–17)
HGB BLD-MCNC: 8.1 G/DL — LOW (ref 13–17)
HGB BLD-MCNC: 8.8 G/DL — LOW (ref 13–17)
MCHC RBC-ENTMCNC: 29.3 PG — SIGNIFICANT CHANGE UP (ref 27–34)
MCHC RBC-ENTMCNC: 29.5 PG — SIGNIFICANT CHANGE UP (ref 27–34)
MCHC RBC-ENTMCNC: 29.8 PG — SIGNIFICANT CHANGE UP (ref 27–34)
MCHC RBC-ENTMCNC: 31.5 GM/DL — LOW (ref 32–36)
MCHC RBC-ENTMCNC: 31.8 GM/DL — LOW (ref 32–36)
MCHC RBC-ENTMCNC: 32.1 GM/DL — SIGNIFICANT CHANGE UP (ref 32–36)
MCV RBC AUTO: 92.6 FL — SIGNIFICANT CHANGE UP (ref 80–100)
MCV RBC AUTO: 92.7 FL — SIGNIFICANT CHANGE UP (ref 80–100)
MCV RBC AUTO: 93 FL — SIGNIFICANT CHANGE UP (ref 80–100)
NRBC # BLD: 0 /100 WBCS — SIGNIFICANT CHANGE UP (ref 0–0)
PLATELET # BLD AUTO: 171 K/UL — SIGNIFICANT CHANGE UP (ref 150–400)
PLATELET # BLD AUTO: 172 K/UL — SIGNIFICANT CHANGE UP (ref 150–400)
PLATELET # BLD AUTO: 186 K/UL — SIGNIFICANT CHANGE UP (ref 150–400)
POTASSIUM SERPL-MCNC: 4 MMOL/L — SIGNIFICANT CHANGE UP (ref 3.5–5.3)
POTASSIUM SERPL-SCNC: 4 MMOL/L — SIGNIFICANT CHANGE UP (ref 3.5–5.3)
PROT SERPL-MCNC: 5.2 G/DL — LOW (ref 6–8.3)
RBC # BLD: 2.72 M/UL — LOW (ref 4.2–5.8)
RBC # BLD: 2.75 M/UL — LOW (ref 4.2–5.8)
RBC # BLD: 3 M/UL — LOW (ref 4.2–5.8)
RBC # FLD: 17 % — HIGH (ref 10.3–14.5)
RBC # FLD: 17 % — HIGH (ref 10.3–14.5)
RBC # FLD: 17.2 % — HIGH (ref 10.3–14.5)
SODIUM SERPL-SCNC: 144 MMOL/L — SIGNIFICANT CHANGE UP (ref 135–145)
WBC # BLD: 10.16 K/UL — SIGNIFICANT CHANGE UP (ref 3.8–10.5)
WBC # BLD: 10.73 K/UL — HIGH (ref 3.8–10.5)
WBC # BLD: 9.44 K/UL — SIGNIFICANT CHANGE UP (ref 3.8–10.5)
WBC # FLD AUTO: 10.16 K/UL — SIGNIFICANT CHANGE UP (ref 3.8–10.5)
WBC # FLD AUTO: 10.73 K/UL — HIGH (ref 3.8–10.5)
WBC # FLD AUTO: 9.44 K/UL — SIGNIFICANT CHANGE UP (ref 3.8–10.5)

## 2018-07-02 RX ADMIN — Medication 1 APPLICATION(S): at 05:31

## 2018-07-02 RX ADMIN — IRON SUCROSE 100 MILLIGRAM(S): 20 INJECTION, SOLUTION INTRAVENOUS at 14:40

## 2018-07-02 RX ADMIN — Medication 1 GRAM(S): at 18:04

## 2018-07-02 RX ADMIN — NYSTATIN CREAM 1 APPLICATION(S): 100000 CREAM TOPICAL at 18:04

## 2018-07-02 RX ADMIN — PANTOPRAZOLE SODIUM 10 MG/HR: 20 TABLET, DELAYED RELEASE ORAL at 19:44

## 2018-07-02 RX ADMIN — PANTOPRAZOLE SODIUM 10 MG/HR: 20 TABLET, DELAYED RELEASE ORAL at 08:38

## 2018-07-02 RX ADMIN — AMLODIPINE BESYLATE 10 MILLIGRAM(S): 2.5 TABLET ORAL at 05:31

## 2018-07-02 RX ADMIN — Medication 1 GRAM(S): at 05:31

## 2018-07-02 RX ADMIN — Medication 1 GRAM(S): at 11:34

## 2018-07-02 RX ADMIN — TAMSULOSIN HYDROCHLORIDE 0.4 MILLIGRAM(S): 0.4 CAPSULE ORAL at 22:20

## 2018-07-02 RX ADMIN — NYSTATIN CREAM 1 APPLICATION(S): 100000 CREAM TOPICAL at 05:34

## 2018-07-02 RX ADMIN — Medication 1 MILLIGRAM(S): at 11:34

## 2018-07-02 RX ADMIN — Medication 1 APPLICATION(S): at 18:04

## 2018-07-02 RX ADMIN — PREGABALIN 1000 MICROGRAM(S): 225 CAPSULE ORAL at 11:34

## 2018-07-02 NOTE — PROGRESS NOTE ADULT - ASSESSMENT
·	Recent SMITH, Obs uropathy, s/p Stent, B/l PCN  ·	Anemia, GI bleed  ·	Nephrolithiasis  ·	Diabetes  ·	Hypertension    Improving renal function trend. Monitor h/h trend. To continue current meds. Avoid nephrotoxins. GI follow up.   Monitor blood sugar levels. Insulin coverage as needed. Dietary restriction. Monitor BP trend. Titrate BP meds as needed. Salt restriction.   Will follow electrolytes and renal function trend.

## 2018-07-02 NOTE — PROGRESS NOTE ADULT - SUBJECTIVE AND OBJECTIVE BOX
INTERVAL HPI/OVERNIGHT EVENTS:  HPI:  No new overnight event.   hgb stable  MEDICATIONS  (STANDING):  amLODIPine   Tablet 10 milliGRAM(s) Oral daily  BACItracin   Ointment 1 Application(s) Topical every 12 hours  cyanocobalamin Injectable 1000 MICROGram(s) IntraMuscular daily  dextrose 5%. 1000 milliLiter(s) (50 mL/Hr) IV Continuous <Continuous>  dextrose 50% Injectable 12.5 Gram(s) IV Push once  folic acid 1 milliGRAM(s) Oral daily  insulin lispro (HumaLOG) corrective regimen sliding scale   SubCutaneous three times a day before meals  iron sucrose Injectable 100 milliGRAM(s) IV Push every 24 hours  nystatin Powder 1 Application(s) Topical two times a day  ondansetron Injectable 4 milliGRAM(s) IV Push once  pantoprazole Infusion 8 mG/Hr (10 mL/Hr) IV Continuous <Continuous>  propofol Infusion 20 MICROgram(s)/kG/Min (22.692 mL/Hr) IV Continuous <Continuous>  sodium chloride 0.9%. 1000 milliLiter(s) (75 mL/Hr) IV Continuous <Continuous>  sucralfate suspension 1 Gram(s) Oral every 6 hours  tamsulosin 0.4 milliGRAM(s) Oral at bedtime    MEDICATIONS  (PRN):  dextrose 40% Gel 15 Gram(s) Oral once PRN Blood Glucose LESS THAN 70 milliGRAM(s)/deciliter  fentaNYL    Injectable 50 MICROGram(s) IV Push every 1 hour PRN Mod pain (4-6) or Vent complinace  glucagon  Injectable 1 milliGRAM(s) IntraMuscular once PRN Glucose LESS THAN 70 milligrams/deciliter      Allergies    No Known Allergies    Intolerances          General:  No wt loss, fevers, chills, night sweats, fatigue,   Eyes:  Good vision, no reported pain  ENT:  No sore throat, pain, runny nose, dysphagia  CV:  No pain, palpitations, hypo/hypertension  Resp:  No dyspnea, cough, tachypnea, wheezing  GI:  No pain, No nausea, No vomiting, No diarrhea, No constipation, No weight loss, No fever, No pruritis, No rectal bleeding, No tarry stools, No dysphagia,  :  No pain, bleeding, incontinence, nocturia  Muscle:  No pain, weakness  Neuro:  No weakness, tingling, memory problems  Psych:  No fatigue, insomnia, mood problems, depression  Endocrine:  No polyuria, polydipsia, cold/heat intolerance  Heme:  No petechiae, ecchymosis, easy bruisability  Skin:  No rash, tattoos, scars, edema      PHYSICAL EXAM:   Vital Signs:  Vital Signs Last 24 Hrs  T(C): 36.4 (2018 09:00), Max: 37.9 (2018 20:00)  T(F): 97.6 (2018 09:00), Max: 100.2 (2018 20:00)  HR: 54 (2018 12:00) (54 - 134)  BP: 134/74 (2018 12:00) (103/62 - 187/112)  BP(mean): 91 (2018 12:00) (73 - 100)  RR: 12 (2018 12:00) (12 - 19)  SpO2: 100% (2018 12:00) (93% - 100%)  Daily     Daily Weight in k (2018 06:18)I&O's Summary    2018 07:  -  2018 07:00  --------------------------------------------------------  IN: 4868.3 mL / OUT: 3125 mL / NET: 1743.3 mL    2018 07:  -  2018 13:47  --------------------------------------------------------  IN: 544 mL / OUT: 0 mL / NET: 544 mL        GENERAL:  Appears stated age, well-groomed, well-nourished, no distress  HEENT:  NC/AT,  conjunctivae clear and pink, no thyromegaly, nodules, adenopathy, no JVD, sclera -anicteric  CHEST:  Full & symmetric excursion, no increased effort, breath sounds clear  HEART:  Regular rhythm, S1, S2, no murmur/rub/S3/S4, no abdominal bruit, no edema intubated  ABDOMEN:  Soft, non-tender, non-distended, normoactive bowel sounds,  no masses ,no hepato-splenomegaly, no signs of chronic liver disease ngt  EXTEREMITIES:  no cyanosis,clubbing or edema  SKIN:  No rash/erythema/ecchymoses/petechiae/wounds/abscess/warm/dry  NEURO:  Alert, oriented, no asterixis, no tremor, no encephalopathy      LABS:                        7.1    9.91  )-----------( 171      ( 2018 11:59 )             21.9     07    143  |  111<H>  |  47<H>  ----------------------------<  112<H>  4.2   |  24  |  1.83<H>    Ca    8.2<L>      2018 07:00    TPro  4.9<L>  /  Alb  2.2<L>  /  TBili  0.3  /  DBili  x   /  AST  10  /  ALT  15  /  AlkPhos  40      PT/INR - ( 2018 07:43 )   PT: 12.5 sec;   INR: 1.14 ratio         PTT - ( 2018 07:43 )  PTT:26.5 sec    amylase   lipase  RADIOLOGY & ADDITIONAL TESTS:

## 2018-07-02 NOTE — PROGRESS NOTE ADULT - SUBJECTIVE AND OBJECTIVE BOX
Patient is a 58y old  Male who presents with a chief complaint of "bloody diarrhea (29 Jun 2018 04:16)      Patient seen in follow up for recent SMITH, Obs uropathy, s/p Stent, B/l PCN (capped)    PAST MEDICAL HISTORY:  Hx of peripheral neuropathy  Morbid obesity  H/O sleep apnea  Acquired lymphedema of leg  Calculus of kidney and ureter  Diabetes mellitus type II  HTN (hypertension)    MEDICATIONS  (STANDING):  amLODIPine   Tablet 10 milliGRAM(s) Oral daily  BACItracin   Ointment 1 Application(s) Topical every 12 hours  cyanocobalamin Injectable 1000 MICROGram(s) IntraMuscular daily  dextrose 5%. 1000 milliLiter(s) (50 mL/Hr) IV Continuous <Continuous>  dextrose 50% Injectable 12.5 Gram(s) IV Push once  folic acid 1 milliGRAM(s) Oral daily  insulin lispro (HumaLOG) corrective regimen sliding scale   SubCutaneous three times a day before meals  iron sucrose Injectable 100 milliGRAM(s) IV Push every 24 hours  nystatin Powder 1 Application(s) Topical two times a day  ondansetron Injectable 4 milliGRAM(s) IV Push once  pantoprazole Infusion 8 mG/Hr (10 mL/Hr) IV Continuous <Continuous>  propofol Infusion 20 MICROgram(s)/kG/Min (22.692 mL/Hr) IV Continuous <Continuous>  sucralfate suspension 1 Gram(s) Oral every 6 hours  tamsulosin 0.4 milliGRAM(s) Oral at bedtime    MEDICATIONS  (PRN):  dextrose 40% Gel 15 Gram(s) Oral once PRN Blood Glucose LESS THAN 70 milliGRAM(s)/deciliter  fentaNYL    Injectable 50 MICROGram(s) IV Push every 1 hour PRN Mod pain (4-6) or Vent complinace  glucagon  Injectable 1 milliGRAM(s) IntraMuscular once PRN Glucose LESS THAN 70 milligrams/deciliter    T(C): 36.8 (07-02-18 @ 12:10), Max: 37.3 (07-02-18 @ 00:04)  HR: 87 (07-02-18 @ 13:00) (54 - 89)  BP: 118/73 (07-02-18 @ 13:00) (114/64 - 162/76)  RR: 17 (07-02-18 @ 13:00) (11 - 24)  SpO2: 94% (07-02-18 @ 13:00) (83% - 100%)  Wt(kg): --  I&O's Detail    01 Jul 2018 07:01  -  02 Jul 2018 07:00  --------------------------------------------------------  IN:    Packed Red Blood Cells: 344 mL    pantoprazole Infusion: 120 mL    propofol Infusion: 357 mL    sodium chloride 0.9%: 300 mL  Total IN: 1121 mL    OUT:    Indwelling Catheter - Urethral: 550 mL    Voided: 800 mL  Total OUT: 1350 mL    Total NET: -229 mL      02 Jul 2018 07:01  -  02 Jul 2018 15:43  --------------------------------------------------------  IN:    Oral Fluid: 720 mL    pantoprazole Infusion: 60 mL  Total IN: 780 mL    OUT:    Voided: 1000 mL  Total OUT: 1000 mL    Total NET: -220 mL          PHYSICAL EXAM:  General: NAD  Respiratory: b/l air entry  Cardiovascular: S1 S2  Gastrointestinal: obese  Extremities:  edema b/l                           8.1    9.44  )-----------( 172      ( 02 Jul 2018 11:58 )             25.2     07-02    144  |  112<H>  |  37<H>  ----------------------------<  93  4.0   |  25  |  1.71<H>    Ca    8.2<L>      02 Jul 2018 06:47    TPro  5.2<L>  /  Alb  2.4<L>  /  TBili  0.6  /  DBili  x   /  AST  14  /  ALT  17  /  AlkPhos  44  07-02        LIVER FUNCTIONS - ( 02 Jul 2018 06:47 )  Alb: 2.4 g/dL / Pro: 5.2 g/dL / ALK PHOS: 44 U/L / ALT: 17 U/L DA / AST: 14 U/L / GGT: x             ABG - ( 01 Jul 2018 13:48 )  pH, Arterial: x     pH, Blood: 7.37  /  pCO2: 41    /  pO2: 94    / HCO3: 23    / Base Excess: -1.6  /  SaO2: 98                Sodium, Serum: 144 (07-02 @ 06:47)  Sodium, Serum: 143 (07-01 @ 07:00)  Sodium, Serum: 143 (06-30 @ 09:37)  Sodium, Serum: 140 (06-29 @ 05:16)    Creatinine, Serum: 1.71 (07-02 @ 06:47)  Creatinine, Serum: 1.83 (07-01 @ 07:00)  Creatinine, Serum: 1.62 (06-30 @ 09:37)  Creatinine, Serum: 2.13 (06-29 @ 05:16)  Creatinine, Serum: 2.12 (06-28 @ 18:49)    Potassium, Serum: 4.0 (07-02 @ 06:47)  Potassium, Serum: 4.2 (07-01 @ 07:00)  Potassium, Serum: 4.4 (06-30 @ 09:37)  Potassium, Serum: 4.4 (06-29 @ 05:16)    Hemoglobin: 8.1 (07-02 @ 11:58)  Hemoglobin: 8.1 (07-02 @ 06:47)  Hemoglobin: 8.4 (07-01 @ 21:46)  Hemoglobin: 7.1 (07-01 @ 11:59)

## 2018-07-02 NOTE — PROGRESS NOTE ADULT - SUBJECTIVE AND OBJECTIVE BOX
Chief Complaint: GI bleed    Interval Events: Extubated. No complaints.    Review of Systems:  General: No fevers, chills, weight loss or gain  Skin: No rashes, color changes  Cardiovascular: No chest pain, orthopnea  Respiratory: No shortness of breath, cough  Gastrointestinal: No nausea, abdominal pain  Genitourinary: No incontinence, pain with urination  Musculoskeletal: No pain, swelling, decreased range of motion  Neurological: No headache, weakness  Psychiatric: No depression, anxiety  Endocrine: No weight loss or gain, increased thirst  All other systems are comprehensively negative.    Physical Exam:  Vitals:        Vital Signs Last 24 Hrs  T(C): 36.7 (02 Jul 2018 08:21), Max: 37.3 (02 Jul 2018 00:04)  T(F): 98 (02 Jul 2018 08:21), Max: 99.2 (02 Jul 2018 00:04)  HR: 89 (02 Jul 2018 09:41) (54 - 89)  BP: 142/69 (02 Jul 2018 09:00) (121/70 - 162/76)  BP(mean): 89 (02 Jul 2018 09:00) (84 - 99)  RR: 13 (02 Jul 2018 09:00) (11 - 22)  SpO2: 95% (02 Jul 2018 09:41) (83% - 100%)  General: NAD  HEENT: MMM  Neck: No JVD, no carotid bruit  Lungs: CTAB  CV: RRR, nl S1/S2, no M/R/G  Abdomen: S/NT/ND, +BS  Extremities: No LE edema, no cyanosis  Neuro: AAOx3, non-focal  Skin: No rash    Labs:                        8.1    10.16 )-----------( 171      ( 02 Jul 2018 06:47 )             25.5     07-02    144  |  112<H>  |  37<H>  ----------------------------<  93  4.0   |  25  |  1.71<H>    Ca    8.2<L>      02 Jul 2018 06:47    TPro  5.2<L>  /  Alb  2.4<L>  /  TBili  0.6  /  DBili  x   /  AST  14  /  ALT  17  /  AlkPhos  44  07-02        PT/INR - ( 01 Jul 2018 07:43 )   PT: 12.5 sec;   INR: 1.14 ratio         PTT - ( 01 Jul 2018 07:43 )  PTT:26.5 sec    Telemetry: Sinus rhythm

## 2018-07-02 NOTE — PROGRESS NOTE ADULT - SUBJECTIVE AND OBJECTIVE BOX
Patient seen and examined;  Chart reviewed and events noted;   sitting in bed; reports being anxious and "having lots of worries" as of late  reports some dark melenotic stools yesterday; none today    MEDICATIONS  (STANDING):  amLODIPine   Tablet 10 milliGRAM(s) Oral daily  BACItracin   Ointment 1 Application(s) Topical every 12 hours  cyanocobalamin Injectable 1000 MICROGram(s) IntraMuscular daily  dextrose 5%. 1000 milliLiter(s) (50 mL/Hr) IV Continuous <Continuous>  dextrose 50% Injectable 12.5 Gram(s) IV Push once  folic acid 1 milliGRAM(s) Oral daily  insulin lispro (HumaLOG) corrective regimen sliding scale   SubCutaneous three times a day before meals  iron sucrose Injectable 100 milliGRAM(s) IV Push every 24 hours  nystatin Powder 1 Application(s) Topical two times a day  ondansetron Injectable 4 milliGRAM(s) IV Push once  pantoprazole Infusion 8 mG/Hr (10 mL/Hr) IV Continuous <Continuous>  propofol Infusion 20 MICROgram(s)/kG/Min (22.692 mL/Hr) IV Continuous <Continuous>  sucralfate suspension 1 Gram(s) Oral every 6 hours  tamsulosin 0.4 milliGRAM(s) Oral at bedtime    MEDICATIONS  (PRN):  dextrose 40% Gel 15 Gram(s) Oral once PRN Blood Glucose LESS THAN 70 milliGRAM(s)/deciliter  fentaNYL    Injectable 50 MICROGram(s) IV Push every 1 hour PRN Mod pain (4-6) or Vent complinace  glucagon  Injectable 1 milliGRAM(s) IntraMuscular once PRN Glucose LESS THAN 70 milligrams/deciliter      Vital Signs Last 24 Hrs  T(C): 36.7 (02 Jul 2018 08:21), Max: 37.3 (02 Jul 2018 00:04)  T(F): 98 (02 Jul 2018 08:21), Max: 99.2 (02 Jul 2018 00:04)  HR: 89 (02 Jul 2018 09:41) (54 - 89)  BP: 142/69 (02 Jul 2018 09:00) (121/70 - 162/76)  BP(mean): 89 (02 Jul 2018 09:00) (84 - 99)  RR: 13 (02 Jul 2018 09:00) (11 - 22)  SpO2: 95% (02 Jul 2018 09:41) (83% - 100%)    PHYSICAL EXAM  General: adult obese male in NAD  HEENT: clear oropharynx, anicteric sclera, pink conjunctivae  Neck: supple  CV: normal S1S2 with no murmur rubs or gallops  Lungs: reduced breath sounds at bases  Abdomen: obese; + bowel sounds  Ext: + bilateral edema  Skin: no rashes and no petichiae  Neuro: alert and oriented X3 no focal deficits      LABS:                        8.1    10.16 )-----------( 171      ( 02 Jul 2018 06:47 )             25.5     Hemoglobin: 8.1 g/dL (07-02 @ 06:47)  Hemoglobin: 8.4 g/dL (07-01 @ 21:46)  Hemoglobin: 7.1 g/dL (07-01 @ 11:59)  Hemoglobin: 7.4 g/dL (07-01 @ 07:00)  Hemoglobin: 7.1 g/dL (06-30 @ 23:35)    07-02    144  |  112<H>  |  37<H>  ----------------------------<  93  4.0   |  25  |  1.71<H>    Ca    8.2<L>      02 Jul 2018 06:47    TPro  5.2<L>  /  Alb  2.4<L>  /  TBili  0.6  /  DBili  x   /  AST  14  /  ALT  17  /  AlkPhos  44  07-02    PT/INR - ( 01 Jul 2018 07:43 )   PT: 12.5 sec;   INR: 1.14 ratio    PTT - ( 01 Jul 2018 07:43 )  PTT:26.5 sec    Iron with Total Binding Capacity (06.29.18 @ 15:08)    Iron - Total Binding Capacity.: 238 ug/dL    % Saturation, Iron: 16 %    Iron Total, Serum: 38 ug/dL    Unsaturated Iron Binding Capacity: 200 ug/dL  Ferritin, Serum: 79: Note: New reference ranges effective 04/16/2018. ng/mL (06.29.18 @ 14:57)    Vitamin B12, Serum: 375: Note: Reference Range Change on 12/18/2017. pg/mL (06.29.18 @ 14:57)  Folate, Serum: 12.8: Note: New reference ranges effective 04/16/2018. ng/mL (06.29.18 @ 14:57)

## 2018-07-02 NOTE — PHYSICAL THERAPY INITIAL EVALUATION ADULT - GAIT DEVIATIONS NOTED, PT EVAL
decreased ana/decreased velocity of limb motion/decreased step length/decreased weight-shifting ability/decreased stride length

## 2018-07-02 NOTE — PROGRESS NOTE ADULT - ASSESSMENT
57 y/o M pt w/ PMHx morbid obesity, DM, HTN, peripheral neuropathy, calculus of kidney and ureter, sleep apnea, acquired lymphedema of leg and PSHx nephrostomy bilateral kidneys presents to the ED BIBA from [facility] c/o bloody diarrhea x 3 since yesterday, describes it as red. Also endorsing nausea. States that he had a blood transfusion at [facility] on 6/23 and was given an injection of Procrit yesterday. States hemoglobin was above 7 yesterday and today it is below 7, approx 5.  Pt states he resides at [facility] for rehabilitation due to a fall 2 months ago. Pt denies vomiting, abd pain, fever or any other complaints at this time.  IN [facility] ED hemoglobin 4.9, and had central line placed and ordered 2 units PRBC.vital signs stable    admitted for acute on ch anemia due to GI bl;eed with SMITH with CKD with h/o ureteral stents with MOrbid obesity , DM2 , HTN, peripheral neuropathy ,    for PRBC, GI consult , npo  , pantoprazole, started in ED, nephro consult , and pulm consult called   6/29 hemoglobin still low received 6 units PRBc,   patient is hemodyanamically stable for emegency endoscopy, and is intermediat risk for procedure, pulmonary clearance Dr Feng  d/w patient and wife by bed side, risk and benefits  d/w DR Reddy Martin as pulmonary critical care.  6/30 had EGd had bleeding duodenal ulcer , hemostatised in procedure , had post op respiratory failure and kept intubated till 7/1, extubated on 7/1, hemodyanamically stable, saturating well, no active bleeding, receiving PRBC, on regimen for gastritis, PUD,  discussed with Dr reddy Martin.  Surgical consult If bleeding recurs, pt stable improving

## 2018-07-02 NOTE — PROGRESS NOTE ADULT - ASSESSMENT
59 y/o man with PMH of nephrolithiasis s/p BL nephrostomies treated with Bactrim who presented with Hgb 4.9 after 2 weeks of melanotic stools. Required total of 9 units of pRBC and EGD ultimately showed large duodenal ulcer  - iron studies equivocal; started on IV iron X3 days on 7/1/18  - started on B12 injections for low normal B12  - started on folic acid to maintain adequate levels  - Hg stable today; no need for transfusion at this point  - can resume erythropoetic agent as per renal for anemia due to anemia due to CKD  - continue PPI and carafate as per GI  - avoid anticoagulation in setting of GI bleed; SCD boots only and early ambulation  - will discuss case with Dr. Hahn  - no additional acute heme interventions necessary; will sign off; reconsult if needed

## 2018-07-02 NOTE — PHYSICAL THERAPY INITIAL EVALUATION ADULT - ADDITIONAL COMMENTS
Pt came from [facility].  Pt reports he has been there for 2 months for rehab s/p fall at home.  Pt lives with wife and family in a house w/ 3 steps to enter with rail.  Pt has rolling walker

## 2018-07-02 NOTE — PROGRESS NOTE ADULT - SUBJECTIVE AND OBJECTIVE BOX
Date/Time Patient Seen:  		  Referring MD:   Data Reviewed	       Patient is a 58y old  Male who presents with a chief complaint of "bloody diarrhea (29 Jun 2018 04:16)    on cpap   awake  verbal      Subjective/HPI     PAST MEDICAL & SURGICAL HISTORY:  Hx of peripheral neuropathy  Morbid obesity  H/O sleep apnea  Acquired lymphedema of leg  Calculus of kidney and ureter  Diabetes mellitus type II  HTN (hypertension)  Ureteral stents placement, bilateral  Hx of tonsillectomy: at 6 years old        Medication list         MEDICATIONS  (STANDING):  amLODIPine   Tablet 10 milliGRAM(s) Oral daily  BACItracin   Ointment 1 Application(s) Topical every 12 hours  cyanocobalamin Injectable 1000 MICROGram(s) IntraMuscular daily  dextrose 5%. 1000 milliLiter(s) (50 mL/Hr) IV Continuous <Continuous>  dextrose 50% Injectable 12.5 Gram(s) IV Push once  folic acid 1 milliGRAM(s) Oral daily  insulin lispro (HumaLOG) corrective regimen sliding scale   SubCutaneous three times a day before meals  iron sucrose Injectable 100 milliGRAM(s) IV Push every 24 hours  nystatin Powder 1 Application(s) Topical two times a day  ondansetron Injectable 4 milliGRAM(s) IV Push once  pantoprazole Infusion 8 mG/Hr (10 mL/Hr) IV Continuous <Continuous>  propofol Infusion 20 MICROgram(s)/kG/Min (22.692 mL/Hr) IV Continuous <Continuous>  sucralfate suspension 1 Gram(s) Oral every 6 hours  tamsulosin 0.4 milliGRAM(s) Oral at bedtime    MEDICATIONS  (PRN):  dextrose 40% Gel 15 Gram(s) Oral once PRN Blood Glucose LESS THAN 70 milliGRAM(s)/deciliter  fentaNYL    Injectable 50 MICROGram(s) IV Push every 1 hour PRN Mod pain (4-6) or Vent complinace  glucagon  Injectable 1 milliGRAM(s) IntraMuscular once PRN Glucose LESS THAN 70 milligrams/deciliter         Vitals log        ICU Vital Signs Last 24 Hrs  T(C): 37.1 (02 Jul 2018 04:04), Max: 37.3 (02 Jul 2018 00:04)  T(F): 98.7 (02 Jul 2018 04:04), Max: 99.2 (02 Jul 2018 00:04)  HR: 69 (02 Jul 2018 06:00) (54 - 86)  BP: 134/62 (02 Jul 2018 06:00) (114/64 - 162/76)  BP(mean): 84 (02 Jul 2018 06:00) (77 - 99)  ABP: --  ABP(mean): --  RR: 16 (02 Jul 2018 06:00) (11 - 22)  SpO2: 98% (02 Jul 2018 06:00) (83% - 100%)       Mode: AC/ CMV (Assist Control/ Continuous Mandatory Ventilation)  RR (machine): 12  TV (machine): 700  FiO2: 40  PEEP: 5  ITime: 1  MAP: 10  PIP: 27      Input and Output:  I&O's Detail    01 Jul 2018 07:01  -  02 Jul 2018 07:00  --------------------------------------------------------  IN:    Packed Red Blood Cells: 344 mL    pantoprazole Infusion: 120 mL    propofol Infusion: 357 mL    sodium chloride 0.9%: 300 mL  Total IN: 1121 mL    OUT:    Indwelling Catheter - Urethral: 550 mL    Voided: 800 mL  Total OUT: 1350 mL    Total NET: -229 mL          Lab Data                        8.1    10.16 )-----------( 171      ( 02 Jul 2018 06:47 )             25.5     07-01    143  |  111<H>  |  47<H>  ----------------------------<  112<H>  4.2   |  24  |  1.83<H>    Ca    8.2<L>      01 Jul 2018 07:00    TPro  4.9<L>  /  Alb  2.2<L>  /  TBili  0.3  /  DBili  x   /  AST  10  /  ALT  15  /  AlkPhos  40  07-01    ABG - ( 01 Jul 2018 13:48 )  pH, Arterial: x     pH, Blood: 7.37  /  pCO2: 41    /  pO2: 94    / HCO3: 23    / Base Excess: -1.6  /  SaO2: 98                      Review of Systems	      Objective     Physical Examination  obese  head at  heart s1s2  lung dec BS  abd soft        Pertinent Lab findings & Imaging      Catalino:  NO   Adequate UO     I&O's Detail    01 Jul 2018 07:01  -  02 Jul 2018 07:00  --------------------------------------------------------  IN:    Packed Red Blood Cells: 344 mL    pantoprazole Infusion: 120 mL    propofol Infusion: 357 mL    sodium chloride 0.9%: 300 mL  Total IN: 1121 mL    OUT:    Indwelling Catheter - Urethral: 550 mL    Voided: 800 mL  Total OUT: 1350 mL    Total NET: -229 mL               Discussed with:     Cultures:	        Radiology

## 2018-07-02 NOTE — PHYSICAL THERAPY INITIAL EVALUATION ADULT - PERTINENT HX OF CURRENT PROBLEM, REHAB EVAL
59 y/o M pt w/ PMHx morbid obesity, DM, HTN, peripheral neuropathy, acquired lymphedema of leg and PSHx nephrostomy bilateral kidneys presents to the ED BIBA from [facility] c/o bloody diarrhea x 3 since yesterday.  Pt with GI Bleed Pt with duodenal ulcer ,

## 2018-07-02 NOTE — PROGRESS NOTE ADULT - ASSESSMENT
The patient is a 58 year old male with a history of HTN, DM, lymphedema, peripheral neuropathy, kidney stone, MEGHA who presents from rehab with bloody diarrhea.    Plan:  - Continue amlodipine for HTN  - GI follow-up  - Monitor CBC

## 2018-07-03 DIAGNOSIS — N20.2 CALCULUS OF KIDNEY WITH CALCULUS OF URETER: ICD-10-CM

## 2018-07-03 DIAGNOSIS — N18.3 CHRONIC KIDNEY DISEASE, STAGE 3 (MODERATE): ICD-10-CM

## 2018-07-03 LAB
ANION GAP SERPL CALC-SCNC: 8 MMOL/L — SIGNIFICANT CHANGE UP (ref 5–17)
BUN SERPL-MCNC: 29 MG/DL — HIGH (ref 7–23)
CALCIUM SERPL-MCNC: 8.2 MG/DL — LOW (ref 8.4–10.5)
CHLORIDE SERPL-SCNC: 109 MMOL/L — HIGH (ref 96–108)
CO2 SERPL-SCNC: 25 MMOL/L — SIGNIFICANT CHANGE UP (ref 22–31)
CREAT SERPL-MCNC: 1.59 MG/DL — HIGH (ref 0.5–1.3)
GLUCOSE BLDC GLUCOMTR-MCNC: 113 MG/DL — HIGH (ref 70–99)
GLUCOSE BLDC GLUCOMTR-MCNC: 116 MG/DL — HIGH (ref 70–99)
GLUCOSE BLDC GLUCOMTR-MCNC: 118 MG/DL — HIGH (ref 70–99)
GLUCOSE BLDC GLUCOMTR-MCNC: 99 MG/DL — SIGNIFICANT CHANGE UP (ref 70–99)
GLUCOSE SERPL-MCNC: 107 MG/DL — HIGH (ref 70–99)
HCT VFR BLD CALC: 26.2 % — LOW (ref 39–50)
HGB BLD-MCNC: 8.1 G/DL — LOW (ref 13–17)
MCHC RBC-ENTMCNC: 28.8 PG — SIGNIFICANT CHANGE UP (ref 27–34)
MCHC RBC-ENTMCNC: 30.9 GM/DL — LOW (ref 32–36)
MCV RBC AUTO: 93.2 FL — SIGNIFICANT CHANGE UP (ref 80–100)
NRBC # BLD: 0 /100 WBCS — SIGNIFICANT CHANGE UP (ref 0–0)
PLATELET # BLD AUTO: 162 K/UL — SIGNIFICANT CHANGE UP (ref 150–400)
POTASSIUM SERPL-MCNC: 3.9 MMOL/L — SIGNIFICANT CHANGE UP (ref 3.5–5.3)
POTASSIUM SERPL-SCNC: 3.9 MMOL/L — SIGNIFICANT CHANGE UP (ref 3.5–5.3)
RBC # BLD: 2.81 M/UL — LOW (ref 4.2–5.8)
RBC # FLD: 16.6 % — HIGH (ref 10.3–14.5)
SODIUM SERPL-SCNC: 142 MMOL/L — SIGNIFICANT CHANGE UP (ref 135–145)
WBC # BLD: 9.88 K/UL — SIGNIFICANT CHANGE UP (ref 3.8–10.5)
WBC # FLD AUTO: 9.88 K/UL — SIGNIFICANT CHANGE UP (ref 3.8–10.5)

## 2018-07-03 RX ADMIN — AMLODIPINE BESYLATE 10 MILLIGRAM(S): 2.5 TABLET ORAL at 05:41

## 2018-07-03 RX ADMIN — Medication 1 GRAM(S): at 05:41

## 2018-07-03 RX ADMIN — TAMSULOSIN HYDROCHLORIDE 0.4 MILLIGRAM(S): 0.4 CAPSULE ORAL at 21:41

## 2018-07-03 RX ADMIN — Medication 1 APPLICATION(S): at 17:35

## 2018-07-03 RX ADMIN — Medication 1 GRAM(S): at 02:35

## 2018-07-03 RX ADMIN — Medication 1 MILLIGRAM(S): at 12:00

## 2018-07-03 RX ADMIN — IRON SUCROSE 100 MILLIGRAM(S): 20 INJECTION, SOLUTION INTRAVENOUS at 14:51

## 2018-07-03 RX ADMIN — NYSTATIN CREAM 1 APPLICATION(S): 100000 CREAM TOPICAL at 05:41

## 2018-07-03 RX ADMIN — PANTOPRAZOLE SODIUM 10 MG/HR: 20 TABLET, DELAYED RELEASE ORAL at 15:49

## 2018-07-03 RX ADMIN — PREGABALIN 1000 MICROGRAM(S): 225 CAPSULE ORAL at 12:00

## 2018-07-03 RX ADMIN — PANTOPRAZOLE SODIUM 10 MG/HR: 20 TABLET, DELAYED RELEASE ORAL at 06:42

## 2018-07-03 RX ADMIN — Medication 1 APPLICATION(S): at 05:41

## 2018-07-03 RX ADMIN — Medication 1 GRAM(S): at 17:08

## 2018-07-03 RX ADMIN — Medication 1 GRAM(S): at 12:00

## 2018-07-03 RX ADMIN — NYSTATIN CREAM 1 APPLICATION(S): 100000 CREAM TOPICAL at 17:09

## 2018-07-03 NOTE — CHART NOTE - NSCHARTNOTEFT_GEN_A_CORE
Assessment:   Pt c hx morbid obesity, DM, HTN, peripheral neuropathy, calculus of kidney and ureter, sleep apnea, lymphedema of leg and  nephrostomy bilateral kidneys presents  from [facility] c/o bloody diarrhea x 3  Pt found to have GI bleed. s/p EGD had bleeding duodenal ulcer s/p hemostatised, post op respiratory failure and pt intubated . Now extubated. Diet advanced to clears which pt is tolerating s trouble. Pt has been either NPO or on clears x 5 days: Pt not receiving adequate nutrition. Recommend advance diet as soon as medically feasible to Consistent CHO/DASH/TLC.     Factors impacting intake: [ ] none [ ] nausea  [ ] vomiting [ ] diarrhea [ ] constipation  [ ]chewing problems [ ] swallowing issues  [x ] other: clears    Diet Presciption: Diet, Clear Liquid (07-01-18 @ 13:50)    Intake: 75% or >    Current Weight: Weight (kg): 189.1 (06-28 @ 16:56)  % Weight Change    Pertinent Medications: MEDICATIONS  (STANDING):  amLODIPine   Tablet 10 milliGRAM(s) Oral daily  BACItracin   Ointment 1 Application(s) Topical every 12 hours  cyanocobalamin Injectable 1000 MICROGram(s) IntraMuscular daily  dextrose 5%. 1000 milliLiter(s) (50 mL/Hr) IV Continuous <Continuous>  dextrose 50% Injectable 12.5 Gram(s) IV Push once  folic acid 1 milliGRAM(s) Oral daily  insulin lispro (HumaLOG) corrective regimen sliding scale   SubCutaneous three times a day before meals  iron sucrose Injectable 100 milliGRAM(s) IV Push every 24 hours  nystatin Powder 1 Application(s) Topical two times a day  ondansetron Injectable 4 milliGRAM(s) IV Push once  pantoprazole Infusion 8 mG/Hr (10 mL/Hr) IV Continuous <Continuous>  sucralfate suspension 1 Gram(s) Oral every 6 hours  tamsulosin 0.4 milliGRAM(s) Oral at bedtime    MEDICATIONS  (PRN):  dextrose 40% Gel 15 Gram(s) Oral once PRN Blood Glucose LESS THAN 70 milliGRAM(s)/deciliter  glucagon  Injectable 1 milliGRAM(s) IntraMuscular once PRN Glucose LESS THAN 70 milligrams/deciliter    Pertinent Labs: 07-02 Na144 mmol/L Glu 93 mg/dL K+ 4.0 mmol/L Cr  1.71 mg/dL<H> BUN 37 mg/dL<H> 07-02 Alb 2.4 g/dL<L> 06-29 [id number] 5.5 % 06-29 Chol 103 mg/dL LDL 49 mg/dL HDL 17 mg/dL<L> Trig 187 mg/dL<H>     CAPILLARY BLOOD GLUCOSE      POCT Blood Glucose.: 116 mg/dL (03 Jul 2018 08:16)  POCT Blood Glucose.: 115 mg/dL (02 Jul 2018 22:43)  POCT Blood Glucose.: 99 mg/dL (02 Jul 2018 17:38)  POCT Blood Glucose.: 111 mg/dL (02 Jul 2018 12:09)    Skin:     Estimated Needs:   [ x] no change since previous assessment  [ ] recalculated:     Previous Nutrition Diagnosis:   [ ] Inadequate Energy Intake [ ]Inadequate Oral Intake [ ] Excessive Energy Intake   [ ] Underweight [ ] Increased Nutrient Needs [x ] Overweight/Obesity   [ ] Altered GI Function [ ] Unintended Weight Loss [ ] Food & Nutrition Related Knowledge Deficit [ ] Malnutrition     Nutrition Diagnosis is x] ongoing  [ ] resolved [ ] not applicable   access to food: resricting food  as evidenced by pt being NPO/clears x 5 days  Goal: advance diet as soon as possible  Pt to consume 50-75% meals x 5 days    New Nutrition Diagnosis: [ ] not applicable   Inadequate protein-energy intake  related to       Interventions:   Recommend  [x ] Change Diet To: Advanced to consistent CHO/DASH/TLC when medically feasible  [ ] Nutrition Supplement  [ ] Nutrition Support  [ ] Other:     Monitoring and Evaluation:   [ ] PO intake [ x ] Tolerance to diet prescription [ x ] weights [ x ] labs[ x ] follow up per protocol  [ ] other: Assessment:   Pt c hx morbid obesity, DM, HTN, peripheral neuropathy, calculus of kidney and ureter, sleep apnea, lymphedema of leg and  nephrostomy bilateral kidneys presents  from [facility] c/o bloody diarrhea x 3  Pt found to have GI bleed. s/p EGD had bleeding duodenal ulcer s/p hemostatised, post op respiratory failure and pt intubated . Now extubated. Diet advanced to clears which pt is tolerating s trouble. Pt has been either NPO or on clears x 5 days: Pt not receiving adequate nutrition. Recommend advance diet as soon as medically feasible to Consistent CHO/DASH/TLC.     Factors impacting intake: [ ] none [ ] nausea  [ ] vomiting [ ] diarrhea [ ] constipation  [ ]chewing problems [ ] swallowing issues  [x ] other: clears    Diet Presciption: Diet, Clear Liquid (07-01-18 @ 13:50)    Intake: 75% or >    Current Weight: Weight (kg): 189.1 (06-28 @ 16:56)  % Weight Change 7/3 409#  416# 1.6%    Pertinent Medications: MEDICATIONS  (STANDING):  amLODIPine   Tablet 10 milliGRAM(s) Oral daily  BACItracin   Ointment 1 Application(s) Topical every 12 hours  cyanocobalamin Injectable 1000 MICROGram(s) IntraMuscular daily  dextrose 5%. 1000 milliLiter(s) (50 mL/Hr) IV Continuous <Continuous>  dextrose 50% Injectable 12.5 Gram(s) IV Push once  folic acid 1 milliGRAM(s) Oral daily  insulin lispro (HumaLOG) corrective regimen sliding scale   SubCutaneous three times a day before meals  iron sucrose Injectable 100 milliGRAM(s) IV Push every 24 hours  nystatin Powder 1 Application(s) Topical two times a day  ondansetron Injectable 4 milliGRAM(s) IV Push once  pantoprazole Infusion 8 mG/Hr (10 mL/Hr) IV Continuous <Continuous>  sucralfate suspension 1 Gram(s) Oral every 6 hours  tamsulosin 0.4 milliGRAM(s) Oral at bedtime    MEDICATIONS  (PRN):  dextrose 40% Gel 15 Gram(s) Oral once PRN Blood Glucose LESS THAN 70 milliGRAM(s)/deciliter  glucagon  Injectable 1 milliGRAM(s) IntraMuscular once PRN Glucose LESS THAN 70 milligrams/deciliter    Pertinent Labs: 07-02 Na144 mmol/L Glu 93 mg/dL K+ 4.0 mmol/L Cr  1.71 mg/dL<H> BUN 37 mg/dL<H> 07-02 Alb 2.4 g/dL<L> 06-29 [id number] 5.5 % 06-29 Chol 103 mg/dL LDL 49 mg/dL HDL 17 mg/dL<L> Trig 187 mg/dL<H>     CAPILLARY BLOOD GLUCOSE      POCT Blood Glucose.: 116 mg/dL (03 Jul 2018 08:16)  POCT Blood Glucose.: 115 mg/dL (02 Jul 2018 22:43)  POCT Blood Glucose.: 99 mg/dL (02 Jul 2018 17:38)  POCT Blood Glucose.: 111 mg/dL (02 Jul 2018 12:09)    Skin: b/l buttocks and sacrum stage I  b/l feet non-pitting edema    Estimated Needs:   [ x] no change since previous assessment  [ ] recalculated:     Previous Nutrition Diagnosis:   [ ] Inadequate Energy Intake [ ]Inadequate Oral Intake [ ] Excessive Energy Intake   [ ] Underweight [ ] Increased Nutrient Needs [x ] Overweight/Obesity   [ ] Altered GI Function [ ] Unintended Weight Loss [ ] Food & Nutrition Related Knowledge Deficit [ ] Malnutrition     Nutrition Diagnosis is x] ongoing  [ ] resolved [ ] not applicable   access to food: resricting food  as evidenced by pt being NPO/clears x 5 days  Goal: advance diet as soon as possible  Pt to consume 50-75% meals x 5 days    New Nutrition Diagnosis: [ ] not applicable   Inadequate protein-energy intake  related to       Interventions:   Recommend  [x ] Change Diet To: Advanced to consistent CHO/DASH/TLC when medically feasible  [ ] Nutrition Supplement  [ ] Nutrition Support  [ ] Other:     Monitoring and Evaluation:   [ ] PO intake [ x ] Tolerance to diet prescription [ x ] weights [ x ] labs[ x ] follow up per protocol  [ ] other:

## 2018-07-03 NOTE — PROGRESS NOTE ADULT - ASSESSMENT
The patient is a 58 year old male with a history of HTN, DM, lymphedema, peripheral neuropathy, kidney stone, MEGHA who presents from rehab with bloody diarrhea.    Plan:  - Continue amlodipine for HTN  - GI follow-up  - Hemoglobin improving  - Atrial tachycardia - brief episode and asymptomatic. If recurrent episodes or patient develops palpitations, start metoprolol.

## 2018-07-03 NOTE — PROGRESS NOTE ADULT - ASSESSMENT
·	Recent SMITH, Obs uropathy, s/p Stent, B/l PCN  ·	Anemia, GI bleed  ·	Nephrolithiasis  ·	Diabetes  ·	Hypertension    Repeat labs. Improving renal function trend. Monitor h/h trend. To continue current meds. Avoid nephrotoxins. GI follow up.   Monitor blood sugar levels. Insulin coverage as needed. Dietary restriction. Monitor BP trend. Titrate BP meds as needed. Salt restriction.   Will follow electrolytes and renal function trend.

## 2018-07-03 NOTE — PROGRESS NOTE ADULT - SUBJECTIVE AND OBJECTIVE BOX
Date/Time Patient Seen:  		  Referring MD:   Data Reviewed	       Patient is a 58y old  Male who presents with a chief complaint of "bloody diarrhea (29 Jun 2018 04:16)    in bed  seen and examined  vs and meds reviewed      Subjective/HPI     PAST MEDICAL & SURGICAL HISTORY:  Hx of peripheral neuropathy  Morbid obesity  H/O sleep apnea  Acquired lymphedema of leg  Calculus of kidney and ureter  Diabetes mellitus type II  HTN (hypertension)  Ureteral stents placement, bilateral  Hx of tonsillectomy: at 6 years old        Medication list         MEDICATIONS  (STANDING):  amLODIPine   Tablet 10 milliGRAM(s) Oral daily  BACItracin   Ointment 1 Application(s) Topical every 12 hours  cyanocobalamin Injectable 1000 MICROGram(s) IntraMuscular daily  dextrose 5%. 1000 milliLiter(s) (50 mL/Hr) IV Continuous <Continuous>  dextrose 50% Injectable 12.5 Gram(s) IV Push once  folic acid 1 milliGRAM(s) Oral daily  insulin lispro (HumaLOG) corrective regimen sliding scale   SubCutaneous three times a day before meals  iron sucrose Injectable 100 milliGRAM(s) IV Push every 24 hours  nystatin Powder 1 Application(s) Topical two times a day  ondansetron Injectable 4 milliGRAM(s) IV Push once  pantoprazole Infusion 8 mG/Hr (10 mL/Hr) IV Continuous <Continuous>  sucralfate suspension 1 Gram(s) Oral every 6 hours  tamsulosin 0.4 milliGRAM(s) Oral at bedtime    MEDICATIONS  (PRN):  dextrose 40% Gel 15 Gram(s) Oral once PRN Blood Glucose LESS THAN 70 milliGRAM(s)/deciliter  fentaNYL    Injectable 50 MICROGram(s) IV Push every 1 hour PRN Mod pain (4-6) or Vent complinace  glucagon  Injectable 1 milliGRAM(s) IntraMuscular once PRN Glucose LESS THAN 70 milligrams/deciliter         Vitals log        ICU Vital Signs Last 24 Hrs  T(C): 37 (03 Jul 2018 04:04), Max: 37.4 (03 Jul 2018 00:04)  T(F): 98.6 (03 Jul 2018 04:04), Max: 99.3 (03 Jul 2018 00:04)  HR: 79 (03 Jul 2018 06:01) (67 - 92)  BP: 126/71 (03 Jul 2018 06:01) (118/73 - 159/64)  BP(mean): 87 (03 Jul 2018 06:01) (77 - 96)  ABP: --  ABP(mean): --  RR: 18 (03 Jul 2018 06:01) (13 - 24)  SpO2: 100% (03 Jul 2018 06:01) (90% - 100%)           Input and Output:  I&O's Detail    02 Jul 2018 07:01  -  03 Jul 2018 07:00  --------------------------------------------------------  IN:    Oral Fluid: 720 mL    pantoprazole Infusion: 240 mL  Total IN: 960 mL    OUT:    Voided: 2050 mL  Total OUT: 2050 mL    Total NET: -1090 mL          Lab Data                        8.8    10.73 )-----------( 186      ( 02 Jul 2018 19:37 )             27.9     07-02    144  |  112<H>  |  37<H>  ----------------------------<  93  4.0   |  25  |  1.71<H>    Ca    8.2<L>      02 Jul 2018 06:47    TPro  5.2<L>  /  Alb  2.4<L>  /  TBili  0.6  /  DBili  x   /  AST  14  /  ALT  17  /  AlkPhos  44  07-02    ABG - ( 01 Jul 2018 13:48 )  pH, Arterial: x     pH, Blood: 7.37  /  pCO2: 41    /  pO2: 94    / HCO3: 23    / Base Excess: -1.6  /  SaO2: 98                      Review of Systems	      Objective     Physical Examination    head at  heart s1s2  lung dec BS  abd soft      Pertinent Lab findings & Imaging      Catalino:  NO   Adequate UO     I&O's Detail    02 Jul 2018 07:01  -  03 Jul 2018 07:00  --------------------------------------------------------  IN:    Oral Fluid: 720 mL    pantoprazole Infusion: 240 mL  Total IN: 960 mL    OUT:    Voided: 2050 mL  Total OUT: 2050 mL    Total NET: -1090 mL               Discussed with:     Cultures:	        Radiology

## 2018-07-03 NOTE — PROGRESS NOTE ADULT - ASSESSMENT
59 y/o M pt w/ PMHx morbid obesity, DM, HTN, peripheral neuropathy, calculus of kidney and ureter, sleep apnea, acquired lymphedema of leg and PSHx for obstructive neuropathy had  nephrostomy bilateral kidneys presents to the ED BIBA from [facility] c/o bloody diarrhea x 3 since yesterday, describes it as red. Also endorsing nausea. States that he had a blood transfusion at [facility] on 6/23 and was given an injection of Procrit yesterday. States hemoglobin was above 7 yesterday and today it is below 7, approx 5.  Pt states he resides at [facility] for rehabilitation due to a fall 2 months ago. Pt denies vomiting, abd pain, fever or any other complaints at this time.  IN [facility] ED hemoglobin 4.9, and had central line placed and ordered 2 units PRBC.vital signs stable    admitted for acute on ch anemia due to GI bl;eed with SMITH with CKD with h/o ureteral stents with MOrbid obesity , DM2 , HTN, peripheral neuropathy ,    for PRBC, GI consult , npo  , pantoprazole, started in ED, nephro consult , and pulm consult called   6/29 hemoglobin still low received 6 units PRBc,   patient is hemodyanamically stable for emegency endoscopy, and is intermediat risk for procedure, pulmonary clearance Dr Feng  d/w patient and wife by bed side, risk and benefits  d/w DR Reddy Martin as pulmonary critical care.  6/30 had EGd had bleeding duodenal ulcer , hemostatised in procedure , had post op respiratory failure and kept intubated till 7/1, extubated on 7/1, hemodyanamically stable, saturating well, no active bleeding, receiving PRBC, on regimen for gastritis, PUD,  discussed with Dr reddy Martin.  Surgical consult If bleeding recurs, pt stable improving,hemoglobin stable, activity increased

## 2018-07-03 NOTE — PROGRESS NOTE ADULT - SUBJECTIVE AND OBJECTIVE BOX
Chief Complaint: GI bleed    Interval Events: No events overnight. No complaints.    Review of Systems:  General: No fevers, chills, weight loss or gain  Skin: No rashes, color changes  Cardiovascular: No chest pain, orthopnea  Respiratory: No shortness of breath, cough  Gastrointestinal: No nausea, abdominal pain  Genitourinary: No incontinence, pain with urination  Musculoskeletal: No pain, swelling, decreased range of motion  Neurological: No headache, weakness  Psychiatric: No depression, anxiety  Endocrine: No weight loss or gain, increased thirst  All other systems are comprehensively negative.    Physical Exam:  Vital Signs Last 24 Hrs  T(C): 37 (03 Jul 2018 08:04), Max: 37.4 (03 Jul 2018 00:04)  T(F): 98.6 (03 Jul 2018 08:04), Max: 99.3 (03 Jul 2018 00:04)  HR: 83 (03 Jul 2018 08:00) (74 - 92)  BP: 129/71 (03 Jul 2018 08:00) (118/73 - 156/73)  BP(mean): 87 (03 Jul 2018 06:01) (77 - 96)  RR: 17 (03 Jul 2018 08:00) (14 - 23)  SpO2: 100% (03 Jul 2018 08:00) (92% - 100%)  General: NAD  HEENT: MMM  Neck: No JVD, no carotid bruit  Lungs: CTAB  CV: RRR, nl S1/S2, no M/R/G  Abdomen: S/NT/ND, +BS  Extremities: No LE edema, no cyanosis  Neuro: AAOx3, non-focal  Skin: No rash    Labs:             07-02    144  |  112<H>  |  37<H>  ----------------------------<  93  4.0   |  25  |  1.71<H>    Ca    8.2<L>      02 Jul 2018 06:47    TPro  5.2<L>  /  Alb  2.4<L>  /  TBili  0.6  /  DBili  x   /  AST  14  /  ALT  17  /  AlkPhos  44  07-02                        8.8    10.73 )-----------( 186      ( 02 Jul 2018 19:37 )             27.9       Telemetry: Sinus rhythm, brief episode of AT

## 2018-07-03 NOTE — PROGRESS NOTE ADULT - SUBJECTIVE AND OBJECTIVE BOX
Patient is a 58y old  Male who presents with a chief complaint of "bloody diarrhea (2018 04:16)      INTERVAL HPI/OVERNIGHT EVENTS:  no acute event overnight  Home Medications:  amLODIPine 10 mg oral tablet: 1 tab(s) orally once a day (2018 12:43)  bacitracin 500 units/g topical ointment: Apply topically to affected area 2 times a day nephrostomy tube (2018 12:43)  Bactrim  mg-160 mg oral tablet: 1 tab(s) orally 2 times a day (2018 12:43)  Colace 100 mg oral capsule: 1 cap(s) orally 3 times a day (2018 12:43)  Flomax 0.4 mg oral capsule: 1 cap(s) orally once a day (2018 12:43)  gas relief: 180 milligram(s)  2 times a day (2018 12:43)  lactulose: 30 milliliter(s) orally 3 times a week, As Needed (2018 12:43)  Milk of Magnesia: 30 milliliter(s) orally 3 times a week, As Needed (2018 12:43)  Procrit 10,000 units/mL preservative-free injectable solution: injectable every 7 days (2018 12:43)  Senna 8.6 mg oral tablet: 2 tab(s) orally once a day (2018 12:43)  Tylenol 500 mg oral tablet: 1 tab(s) orally every 6 hours, As Needed (2018 12:43)  Zofran ODT 4 mg oral tablet, disintegratin tab(s) orally 6 times a day, As Needed (2018 12:43)      MEDICATIONS  (STANDING):  amLODIPine   Tablet 10 milliGRAM(s) Oral daily  BACItracin   Ointment 1 Application(s) Topical every 12 hours  cyanocobalamin Injectable 1000 MICROGram(s) IntraMuscular daily  dextrose 5%. 1000 milliLiter(s) (50 mL/Hr) IV Continuous <Continuous>  dextrose 50% Injectable 12.5 Gram(s) IV Push once  folic acid 1 milliGRAM(s) Oral daily  insulin lispro (HumaLOG) corrective regimen sliding scale   SubCutaneous three times a day before meals  iron sucrose Injectable 100 milliGRAM(s) IV Push every 24 hours  nystatin Powder 1 Application(s) Topical two times a day  ondansetron Injectable 4 milliGRAM(s) IV Push once  pantoprazole Infusion 8 mG/Hr (10 mL/Hr) IV Continuous <Continuous>  sucralfate suspension 1 Gram(s) Oral every 6 hours  tamsulosin 0.4 milliGRAM(s) Oral at bedtime    MEDICATIONS  (PRN):  dextrose 40% Gel 15 Gram(s) Oral once PRN Blood Glucose LESS THAN 70 milliGRAM(s)/deciliter  glucagon  Injectable 1 milliGRAM(s) IntraMuscular once PRN Glucose LESS THAN 70 milligrams/deciliter      Allergies    No Known Allergies    Intolerances        REVIEW OF SYSTEMS:  CONSTITUTIONAL: No fever, weight loss, feels fatigue  EYES: No eye pain, visual disturbances, or discharge  ENMT:  No difficulty hearing, tinnitus, vertigo; No sinus or throat pain  NECK: No pain or stiffness  BREASTS: No pain, masses, or nipple discharge  RESPIRATORY: No cough, wheezing, chills or hemoptysis; No shortness of breath  CARDIOVASCULAR: No chest pain, palpitations, dizziness, or leg swelling  GASTROINTESTINAL: no abdominal or epigastric pain. No nausea, vomiting, or hematemesis; No diarrhea or constipation. No melena or hematochezia.  GENITOURINARY: No dysuria, frequency, hematuria, or incontinence  NEUROLOGICAL: No headaches, memory loss, loss of strength, numbness, or tremors  SKIN: No itching, burning, rashes, or lesions   ENDOCRINE: No heat or cold intolerance; No hair loss  MUSCULOSKELETAL: No joint pain or swelling; No muscle, back, or extremity pain, leg edema ch  PSYCHIATRIC: No depression, anxiety, mood swings, or difficulty sleeping  HEME/LYMPH: No easy bruising, or bleeding gums  ALLERGY AND IMMUNOLOGIC: No hives or eczema    Vital Signs Last 24 Hrs  T(C): 37 (2018 08:04), Max: 37.4 (2018 00:04)  T(F): 98.6 (2018 08:04), Max: 99.3 (2018 00:04)  HR: 83 (2018 10:00) (74 - 92)  BP: 157/75 (2018 10:00) (118/73 - 157/75)  BP(mean): 99 (2018 10:00) (77 - 99)  RR: 19 (2018 10:00) (14 - 23)  SpO2: 100% (2018 10:00) (92% - 100%)    PHYSICAL EXAM:  GENERAL: obese well-groomed, well-developed  HEAD:  Atraumatic, Normocephalic  EYES: EOMI, PERRLA, conjunctiva and sclera clear  ENMT: Moist mucous membranes,   NECK: Supple, No JVD, Normal thyroid  NERVOUS SYSTEM:  Alert & Oriented X3, Good concentration; Motor Strength 5/5 B/L upper and lower extremities; DTRs 2+ intact and symmetric  CHEST/LUNG: Clear to percussion bilaterally; No rales, rhonchi, wheezing, or rubs  HEART: Regular rate and rhythm; No murmurs, rubs, or gallops  ABDOMEN: Soft, Nontender, Nondistended; Bowel sounds present  EXTREMITIES:  2+ Peripheral Pulses, No clubbing, cyanosis, ch leg edema  SKIN: No rashes or lesions    LABS:                        8.8    10.73 )-----------( 186      ( 2018 19:37 )             27.9     07-    144  |  112<H>  |  37<H>  ----------------------------<  93  4.0   |  25  |  1.71<H>    Ca    8.2<L>      2018 06:47    TPro  5.2<L>  /  Alb  2.4<L>  /  TBili  0.6  /  DBili  x   /  AST  14  /  ALT  17  /  AlkPhos  44  07-        CAPILLARY BLOOD GLUCOSE      POCT Blood Glucose.: 116 mg/dL (2018 08:16)  POCT Blood Glucose.: 115 mg/dL (2018 22:43)  POCT Blood Glucose.: 99 mg/dL (2018 17:38)  POCT Blood Glucose.: 111 mg/dL (2018 12:09)        GI PCR Panel, Stool (collected 18 @ 12:05)  Source: .Stool Feces  Final Report (18 @ 14:20):    GI PCR Results: NOT detected    *******Please Note:*******    GI panel PCR evaluates for:    Campylobacter, Plesiomonas shigelloides, Salmonella,    Vibrio, Yersinia enterocolitica, Enteroaggregative    Escherichia coli (EAEC), Enteropathogenic E.coli (EPEC),    Enterotoxigenic E. coli (ETEC) lt/st, Shiga-like    toxin-producing E. coli (STEC) stx1/stx2,    Shigella/ Enteroinvasive E. coli (EIEC), Cryptosporidium,    Cyclospora cayetanensis, Entamoeba histolytica,    Giardia lamblia, Adenovirus F 40/41, Astrovirus,    Norovirus GI/GII, Rotavirus A, Sapovirus        I&O's Summary    2018 07:01  -  2018 07:00  --------------------------------------------------------  IN: 960 mL / OUT: 2050 mL / NET: -1090 mL        RADIOLOGY & ADDITIONAL TESTS:    Imaging Personally Reviewed:  [x ] YES  [ ] NO    Consultant(s) Notes Reviewed:  [x ] YES  [ ] NO    Care Discussed with Consultants/Other Providers [ x] YES  [ ] NO

## 2018-07-03 NOTE — PROGRESS NOTE ADULT - SUBJECTIVE AND OBJECTIVE BOX
Patient is a 58y old  Male who presents with a chief complaint of "bloody diarrhea (29 Jun 2018 04:16)      Patient seen in follow up for recent SMITH, Obs uropathy, s/p Stent, B/l PCN (capped)    PAST MEDICAL HISTORY:  Hx of peripheral neuropathy  Morbid obesity  H/O sleep apnea  Acquired lymphedema of leg  Calculus of kidney and ureter  Diabetes mellitus type II  HTN (hypertension)    MEDICATIONS  (STANDING):  amLODIPine   Tablet 10 milliGRAM(s) Oral daily  BACItracin   Ointment 1 Application(s) Topical every 12 hours  cyanocobalamin Injectable 1000 MICROGram(s) IntraMuscular daily  dextrose 5%. 1000 milliLiter(s) (50 mL/Hr) IV Continuous <Continuous>  dextrose 50% Injectable 12.5 Gram(s) IV Push once  folic acid 1 milliGRAM(s) Oral daily  insulin lispro (HumaLOG) corrective regimen sliding scale   SubCutaneous three times a day before meals  iron sucrose Injectable 100 milliGRAM(s) IV Push every 24 hours  nystatin Powder 1 Application(s) Topical two times a day  ondansetron Injectable 4 milliGRAM(s) IV Push once  pantoprazole Infusion 8 mG/Hr (10 mL/Hr) IV Continuous <Continuous>  sucralfate suspension 1 Gram(s) Oral every 6 hours  tamsulosin 0.4 milliGRAM(s) Oral at bedtime    MEDICATIONS  (PRN):  dextrose 40% Gel 15 Gram(s) Oral once PRN Blood Glucose LESS THAN 70 milliGRAM(s)/deciliter  glucagon  Injectable 1 milliGRAM(s) IntraMuscular once PRN Glucose LESS THAN 70 milligrams/deciliter    T(C): 37 (07-03-18 @ 08:04), Max: 37.4 (07-03-18 @ 00:04)  HR: 83 (07-03-18 @ 10:00) (54 - 92)  BP: 157/75 (07-03-18 @ 10:00) (118/73 - 162/76)  RR: 19 (07-03-18 @ 10:00)  SpO2: 100% (07-03-18 @ 10:00)  Wt(kg): --  I&O's Detail    02 Jul 2018 07:01  -  03 Jul 2018 07:00  --------------------------------------------------------  IN:    Oral Fluid: 720 mL    pantoprazole Infusion: 240 mL  Total IN: 960 mL    OUT:    Voided: 2050 mL  Total OUT: 2050 mL    Total NET: -1090 mL              PHYSICAL EXAM:  General: NAD  Respiratory: b/l air entry  Cardiovascular: S1 S2  Gastrointestinal: obese  Extremities:  edema b/l                     LABORATORY:                        8.8    10.73 )-----------( 186      ( 02 Jul 2018 19:37 )             27.9     07-02    144  |  112<H>  |  37<H>  ----------------------------<  93  4.0   |  25  |  1.71<H>    Ca    8.2<L>      02 Jul 2018 06:47    TPro  5.2<L>  /  Alb  2.4<L>  /  TBili  0.6  /  DBili  x   /  AST  14  /  ALT  17  /  AlkPhos  44  07-02    Sodium, Serum: 144 mmol/L (07-02 @ 06:47)    Potassium, Serum: 4.0 mmol/L (07-02 @ 06:47)    Hemoglobin: 8.8 g/dL (07-02 @ 19:37)  Hemoglobin: 8.1 g/dL (07-02 @ 11:58)  Hemoglobin: 8.1 g/dL (07-02 @ 06:47)  Hemoglobin: 8.4 g/dL (07-01 @ 21:46)    Creatinine, Serum 1.71 (07-02 @ 06:47)  Creatinine, Serum 1.83 (07-01 @ 07:00)        LIVER FUNCTIONS - ( 02 Jul 2018 06:47 )  Alb: 2.4 g/dL / Pro: 5.2 g/dL / ALK PHOS: 44 U/L / ALT: 17 U/L DA / AST: 14 U/L / GGT: x             ABG - ( 01 Jul 2018 13:48 )  pH, Arterial: x     pH, Blood: 7.37  /  pCO2: 41    /  pO2: 94    / HCO3: 23    / Base Excess: -1.6  /  SaO2: 98

## 2018-07-04 LAB
ANION GAP SERPL CALC-SCNC: 8 MMOL/L — SIGNIFICANT CHANGE UP (ref 5–17)
BUN SERPL-MCNC: 25 MG/DL — HIGH (ref 7–23)
CALCIUM SERPL-MCNC: 8.1 MG/DL — LOW (ref 8.4–10.5)
CHLORIDE SERPL-SCNC: 106 MMOL/L — SIGNIFICANT CHANGE UP (ref 96–108)
CO2 SERPL-SCNC: 25 MMOL/L — SIGNIFICANT CHANGE UP (ref 22–31)
CREAT SERPL-MCNC: 1.51 MG/DL — HIGH (ref 0.5–1.3)
GLUCOSE BLDC GLUCOMTR-MCNC: 102 MG/DL — HIGH (ref 70–99)
GLUCOSE BLDC GLUCOMTR-MCNC: 120 MG/DL — HIGH (ref 70–99)
GLUCOSE BLDC GLUCOMTR-MCNC: 121 MG/DL — HIGH (ref 70–99)
GLUCOSE BLDC GLUCOMTR-MCNC: 126 MG/DL — HIGH (ref 70–99)
GLUCOSE SERPL-MCNC: 117 MG/DL — HIGH (ref 70–99)
HCT VFR BLD CALC: 26.1 % — LOW (ref 39–50)
HGB BLD-MCNC: 8.3 G/DL — LOW (ref 13–17)
MCHC RBC-ENTMCNC: 29.5 PG — SIGNIFICANT CHANGE UP (ref 27–34)
MCHC RBC-ENTMCNC: 31.8 GM/DL — LOW (ref 32–36)
MCV RBC AUTO: 92.9 FL — SIGNIFICANT CHANGE UP (ref 80–100)
NRBC # BLD: 0 /100 WBCS — SIGNIFICANT CHANGE UP (ref 0–0)
PLATELET # BLD AUTO: 167 K/UL — SIGNIFICANT CHANGE UP (ref 150–400)
POTASSIUM SERPL-MCNC: 3.8 MMOL/L — SIGNIFICANT CHANGE UP (ref 3.5–5.3)
POTASSIUM SERPL-SCNC: 3.8 MMOL/L — SIGNIFICANT CHANGE UP (ref 3.5–5.3)
RBC # BLD: 2.81 M/UL — LOW (ref 4.2–5.8)
RBC # FLD: 15.9 % — HIGH (ref 10.3–14.5)
SODIUM SERPL-SCNC: 139 MMOL/L — SIGNIFICANT CHANGE UP (ref 135–145)
WBC # BLD: 10.15 K/UL — SIGNIFICANT CHANGE UP (ref 3.8–10.5)
WBC # FLD AUTO: 10.15 K/UL — SIGNIFICANT CHANGE UP (ref 3.8–10.5)

## 2018-07-04 RX ADMIN — Medication 1 APPLICATION(S): at 05:47

## 2018-07-04 RX ADMIN — NYSTATIN CREAM 1 APPLICATION(S): 100000 CREAM TOPICAL at 18:37

## 2018-07-04 RX ADMIN — PREGABALIN 1000 MICROGRAM(S): 225 CAPSULE ORAL at 12:12

## 2018-07-04 RX ADMIN — AMLODIPINE BESYLATE 10 MILLIGRAM(S): 2.5 TABLET ORAL at 05:44

## 2018-07-04 RX ADMIN — PANTOPRAZOLE SODIUM 10 MG/HR: 20 TABLET, DELAYED RELEASE ORAL at 11:30

## 2018-07-04 RX ADMIN — Medication 1 MILLIGRAM(S): at 12:12

## 2018-07-04 RX ADMIN — Medication 1 GRAM(S): at 18:38

## 2018-07-04 RX ADMIN — TAMSULOSIN HYDROCHLORIDE 0.4 MILLIGRAM(S): 0.4 CAPSULE ORAL at 21:18

## 2018-07-04 RX ADMIN — Medication 1 GRAM(S): at 05:44

## 2018-07-04 RX ADMIN — Medication 1 GRAM(S): at 01:47

## 2018-07-04 RX ADMIN — NYSTATIN CREAM 1 APPLICATION(S): 100000 CREAM TOPICAL at 05:46

## 2018-07-04 RX ADMIN — PANTOPRAZOLE SODIUM 10 MG/HR: 20 TABLET, DELAYED RELEASE ORAL at 21:20

## 2018-07-04 RX ADMIN — Medication 1 APPLICATION(S): at 18:38

## 2018-07-04 RX ADMIN — PANTOPRAZOLE SODIUM 10 MG/HR: 20 TABLET, DELAYED RELEASE ORAL at 03:28

## 2018-07-04 RX ADMIN — Medication 1 GRAM(S): at 23:05

## 2018-07-04 RX ADMIN — Medication 1 GRAM(S): at 12:12

## 2018-07-04 NOTE — PROGRESS NOTE ADULT - ASSESSMENT
The patient is a 58 year old male with a history of HTN, DM, lymphedema, peripheral neuropathy, kidney stone, MEGHA who presents from rehab with bloody diarrhea.    Plan:  - Continue amlodipine for HTN  - Hemoglobin stable  - Atrial tachycardia - brief episode and asymptomatic. If recurrent episodes or patient develops palpitations, start metoprolol.  - GI follow-up

## 2018-07-04 NOTE — PROGRESS NOTE ADULT - SUBJECTIVE AND OBJECTIVE BOX
INTERVAL HPI/OVERNIGHT EVENTS:  No new overnight event.  No N/V/D.  Tolerating diet.  no melena no brbpr    MEDICATIONS  (STANDING):  amLODIPine   Tablet 10 milliGRAM(s) Oral daily  BACItracin   Ointment 1 Application(s) Topical every 12 hours  dextrose 5%. 1000 milliLiter(s) (50 mL/Hr) IV Continuous <Continuous>  dextrose 50% Injectable 12.5 Gram(s) IV Push once  folic acid 1 milliGRAM(s) Oral daily  insulin lispro (HumaLOG) corrective regimen sliding scale   SubCutaneous three times a day before meals  nystatin Powder 1 Application(s) Topical two times a day  ondansetron Injectable 4 milliGRAM(s) IV Push once  pantoprazole Infusion 8 mG/Hr (10 mL/Hr) IV Continuous <Continuous>  sucralfate suspension 1 Gram(s) Oral every 6 hours  tamsulosin 0.4 milliGRAM(s) Oral at bedtime    MEDICATIONS  (PRN):  dextrose 40% Gel 15 Gram(s) Oral once PRN Blood Glucose LESS THAN 70 milliGRAM(s)/deciliter  glucagon  Injectable 1 milliGRAM(s) IntraMuscular once PRN Glucose LESS THAN 70 milligrams/deciliter      Allergies    No Known Allergies    Intolerances          General:  No wt loss, fevers, chills, night sweats, fatigue,   Eyes:  Good vision, no reported pain  ENT:  No sore throat, pain, runny nose, dysphagia  CV:  No pain, palpitations, hypo/hypertension  Resp:  No dyspnea, cough, tachypnea, wheezing  GI:  No pain, No nausea, No vomiting, No diarrhea, No constipation, No weight loss, No fever, No pruritis, No rectal bleeding, No tarry stools, No dysphagia,  :  No pain, bleeding, incontinence, nocturia  Muscle:  No pain, weakness  Neuro:  No weakness, tingling, memory problems  Psych:  No fatigue, insomnia, mood problems, depression  Endocrine:  No polyuria, polydipsia, cold/heat intolerance  Heme:  No petechiae, ecchymosis, easy bruisability  Skin:  No rash, tattoos, scars, edema      PHYSICAL EXAM:   Vital Signs:  Vital Signs Last 24 Hrs  T(C): 37.3 (2018 08:26), Max: 37.5 (2018 00:06)  T(F): 99.1 (2018 08:26), Max: 99.5 (2018 00:06)  HR: 96 (2018 10:00) (79 - 96)  BP: 140/68 (2018 10:00) (113/71 - 140/68)  BP(mean): 89 (2018 10:00) (79 - 96)  RR: 24 (2018 10:00) (16 - 26)  SpO2: 96% (2018 10:00) (94% - 99%)  Daily     Daily Weight in k.3 (2018 05:00)I&O's Summary    2018 07:01  -  2018 07:00  --------------------------------------------------------  IN: 1350 mL / OUT: 625 mL / NET: 725 mL        GENERAL:  Appears stated age, well-groomed, well-nourished, no distress  HEENT:  NC/AT,  conjunctivae clear and pink, no thyromegaly, nodules, adenopathy, no JVD, sclera -anicteric  CHEST:  Full & symmetric excursion, no increased effort, breath sounds clear  HEART:  Regular rhythm, S1, S2, no murmur/rub/S3/S4, no abdominal bruit, no edema  ABDOMEN:  Soft, non-tender, non-distended, normoactive bowel sounds,  no masses ,no hepato-splenomegaly, no signs of chronic liver disease  EXTEREMITIES:  no cyanosis,clubbing or edema  SKIN:  No rash/erythema/ecchymoses/petechiae/wounds/abscess/warm/dry  NEURO:  Alert, oriented, no asterixis, no tremor, no encephalopathy      LABS:                        8.3    10.15 )-----------( 167      ( 2018 06:43 )             26.1     07-04    139  |  106  |  25<H>  ----------------------------<  117<H>  3.8   |  25  |  1.51<H>    Ca    8.1<L>      2018 06:43          amylase   lipase  RADIOLOGY & ADDITIONAL TESTS:

## 2018-07-04 NOTE — PROGRESS NOTE ADULT - SUBJECTIVE AND OBJECTIVE BOX
Patient is a 58y old  Male who presents with a chief complaint of "bloody diarrhea (2018 04:16)      INTERVAL HPI/OVERNIGHT EVENTS:no acute event overnight    Home Medications:  amLODIPine 10 mg oral tablet: 1 tab(s) orally once a day (2018 12:43)  bacitracin 500 units/g topical ointment: Apply topically to affected area 2 times a day nephrostomy tube (2018 12:43)  Bactrim  mg-160 mg oral tablet: 1 tab(s) orally 2 times a day (2018 12:43)  Colace 100 mg oral capsule: 1 cap(s) orally 3 times a day (2018 12:43)  Flomax 0.4 mg oral capsule: 1 cap(s) orally once a day (2018 12:43)  gas relief: 180 milligram(s)  2 times a day (2018 12:43)  lactulose: 30 milliliter(s) orally 3 times a week, As Needed (2018 12:43)  Milk of Magnesia: 30 milliliter(s) orally 3 times a week, As Needed (2018 12:43)  Procrit 10,000 units/mL preservative-free injectable solution: injectable every 7 days (2018 12:43)  Senna 8.6 mg oral tablet: 2 tab(s) orally once a day (2018 12:43)  Tylenol 500 mg oral tablet: 1 tab(s) orally every 6 hours, As Needed (2018 12:43)  Zofran ODT 4 mg oral tablet, disintegratin tab(s) orally 6 times a day, As Needed (2018 12:43)      MEDICATIONS  (STANDING):  amLODIPine   Tablet 10 milliGRAM(s) Oral daily  BACItracin   Ointment 1 Application(s) Topical every 12 hours  cyanocobalamin Injectable 1000 MICROGram(s) IntraMuscular daily  dextrose 5%. 1000 milliLiter(s) (50 mL/Hr) IV Continuous <Continuous>  dextrose 50% Injectable 12.5 Gram(s) IV Push once  folic acid 1 milliGRAM(s) Oral daily  insulin lispro (HumaLOG) corrective regimen sliding scale   SubCutaneous three times a day before meals  nystatin Powder 1 Application(s) Topical two times a day  ondansetron Injectable 4 milliGRAM(s) IV Push once  pantoprazole Infusion 8 mG/Hr (10 mL/Hr) IV Continuous <Continuous>  sucralfate suspension 1 Gram(s) Oral every 6 hours  tamsulosin 0.4 milliGRAM(s) Oral at bedtime    MEDICATIONS  (PRN):  dextrose 40% Gel 15 Gram(s) Oral once PRN Blood Glucose LESS THAN 70 milliGRAM(s)/deciliter  glucagon  Injectable 1 milliGRAM(s) IntraMuscular once PRN Glucose LESS THAN 70 milligrams/deciliter      Allergies    No Known Allergies    Intolerances        REVIEW OF SYSTEMS:  CONSTITUTIONAL: No fever, weight loss, or fatigue  EYES: No eye pain, visual disturbances, or discharge  ENMT:  No difficulty hearing, tinnitus, vertigo; No sinus or throat pain  NECK: No pain or stiffness  BREASTS: No pain, masses, or nipple discharge  RESPIRATORY: No cough, wheezing, chills or hemoptysis; No shortness of breath  CARDIOVASCULAR: No chest pain, palpitations, dizziness, or leg swelling  GASTROINTESTINAL: No abdominal or epigastric pain. No nausea, vomiting, or hematemesis; No diarrhea or constipation. No melena or hematochezia.  GENITOURINARY: No dysuria, frequency, hematuria, or incontinence  NEUROLOGICAL: No headaches, memory loss, loss of strength, numbness, or tremors  SKIN: No itching, burning, rashes, or lesions   ENDOCRINE: No heat or cold intolerance; No hair loss  MUSCULOSKELETAL: No joint pain or swelling; No muscle, back, or extremity pain  PSYCHIATRIC: No depression, anxiety, mood swings, or difficulty sleeping  HEME/LYMPH: No easy bruising, or bleeding gums  ALLERGY AND IMMUNOLOGIC: No hives or eczema    Vital Signs Last 24 Hrs  T(C): 37.3 (2018 08:26), Max: 37.5 (2018 00:06)  T(F): 99.1 (2018 08:26), Max: 99.5 (2018 00:06)  HR: 96 (2018 10:00) (79 - 100)  BP: 140/68 (2018 10:00) (113/71 - 140/68)  BP(mean): 89 (2018 10:00) (79 - 96)  RR: 24 (2018 10:00) (16 - 27)  SpO2: 96% (2018 10:00) (94% - 99%)    PHYSICAL EXAM:  GENERAL: obese, well-groomed, well-developed  HEAD:  Atraumatic, Normocephalic  EYES: EOMI, PERRLA, conjunctiva and sclera clear  ENMT: Moist mucous membranes,   NECK: Supple, No JVD, Normal thyroid  NERVOUS SYSTEM:  Alert & Oriented X3, Good concentration; Motor Strength 5/5 B/L upper and lower extremities; DTRs 2+ intact and symmetric  CHEST/LUNG: Clear to percussion bilaterally; No rales, rhonchi, wheezing, or rubs  HEART: Regular rate and rhythm; No murmurs, rubs, or gallops  ABDOMEN: Soft, Nontender, Nondistended; Bowel sounds present, obese  EXTREMITIES:  2+ Peripheral Pulses, No clubbing, cyanosis, or edema  LYMPH: No lymphadenopathy noted  SKIN: No rashes or lesions    LABS:                        8.3    10.15 )-----------( 167      ( 2018 06:43 )             26.1     07-04    139  |  106  |  25<H>  ----------------------------<  117<H>  3.8   |  25  |  1.51<H>    Ca    8.1<L>      2018 06:43          CAPILLARY BLOOD GLUCOSE      POCT Blood Glucose.: 126 mg/dL (2018 06:46)  POCT Blood Glucose.: 99 mg/dL (2018 23:08)  POCT Blood Glucose.: 118 mg/dL (2018 16:29)  POCT Blood Glucose.: 113 mg/dL (2018 12:25)        GI PCR Panel, Stool (collected 18 @ 12:05)  Source: .Stool Feces  Final Report (18 @ 14:20):    GI PCR Results: NOT detected    *******Please Note:*******    GI panel PCR evaluates for:    Campylobacter, Plesiomonas shigelloides, Salmonella,    Vibrio, Yersinia enterocolitica, Enteroaggregative    Escherichia coli (EAEC), Enteropathogenic E.coli (EPEC),    Enterotoxigenic E. coli (ETEC) lt/st, Shiga-like    toxin-producing E. coli (STEC) stx1/stx2,    Shigella/ Enteroinvasive E. coli (EIEC), Cryptosporidium,    Cyclospora cayetanensis, Entamoeba histolytica,    Giardia lamblia, Adenovirus F 40/41, Astrovirus,    Norovirus GI/GII, Rotavirus A, Sapovirus        I&O's Summary    2018 07:01  -  2018 07:00  --------------------------------------------------------  IN: 1350 mL / OUT: 625 mL / NET: 725 mL        RADIOLOGY & ADDITIONAL TESTS:    Imaging Personally Reviewed:  [ x] YES  [ ] NO    Consultant(s) Notes Reviewed:  [x ] YES  [ ] NO    Care Discussed with Consultants/Other Providers [ x] YES  [ ] NO

## 2018-07-04 NOTE — PROGRESS NOTE ADULT - ASSESSMENT
G.I. bleed. Morbid obesity. Bilateral capped PCNs and bilateral ureteral stents. Hemodynamic SMITH on CKD much improved.  Continue with care as per CCM and G.I. Monitor renal indices, avoid nephrotoxins, replete electrolytes PRN.  Would check with patient's urologist if PCN's removal will be of benefit.

## 2018-07-04 NOTE — PROGRESS NOTE ADULT - ASSESSMENT
59 y/o M pt w/ PMHx morbid obesity, DM, HTN, peripheral neuropathy, calculus of kidney and ureter, sleep apnea, acquired lymphedema of leg and PSHx for obstructive neuropathy had  nephrostomy bilateral kidneys presents to the ED BIBA from [facility] c/o bloody diarrhea x 3 since yesterday, describes it as red. Also endorsing nausea. States that he had a blood transfusion at [facility] on 6/23 and was given an injection of Procrit yesterday. States hemoglobin was above 7 yesterday and today it is below 7, approx 5.  Pt states he resides at [facility] for rehabilitation due to a fall 2 months ago. Pt denies vomiting, abd pain, fever or any other complaints at this time.  IN [facility] ED hemoglobin 4.9, and had central line placed and ordered 2 units PRBC.vital signs stable    admitted for acute on ch anemia due to GI bl;eed with SMITH with CKD with h/o ureteral stents with MOrbid obesity , DM2 , HTN, peripheral neuropathy ,    for PRBC, GI consult , npo  , pantoprazole, started in ED, nephro consult , and pulm consult called   6/29 hemoglobin still low received 6 units PRBc,   patient is hemodyanamically stable for emegency endoscopy, and is intermediat risk for procedure, pulmonary clearance Dr Blakely  d/w patient and wife by bed side, risk and benefits  d/w DR Hudson,  DR Blakely as pulmonary critical care.  6/30 had EGd had bleeding duodenal ulcer , hemostatised in procedure , had post op respiratory failure and kept intubated till 7/1, extubated on 7/1, hemodyanamically stable, saturating well, no active bleeding, receiving PRBC, on regimen for gastritis, PUD,no active bleeding, hemoglobin improving , activity increased  discussed with Dr Hudson, Dr blakely.  Surgical consult If bleeding recurs, pt stable improving,hemoglobin stable, activity increased  DC plan started, PT  pt came from rehab, but does not want to go back to rehab

## 2018-07-04 NOTE — PROGRESS NOTE ADULT - SUBJECTIVE AND OBJECTIVE BOX
Date/Time Patient Seen:  		  Referring MD:   Data Reviewed	       Patient is a 58y old  Male who presents with a chief complaint of "bloody diarrhea (29 Jun 2018 04:16)  in bed  seen and examined  vs and meds reviewed  on NIPPV overnight      Subjective/HPI     PAST MEDICAL & SURGICAL HISTORY:  Hx of peripheral neuropathy  Morbid obesity  H/O sleep apnea  Acquired lymphedema of leg  Calculus of kidney and ureter  Diabetes mellitus type II  HTN (hypertension)  Ureteral stents placement, bilateral  Hx of tonsillectomy: at 6 years old        Medication list         MEDICATIONS  (STANDING):  amLODIPine   Tablet 10 milliGRAM(s) Oral daily  BACItracin   Ointment 1 Application(s) Topical every 12 hours  cyanocobalamin Injectable 1000 MICROGram(s) IntraMuscular daily  dextrose 5%. 1000 milliLiter(s) (50 mL/Hr) IV Continuous <Continuous>  dextrose 50% Injectable 12.5 Gram(s) IV Push once  folic acid 1 milliGRAM(s) Oral daily  insulin lispro (HumaLOG) corrective regimen sliding scale   SubCutaneous three times a day before meals  nystatin Powder 1 Application(s) Topical two times a day  ondansetron Injectable 4 milliGRAM(s) IV Push once  pantoprazole Infusion 8 mG/Hr (10 mL/Hr) IV Continuous <Continuous>  sucralfate suspension 1 Gram(s) Oral every 6 hours  tamsulosin 0.4 milliGRAM(s) Oral at bedtime    MEDICATIONS  (PRN):  dextrose 40% Gel 15 Gram(s) Oral once PRN Blood Glucose LESS THAN 70 milliGRAM(s)/deciliter  glucagon  Injectable 1 milliGRAM(s) IntraMuscular once PRN Glucose LESS THAN 70 milligrams/deciliter         Vitals log        ICU Vital Signs Last 24 Hrs  T(C): 37.3 (04 Jul 2018 05:00), Max: 37.5 (04 Jul 2018 00:06)  T(F): 99.1 (04 Jul 2018 05:00), Max: 99.5 (04 Jul 2018 00:06)  HR: 85 (04 Jul 2018 07:00) (79 - 100)  BP: 136/68 (04 Jul 2018 07:00) (113/71 - 157/75)  BP(mean): 85 (04 Jul 2018 07:00) (79 - 99)  ABP: --  ABP(mean): --  RR: 17 (04 Jul 2018 07:00) (17 - 27)  SpO2: 96% (04 Jul 2018 07:00) (94% - 100%)           Input and Output:  I&O's Detail    03 Jul 2018 07:01  -  04 Jul 2018 07:00  --------------------------------------------------------  IN:    Oral Fluid: 1120 mL    pantoprazole Infusion: 230 mL  Total IN: 1350 mL    OUT:    Voided: 625 mL  Total OUT: 625 mL    Total NET: 725 mL          Lab Data                        8.3    10.15 )-----------( 167      ( 04 Jul 2018 06:43 )             26.1     07-04    139  |  106  |  25<H>  ----------------------------<  117<H>  3.8   |  25  |  1.51<H>    Ca    8.1<L>      04 Jul 2018 06:43              Review of Systems	      Objective     Physical Examination    head at  heart s1s2  lung dec BS  abd soft      Pertinent Lab findings & Imaging      Catalino:  NO   Adequate UO     I&O's Detail    03 Jul 2018 07:01  -  04 Jul 2018 07:00  --------------------------------------------------------  IN:    Oral Fluid: 1120 mL    pantoprazole Infusion: 230 mL  Total IN: 1350 mL    OUT:    Voided: 625 mL  Total OUT: 625 mL    Total NET: 725 mL               Discussed with:     Cultures:	        Radiology

## 2018-07-04 NOTE — PROGRESS NOTE ADULT - SUBJECTIVE AND OBJECTIVE BOX
Chief Complaint: GI bleed    Interval Events: No events overnight. No complaints.    Review of Systems:  General: No fevers, chills, weight loss or gain  Skin: No rashes, color changes  Cardiovascular: No chest pain, orthopnea  Respiratory: No shortness of breath, cough  Gastrointestinal: No nausea, abdominal pain  Genitourinary: No incontinence, pain with urination  Musculoskeletal: No pain, swelling, decreased range of motion  Neurological: No headache, weakness  Psychiatric: No depression, anxiety  Endocrine: No weight loss or gain, increased thirst  All other systems are comprehensively negative.    Physical Exam:  Vital Signs Last 24 Hrs  T(C): 37.3 (04 Jul 2018 05:00), Max: 37.5 (04 Jul 2018 00:06)  T(F): 99.1 (04 Jul 2018 05:00), Max: 99.5 (04 Jul 2018 00:06)  HR: 85 (04 Jul 2018 07:00) (79 - 100)  BP: 136/68 (04 Jul 2018 07:00) (113/71 - 157/75)  BP(mean): 85 (04 Jul 2018 07:00) (79 - 99)  RR: 17 (04 Jul 2018 07:00) (17 - 27)  SpO2: 96% (04 Jul 2018 07:00) (94% - 100%)  General: NAD  HEENT: MMM  Neck: No JVD, no carotid bruit  Lungs: CTAB  CV: RRR, nl S1/S2, no M/R/G  Abdomen: S/NT/ND, +BS  Extremities: No LE edema, no cyanosis  Neuro: AAOx3, non-focal  Skin: No rash    Labs:               07-04    139  |  106  |  25<H>  ----------------------------<  117<H>  3.8   |  25  |  1.51<H>    Ca    8.1<L>      04 Jul 2018 06:43                          8.3    10.15 )-----------( 167      ( 04 Jul 2018 06:43 )             26.1       Telemetry: Sinus rhythm, rare PVCs

## 2018-07-04 NOTE — PROGRESS NOTE ADULT - SUBJECTIVE AND OBJECTIVE BOX
Patient seen in follow up for SMITH on CKD.m In chair, in good spirits, on nasal O2. No voiding c/o.  Has bilateral capped PCNs and ureteral stents for obstructing stone disease. Urologist is Dr. Teixeira.    PMH:  Hx of peripheral neuropathy  Morbid obesity  H/O sleep apnea  Acquired lymphedema of leg  Calculus of kidney and ureter  Diabetes mellitus type II  HTN (hypertension)    MEDICATIONS  (STANDING):  amLODIPine   Tablet 10 milliGRAM(s) Oral daily  BACItracin   Ointment 1 Application(s) Topical every 12 hours  dextrose 5%. 1000 milliLiter(s) (50 mL/Hr) IV Continuous <Continuous>  dextrose 50% Injectable 12.5 Gram(s) IV Push once  folic acid 1 milliGRAM(s) Oral daily  insulin lispro (HumaLOG) corrective regimen sliding scale   SubCutaneous three times a day before meals  nystatin Powder 1 Application(s) Topical two times a day  ondansetron Injectable 4 milliGRAM(s) IV Push once  pantoprazole Infusion 8 mG/Hr (10 mL/Hr) IV Continuous <Continuous>  sucralfate suspension 1 Gram(s) Oral every 6 hours  tamsulosin 0.4 milliGRAM(s) Oral at bedtime    MEDICATIONS  (PRN):  dextrose 40% Gel 15 Gram(s) Oral once PRN Blood Glucose LESS THAN 70 milliGRAM(s)/deciliter  glucagon  Injectable 1 milliGRAM(s) IntraMuscular once PRN Glucose LESS THAN 70 milligrams/deciliter    T(C): 36.7 (07-04-18 @ 12:59), Max: 37.5 (07-04-18 @ 00:06)  HR: 96 (07-04-18 @ 10:00) (79 - 100)  BP: 140/68 (07-04-18 @ 10:00) (113/71 - 157/75)  RR: 24 (07-04-18 @ 10:00) (16 - 27)  SpO2: 96% (07-04-18 @ 10:00) (94% - 100%)  Wt(kg): --  I&O's Detail    03 Jul 2018 07:01  -  04 Jul 2018 07:00  --------------------------------------------------------  IN:    Oral Fluid: 1120 mL    pantoprazole Infusion: 230 mL  Total IN: 1350 mL    OUT:    Voided: 625 mL  Total OUT: 625 mL    Total NET: 725 mL      PHYSICAL EXAM:  General: NAD, AAO x3  Respiratory: b/l air entry  Cardiovascular: S1 S2 reg  Gastrointestinal: soft, large, nothing palpable  Extremities:  chronic lymphedema with support stockings    CBC Full  -  ( 04 Jul 2018 06:43 )  WBC Count : 10.15 K/uL  Hemoglobin : 8.3 g/dL  Hematocrit : 26.1 %  Platelet Count - Automated : 167 K/uL  Mean Cell Volume : 92.9 fl  Mean Cell Hemoglobin : 29.5 pg  Mean Cell Hemoglobin Concentration : 31.8 gm/dL  Auto Neutrophil # : x  Auto Lymphocyte # : x  Auto Monocyte # : x  Auto Eosinophil # : x  Auto Basophil # : x  Auto Neutrophil % : x  Auto Lymphocyte % : x  Auto Monocyte % : x  Auto Eosinophil % : x  Auto Basophil % : x    07-04    139  |  106  |  25<H>  ----------------------------<  117<H>  3.8   |  25  |  1.51<H>    Ca    8.1<L>      04 Jul 2018 06:43          Sodium, Serum: 139 (07-04 @ 06:43)  Sodium, Serum: 142 (07-03 @ 14:22)  Sodium, Serum: 144 (07-02 @ 06:47)    Creatinine, Serum: 1.51 (07-04 @ 06:43)  Creatinine, Serum: 1.59 (07-03 @ 14:22)  Creatinine, Serum: 1.71 (07-02 @ 06:47)    Potassium, Serum: 3.8 (07-04 @ 06:43)  Potassium, Serum: 3.9 (07-03 @ 14:22)  Potassium, Serum: 4.0 (07-02 @ 06:47)    Hemoglobin: 8.3 (07-04 @ 06:43)  Hemoglobin: 8.1 (07-03 @ 14:20)  Hemoglobin: 8.8 (07-02 @ 19:37)  Hemoglobin: 8.1 (07-02 @ 11:58)  Hemoglobin: 8.1 (07-02 @ 06:47)  Hemoglobin: 8.4 (07-01 @ 21:46)

## 2018-07-05 ENCOUNTER — TRANSCRIPTION ENCOUNTER (OUTPATIENT)
Age: 58
End: 2018-07-05

## 2018-07-05 DIAGNOSIS — R50.9 FEVER, UNSPECIFIED: ICD-10-CM

## 2018-07-05 LAB
GLUCOSE BLDC GLUCOMTR-MCNC: 103 MG/DL — HIGH (ref 70–99)
GLUCOSE BLDC GLUCOMTR-MCNC: 117 MG/DL — HIGH (ref 70–99)
GLUCOSE BLDC GLUCOMTR-MCNC: 129 MG/DL — HIGH (ref 70–99)
GLUCOSE BLDC GLUCOMTR-MCNC: 136 MG/DL — HIGH (ref 70–99)

## 2018-07-05 RX ORDER — PANTOPRAZOLE SODIUM 20 MG/1
40 TABLET, DELAYED RELEASE ORAL
Qty: 0 | Refills: 0 | Status: DISCONTINUED | OUTPATIENT
Start: 2018-07-05 | End: 2018-07-11

## 2018-07-05 RX ADMIN — NYSTATIN CREAM 1 APPLICATION(S): 100000 CREAM TOPICAL at 05:11

## 2018-07-05 RX ADMIN — Medication 1 GRAM(S): at 12:28

## 2018-07-05 RX ADMIN — Medication 1 MILLIGRAM(S): at 12:28

## 2018-07-05 RX ADMIN — TAMSULOSIN HYDROCHLORIDE 0.4 MILLIGRAM(S): 0.4 CAPSULE ORAL at 21:22

## 2018-07-05 RX ADMIN — Medication 1 GRAM(S): at 23:53

## 2018-07-05 RX ADMIN — Medication 1 GRAM(S): at 17:32

## 2018-07-05 RX ADMIN — AMLODIPINE BESYLATE 10 MILLIGRAM(S): 2.5 TABLET ORAL at 05:11

## 2018-07-05 RX ADMIN — PANTOPRAZOLE SODIUM 10 MG/HR: 20 TABLET, DELAYED RELEASE ORAL at 08:23

## 2018-07-05 RX ADMIN — Medication 1 GRAM(S): at 05:11

## 2018-07-05 RX ADMIN — Medication 1 APPLICATION(S): at 17:33

## 2018-07-05 RX ADMIN — Medication 1 APPLICATION(S): at 05:11

## 2018-07-05 RX ADMIN — NYSTATIN CREAM 1 APPLICATION(S): 100000 CREAM TOPICAL at 17:33

## 2018-07-05 NOTE — DISCHARGE NOTE ADULT - HOME CARE AGENCY
[facility] [phone] or  [phone]  Nurse to visit the day after hospital discharge; physical therapist to follow. Please contact the home care agency at the above phone number if you have not heard from them by 12 noon on the day after your hospital discharge.

## 2018-07-05 NOTE — PROGRESS NOTE ADULT - SUBJECTIVE AND OBJECTIVE BOX
Patient is a 58y old  Male who presents with a chief complaint of "bloody diarrhea (29 Jun 2018 04:16)  Patient seen in follow up for recent SMITH, Obs uropathy, s/p Stent, B/l PCN (capped)    Stable renal function. ? Renal function at baseline.     PAST MEDICAL HISTORY:  Hx of peripheral neuropathy  Morbid obesity  H/O sleep apnea  Acquired lymphedema of leg  Calculus of kidney and ureter  Diabetes mellitus type II  HTN (hypertension)     MEDICATIONS  (STANDING):  amLODIPine   Tablet 10 milliGRAM(s) Oral daily  BACItracin   Ointment 1 Application(s) Topical every 12 hours  dextrose 5%. 1000 milliLiter(s) (50 mL/Hr) IV Continuous <Continuous>  dextrose 50% Injectable 12.5 Gram(s) IV Push once  folic acid 1 milliGRAM(s) Oral daily  insulin lispro (HumaLOG) corrective regimen sliding scale   SubCutaneous three times a day before meals  nystatin Powder 1 Application(s) Topical two times a day  ondansetron Injectable 4 milliGRAM(s) IV Push once  pantoprazole Infusion 8 mG/Hr (10 mL/Hr) IV Continuous <Continuous>  sucralfate suspension 1 Gram(s) Oral every 6 hours  tamsulosin 0.4 milliGRAM(s) Oral at bedtime    MEDICATIONS  (PRN):  dextrose 40% Gel 15 Gram(s) Oral once PRN Blood Glucose LESS THAN 70 milliGRAM(s)/deciliter  glucagon  Injectable 1 milliGRAM(s) IntraMuscular once PRN Glucose LESS THAN 70 milligrams/deciliter    T(C): 36.9 (07-05-18 @ 12:23), Max: 38.3 (07-05-18 @ 05:07)  HR: 103 (07-05-18 @ 13:40) (79 - 109)  BP: 127/81 (07-05-18 @ 13:40) (113/71 - 140/68)  RR: 18 (07-05-18 @ 13:40)  SpO2: 98% (07-05-18 @ 13:40)  Wt(kg): --  I&O's Detail    04 Jul 2018 07:01  -  05 Jul 2018 07:00  --------------------------------------------------------  IN:    Oral Fluid: 1140 mL    pantoprazole Infusion: 220 mL  Total IN: 1360 mL    OUT:    Voided: 1750 mL  Total OUT: 1750 mL    Total NET: -390 mL      05 Jul 2018 07:01  -  05 Jul 2018 15:28  --------------------------------------------------------  IN:    pantoprazole Infusion: 70 mL  Total IN: 70 mL    OUT:    Voided: 600 mL  Total OUT: 600 mL    Total NET: -530 mL        PHYSICAL EXAM:  General: NAD  Respiratory: b/l air entry  Cardiovascular: S1 S2  Gastrointestinal: obese, b/l PCN capped  Extremities:  edema b/l                 LABORATORY:                        8.3    10.15 )-----------( 167      ( 04 Jul 2018 06:43 )             26.1     07-04    139  |  106  |  25<H>  ----------------------------<  117<H>  3.8   |  25  |  1.51<H>    Ca    8.1<L>      04 Jul 2018 06:43      Sodium, Serum: 139 mmol/L (07-04 @ 06:43)    Potassium, Serum: 3.8 mmol/L (07-04 @ 06:43)    Hemoglobin: 8.3 g/dL (07-04 @ 06:43)  Hemoglobin: 8.1 g/dL (07-03 @ 14:20)  Hemoglobin: 8.8 g/dL (07-02 @ 19:37)    Creatinine, Serum 1.51 (07-04 @ 06:43)  Creatinine, Serum 1.59 (07-03 @ 14:22)

## 2018-07-05 NOTE — DISCHARGE NOTE ADULT - MEDICATION SUMMARY - MEDICATIONS TO STOP TAKING
I will STOP taking the medications listed below when I get home from the hospital:    Milk of Magnesia  -- 30 milliliter(s) by mouth 3 times a week, As Needed    lactulose  -- 30 milliliter(s) by mouth 3 times a week, As Needed    sertraline 25 mg oral tablet  -- 1 tab(s) by mouth once a day (at bedtime)    Zofran ODT 4 mg oral tablet, disintegrating  -- 1 tab(s) by mouth 6 times a day, As Needed

## 2018-07-05 NOTE — PROGRESS NOTE ADULT - SUBJECTIVE AND OBJECTIVE BOX
INTERVAL HPI/OVERNIGHT EVENTS:  events noted no melena    MEDICATIONS  (STANDING):  amLODIPine   Tablet 10 milliGRAM(s) Oral daily  BACItracin   Ointment 1 Application(s) Topical every 12 hours  dextrose 50% Injectable 12.5 Gram(s) IV Push once  folic acid 1 milliGRAM(s) Oral daily  insulin lispro (HumaLOG) corrective regimen sliding scale   SubCutaneous three times a day before meals  nystatin Powder 1 Application(s) Topical two times a day  pantoprazole    Tablet 40 milliGRAM(s) Oral before breakfast  sucralfate suspension 1 Gram(s) Oral every 6 hours  tamsulosin 0.4 milliGRAM(s) Oral at bedtime    MEDICATIONS  (PRN):  dextrose 40% Gel 15 Gram(s) Oral once PRN Blood Glucose LESS THAN 70 milliGRAM(s)/deciliter  glucagon  Injectable 1 milliGRAM(s) IntraMuscular once PRN Glucose LESS THAN 70 milligrams/deciliter      Allergies    No Known Allergies    Intolerances        Review of Systems:    General:  No wt loss, fevers, chills, night sweats, fatigue   Eyes:  Good vision, no reported pain  ENT:  No sore throat, pain, runny nose, dysphagia  CV:  No pain, palpitations, hypo/hypertension  Resp:  No dyspnea, cough, tachypnea, wheezing  GI:  No pain, No nausea, No vomiting, No diarrhea, No constipation, No weight loss, No fever, No pruritis, No rectal bleeding, No melena, No dysphagia  :  No pain, bleeding, incontinence, nocturia  Muscle:  No pain, weakness  Neuro:  No weakness, tingling, memory problems  Psych:  No fatigue, insomnia, mood problems, depression  Endocrine:  No polyuria, polydypsia, cold/heat intolerance  Heme:  No petechiae, ecchymosis, easy bruisability  Skin:  No rash, tattoos, scars, edema      Vital Signs Last 24 Hrs  T(C): 37.2 (05 Jul 2018 16:20), Max: 38.3 (05 Jul 2018 05:07)  T(F): 99 (05 Jul 2018 16:20), Max: 100.9 (05 Jul 2018 05:07)  HR: 103 (05 Jul 2018 13:40) (79 - 109)  BP: 127/81 (05 Jul 2018 13:40) (118/80 - 133/75)  BP(mean): 88 (05 Jul 2018 06:00) (84 - 94)  RR: 18 (05 Jul 2018 13:40) (15 - 22)  SpO2: 98% (05 Jul 2018 13:40) (91% - 100%)    PHYSICAL EXAM:    Constitutional: NAD  HEENT: EOMI, throat clear  Neck: No LAD, supple  Respiratory: CTA and P  Cardiovascular: S1 and S2, RRR, no M  Gastrointestinal: BS+, soft, NT/ND, neg HSM,  Extremities: No peripheral edema, neg clubbing, cyanosis  Vascular: 2+ peripheral pulses  Neurological: A/O x 3, no focal deficits  Psychiatric: Normal mood, normal affect  Skin: No rashes      LABS:                        8.3    10.15 )-----------( 167      ( 04 Jul 2018 06:43 )             26.1     07-04    139  |  106  |  25<H>  ----------------------------<  117<H>  3.8   |  25  |  1.51<H>    Ca    8.1<L>      04 Jul 2018 06:43            RADIOLOGY & ADDITIONAL TESTS:

## 2018-07-05 NOTE — PROGRESS NOTE ADULT - ASSESSMENT
·	Recent SMITH, Obs uropathy, s/p Stent, B/l PCN, CKD   ·	Anemia, GI bleed  ·	Nephrolithiasis  ·	Diabetes  ·	Hypertension    Improving renal function trend. ? Renal function at baseline. Monitor h/h trend. To continue current meds. Avoid nephrotoxins. GI follow up.   Monitor blood sugar levels. Insulin coverage as needed. Dietary restriction. Monitor BP trend. Titrate BP meds as needed. Salt restriction.   Will follow electrolytes and renal function trend. Out pt  follow up for management of stones and removal of PCN. Pt aware.

## 2018-07-05 NOTE — DISCHARGE NOTE ADULT - MEDICATION SUMMARY - MEDICATIONS TO TAKE
I will START or STAY ON the medications listed below when I get home from the hospital:    Tylenol 500 mg oral tablet  -- 1 tab(s) by mouth every 6 hours, As Needed  -- Indication: For pain    tamsulosin 0.4 mg oral capsule  -- 1 cap(s) by mouth once a day (at bedtime)  -- Indication: For bph    metoprolol succinate 25 mg oral tablet, extended release  -- 1 tab(s) by mouth once a day  -- Indication: For nsvt    amLODIPine 10 mg oral tablet  -- 1 tab(s) by mouth once a day  -- Indication: For Htn    cefuroxime 500 mg oral tablet  -- 1 tab(s) by mouth every 12 hours  -- Indication: For UTI (urinary tract infection)    nystatin 100,000 units/g topical powder  -- 1 application on skin 2 times a day  -- Indication: For Dermatitis    bacitracin 500 units/g topical ointment  -- Apply on skin to affected area 2 times a day nephrostomy tube  -- Indication: For Cellulitis    ferrous sulfate 325 mg (65 mg elemental iron) oral tablet  -- 1 tab(s) by mouth 2 times a day   -- Indication: For Anemia    Colace 100 mg oral capsule  -- 1 cap(s) by mouth 3 times a day  -- Indication: For Const    Senna 8.6 mg oral tablet  -- 2 tab(s) by mouth once a day  -- Indication: For Const    sucralfate 1 g oral tablet  -- 1 tab(s) by mouth every 6 hours  -- Indication: For pud    pantoprazole 40 mg oral delayed release tablet  -- 1 tab(s) by mouth once a day (before a meal)  -- Indication: For pud    Multiple Vitamins oral tablet  -- 1 tab(s) by mouth once a day  -- Indication: For Suppl    folic acid 1 mg oral tablet  -- 1 tab(s) by mouth once a day  -- Indication: For Suppl

## 2018-07-05 NOTE — DISCHARGE NOTE ADULT - PROVIDER TOKENS
TOKEN:'[id number]',TOKEN:'[id number]',FREE:[LAST:[your urologist, and pcp],PHONE:[(   )    -],FAX:[(   )    -]]

## 2018-07-05 NOTE — DISCHARGE NOTE ADULT - CASE MANAGER'S NAME
Prabha Casey RN [phone] Prabha Casey RN [phone], Sisi Chakraborty RN/ Direct # [phone]/ Office # [phone] Prabha Casey RN [phone], Sisi Chakraborty RN/ Direct # [phone]/ Office # [phone]/Rica Purvis -[phone]

## 2018-07-05 NOTE — PROGRESS NOTE ADULT - SUBJECTIVE AND OBJECTIVE BOX
Date/Time Patient Seen:  		  Referring MD:   Data Reviewed	       Patient is a 58y old  Male who presents with a chief complaint of "bloody diarrhea (29 Jun 2018 04:16)  in bed  vs and meds reviewed  Tm 100.9 noted      Subjective/HPI     PAST MEDICAL & SURGICAL HISTORY:  Hx of peripheral neuropathy  Morbid obesity  H/O sleep apnea  Acquired lymphedema of leg  Calculus of kidney and ureter  Diabetes mellitus type II  HTN (hypertension)  Ureteral stents placement, bilateral  Hx of tonsillectomy: at 6 years old        Medication list         MEDICATIONS  (STANDING):  amLODIPine   Tablet 10 milliGRAM(s) Oral daily  BACItracin   Ointment 1 Application(s) Topical every 12 hours  dextrose 5%. 1000 milliLiter(s) (50 mL/Hr) IV Continuous <Continuous>  dextrose 50% Injectable 12.5 Gram(s) IV Push once  folic acid 1 milliGRAM(s) Oral daily  insulin lispro (HumaLOG) corrective regimen sliding scale   SubCutaneous three times a day before meals  nystatin Powder 1 Application(s) Topical two times a day  ondansetron Injectable 4 milliGRAM(s) IV Push once  pantoprazole Infusion 8 mG/Hr (10 mL/Hr) IV Continuous <Continuous>  sucralfate suspension 1 Gram(s) Oral every 6 hours  tamsulosin 0.4 milliGRAM(s) Oral at bedtime    MEDICATIONS  (PRN):  dextrose 40% Gel 15 Gram(s) Oral once PRN Blood Glucose LESS THAN 70 milliGRAM(s)/deciliter  glucagon  Injectable 1 milliGRAM(s) IntraMuscular once PRN Glucose LESS THAN 70 milligrams/deciliter         Vitals log        ICU Vital Signs Last 24 Hrs  T(C): 38.3 (05 Jul 2018 05:07), Max: 38.3 (05 Jul 2018 05:07)  T(F): 100.9 (05 Jul 2018 05:07), Max: 100.9 (05 Jul 2018 05:07)  HR: 93 (05 Jul 2018 06:00) (79 - 109)  BP: 131/70 (05 Jul 2018 06:00) (118/80 - 140/68)  BP(mean): 88 (05 Jul 2018 06:00) (80 - 94)  ABP: --  ABP(mean): --  RR: 16 (05 Jul 2018 06:00) (15 - 24)  SpO2: 97% (05 Jul 2018 06:00) (91% - 100%)           Input and Output:  I&O's Detail    04 Jul 2018 07:01  -  05 Jul 2018 07:00  --------------------------------------------------------  IN:    Oral Fluid: 1140 mL    pantoprazole Infusion: 220 mL  Total IN: 1360 mL    OUT:    Voided: 1750 mL  Total OUT: 1750 mL    Total NET: -390 mL          Lab Data                        8.3    10.15 )-----------( 167      ( 04 Jul 2018 06:43 )             26.1     07-04    139  |  106  |  25<H>  ----------------------------<  117<H>  3.8   |  25  |  1.51<H>    Ca    8.1<L>      04 Jul 2018 06:43              Review of Systems	      Objective     Physical Examination    heart s1s2  lung dec BS  morbid obesity      Pertinent Lab findings & Imaging      Catalino:  NO   Adequate UO     I&O's Detail    04 Jul 2018 07:01  -  05 Jul 2018 07:00  --------------------------------------------------------  IN:    Oral Fluid: 1140 mL    pantoprazole Infusion: 220 mL  Total IN: 1360 mL    OUT:    Voided: 1750 mL  Total OUT: 1750 mL    Total NET: -390 mL               Discussed with:     Cultures:	        Radiology

## 2018-07-05 NOTE — DISCHARGE NOTE ADULT - SECONDARY DIAGNOSIS.
Acute deep vein thrombosis (DVT) of femoral vein of both lower extremities CKD (chronic kidney disease), stage III Calculus of kidney and ureter Essential hypertension Anemia Lymphedema

## 2018-07-05 NOTE — DISCHARGE NOTE ADULT - CARE PLAN
Principal Discharge DX:	Peptic ulcer disease with hemorrhage  Goal:	improved  Assessment and plan of treatment:	had bleeding ulcer hemostatised on EGD, cont carafate and ppi and f up with GI  Secondary Diagnosis:	Acute deep vein thrombosis (DVT) of femoral vein of both lower extremities  Assessment and plan of treatment:	had IVC filter , on no AC due to recent severe GI bleed, may need Anticoag in future for which f up with GI for clearance and hematologist for tt of anemia and dvt  Secondary Diagnosis:	CKD (chronic kidney disease), stage III  Assessment and plan of treatment:	f up with your urologist and nephro  Secondary Diagnosis:	Calculus of kidney and ureter  Assessment and plan of treatment:	f up with urologist for ureteral stents  Secondary Diagnosis:	Essential hypertension  Assessment and plan of treatment:	amlodipine  Secondary Diagnosis:	Anemia  Assessment and plan of treatment:	f up with hematologist feso4. procrit with hematologist  Secondary Diagnosis:	Lymphedema  Assessment and plan of treatment:	lauren stockings

## 2018-07-05 NOTE — PROGRESS NOTE ADULT - SUBJECTIVE AND OBJECTIVE BOX
Chief Complaint: GI bleed    Interval Events: No events overnight. No complaints.    Review of Systems:  General: No fevers, chills, weight loss or gain  Skin: No rashes, color changes  Cardiovascular: No chest pain, orthopnea  Respiratory: No shortness of breath, cough  Gastrointestinal: No nausea, abdominal pain  Genitourinary: No incontinence, pain with urination  Musculoskeletal: No pain, swelling, decreased range of motion  Neurological: No headache, weakness  Psychiatric: No depression, anxiety  Endocrine: No weight loss or gain, increased thirst  All other systems are comprehensively negative.    Physical Exam:  Vital Signs Last 24 Hrs  T(C): 37.3 (05 Jul 2018 08:11), Max: 38.3 (05 Jul 2018 05:07)  T(F): 99.1 (05 Jul 2018 08:11), Max: 100.9 (05 Jul 2018 05:07)  HR: 98 (05 Jul 2018 08:56) (79 - 109)  BP: 130/72 (05 Jul 2018 08:56) (118/80 - 133/75)  BP(mean): 88 (05 Jul 2018 06:00) (80 - 94)  RR: 22 (05 Jul 2018 08:56) (15 - 24)  SpO2: 93% (05 Jul 2018 08:56) (91% - 100%)  General: NAD  HEENT: MMM  Neck: No JVD, no carotid bruit  Lungs: CTAB  CV: RRR, nl S1/S2, no M/R/G  Abdomen: S/NT/ND, +BS  Extremities: No LE edema, no cyanosis  Neuro: AAOx3, non-focal  Skin: No rash    Labs:             07-04    139  |  106  |  25<H>  ----------------------------<  117<H>  3.8   |  25  |  1.51<H>    Ca    8.1<L>      04 Jul 2018 06:43                          8.3    10.15 )-----------( 167      ( 04 Jul 2018 06:43 )             26.1       Telemetry: Sinus rhythm, PVCs

## 2018-07-05 NOTE — DISCHARGE NOTE ADULT - HOSPITAL COURSE
57 y/o M pt w/ PMHx morbid obesity, DM, HTN, peripheral neuropathy, calculus of kidney and ureter, sleep apnea, acquired lymphedema of leg and PSHx for obstructive neuropathy had  nephrostomy bilateral kidneys presents to the ED BIBA from [facility] c/o bloody diarrhea x 3 since yesterday, describes it as red. Also endorsing nausea. States that he had a blood transfusion at [facility] on 6/23 and was given an injection of Procrit yesterday. States hemoglobin was above 7 day prior to admission and on day of admission  i below 7, approx 5.  Pt stated he resides at [facility] for rehabilitation due to a fall 2 months ago. Pt denies vomiting, abd pain, fever or any other complaints at this time.  IN [facility] ED hemoglobin 4.9, and had central line placed and  2 units PRBC.vital signs stable    admitted for acute on ch anemia due to GI bl;eed with SMITH with CKD3 with h/o ureteral stents with MOrbid obesity , DM2 , HTN, peripheral neuropathy ,    for PRBC, GI consult , npo  , pantoprazole, started in ED, nephro consult , and pulm consult called   6/29 hemoglobin still low received 6 units PRBc,   patient was hemodyanamically stable for emegency endoscopy, and  intermediat risk for procedure, pulmonary clearance Dr Feng  d/w patient and wife by bed side, risk and benefits explained  d/w DR Hudson,  DR Feng as pulmonary critical care.  6/30 had EGd had bleeding duodenal ulcer , hemostatised in procedure , had post op respiratory failure and kept intubated till 7/1, extubated on 7/1, hemodyanamically stable, saturating well, no active bleeding, receiving PRBC, on regimen for gastritis, PUD,no active bleeding, hemoglobin improving , activity increased  Surgical consult If bleeding recurs, pt stable improving,hemoglobin stable, activity increased  DC plan started, PT, diet advance as per GI, had some arrhythmia on Tele ECHO ordered, will monitored for 24 hrs , NSVT transient resolved, LVEF normal, d/w cardiologist , stable for discharge,  pt came from rehab, but does not want to go back to rehab,    overnight 7/7 had fever UA suggestive of UTI started on ceftriaxone cultures sent, will await improvement, x ray chest clear.  ID consult called,   continue activity, hemoglobin 7.8 slight drop will continue to monitor.   < from: US Duplex Venous Lower Ext Complete, Bilateral (07.08.18 @ 11:10) >  There is occlusive and partially occlusive DVT in the rightproximal and   mid femoral vein, right popliteal vein and right posterior tibial veins.    There is occlusive and partially occlusive DVT in the left popliteal vein   and left posterior tibial veins.    There is normal compressibility of the bilateral common femoral veins.  advised IVC filtered , in view of recent severe GI bleed with anemia, d/w hemeonc and Dr Acevedo , vascular   d/w wife , patient aware of condition, and prognosis and dilemma of tt.  cont current care.  IVC filter on 7/11  intermediate risk for procedure, hemodyanamically stable, MEGHA stable pulmonary f up noted CPAP at night.  hemoglobin 7.2, will transfuse 1 unit PRBC,PT eval, dc plan to rehab discussed with patient which he refused, received PRBC one unit HB 8.2,   Pt stable for discharge as per ID, vascular and cardiologist  out pt f up with pcp within 1 week and get cbc

## 2018-07-05 NOTE — PROGRESS NOTE ADULT - SUBJECTIVE AND OBJECTIVE BOX
Patient is a 58y old  Male who presents with a chief complaint of "bloody diarrhea (2018 04:16)      INTERVAL HPI/OVERNIGHT EVENTS:no acute event    Home Medications:  amLODIPine 10 mg oral tablet: 1 tab(s) orally once a day (2018 12:43)  bacitracin 500 units/g topical ointment: Apply topically to affected area 2 times a day nephrostomy tube (2018 12:43)  Bactrim  mg-160 mg oral tablet: 1 tab(s) orally 2 times a day (2018 12:43)  Colace 100 mg oral capsule: 1 cap(s) orally 3 times a day (2018 12:43)  Flomax 0.4 mg oral capsule: 1 cap(s) orally once a day (2018 12:43)  gas relief: 180 milligram(s)  2 times a day (2018 12:43)  lactulose: 30 milliliter(s) orally 3 times a week, As Needed (2018 12:43)  Milk of Magnesia: 30 milliliter(s) orally 3 times a week, As Needed (2018 12:43)  Procrit 10,000 units/mL preservative-free injectable solution: injectable every 7 days (2018 12:43)  Senna 8.6 mg oral tablet: 2 tab(s) orally once a day (2018 12:43)  Tylenol 500 mg oral tablet: 1 tab(s) orally every 6 hours, As Needed (2018 12:43)  Zofran ODT 4 mg oral tablet, disintegratin tab(s) orally 6 times a day, As Needed (2018 12:43)      MEDICATIONS  (STANDING):  amLODIPine   Tablet 10 milliGRAM(s) Oral daily  BACItracin   Ointment 1 Application(s) Topical every 12 hours  dextrose 5%. 1000 milliLiter(s) (50 mL/Hr) IV Continuous <Continuous>  dextrose 50% Injectable 12.5 Gram(s) IV Push once  folic acid 1 milliGRAM(s) Oral daily  insulin lispro (HumaLOG) corrective regimen sliding scale   SubCutaneous three times a day before meals  nystatin Powder 1 Application(s) Topical two times a day  ondansetron Injectable 4 milliGRAM(s) IV Push once  pantoprazole Infusion 8 mG/Hr (10 mL/Hr) IV Continuous <Continuous>  sucralfate suspension 1 Gram(s) Oral every 6 hours  tamsulosin 0.4 milliGRAM(s) Oral at bedtime    MEDICATIONS  (PRN):  dextrose 40% Gel 15 Gram(s) Oral once PRN Blood Glucose LESS THAN 70 milliGRAM(s)/deciliter  glucagon  Injectable 1 milliGRAM(s) IntraMuscular once PRN Glucose LESS THAN 70 milligrams/deciliter      Allergies    No Known Allergies    Intolerances        REVIEW OF SYSTEMS:  CONSTITUTIONAL: No fever, weight loss, or fatigue  EYES: No eye pain, visual disturbances, or discharge  ENMT:  No difficulty hearing, tinnitus, vertigo; No sinus or throat pain  NECK: No pain or stiffness  BREASTS: No pain, masses, or nipple discharge  RESPIRATORY: No cough, wheezing, chills or hemoptysis; No shortness of breath  CARDIOVASCULAR: No chest pain, palpitations, dizziness, or leg swelling  GASTROINTESTINAL: No abdominal or epigastric pain. No nausea, vomiting, or hematemesis; No diarrhea or constipation. No melena or hematochezia.  GENITOURINARY: No dysuria, frequency, hematuria, or incontinence, has nephrostomy tubes  capped  NEUROLOGICAL: No headaches, memory loss, loss of strength, numbness, or tremors  SKIN: No itching, burning, rashes, or lesions   LYMPH NODES: No enlarged glands  ENDOCRINE: No heat or cold intolerance; No hair loss  MUSCULOSKELETAL: No joint pain or swelling; No muscle, back, or extremity pain, edema legs  PSYCHIATRIC: No depression, anxiety, mood swings, or difficulty sleeping  HEME/LYMPH: No easy bruising, or bleeding gums  ALLERGY AND IMMUNOLOGIC: No hives or eczema    Vital Signs Last 24 Hrs  T(C): 36.9 (2018 12:23), Max: 38.3 (2018 05:07)  T(F): 98.5 (2018 12:23), Max: 100.9 (2018 05:07)  HR: 98 (2018 08:56) (79 - 109)  BP: 130/72 (2018 08:56) (118/80 - 133/75)  BP(mean): 88 (2018 06:00) (80 - 94)  RR: 22 (2018 08:56) (15 - 24)  SpO2: 93% (2018 08:56) (91% - 100%)    PHYSICAL EXAM:  GENERAL: obese, well-groomed, well-developed  HEAD:  Atraumatic, Normocephalic  EYES: EOMI, PERRLA, conjunctiva and sclera clear  ENMT: Moist mucous membranes,   NECK: Supple, No JVD, Normal thyroid  NERVOUS SYSTEM:  Alert & Oriented X3, Good concentration; Motor Strength 5/5 B/L upper and lower extremities; DTRs 2+ intact and symmetric  CHEST/LUNG: Clear to percussion bilaterally; No rales, rhonchi, wheezing, or rubs  HEART: Regular rate and rhythm; No murmurs, rubs, or gallops  ABDOMEN: Soft, Nontender, Nondistended; Bowel sounds present  EXTREMITIES:  2+ Peripheral Pulses, No clubbing, cyanosis,  edema b/l LE  SKIN: No rashes or lesions    LABS:                        8.3    10.15 )-----------( 167      ( 2018 06:43 )             26.1     07-04    139  |  106  |  25<H>  ----------------------------<  117<H>  3.8   |  25  |  1.51<H>    Ca    8.1<L>      2018 06:43          CAPILLARY BLOOD GLUCOSE      POCT Blood Glucose.: 103 mg/dL (2018 12:27)  POCT Blood Glucose.: 129 mg/dL (2018 06:42)  POCT Blood Glucose.: 120 mg/dL (2018 23:02)  POCT Blood Glucose.: 121 mg/dL (2018 18:00)  POCT Blood Glucose.: 102 mg/dL (2018 13:20)        GI PCR Panel, Stool (collected 18 @ 12:05)  Source: .Stool Feces  Final Report (18 @ 14:20):    GI PCR Results: NOT detected    *******Please Note:*******    GI panel PCR evaluates for:    Campylobacter, Plesiomonas shigelloides, Salmonella,    Vibrio, Yersinia enterocolitica, Enteroaggregative    Escherichia coli (EAEC), Enteropathogenic E.coli (EPEC),    Enterotoxigenic E. coli (ETEC) lt/st, Shiga-like    toxin-producing E. coli (STEC) stx1/stx2,    Shigella/ Enteroinvasive E. coli (EIEC), Cryptosporidium,    Cyclospora cayetanensis, Entamoeba histolytica,    Giardia lamblia, Adenovirus F 40/41, Astrovirus,    Norovirus GI/GII, Rotavirus A, Sapovirus        I&O's Summary    2018 07:01  -  2018 07:00  --------------------------------------------------------  IN: 1360 mL / OUT: 1750 mL / NET: -390 mL        RADIOLOGY & ADDITIONAL TESTS:    Imaging Personally Reviewed:  [ x] YES  [ ] NO    Consultant(s) Notes Reviewed:  [x ] YES  [ ] NO    Care Discussed with Consultants/Other Providers [x ] YES  [ ] NO

## 2018-07-05 NOTE — PROGRESS NOTE ADULT - ASSESSMENT
The patient is a 58 year old male with a history of HTN, DM, lymphedema, peripheral neuropathy, kidney stone, MEGHA who presents from rehab with bloody diarrhea.    Plan:  - Continue amlodipine for HTN  - Hemoglobin stable  - Atrial tachycardia - brief episode and asymptomatic. If recurrent episodes or patient develops palpitations, start metoprolol.  - GI follow-up  - Discharge planning

## 2018-07-05 NOTE — DISCHARGE NOTE ADULT - CARE PROVIDER_API CALL
Michel Hudson (), Gastroenterology; Internal Medicine  [address] [place] [address]  Phone: [phone]  Fax: [phone]    Jacky Alvarez), Hematology; Internal Medicine; Medical Oncology  [facility]  [address] [place] [address]  Phone: [phone]  Fax: [phone]    your urologist, and pcp,   Phone: (   )    -  Fax: (   )    -

## 2018-07-05 NOTE — PROGRESS NOTE ADULT - ASSESSMENT
57 y/o M pt w/ PMHx morbid obesity, DM, HTN, peripheral neuropathy, calculus of kidney and ureter, sleep apnea, acquired lymphedema of leg and PSHx for obstructive neuropathy had  nephrostomy bilateral kidneys presents to the ED BIBA from [facility] c/o bloody diarrhea x 3 since yesterday, describes it as red. Also endorsing nausea. States that he had a blood transfusion at [facility] on 6/23 and was given an injection of Procrit yesterday. States hemoglobin was above 7 yesterday and today it is below 7, approx 5.  Pt states he resides at [facility] for rehabilitation due to a fall 2 months ago. Pt denies vomiting, abd pain, fever or any other complaints at this time.  IN [facility] ED hemoglobin 4.9, and had central line placed and ordered 2 units PRBC.vital signs stable    admitted for acute on ch anemia due to GI bl;eed with SMITH with CKD with h/o ureteral stents with MOrbid obesity , DM2 , HTN, peripheral neuropathy ,    for PRBC, GI consult , npo  , pantoprazole, started in ED, nephro consult , and pulm consult called   6/29 hemoglobin still low received 6 units PRBc,   patient is hemodyanamically stable for emegency endoscopy, and is intermediat risk for procedure, pulmonary clearance Dr Blakely  d/w patient and wife by bed side, risk and benefits  d/w DR Hudson,  DR Blakely as pulmonary critical care.  6/30 had EGd had bleeding duodenal ulcer , hemostatised in procedure , had post op respiratory failure and kept intubated till 7/1, extubated on 7/1, hemodyanamically stable, saturating well, no active bleeding, receiving PRBC, on regimen for gastritis, PUD,no active bleeding, hemoglobin improving , activity increased  discussed with Dr Hudson, Dr blakely.  Surgical consult If bleeding recurs, pt stable improving,hemoglobin stable, activity increased  DC plan started, PT, diet advance as GI  pt came from rehab, but does not want to go back to rehab

## 2018-07-05 NOTE — CHART NOTE - NSCHARTNOTEFT_GEN_A_CORE
Assessment:  Pt c hx morbid obesity, DM, HTN, peripheral neuropathy, calculus of kidney and ureter, sleep apnea, lymphedema of leg and nephrostomy bilateral kidneys presents  from [facility] c/o bloody diarrhea x 3. Pt found to have GI bleed.  Pt reports he can still see dark blood in stool (likely old blood) as there are no other s/s active bleed per documentation. Pt has been on clear fluids x 5 days and was NPO for 3 days prior to that. Pt awaiting diet advancement. Per GI note (7/4), diet to be advanced as tolerated. Per RN, MD requesting GI MD to advance diet order to solids. As medically feasible, recommend Con CHO, DASH/TLC diet (no red dye). Discussed benefit of soft, easily digested foods at first, limiting gastric irritants i.e caffeine. Pt also reports he would like to continue to lose wt, also hx prediabetes. Discussed importance of portion control, balanced meals. RD to f/u for continued diet education.       Factors impacting intake: [ ] none [ ] nausea  [ ] vomiting [ ] diarrhea [ ] constipation  [ ]chewing problems [ ] swallowing issues  [x] other: GIB    Diet Presciption: Diet, Clear Liquid (07-01-18 @ 13:50)    Intake: good on clear fluids, awaiting advancement to     Current Weight: Weight (kg): 189.1 (06-28 @ 16:56)  % Weight Change 7/5 409#, 7# wt loss x 8 days, also noted c generalized non pitting edema    Pertinent Medications: MEDICATIONS  (STANDING):  amLODIPine   Tablet 10 milliGRAM(s) Oral daily  BACItracin   Ointment 1 Application(s) Topical every 12 hours  dextrose 5%. 1000 milliLiter(s) (50 mL/Hr) IV Continuous <Continuous>  dextrose 50% Injectable 12.5 Gram(s) IV Push once  folic acid 1 milliGRAM(s) Oral daily  insulin lispro (HumaLOG) corrective regimen sliding scale   SubCutaneous three times a day before meals  nystatin Powder 1 Application(s) Topical two times a day  ondansetron Injectable 4 milliGRAM(s) IV Push once  pantoprazole Infusion 8 mG/Hr (10 mL/Hr) IV Continuous <Continuous>  sucralfate suspension 1 Gram(s) Oral every 6 hours  tamsulosin 0.4 milliGRAM(s) Oral at bedtime    MEDICATIONS  (PRN):  dextrose 40% Gel 15 Gram(s) Oral once PRN Blood Glucose LESS THAN 70 milliGRAM(s)/deciliter  glucagon  Injectable 1 milliGRAM(s) IntraMuscular once PRN Glucose LESS THAN 70 milligrams/deciliter    Pertinent Labs: 07-04 Na139 mmol/L Glu 117 mg/dL<H> K+ 3.8 mmol/L Cr  1.51 mg/dL<H> BUN 25 mg/dL<H> 07-02 Alb 2.4 g/dL<L> 06-29 [id number] 5.5 % 06-29 Chol 103 mg/dL LDL 49 mg/dL HDL 17 mg/dL<L> Trig 187 mg/dL<H>     CAPILLARY BLOOD GLUCOSE      POCT Blood Glucose.: 103 mg/dL (05 Jul 2018 12:27)  POCT Blood Glucose.: 129 mg/dL (05 Jul 2018 06:42)  POCT Blood Glucose.: 120 mg/dL (04 Jul 2018 23:02)  POCT Blood Glucose.: 121 mg/dL (04 Jul 2018 18:00)    Skin: sacrum stage I, L buttock stage I, r buttock stage I    Estimated Needs:   [ x] no change since previous assessment  [ ] recalculated:     Previous Nutrition Diagnosis:   [ ] Inadequate Energy Intake [ ]Inadequate Oral Intake [ ] Excessive Energy Intake   [ ] Underweight [ ] Increased Nutrient Needs [ x] Overweight/Obesity   [ ] Altered GI Function [ ] Unintended Weight Loss [ ] Food & Nutrition Related Knowledge Deficit [ ] Malnutrition     Nutrition Diagnosis is [x ] ongoing  [ ] resolved [ ] not applicable     New Nutrition Diagnosis: [ ] not applicable     altered gi function related to alteration in gi tract as evidenced by GIB, clear flds x 5 days      Interventions: clear fluids, diet advancement per GI  Recommend  [x ] Change Diet To: Con CHO DASH/TLC diet, soft, no red dye  [ ] Nutrition Supplement  [ ] Nutrition Support  [ ] Other:     Monitoring and Evaluation:   [x ] PO intake [ x ] Tolerance to diet prescription [ x ] weights [ x ] labs[ x ] follow up per protocol  [ ] other:

## 2018-07-05 NOTE — DISCHARGE NOTE ADULT - DURABLE MEDICAL EQUIPMENT AGENCY
Prior to this hospital stay, you have the following equipments @ home: ROLLATOR WITH SEAT; BARIATRIC WALKER; 2 CANES; RAMP THRU GARAGE Prior to this hospital stay, you have the following equipments @ home: ROLLATOR WITH SEAT; BARIATRIC WALKER; 2 CANES; RAMP THRU GARAGE  We ordered for you: Bariatric Commode from  [facility] to deliver to your residence.   You were given prescriptions for grab bars and recliner lift to stand chair per your request. Prior to this hospital stay, you have the following equipments @ home: ROLLATOR WITH SEAT; BARIATRIC WALKER; 2 CANES; RAMP THRU GARAGE  We ordered for you: BARIATRIC COMMODE from  [facility] to deliver to your residence.   You were given prescriptions for grab bars and recliner lift to stand chair per your request.

## 2018-07-05 NOTE — DISCHARGE NOTE ADULT - PLAN OF CARE
improved had bleeding ulcer hemostatised on EGD, cont carafate and ppi and f up with GI had IVC filter , on no AC due to recent severe GI bleed, may need Anticoag in future for which f up with GI for clearance and hematologist for tt of anemia and dvt f up with your urologist and nephro f up with urologist for ureteral stents amlodipine f up with hematologist maycol. procrit with hematologist lauren stockings

## 2018-07-06 LAB
ANION GAP SERPL CALC-SCNC: 9 MMOL/L — SIGNIFICANT CHANGE UP (ref 5–17)
BUN SERPL-MCNC: 28 MG/DL — HIGH (ref 7–23)
CALCIUM SERPL-MCNC: 7.9 MG/DL — LOW (ref 8.4–10.5)
CHLORIDE SERPL-SCNC: 104 MMOL/L — SIGNIFICANT CHANGE UP (ref 96–108)
CO2 SERPL-SCNC: 24 MMOL/L — SIGNIFICANT CHANGE UP (ref 22–31)
CREAT SERPL-MCNC: 1.91 MG/DL — HIGH (ref 0.5–1.3)
GLUCOSE BLDC GLUCOMTR-MCNC: 106 MG/DL — HIGH (ref 70–99)
GLUCOSE BLDC GLUCOMTR-MCNC: 122 MG/DL — HIGH (ref 70–99)
GLUCOSE BLDC GLUCOMTR-MCNC: 124 MG/DL — HIGH (ref 70–99)
GLUCOSE SERPL-MCNC: 108 MG/DL — HIGH (ref 70–99)
HCT VFR BLD CALC: 25.3 % — LOW (ref 39–50)
HGB BLD-MCNC: 8 G/DL — LOW (ref 13–17)
MAGNESIUM SERPL-MCNC: 2 MG/DL — SIGNIFICANT CHANGE UP (ref 1.6–2.6)
MCHC RBC-ENTMCNC: 29.2 PG — SIGNIFICANT CHANGE UP (ref 27–34)
MCHC RBC-ENTMCNC: 31.6 GM/DL — LOW (ref 32–36)
MCV RBC AUTO: 92.3 FL — SIGNIFICANT CHANGE UP (ref 80–100)
NRBC # BLD: 0 /100 WBCS — SIGNIFICANT CHANGE UP (ref 0–0)
PHOSPHATE SERPL-MCNC: 3 MG/DL — SIGNIFICANT CHANGE UP (ref 2.5–4.5)
PLATELET # BLD AUTO: 203 K/UL — SIGNIFICANT CHANGE UP (ref 150–400)
POTASSIUM SERPL-MCNC: 3.5 MMOL/L — SIGNIFICANT CHANGE UP (ref 3.5–5.3)
POTASSIUM SERPL-SCNC: 3.5 MMOL/L — SIGNIFICANT CHANGE UP (ref 3.5–5.3)
RBC # BLD: 2.74 M/UL — LOW (ref 4.2–5.8)
RBC # FLD: 14.9 % — HIGH (ref 10.3–14.5)
SODIUM SERPL-SCNC: 137 MMOL/L — SIGNIFICANT CHANGE UP (ref 135–145)
WBC # BLD: 9.91 K/UL — SIGNIFICANT CHANGE UP (ref 3.8–10.5)
WBC # FLD AUTO: 9.91 K/UL — SIGNIFICANT CHANGE UP (ref 3.8–10.5)

## 2018-07-06 RX ORDER — FERROUS SULFATE 325(65) MG
325 TABLET ORAL DAILY
Qty: 0 | Refills: 0 | Status: DISCONTINUED | OUTPATIENT
Start: 2018-07-06 | End: 2018-07-11

## 2018-07-06 RX ORDER — POTASSIUM CHLORIDE 20 MEQ
40 PACKET (EA) ORAL ONCE
Qty: 0 | Refills: 0 | Status: COMPLETED | OUTPATIENT
Start: 2018-07-06 | End: 2018-07-06

## 2018-07-06 RX ADMIN — PANTOPRAZOLE SODIUM 40 MILLIGRAM(S): 20 TABLET, DELAYED RELEASE ORAL at 06:36

## 2018-07-06 RX ADMIN — Medication 1 GRAM(S): at 06:36

## 2018-07-06 RX ADMIN — NYSTATIN CREAM 1 APPLICATION(S): 100000 CREAM TOPICAL at 06:36

## 2018-07-06 RX ADMIN — Medication 1 GRAM(S): at 17:35

## 2018-07-06 RX ADMIN — AMLODIPINE BESYLATE 10 MILLIGRAM(S): 2.5 TABLET ORAL at 06:36

## 2018-07-06 RX ADMIN — Medication 1 GRAM(S): at 13:19

## 2018-07-06 RX ADMIN — Medication 1 TABLET(S): at 13:19

## 2018-07-06 RX ADMIN — Medication 1 MILLIGRAM(S): at 13:19

## 2018-07-06 RX ADMIN — Medication 1 APPLICATION(S): at 06:57

## 2018-07-06 RX ADMIN — Medication 1 GRAM(S): at 23:32

## 2018-07-06 RX ADMIN — TAMSULOSIN HYDROCHLORIDE 0.4 MILLIGRAM(S): 0.4 CAPSULE ORAL at 22:15

## 2018-07-06 RX ADMIN — Medication 325 MILLIGRAM(S): at 13:19

## 2018-07-06 RX ADMIN — Medication 40 MILLIEQUIVALENT(S): at 10:55

## 2018-07-06 NOTE — PROGRESS NOTE ADULT - ASSESSMENT
59 y/o M pt w/ PMHx morbid obesity, DM, HTN, peripheral neuropathy, calculus of kidney and ureter, sleep apnea, acquired lymphedema of leg and PSHx for obstructive neuropathy had  nephrostomy bilateral kidneys [facility]/o bloody diarrhea x 3 since yesterday, describes it as red. Also endorsing nausea. States that he had a blood transfusion at [facility] on 6/23 and was given an injection of Procrit yesterday. States hemoglobin was above 7 yesterday and today it is below 7, approx 5.  Pt states he resides at [facility] for rehabilitation due to a fall 2 months ago. Pt denies vomiting, abd pain, fever or any other complaints at this time.  IN [facility] ED hemoglobin 4.9, and had central line placed and ordered 2 units PRBC.vital signs stable    admitted for acute on ch anemia due to GI bl;eed with SMITH with CKD with h/o ureteral stents with MOrbid obesity , DM2 , HTN, peripheral neuropathy ,    for PRBC, GI consult , npo  , pantoprazole, started in ED, nephro consult , and pulm consult called   6/29 hemoglobin still low received 6 units PRBc,   patient is hemodyanamically stable for emegency endoscopy, and is intermediat risk for procedure, pulmonary clearance Dr Blakely  d/w patient and wife by bed side, risk and benefits  d/w DR Hudson,  DR Blakely as pulmonary critical care.  6/30 had EGd had bleeding duodenal ulcer , hemostatised in procedure , had post op respiratory failure and kept intubated till 7/1, extubated on 7/1, hemodyanamically stable, saturating well, no active bleeding, receiving PRBC, on regimen for gastritis, PUD,no active bleeding, hemoglobin improving , activity increased  discussed with Dr Hudson, Dr blakely.  Surgical consult If bleeding recurs, pt stable improving,hemoglobin stable, activity increased  DC plan started, PT, diet advance as per GI, had some arrhythmia on Tele ECHO ordered, will monitor for 24 hrs ,   pt came from rehab, but does not want to go back to rehab, if stable will dc to home in am

## 2018-07-06 NOTE — PROGRESS NOTE ADULT - ASSESSMENT
The patient is a 58 year old male with a history of HTN, DM, lymphedema, peripheral neuropathy, kidney stone, MEGHA who presents from rehab with bloody diarrhea.    Plan:  - Continue amlodipine for HTN  - Hemoglobin stable  - Atrial tachycardia - brief episode and asymptomatic. If recurrent episodes or patient develops palpitations, start metoprolol.  - NSVT - check echo. Give K.  - GI follow-up  - Discharge planning

## 2018-07-06 NOTE — PROGRESS NOTE ADULT - ASSESSMENT
·	Recent SMITH, Obs uropathy, s/p Stent, B/l PCN, CKD   ·	Anemia, GI bleed, HGB improved.   ·	Nephrolithiasis  ·	Diabetes  ·	Hypertension    Improving renal function trend. ? Renal function at baseline. Monitor h/h trend. To continue current meds. Avoid nephrotoxins. GI follow up.   Monitor blood sugar levels. Insulin coverage as needed. Dietary restriction. Monitor BP trend. Titrate BP meds as needed. Salt restriction.   Will follow electrolytes and renal function trend. Out pt  follow up for management of stones and removal of PCN. Pt aware.

## 2018-07-06 NOTE — PROGRESS NOTE ADULT - SUBJECTIVE AND OBJECTIVE BOX
MICHAEL LEONA  58y  Male    Patient is a 58y old  Male who presents with a chief complaint of "bloody diarrhea (05 Jul 2018 15:53)    awake and alert.  denies any chest pain or shortness of breath.     PAST MEDICAL & SURGICAL HISTORY:  Hx of peripheral neuropathy  Morbid obesity  H/O sleep apnea  Acquired lymphedema of leg  Calculus of kidney and ureter  Diabetes mellitus type II  HTN (hypertension)  Ureteral stents placement, bilateral  Hx of tonsillectomy: at 6 years old    PHYSICAL EXAM:    T(C): 36.8 (07-06-18 @ 08:05), Max: 37.2 (07-05-18 @ 16:20)  HR: 92 (07-06-18 @ 09:20) (73 - 116)  BP: 128/70 (07-06-18 @ 09:20) (107/63 - 146/78)  RR: 16 (07-06-18 @ 04:00) (14 - 20)  SpO2: 96% (07-06-18 @ 09:20) (92% - 99%)  Wt(kg): --    I&O's Detail    05 Jul 2018 07:01  -  06 Jul 2018 07:00  --------------------------------------------------------  IN:    Oral Fluid: 480 mL    pantoprazole Infusion: 100 mL  Total IN: 580 mL    OUT:    Voided: 1250 mL  Total OUT: 1250 mL    Total NET: -670 mL    Respiratory: clear anteriorly, decreased BS at bases  Cardiovascular: S1 S2  Gastrointestinal: soft NT ND +BS  Extremities: edema   Neuro: Awake and alert    MEDICATIONS  (STANDING):  amLODIPine   Tablet 10 milliGRAM(s) Oral daily  BACItracin   Ointment 1 Application(s) Topical every 12 hours  dextrose 50% Injectable 12.5 Gram(s) IV Push once  ferrous    sulfate 325 milliGRAM(s) Oral daily  folic acid 1 milliGRAM(s) Oral daily  insulin lispro (HumaLOG) corrective regimen sliding scale   SubCutaneous three times a day before meals  multivitamin 1 Tablet(s) Oral daily  nystatin Powder 1 Application(s) Topical two times a day  pantoprazole    Tablet 40 milliGRAM(s) Oral before breakfast  sucralfate suspension 1 Gram(s) Oral every 6 hours  tamsulosin 0.4 milliGRAM(s) Oral at bedtime    MEDICATIONS  (PRN):  dextrose 40% Gel 15 Gram(s) Oral once PRN Blood Glucose LESS THAN 70 milliGRAM(s)/deciliter  glucagon  Injectable 1 milliGRAM(s) IntraMuscular once PRN Glucose LESS THAN 70 milligrams/deciliter                            8.0    9.91  )-----------( 203      ( 06 Jul 2018 07:12 )             25.3       07-06    137  |  104  |  28<H>  ----------------------------<  108<H>  3.5   |  24  |  1.91<H>    Ca    7.9<L>      06 Jul 2018 07:12  Phos  3.0     07-06  Mg     2.0     07-06

## 2018-07-06 NOTE — PROGRESS NOTE ADULT - SUBJECTIVE AND OBJECTIVE BOX
Chief Complaint: GI bleed    Interval Events: No events overnight. No complaints.    Review of Systems:  General: No fevers, chills, weight loss or gain  Skin: No rashes, color changes  Cardiovascular: No chest pain, orthopnea  Respiratory: No shortness of breath, cough  Gastrointestinal: No nausea, abdominal pain  Genitourinary: No incontinence, pain with urination  Musculoskeletal: No pain, swelling, decreased range of motion  Neurological: No headache, weakness  Psychiatric: No depression, anxiety  Endocrine: No weight loss or gain, increased thirst  All other systems are comprehensively negative.    Physical Exam:  Vital Signs Last 24 Hrs  T(C): 36.8 (06 Jul 2018 08:05), Max: 37.2 (05 Jul 2018 16:20)  T(F): 98.2 (06 Jul 2018 08:05), Max: 99 (05 Jul 2018 16:20)  HR: 92 (06 Jul 2018 09:20) (73 - 116)  BP: 128/70 (06 Jul 2018 09:20) (107/63 - 146/78)  BP(mean): 84 (06 Jul 2018 09:20) (73 - 98)  RR: 16 (06 Jul 2018 04:00) (14 - 20)  SpO2: 96% (06 Jul 2018 09:20) (92% - 99%)  General: NAD  HEENT: MMM  Neck: No JVD, no carotid bruit  Lungs: CTAB  CV: RRR, nl S1/S2, no M/R/G  Abdomen: S/NT/ND, +BS  Extremities: No LE edema, no cyanosis  Neuro: AAOx3, non-focal  Skin: No rash    Labs:             07-04    139  |  106  |  25<H>  ----------------------------<  117<H>  3.8   |  25  |  1.51<H>    Ca    8.1<L>      04 Jul 2018 06:43                          8.3    10.15 )-----------( 167      ( 04 Jul 2018 06:43 )             26.1       Telemetry: Sinus rhythm, PVCs, 5 beat NSVT vs. aberrantly conducted beats (RBBB morphology)

## 2018-07-06 NOTE — PROGRESS NOTE ADULT - SUBJECTIVE AND OBJECTIVE BOX
Date/Time Patient Seen:  		  Referring MD:   Data Reviewed	       Patient is a 58y old  Male who presents with a chief complaint of "bloody diarrhea (05 Jul 2018 15:53)    in bed  seen and examined  vs and meds reviewed      Subjective/HPI     PAST MEDICAL & SURGICAL HISTORY:  Hx of peripheral neuropathy  Morbid obesity  H/O sleep apnea  Acquired lymphedema of leg  Calculus of kidney and ureter  Diabetes mellitus type II  HTN (hypertension)  Ureteral stents placement, bilateral  Hx of tonsillectomy: at 6 years old        Medication list         MEDICATIONS  (STANDING):  amLODIPine   Tablet 10 milliGRAM(s) Oral daily  BACItracin   Ointment 1 Application(s) Topical every 12 hours  dextrose 50% Injectable 12.5 Gram(s) IV Push once  folic acid 1 milliGRAM(s) Oral daily  insulin lispro (HumaLOG) corrective regimen sliding scale   SubCutaneous three times a day before meals  nystatin Powder 1 Application(s) Topical two times a day  pantoprazole    Tablet 40 milliGRAM(s) Oral before breakfast  sucralfate suspension 1 Gram(s) Oral every 6 hours  tamsulosin 0.4 milliGRAM(s) Oral at bedtime    MEDICATIONS  (PRN):  dextrose 40% Gel 15 Gram(s) Oral once PRN Blood Glucose LESS THAN 70 milliGRAM(s)/deciliter  glucagon  Injectable 1 milliGRAM(s) IntraMuscular once PRN Glucose LESS THAN 70 milligrams/deciliter         Vitals log        ICU Vital Signs Last 24 Hrs  T(C): 36.9 (06 Jul 2018 04:04), Max: 37.3 (05 Jul 2018 08:11)  T(F): 98.4 (06 Jul 2018 04:04), Max: 99.1 (05 Jul 2018 08:11)  HR: 87 (06 Jul 2018 06:00) (73 - 116)  BP: 138/76 (06 Jul 2018 06:00) (107/63 - 146/78)  BP(mean): 91 (06 Jul 2018 06:00) (73 - 98)  ABP: --  ABP(mean): --  RR: 16 (06 Jul 2018 04:00) (14 - 22)  SpO2: 99% (06 Jul 2018 06:00) (92% - 99%)           Input and Output:  I&O's Detail    05 Jul 2018 07:01  -  06 Jul 2018 07:00  --------------------------------------------------------  IN:    Oral Fluid: 360 mL    pantoprazole Infusion: 100 mL  Total IN: 460 mL    OUT:    Voided: 1250 mL  Total OUT: 1250 mL    Total NET: -790 mL          Lab Data                        8.0    9.91  )-----------( 203      ( 06 Jul 2018 07:12 )             25.3                   Review of Systems	      Objective     Physical Examination    obese  heart s1s2  lung dec BS  abd soft      Pertinent Lab findings & Imaging      Catalino:  NO   Adequate UO     I&O's Detail    05 Jul 2018 07:01  -  06 Jul 2018 07:00  --------------------------------------------------------  IN:    Oral Fluid: 360 mL    pantoprazole Infusion: 100 mL  Total IN: 460 mL    OUT:    Voided: 1250 mL  Total OUT: 1250 mL    Total NET: -790 mL               Discussed with:     Cultures:	        Radiology

## 2018-07-06 NOTE — PROGRESS NOTE ADULT - SUBJECTIVE AND OBJECTIVE BOX
Patient is a 58y old  Male who presents with a chief complaint of "bloody diarrhea (2018 15:53)      INTERVAL HPI/OVERNIGHT EVENTS:no acute event, excet some arrhthmia on tele    Home Medications:  amLODIPine 10 mg oral tablet: 1 tab(s) orally once a day (2018 12:43)  bacitracin 500 units/g topical ointment: Apply topically to affected area 2 times a day nephrostomy tube (2018 12:43)  Bactrim  mg-160 mg oral tablet: 1 tab(s) orally 2 times a day (2018 12:43)  Colace 100 mg oral capsule: 1 cap(s) orally 3 times a day (2018 12:43)  Flomax 0.4 mg oral capsule: 1 cap(s) orally once a day (2018 12:43)  gas relief: 180 milligram(s)  2 times a day (2018 12:43)  lactulose: 30 milliliter(s) orally 3 times a week, As Needed (2018 12:43)  Milk of Magnesia: 30 milliliter(s) orally 3 times a week, As Needed (2018 12:43)  Procrit 10,000 units/mL preservative-free injectable solution: injectable every 7 days (2018 12:43)  Senna 8.6 mg oral tablet: 2 tab(s) orally once a day (2018 12:43)  Tylenol 500 mg oral tablet: 1 tab(s) orally every 6 hours, As Needed (2018 12:43)  Zofran ODT 4 mg oral tablet, disintegratin tab(s) orally 6 times a day, As Needed (2018 12:43)      MEDICATIONS  (STANDING):  amLODIPine   Tablet 10 milliGRAM(s) Oral daily  BACItracin   Ointment 1 Application(s) Topical every 12 hours  dextrose 50% Injectable 12.5 Gram(s) IV Push once  folic acid 1 milliGRAM(s) Oral daily  insulin lispro (HumaLOG) corrective regimen sliding scale   SubCutaneous three times a day before meals  nystatin Powder 1 Application(s) Topical two times a day  pantoprazole    Tablet 40 milliGRAM(s) Oral before breakfast  potassium chloride    Tablet ER 40 milliEquivalent(s) Oral once  sucralfate suspension 1 Gram(s) Oral every 6 hours  tamsulosin 0.4 milliGRAM(s) Oral at bedtime    MEDICATIONS  (PRN):  dextrose 40% Gel 15 Gram(s) Oral once PRN Blood Glucose LESS THAN 70 milliGRAM(s)/deciliter  glucagon  Injectable 1 milliGRAM(s) IntraMuscular once PRN Glucose LESS THAN 70 milligrams/deciliter      Allergies    No Known Allergies    Intolerances        REVIEW OF SYSTEMS:  CONSTITUTIONAL: No fever, weight loss, or fatigue  EYES: No eye pain, visual disturbances, or discharge  ENMT:  No difficulty hearing, tinnitus, vertigo; No sinus or throat pain  NECK: No pain or stiffness  BREASTS: No pain, masses, or nipple discharge  RESPIRATORY: No cough, wheezing, chills or hemoptysis; No shortness of breath  CARDIOVASCULAR: No chest pain, palpitations, dizziness, or leg swelling  GASTROINTESTINAL: No abdominal or epigastric pain. No nausea, vomiting, or hematemesis; No diarrhea or constipation. No melena or hematochezia.  GENITOURINARY: No dysuria, frequency, hematuria, or incontinence  NEUROLOGICAL: No headaches, memory loss, loss of strength, numbness, or tremors  SKIN: No itching, burning, rashes, or lesions   ENDOCRINE: No heat or cold intolerance; No hair loss  MUSCULOSKELETAL: No joint pain or swelling; No muscle, back, or extremity pain  PSYCHIATRIC: No depression, anxiety, mood swings, or difficulty sleeping  HEME/LYMPH: No easy bruising, or bleeding gums  ALLERGY AND IMMUNOLOGIC: No hives or eczema    Vital Signs Last 24 Hrs  T(C): 36.8 (2018 08:05), Max: 37.2 (2018 16:20)  T(F): 98.2 (2018 08:05), Max: 99 (2018 16:20)  HR: 92 (2018 09:20) (73 - 116)  BP: 128/70 (2018 09:20) (107/63 - 146/78)  BP(mean): 84 (2018 09:20) (73 - 98)  RR: 16 (2018 04:00) (14 - 20)  SpO2: 96% (2018 09:20) (92% - 99%)    PHYSICAL EXAM:  GENERAL: NAD, well-groomed, well-developed  HEAD:  Atraumatic, Normocephalic  EYES: EOMI, PERRLA, conjunctiva and sclera clear  ENMT: Moist mucous membranes,   NECK: Supple, No JVD, Normal thyroid  NERVOUS SYSTEM:  Alert & Oriented X3, Good concentration; Motor Strength 5/5 B/L upper and lower extremities; DTRs 2+ intact and symmetric  CHEST/LUNG: Clear to percussion bilaterally; No rales, rhonchi, wheezing, or rubs  HEART: Regular rate and rhythm; No murmurs, rubs, or gallops  ABDOMEN: Soft, Nontender, Nondistended; Bowel sounds present  EXTREMITIES:  2+ Peripheral Pulses, No clubbing, cyanosis, or edema  SKIN: No rashes or lesions    LABS:                        8.0    9.91  )-----------( 203      ( 2018 07:12 )             25.3     07-06    137  |  104  |  28<H>  ----------------------------<  108<H>  3.5   |  24  |  1.91<H>    Ca    7.9<L>      2018 07:12  Phos  3.0     07-06  Mg     2.0     07-06          CAPILLARY BLOOD GLUCOSE      POCT Blood Glucose.: 124 mg/dL (2018 07:40)  POCT Blood Glucose.: 136 mg/dL (2018 21:21)  POCT Blood Glucose.: 117 mg/dL (2018 16:44)  POCT Blood Glucose.: 103 mg/dL (2018 12:27)        GI PCR Panel, Stool (collected 18 @ 12:05)  Source: .Stool Feces  Final Report (18 @ 14:20):    GI PCR Results: NOT detected    *******Please Note:*******    GI panel PCR evaluates for:    Campylobacter, Plesiomonas shigelloides, Salmonella,    Vibrio, Yersinia enterocolitica, Enteroaggregative    Escherichia coli (EAEC), Enteropathogenic E.coli (EPEC),    Enterotoxigenic E. coli (ETEC) lt/st, Shiga-like    toxin-producing E. coli (STEC) stx1/stx2,    Shigella/ Enteroinvasive E. coli (EIEC), Cryptosporidium,    Cyclospora cayetanensis, Entamoeba histolytica,    Giardia lamblia, Adenovirus F 40/41, Astrovirus,    Norovirus GI/GII, Rotavirus A, Sapovirus        I&O's Summary    2018 07:01  -  2018 07:00  --------------------------------------------------------  IN: 580 mL / OUT: 1250 mL / NET: -670 mL        RADIOLOGY & ADDITIONAL TESTS:    Imaging Personally Reviewed:  [x ] YES  [ ] NO    Consultant(s) Notes Reviewed:  [ x] YES  [ ] NO    Care Discussed with Consultants/Other Providers [ x] YES  [ ] NO

## 2018-07-06 NOTE — PROGRESS NOTE ADULT - SUBJECTIVE AND OBJECTIVE BOX
INTERVAL HPI/OVERNIGHT EVENTS:  No new overnight event.  No N/V/D.  Tolerating diet.     MEDICATIONS  (STANDING):  amLODIPine   Tablet 10 milliGRAM(s) Oral daily  BACItracin   Ointment 1 Application(s) Topical every 12 hours  dextrose 50% Injectable 12.5 Gram(s) IV Push once  folic acid 1 milliGRAM(s) Oral daily  insulin lispro (HumaLOG) corrective regimen sliding scale   SubCutaneous three times a day before meals  nystatin Powder 1 Application(s) Topical two times a day  pantoprazole    Tablet 40 milliGRAM(s) Oral before breakfast  sucralfate suspension 1 Gram(s) Oral every 6 hours  tamsulosin 0.4 milliGRAM(s) Oral at bedtime    MEDICATIONS  (PRN):  dextrose 40% Gel 15 Gram(s) Oral once PRN Blood Glucose LESS THAN 70 milliGRAM(s)/deciliter  glucagon  Injectable 1 milliGRAM(s) IntraMuscular once PRN Glucose LESS THAN 70 milligrams/deciliter      Allergies    No Known Allergies    Intolerances        Review of Systems:    General:  No wt loss, fevers, chills, night sweats,fatigue,   Eyes:  Good vision, no reported pain  ENT:  No sore throat, pain, runny nose, dysphagia  CV:  No pain, palpitatioins, hypo/hypertension  Resp:  No dyspnea, cough, tachypnea, wheezing  GI:  No pain, No nausea, No vomiting, No diarrhea, No constipatiion, No weight loss, No fever, No pruritis, No rectal bleeding, No tarry stools, No dysphagia,  :  No pain, bleeding, incontinence, nocturia  Muscle:  No pain, weakness  Neuro:  No weakness, tingling, memory problems  Psych:  No fatigue, insomnia, mood problems, depression  Endocrine:  No polyuria, polydypsia, cold/heat intolerance  Heme:  No petechiae, ecchymosis, easy bruisability  Skin:  No rash, tattoos, scars, edema      Vital Signs Last 24 Hrs  T(C): 36.8 (06 Jul 2018 08:05), Max: 37.2 (05 Jul 2018 16:20)  T(F): 98.2 (06 Jul 2018 08:05), Max: 99 (05 Jul 2018 16:20)  HR: 92 (06 Jul 2018 09:20) (73 - 116)  BP: 128/70 (06 Jul 2018 09:20) (107/63 - 146/78)  BP(mean): 84 (06 Jul 2018 09:20) (73 - 98)  RR: 16 (06 Jul 2018 04:00) (14 - 20)  SpO2: 96% (06 Jul 2018 09:20) (92% - 99%)    PHYSICAL EXAM:    Constitutional: NAD, well-developed  HEENT: EOMI, throat clear  Neck: No LAD, supple  Respiratory: CTA and P  Cardiovascular: S1 and S2, RRR, no M  Gastrointestinal: BS+, soft, NT/ND, neg HSM,  Extremities: No peripheral edema, neg clubing, cyanosis  Vascular: 2+ peripheral pulses  Neurological: A/O x 3, no focal deficits  Psychiatric: Normal mood, normal affect  Skin: No rashes      LABS:                        8.0    9.91  )-----------( 203      ( 06 Jul 2018 07:12 )             25.3     07-06    137  |  104  |  28<H>  ----------------------------<  108<H>  3.5   |  24  |  1.91<H>    Ca    7.9<L>      06 Jul 2018 07:12  Phos  3.0     07-06  Mg     2.0     07-06            RADIOLOGY & ADDITIONAL TESTS:

## 2018-07-07 LAB
ANION GAP SERPL CALC-SCNC: 9 MMOL/L — SIGNIFICANT CHANGE UP (ref 5–17)
ANION GAP SERPL CALC-SCNC: 9 MMOL/L — SIGNIFICANT CHANGE UP (ref 5–17)
APPEARANCE UR: ABNORMAL
BACTERIA # UR AUTO: ABNORMAL
BILIRUB UR-MCNC: NEGATIVE — SIGNIFICANT CHANGE UP
BUN SERPL-MCNC: 32 MG/DL — HIGH (ref 7–23)
BUN SERPL-MCNC: 33 MG/DL — HIGH (ref 7–23)
CALCIUM SERPL-MCNC: 7.9 MG/DL — LOW (ref 8.4–10.5)
CALCIUM SERPL-MCNC: 8 MG/DL — LOW (ref 8.4–10.5)
CHLORIDE SERPL-SCNC: 102 MMOL/L — SIGNIFICANT CHANGE UP (ref 96–108)
CHLORIDE SERPL-SCNC: 103 MMOL/L — SIGNIFICANT CHANGE UP (ref 96–108)
CO2 SERPL-SCNC: 25 MMOL/L — SIGNIFICANT CHANGE UP (ref 22–31)
CO2 SERPL-SCNC: 25 MMOL/L — SIGNIFICANT CHANGE UP (ref 22–31)
COLOR SPEC: YELLOW — SIGNIFICANT CHANGE UP
CREAT SERPL-MCNC: 1.95 MG/DL — HIGH (ref 0.5–1.3)
CREAT SERPL-MCNC: 2.16 MG/DL — HIGH (ref 0.5–1.3)
DIFF PNL FLD: ABNORMAL
EPI CELLS # UR: ABNORMAL
GLUCOSE BLDC GLUCOMTR-MCNC: 112 MG/DL — HIGH (ref 70–99)
GLUCOSE BLDC GLUCOMTR-MCNC: 127 MG/DL — HIGH (ref 70–99)
GLUCOSE BLDC GLUCOMTR-MCNC: 145 MG/DL — HIGH (ref 70–99)
GLUCOSE SERPL-MCNC: 106 MG/DL — HIGH (ref 70–99)
GLUCOSE SERPL-MCNC: 129 MG/DL — HIGH (ref 70–99)
GLUCOSE UR QL: NEGATIVE MG/DL — SIGNIFICANT CHANGE UP
HCT VFR BLD CALC: 25.4 % — LOW (ref 39–50)
HCT VFR BLD CALC: 25.6 % — LOW (ref 39–50)
HGB BLD-MCNC: 8 G/DL — LOW (ref 13–17)
HGB BLD-MCNC: 8.2 G/DL — LOW (ref 13–17)
KETONES UR-MCNC: NEGATIVE — SIGNIFICANT CHANGE UP
LACTATE SERPL-SCNC: 0.9 MMOL/L — SIGNIFICANT CHANGE UP (ref 0.7–2)
LEUKOCYTE ESTERASE UR-ACNC: ABNORMAL
MCHC RBC-ENTMCNC: 29 PG — SIGNIFICANT CHANGE UP (ref 27–34)
MCHC RBC-ENTMCNC: 29.4 PG — SIGNIFICANT CHANGE UP (ref 27–34)
MCHC RBC-ENTMCNC: 31.5 GM/DL — LOW (ref 32–36)
MCHC RBC-ENTMCNC: 32 GM/DL — SIGNIFICANT CHANGE UP (ref 32–36)
MCV RBC AUTO: 91.8 FL — SIGNIFICANT CHANGE UP (ref 80–100)
MCV RBC AUTO: 92 FL — SIGNIFICANT CHANGE UP (ref 80–100)
NITRITE UR-MCNC: POSITIVE
NRBC # BLD: 0 /100 WBCS — SIGNIFICANT CHANGE UP (ref 0–0)
NRBC # BLD: 0 /100 WBCS — SIGNIFICANT CHANGE UP (ref 0–0)
PH UR: 8 — SIGNIFICANT CHANGE UP (ref 5–8)
PLATELET # BLD AUTO: 215 K/UL — SIGNIFICANT CHANGE UP (ref 150–400)
PLATELET # BLD AUTO: 254 K/UL — SIGNIFICANT CHANGE UP (ref 150–400)
POTASSIUM SERPL-MCNC: 3.5 MMOL/L — SIGNIFICANT CHANGE UP (ref 3.5–5.3)
POTASSIUM SERPL-MCNC: 3.6 MMOL/L — SIGNIFICANT CHANGE UP (ref 3.5–5.3)
POTASSIUM SERPL-SCNC: 3.5 MMOL/L — SIGNIFICANT CHANGE UP (ref 3.5–5.3)
POTASSIUM SERPL-SCNC: 3.6 MMOL/L — SIGNIFICANT CHANGE UP (ref 3.5–5.3)
PROT UR-MCNC: 100 MG/DL
RBC # BLD: 2.76 M/UL — LOW (ref 4.2–5.8)
RBC # BLD: 2.79 M/UL — LOW (ref 4.2–5.8)
RBC # FLD: 14.9 % — HIGH (ref 10.3–14.5)
RBC # FLD: 15 % — HIGH (ref 10.3–14.5)
RBC CASTS # UR COMP ASSIST: ABNORMAL /HPF (ref 0–4)
SODIUM SERPL-SCNC: 136 MMOL/L — SIGNIFICANT CHANGE UP (ref 135–145)
SODIUM SERPL-SCNC: 137 MMOL/L — SIGNIFICANT CHANGE UP (ref 135–145)
SP GR SPEC: 1.01 — SIGNIFICANT CHANGE UP (ref 1.01–1.02)
UROBILINOGEN FLD QL: NEGATIVE MG/DL — SIGNIFICANT CHANGE UP
WBC # BLD: 8.66 K/UL — SIGNIFICANT CHANGE UP (ref 3.8–10.5)
WBC # BLD: 9.62 K/UL — SIGNIFICANT CHANGE UP (ref 3.8–10.5)
WBC # FLD AUTO: 8.66 K/UL — SIGNIFICANT CHANGE UP (ref 3.8–10.5)
WBC # FLD AUTO: 9.62 K/UL — SIGNIFICANT CHANGE UP (ref 3.8–10.5)
WBC UR QL: SIGNIFICANT CHANGE UP

## 2018-07-07 PROCEDURE — 93306 TTE W/DOPPLER COMPLETE: CPT | Mod: 26

## 2018-07-07 PROCEDURE — 12345: CPT

## 2018-07-07 PROCEDURE — 71045 X-RAY EXAM CHEST 1 VIEW: CPT | Mod: 26

## 2018-07-07 RX ORDER — CEFTRIAXONE 500 MG/1
1 INJECTION, POWDER, FOR SOLUTION INTRAMUSCULAR; INTRAVENOUS EVERY 24 HOURS
Qty: 0 | Refills: 0 | Status: DISCONTINUED | OUTPATIENT
Start: 2018-07-08 | End: 2018-07-11

## 2018-07-07 RX ORDER — SODIUM CHLORIDE 9 MG/ML
1000 INJECTION, SOLUTION INTRAVENOUS
Qty: 0 | Refills: 0 | Status: DISCONTINUED | OUTPATIENT
Start: 2018-07-07 | End: 2018-07-10

## 2018-07-07 RX ORDER — ACETAMINOPHEN 500 MG
650 TABLET ORAL ONCE
Qty: 0 | Refills: 0 | Status: COMPLETED | OUTPATIENT
Start: 2018-07-07 | End: 2018-07-07

## 2018-07-07 RX ORDER — CEFTRIAXONE 500 MG/1
INJECTION, POWDER, FOR SOLUTION INTRAMUSCULAR; INTRAVENOUS
Qty: 0 | Refills: 0 | Status: DISCONTINUED | OUTPATIENT
Start: 2018-07-07 | End: 2018-07-11

## 2018-07-07 RX ORDER — CEFTRIAXONE 500 MG/1
1 INJECTION, POWDER, FOR SOLUTION INTRAMUSCULAR; INTRAVENOUS ONCE
Qty: 0 | Refills: 0 | Status: COMPLETED | OUTPATIENT
Start: 2018-07-07 | End: 2018-07-07

## 2018-07-07 RX ADMIN — AMLODIPINE BESYLATE 10 MILLIGRAM(S): 2.5 TABLET ORAL at 05:09

## 2018-07-07 RX ADMIN — Medication 1 GRAM(S): at 18:02

## 2018-07-07 RX ADMIN — NYSTATIN CREAM 1 APPLICATION(S): 100000 CREAM TOPICAL at 05:10

## 2018-07-07 RX ADMIN — NYSTATIN CREAM 1 APPLICATION(S): 100000 CREAM TOPICAL at 18:04

## 2018-07-07 RX ADMIN — Medication 1 APPLICATION(S): at 18:02

## 2018-07-07 RX ADMIN — Medication 1 GRAM(S): at 05:09

## 2018-07-07 RX ADMIN — CEFTRIAXONE 100 GRAM(S): 500 INJECTION, POWDER, FOR SOLUTION INTRAMUSCULAR; INTRAVENOUS at 21:00

## 2018-07-07 RX ADMIN — TAMSULOSIN HYDROCHLORIDE 0.4 MILLIGRAM(S): 0.4 CAPSULE ORAL at 21:45

## 2018-07-07 RX ADMIN — PANTOPRAZOLE SODIUM 40 MILLIGRAM(S): 20 TABLET, DELAYED RELEASE ORAL at 08:30

## 2018-07-07 RX ADMIN — Medication 1 TABLET(S): at 12:11

## 2018-07-07 RX ADMIN — SODIUM CHLORIDE 50 MILLILITER(S): 9 INJECTION, SOLUTION INTRAVENOUS at 21:30

## 2018-07-07 RX ADMIN — Medication 1 GRAM(S): at 12:11

## 2018-07-07 RX ADMIN — Medication 1 MILLIGRAM(S): at 12:11

## 2018-07-07 RX ADMIN — Medication 650 MILLIGRAM(S): at 21:45

## 2018-07-07 RX ADMIN — Medication 1 APPLICATION(S): at 05:09

## 2018-07-07 RX ADMIN — Medication 325 MILLIGRAM(S): at 12:11

## 2018-07-07 NOTE — PROGRESS NOTE ADULT - ASSESSMENT
The patient is a 58 year old male with a history of HTN, DM, lymphedema, peripheral neuropathy, kidney stone, MEGHA who presents from rehab with bloody diarrhea.    Plan:  - Continue amlodipine for HTN  - Hemoglobin stable  - Atrial tachycardia - brief episode and asymptomatic. If recurrent episodes or patient develops palpitations, start metoprolol.  - NSVT - await final report. LV systolic function appears normal, no significant valve issues.  - GI follow-up  - Discharge planning

## 2018-07-07 NOTE — PROGRESS NOTE ADULT - SUBJECTIVE AND OBJECTIVE BOX
Chief Complaint: GI bleed    Interval Events: No events overnight. No complaints.    Review of Systems:  General: No fevers, chills, weight loss or gain  Skin: No rashes, color changes  Cardiovascular: No chest pain, orthopnea  Respiratory: No shortness of breath, cough  Gastrointestinal: No nausea, abdominal pain  Genitourinary: No incontinence, pain with urination  Musculoskeletal: No pain, swelling, decreased range of motion  Neurological: No headache, weakness  Psychiatric: No depression, anxiety  Endocrine: No weight loss or gain, increased thirst  All other systems are comprehensively negative.    Physical Exam:  Vital Signs Last 24 Hrs  T(C): 37 (07 Jul 2018 07:34), Max: 37.2 (06 Jul 2018 20:12)  T(F): 98.6 (07 Jul 2018 07:34), Max: 99 (06 Jul 2018 20:12)  HR: 89 (07 Jul 2018 08:00) (77 - 112)  BP: 121/75 (07 Jul 2018 08:00) (100/86 - 142/78)  BP(mean): 88 (07 Jul 2018 08:00) (77 - 94)  RR: 18 (07 Jul 2018 08:00) (15 - 27)  SpO2: 98% (07 Jul 2018 08:00) (90% - 100%)  General: NAD  HEENT: MMM  Neck: No JVD, no carotid bruit  Lungs: CTAB  CV: RRR, nl S1/S2, no M/R/G  Abdomen: S/NT/ND, +BS  Extremities: No LE edema, no cyanosis  Neuro: AAOx3, non-focal  Skin: No rash    Labs:             07-07    137  |  103  |  32<H>  ----------------------------<  106<H>  3.5   |  25  |  1.95<H>    Ca    8.0<L>      07 Jul 2018 05:12  Phos  3.0     07-06  Mg     2.0     07-06                          8.0    8.66  )-----------( 215      ( 07 Jul 2018 05:12 )             25.4     Telemetry: Sinus rhythm, PVCs, ventricular couplets

## 2018-07-07 NOTE — PROGRESS NOTE ADULT - SUBJECTIVE AND OBJECTIVE BOX
INTERVAL HPI/OVERNIGHT EVENTS:  No new overnight event.  No N/V/D.  Tolerating diet.     MEDICATIONS  (STANDING):  amLODIPine   Tablet 10 milliGRAM(s) Oral daily  BACItracin   Ointment 1 Application(s) Topical every 12 hours  dextrose 50% Injectable 12.5 Gram(s) IV Push once  ferrous    sulfate 325 milliGRAM(s) Oral daily  folic acid 1 milliGRAM(s) Oral daily  insulin lispro (HumaLOG) corrective regimen sliding scale   SubCutaneous three times a day before meals  multivitamin 1 Tablet(s) Oral daily  nystatin Powder 1 Application(s) Topical two times a day  pantoprazole    Tablet 40 milliGRAM(s) Oral before breakfast  sucralfate suspension 1 Gram(s) Oral every 6 hours  tamsulosin 0.4 milliGRAM(s) Oral at bedtime    MEDICATIONS  (PRN):  dextrose 40% Gel 15 Gram(s) Oral once PRN Blood Glucose LESS THAN 70 milliGRAM(s)/deciliter  glucagon  Injectable 1 milliGRAM(s) IntraMuscular once PRN Glucose LESS THAN 70 milligrams/deciliter      Allergies    No Known Allergies    Intolerances        Review of Systems:    General:  No wt loss, fevers, chills, night sweats,fatigue,   Eyes:  Good vision, no reported pain  ENT:  No sore throat, pain, runny nose, dysphagia  CV:  No pain, palpitatioins, hypo/hypertension  Resp:  No dyspnea, cough, tachypnea, wheezing  GI:  No pain, No nausea, No vomiting, No diarrhea, No constipatiion, No weight loss, No fever, No pruritis, No rectal bleeding, No tarry stools, No dysphagia,  :  No pain, bleeding, incontinence, nocturia  Muscle:  No pain, weakness  Neuro:  No weakness, tingling, memory problems  Psych:  No fatigue, insomnia, mood problems, depression  Endocrine:  No polyuria, polydypsia, cold/heat intolerance  Heme:  No petechiae, ecchymosis, easy bruisability  Skin:  No rash, tattoos, scars, edema      Vital Signs Last 24 Hrs  T(C): 37 (07 Jul 2018 07:34), Max: 37.2 (06 Jul 2018 20:12)  T(F): 98.6 (07 Jul 2018 07:34), Max: 99 (06 Jul 2018 20:12)  HR: 97 (07 Jul 2018 10:00) (87 - 112)  BP: 118/66 (07 Jul 2018 10:00) (100/86 - 142/78)  BP(mean): 80 (07 Jul 2018 10:00) (79 - 94)  RR: 23 (07 Jul 2018 10:00) (15 - 27)  SpO2: 93% (07 Jul 2018 10:00) (90% - 100%)    PHYSICAL EXAM:    Constitutional: NAD, well-developed  HEENT: EOMI, throat clear  Neck: No LAD, supple  Respiratory: CTA and P  Cardiovascular: S1 and S2, RRR, no M  Gastrointestinal: BS+, soft, NT/ND, neg HSM,  Extremities: No peripheral edema, neg clubing, cyanosis  Vascular: 2+ peripheral pulses  Neurological: A/O x 3, no focal deficits  Psychiatric: Normal mood, normal affect  Skin: No rashes      LABS:                        8.0    8.66  )-----------( 215      ( 07 Jul 2018 05:12 )             25.4     07-07    137  |  103  |  32<H>  ----------------------------<  106<H>  3.5   |  25  |  1.95<H>    Ca    8.0<L>      07 Jul 2018 05:12  Phos  3.0     07-06  Mg     2.0     07-06            RADIOLOGY & ADDITIONAL TESTS:

## 2018-07-07 NOTE — PROGRESS NOTE ADULT - ASSESSMENT
·	Recent SMITH, Obs uropathy, s/p Stent, B/l PCN, CKD   ·	Anemia, GI bleed  ·	Nephrolithiasis  ·	Diabetes  ·	Hypertension    Stable renal function. Monitor h/h trend. To continue current meds. Avoid nephrotoxins. GI follow up.   Monitor blood sugar levels. Insulin coverage as needed. Dietary restriction. Monitor BP trend. Titrate BP meds as needed. Salt restriction.   Will follow electrolytes and renal function trend. Out pt  follow up for management of stones and removal of PCN. Pt aware.

## 2018-07-07 NOTE — PROGRESS NOTE ADULT - ASSESSMENT
57 y/o M pt w/ PMHx morbid obesity, DM, HTN, peripheral neuropathy, calculus of kidney and ureter, sleep apnea, acquired lymphedema of leg and PSHx for obstructive neuropathy had  nephrostomy bilateral kidneys presents to the ED BIBA from [facility] c/o bloody diarrhea x 3 since yesterday, describes it as red. Also endorsing nausea. States that he had a blood transfusion at [facility] on 6/23 and was given an injection of Procrit yesterday. States hemoglobin was above 7 day prior to admission and on day of admission  i below 7, approx 5.  Pt stated he resides at [facility] for rehabilitation due to a fall 2 months ago. Pt denies vomiting, abd pain, fever or any other complaints at this time.  IN [facility] ED hemoglobin 4.9, and had central line placed and  2 units PRBC.vital signs stable    admitted for acute on ch anemia due to GI bl;eed with SMITH with CKD3 with h/o ureteral stents with MOrbid obesity , DM2 , HTN, peripheral neuropathy ,    for PRBC, GI consult , npo  , pantoprazole, started in ED, nephro consult , and pulm consult called   6/29 hemoglobin still low received 6 units PRBc,   patient was hemodyanamically stable for emegency endoscopy, and  intermediat risk for procedure, pulmonary clearance Dr Feng  d/w patient and wife by bed side, risk and benefits explained  d/w DR Hudson,  DR Feng as pulmonary critical care.  6/30 had EGd had bleeding duodenal ulcer , hemostatised in procedure , had post op respiratory failure and kept intubated till 7/1, extubated on 7/1, hemodyanamically stable, saturating well, no active bleeding, receiving PRBC, on regimen for gastritis, PUD,no active bleeding, hemoglobin improving , activity increased  Surgical consult If bleeding recurs, pt stable improving,hemoglobin stable, activity increased  DC plan started, PT, diet advance as per GI, had some arrhythmia on Tele ECHO ordered, will monitored for 24 hrs , NSVT transient resolved, LVEF normal, d/w cardiologist , stable for discharge,  pt came from rehab, but does not want to go back to rehab, if stable will dc to home in am

## 2018-07-07 NOTE — PROGRESS NOTE ADULT - SUBJECTIVE AND OBJECTIVE BOX
Patient is a 58y old  Male who presents with a chief complaint of "bloody diarrhea (29 Jun 2018 04:16)  Patient seen in follow up for recent SMITH, Obs uropathy, s/p Stent, B/l PCN (capped)    Stable renal function.    PAST MEDICAL HISTORY:  Hx of peripheral neuropathy  Morbid obesity  H/O sleep apnea  Acquired lymphedema of leg  Calculus of kidney and ureter  Diabetes mellitus type II  HTN (hypertension)     MEDICATIONS  (STANDING):  amLODIPine   Tablet 10 milliGRAM(s) Oral daily  BACItracin   Ointment 1 Application(s) Topical every 12 hours  dextrose 50% Injectable 12.5 Gram(s) IV Push once  ferrous    sulfate 325 milliGRAM(s) Oral daily  folic acid 1 milliGRAM(s) Oral daily  insulin lispro (HumaLOG) corrective regimen sliding scale   SubCutaneous three times a day before meals  multivitamin 1 Tablet(s) Oral daily  nystatin Powder 1 Application(s) Topical two times a day  pantoprazole    Tablet 40 milliGRAM(s) Oral before breakfast  sucralfate suspension 1 Gram(s) Oral every 6 hours  tamsulosin 0.4 milliGRAM(s) Oral at bedtime    MEDICATIONS  (PRN):  dextrose 40% Gel 15 Gram(s) Oral once PRN Blood Glucose LESS THAN 70 milliGRAM(s)/deciliter  glucagon  Injectable 1 milliGRAM(s) IntraMuscular once PRN Glucose LESS THAN 70 milligrams/deciliter    T(C): 37 (07-07-18 @ 07:34), Max: 37.2 (07-05-18 @ 16:20)  HR: 89 (07-07-18 @ 08:00) (73 - 116)  BP: 121/75 (07-07-18 @ 08:00) (100/86 - 146/78)  RR: 18 (07-07-18 @ 08:00)  SpO2: 98% (07-07-18 @ 08:00)  Wt(kg): --  I&O's Detail        PHYSICAL EXAM:  General: NAD  Respiratory: b/l air entry  Cardiovascular: S1 S2  Gastrointestinal: obese, b/l PCN capped  Extremities:  edema b/l                 LABORATORY:                        8.0    8.66  )-----------( 215      ( 07 Jul 2018 05:12 )             25.4     07-07    137  |  103  |  32<H>  ----------------------------<  106<H>  3.5   |  25  |  1.95<H>    Ca    8.0<L>      07 Jul 2018 05:12  Phos  3.0     07-06  Mg     2.0     07-06      Sodium, Serum: 137 mmol/L (07-07 @ 05:12)  Sodium, Serum: 137 mmol/L (07-06 @ 07:12)    Potassium, Serum: 3.5 mmol/L (07-07 @ 05:12)  Potassium, Serum: 3.5 mmol/L (07-06 @ 07:12)    Hemoglobin: 8.0 g/dL (07-07 @ 05:12)  Hemoglobin: 8.0 g/dL (07-06 @ 07:12)    Creatinine, Serum 1.95 (07-07 @ 05:12)  Creatinine, Serum 1.91 (07-06 @ 07:12)

## 2018-07-07 NOTE — PROGRESS NOTE ADULT - SUBJECTIVE AND OBJECTIVE BOX
Date/Time Patient Seen:  		  Referring MD:   Data Reviewed	       Patient is a 58y old  Male who presents with a chief complaint of "bloody diarrhea (05 Jul 2018 15:53)  in bed  vs and meds reviewed        Subjective/HPI     PAST MEDICAL & SURGICAL HISTORY:  Hx of peripheral neuropathy  Morbid obesity  H/O sleep apnea  Acquired lymphedema of leg  Calculus of kidney and ureter  Diabetes mellitus type II  HTN (hypertension)  Ureteral stents placement, bilateral  Hx of tonsillectomy: at 6 years old        Medication list         MEDICATIONS  (STANDING):  amLODIPine   Tablet 10 milliGRAM(s) Oral daily  BACItracin   Ointment 1 Application(s) Topical every 12 hours  dextrose 50% Injectable 12.5 Gram(s) IV Push once  ferrous    sulfate 325 milliGRAM(s) Oral daily  folic acid 1 milliGRAM(s) Oral daily  insulin lispro (HumaLOG) corrective regimen sliding scale   SubCutaneous three times a day before meals  multivitamin 1 Tablet(s) Oral daily  nystatin Powder 1 Application(s) Topical two times a day  pantoprazole    Tablet 40 milliGRAM(s) Oral before breakfast  sucralfate suspension 1 Gram(s) Oral every 6 hours  tamsulosin 0.4 milliGRAM(s) Oral at bedtime    MEDICATIONS  (PRN):  dextrose 40% Gel 15 Gram(s) Oral once PRN Blood Glucose LESS THAN 70 milliGRAM(s)/deciliter  glucagon  Injectable 1 milliGRAM(s) IntraMuscular once PRN Glucose LESS THAN 70 milligrams/deciliter         Vitals log        ICU Vital Signs Last 24 Hrs  T(C): 36.4 (07 Jul 2018 04:02), Max: 37.2 (06 Jul 2018 20:12)  T(F): 97.6 (07 Jul 2018 04:02), Max: 99 (06 Jul 2018 20:12)  HR: 96 (07 Jul 2018 06:00) (77 - 112)  BP: 133/73 (07 Jul 2018 06:00) (100/86 - 142/78)  BP(mean): 90 (07 Jul 2018 06:00) (77 - 94)  ABP: --  ABP(mean): --  RR: 17 (07 Jul 2018 06:00) (15 - 27)  SpO2: 100% (07 Jul 2018 06:00) (90% - 100%)           Input and Output:  I&O's Detail    05 Jul 2018 07:01  -  06 Jul 2018 07:00  --------------------------------------------------------  IN:    Oral Fluid: 480 mL    pantoprazole Infusion: 100 mL  Total IN: 580 mL    OUT:    Voided: 1250 mL  Total OUT: 1250 mL    Total NET: -670 mL          Lab Data                        8.0    8.66  )-----------( 215      ( 07 Jul 2018 05:12 )             25.4     07-07    137  |  103  |  32<H>  ----------------------------<  106<H>  3.5   |  25  |  1.95<H>    Ca    8.0<L>      07 Jul 2018 05:12  Phos  3.0     07-06  Mg     2.0     07-06              Review of Systems	      Objective     Physical Examination    heart s1s2  lung dec BS  abd soft      Pertinent Lab findings & Imaging      Catalino:  NO   Adequate UO     I&O's Detail    05 Jul 2018 07:01  -  06 Jul 2018 07:00  --------------------------------------------------------  IN:    Oral Fluid: 480 mL    pantoprazole Infusion: 100 mL  Total IN: 580 mL    OUT:    Voided: 1250 mL  Total OUT: 1250 mL    Total NET: -670 mL               Discussed with:     Cultures:	        Radiology

## 2018-07-07 NOTE — PROGRESS NOTE ADULT - SUBJECTIVE AND OBJECTIVE BOX
Patient is a 58y old  Male who presents with a chief complaint of "bloody diarrhea (2018 15:53)      INTERVAL HPI/OVERNIGHT EVENTS:no acute event overnight    Home Medications:  amLODIPine 10 mg oral tablet: 1 tab(s) orally once a day (2018 12:43)  bacitracin 500 units/g topical ointment: Apply topically to affected area 2 times a day nephrostomy tube (2018 12:43)  Bactrim  mg-160 mg oral tablet: 1 tab(s) orally 2 times a day (2018 12:43)  Colace 100 mg oral capsule: 1 cap(s) orally 3 times a day (2018 12:43)  Flomax 0.4 mg oral capsule: 1 cap(s) orally once a day (2018 12:43)  gas relief: 180 milligram(s)  2 times a day (2018 12:43)  lactulose: 30 milliliter(s) orally 3 times a week, As Needed (2018 12:43)  Milk of Magnesia: 30 milliliter(s) orally 3 times a week, As Needed (2018 12:43)  Procrit 10,000 units/mL preservative-free injectable solution: injectable every 7 days (2018 12:43)  Senna 8.6 mg oral tablet: 2 tab(s) orally once a day (2018 12:43)  Tylenol 500 mg oral tablet: 1 tab(s) orally every 6 hours, As Needed (2018 12:43)  Zofran ODT 4 mg oral tablet, disintegratin tab(s) orally 6 times a day, As Needed (2018 12:43)      MEDICATIONS  (STANDING):  amLODIPine   Tablet 10 milliGRAM(s) Oral daily  BACItracin   Ointment 1 Application(s) Topical every 12 hours  dextrose 50% Injectable 12.5 Gram(s) IV Push once  ferrous    sulfate 325 milliGRAM(s) Oral daily  folic acid 1 milliGRAM(s) Oral daily  insulin lispro (HumaLOG) corrective regimen sliding scale   SubCutaneous three times a day before meals  multivitamin 1 Tablet(s) Oral daily  nystatin Powder 1 Application(s) Topical two times a day  pantoprazole    Tablet 40 milliGRAM(s) Oral before breakfast  sucralfate suspension 1 Gram(s) Oral every 6 hours  tamsulosin 0.4 milliGRAM(s) Oral at bedtime    MEDICATIONS  (PRN):  dextrose 40% Gel 15 Gram(s) Oral once PRN Blood Glucose LESS THAN 70 milliGRAM(s)/deciliter  glucagon  Injectable 1 milliGRAM(s) IntraMuscular once PRN Glucose LESS THAN 70 milligrams/deciliter      Allergies    No Known Allergies    Intolerances        REVIEW OF SYSTEMS:  CONSTITUTIONAL: No fever, weight loss, or fatigue  EYES: No eye pain, visual disturbances, or discharge  ENMT:  No difficulty hearing, tinnitus, vertigo; No sinus or throat pain  NECK: No pain or stiffness  BREASTS: No pain, masses, or nipple discharge  RESPIRATORY: No cough, wheezing, chills or hemoptysis; No shortness of breath  CARDIOVASCULAR: No chest pain, palpitations, dizziness, or leg swelling  GASTROINTESTINAL: No abdominal or epigastric pain. No nausea, vomiting, or hematemesis; No diarrhea or constipation. No melena or hematochezia.  GENITOURINARY: No dysuria, frequency, hematuria, or incontinence  NEUROLOGICAL: No headaches, memory loss, loss of strength, numbness, or tremors  SKIN: No itching, burning, rashes, or lesions   ENDOCRINE: No heat or cold intolerance; No hair loss  MUSCULOSKELETAL: No joint pain or swelling; No muscle, back, or extremity pain  PSYCHIATRIC: No depression, anxiety, mood swings, or difficulty sleeping  HEME/LYMPH: No easy bruising, or bleeding gums  ALLERGY AND IMMUNOLOGIC: No hives or eczema    Vital Signs Last 24 Hrs  T(C): 37 (2018 07:34), Max: 37.2 (2018 20:12)  T(F): 98.6 (2018 07:34), Max: 99 (2018 20:12)  HR: 89 (2018 08:00) (77 - 112)  BP: 121/75 (2018 08:00) (100/86 - 142/78)  BP(mean): 88 (2018 08:00) (77 - 94)  RR: 18 (2018 08:00) (15 - 27)  SpO2: 98% (2018 08:00) (90% - 100%)    PHYSICAL EXAM:  GENERAL: NAD, well-groomed, well-developed  HEAD:  Atraumatic, Normocephalic  EYES: EOMI, PERRLA, conjunctiva and sclera clear  ENMT: Moist mucous membranes,   NECK: Supple, No JVD, Normal thyroid  NERVOUS SYSTEM:  Alert & Oriented X3, Good concentration; Motor Strength 5/5 B/L upper and lower extremities; DTRs 2+ intact and symmetric  CHEST/LUNG: Clear to percussion bilaterally; No rales, rhonchi, wheezing, or rubs  HEART: Regular rate and rhythm; No murmurs, rubs, or gallops  ABDOMEN: Soft, Nontender, Nondistended; Bowel sounds present  EXTREMITIES:  2+ Peripheral Pulses, No clubbing, cyanosis, or edema  SKIN: No rashes or lesions    LABS:                        8.0    8.66  )-----------( 215      ( 2018 05:12 )             25.4     07-    137  |  103  |  32<H>  ----------------------------<  106<H>  3.5   |  25  |  1.95<H>    Ca    8.0<L>      2018 05:12  Phos  3.0     07-06  Mg     2.0     07-06          CAPILLARY BLOOD GLUCOSE      POCT Blood Glucose.: 112 mg/dL (2018 07:57)  POCT Blood Glucose.: 106 mg/dL (2018 16:43)  POCT Blood Glucose.: 122 mg/dL (2018 12:20)        GI PCR Panel, Stool (collected 18 @ 12:05)  Source: .Stool Feces  Final Report (18 @ 14:20):    GI PCR Results: NOT detected    *******Please Note:*******    GI panel PCR evaluates for:    Campylobacter, Plesiomonas shigelloides, Salmonella,    Vibrio, Yersinia enterocolitica, Enteroaggregative    Escherichia coli (EAEC), Enteropathogenic E.coli (EPEC),    Enterotoxigenic E. coli (ETEC) lt/st, Shiga-like    toxin-producing E. coli (STEC) stx1/stx2,    Shigella/ Enteroinvasive E. coli (EIEC), Cryptosporidium,    Cyclospora cayetanensis, Entamoeba histolytica,    Giardia lamblia, Adenovirus F 40/41, Astrovirus,    Norovirus GI/GII, Rotavirus A, Sapovirus        I&O's Summary      RADIOLOGY & ADDITIONAL TESTS:    Imaging Personally Reviewed:  [x ] YES  [ ] NO    Consultant(s) Notes Reviewed:  [x ] YES  [ ] NO    Care Discussed with Consultants/Other Providers [x ] YES  [ ] NO

## 2018-07-08 LAB
ANION GAP SERPL CALC-SCNC: 9 MMOL/L — SIGNIFICANT CHANGE UP (ref 5–17)
BUN SERPL-MCNC: 34 MG/DL — HIGH (ref 7–23)
CALCIUM SERPL-MCNC: 8.1 MG/DL — LOW (ref 8.4–10.5)
CHLORIDE SERPL-SCNC: 103 MMOL/L — SIGNIFICANT CHANGE UP (ref 96–108)
CO2 SERPL-SCNC: 26 MMOL/L — SIGNIFICANT CHANGE UP (ref 22–31)
CREAT SERPL-MCNC: 2.16 MG/DL — HIGH (ref 0.5–1.3)
GLUCOSE BLDC GLUCOMTR-MCNC: 112 MG/DL — HIGH (ref 70–99)
GLUCOSE BLDC GLUCOMTR-MCNC: 125 MG/DL — HIGH (ref 70–99)
GLUCOSE BLDC GLUCOMTR-MCNC: 127 MG/DL — HIGH (ref 70–99)
GLUCOSE BLDC GLUCOMTR-MCNC: 130 MG/DL — HIGH (ref 70–99)
GLUCOSE BLDC GLUCOMTR-MCNC: 144 MG/DL — HIGH (ref 70–99)
GLUCOSE SERPL-MCNC: 104 MG/DL — HIGH (ref 70–99)
HCT VFR BLD CALC: 24.3 % — LOW (ref 39–50)
HGB BLD-MCNC: 7.8 G/DL — LOW (ref 13–17)
MAGNESIUM SERPL-MCNC: 2 MG/DL — SIGNIFICANT CHANGE UP (ref 1.6–2.6)
MCHC RBC-ENTMCNC: 29.5 PG — SIGNIFICANT CHANGE UP (ref 27–34)
MCHC RBC-ENTMCNC: 32.1 GM/DL — SIGNIFICANT CHANGE UP (ref 32–36)
MCV RBC AUTO: 92 FL — SIGNIFICANT CHANGE UP (ref 80–100)
NRBC # BLD: 0 /100 WBCS — SIGNIFICANT CHANGE UP (ref 0–0)
PHOSPHATE SERPL-MCNC: 3.5 MG/DL — SIGNIFICANT CHANGE UP (ref 2.5–4.5)
PLATELET # BLD AUTO: 263 K/UL — SIGNIFICANT CHANGE UP (ref 150–400)
POTASSIUM SERPL-MCNC: 3.6 MMOL/L — SIGNIFICANT CHANGE UP (ref 3.5–5.3)
POTASSIUM SERPL-SCNC: 3.6 MMOL/L — SIGNIFICANT CHANGE UP (ref 3.5–5.3)
RBC # BLD: 2.64 M/UL — LOW (ref 4.2–5.8)
RBC # FLD: 15.1 % — HIGH (ref 10.3–14.5)
SODIUM SERPL-SCNC: 138 MMOL/L — SIGNIFICANT CHANGE UP (ref 135–145)
WBC # BLD: 9.31 K/UL — SIGNIFICANT CHANGE UP (ref 3.8–10.5)
WBC # FLD AUTO: 9.31 K/UL — SIGNIFICANT CHANGE UP (ref 3.8–10.5)

## 2018-07-08 PROCEDURE — 93970 EXTREMITY STUDY: CPT | Mod: 26

## 2018-07-08 RX ORDER — HEPARIN SODIUM 5000 [USP'U]/ML
5000 INJECTION INTRAVENOUS; SUBCUTANEOUS EVERY 12 HOURS
Qty: 0 | Refills: 0 | Status: DISCONTINUED | OUTPATIENT
Start: 2018-07-08 | End: 2018-07-08

## 2018-07-08 RX ADMIN — Medication 1 GRAM(S): at 12:30

## 2018-07-08 RX ADMIN — Medication 1 GRAM(S): at 23:53

## 2018-07-08 RX ADMIN — NYSTATIN CREAM 1 APPLICATION(S): 100000 CREAM TOPICAL at 18:16

## 2018-07-08 RX ADMIN — CEFTRIAXONE 100 GRAM(S): 500 INJECTION, POWDER, FOR SOLUTION INTRAMUSCULAR; INTRAVENOUS at 20:09

## 2018-07-08 RX ADMIN — NYSTATIN CREAM 1 APPLICATION(S): 100000 CREAM TOPICAL at 06:06

## 2018-07-08 RX ADMIN — HEPARIN SODIUM 5000 UNIT(S): 5000 INJECTION INTRAVENOUS; SUBCUTANEOUS at 15:43

## 2018-07-08 RX ADMIN — Medication 1 TABLET(S): at 12:30

## 2018-07-08 RX ADMIN — PANTOPRAZOLE SODIUM 40 MILLIGRAM(S): 20 TABLET, DELAYED RELEASE ORAL at 06:06

## 2018-07-08 RX ADMIN — Medication 1 APPLICATION(S): at 18:16

## 2018-07-08 RX ADMIN — AMLODIPINE BESYLATE 10 MILLIGRAM(S): 2.5 TABLET ORAL at 06:05

## 2018-07-08 RX ADMIN — Medication 1 APPLICATION(S): at 12:31

## 2018-07-08 RX ADMIN — TAMSULOSIN HYDROCHLORIDE 0.4 MILLIGRAM(S): 0.4 CAPSULE ORAL at 21:19

## 2018-07-08 RX ADMIN — Medication 1 MILLIGRAM(S): at 12:30

## 2018-07-08 RX ADMIN — Medication 1 GRAM(S): at 18:16

## 2018-07-08 RX ADMIN — Medication 325 MILLIGRAM(S): at 12:30

## 2018-07-08 RX ADMIN — SODIUM CHLORIDE 50 MILLILITER(S): 9 INJECTION, SOLUTION INTRAVENOUS at 18:16

## 2018-07-08 RX ADMIN — Medication 1 GRAM(S): at 06:05

## 2018-07-08 RX ADMIN — Medication 1 GRAM(S): at 06:04

## 2018-07-08 NOTE — PROGRESS NOTE ADULT - SUBJECTIVE AND OBJECTIVE BOX
Date/Time Patient Seen:  		  Referring MD:   Data Reviewed	       Patient is a 58y old  Male who presents with a chief complaint of "bloody diarrhea (05 Jul 2018 15:53)      Subjective/HPI    in bed  seen and examined  vs and meds reviewed         PAST MEDICAL & SURGICAL HISTORY:  Hx of peripheral neuropathy  Morbid obesity  H/O sleep apnea  Acquired lymphedema of leg  Calculus of kidney and ureter  Diabetes mellitus type II  HTN (hypertension)  Ureteral stents placement, bilateral  Hx of tonsillectomy: at 6 years old        Medication list         MEDICATIONS  (STANDING):  amLODIPine   Tablet 10 milliGRAM(s) Oral daily  BACItracin   Ointment 1 Application(s) Topical every 12 hours  cefTRIAXone   IVPB 1 Gram(s) IV Intermittent every 24 hours  cefTRIAXone   IVPB      dextrose 50% Injectable 12.5 Gram(s) IV Push once  ferrous    sulfate 325 milliGRAM(s) Oral daily  folic acid 1 milliGRAM(s) Oral daily  insulin lispro (HumaLOG) corrective regimen sliding scale   SubCutaneous three times a day before meals  lactated ringers. 1000 milliLiter(s) (50 mL/Hr) IV Continuous <Continuous>  multivitamin 1 Tablet(s) Oral daily  nystatin Powder 1 Application(s) Topical two times a day  pantoprazole    Tablet 40 milliGRAM(s) Oral before breakfast  sucralfate suspension 1 Gram(s) Oral every 6 hours  tamsulosin 0.4 milliGRAM(s) Oral at bedtime    MEDICATIONS  (PRN):  dextrose 40% Gel 15 Gram(s) Oral once PRN Blood Glucose LESS THAN 70 milliGRAM(s)/deciliter  glucagon  Injectable 1 milliGRAM(s) IntraMuscular once PRN Glucose LESS THAN 70 milligrams/deciliter         Vitals log        ICU Vital Signs Last 24 Hrs  T(C): 37.1 (08 Jul 2018 07:42), Max: 38.3 (07 Jul 2018 19:57)  T(F): 98.8 (08 Jul 2018 07:42), Max: 101 (07 Jul 2018 19:57)  HR: 92 (08 Jul 2018 06:00) (84 - 106)  BP: 138/71 (08 Jul 2018 06:00) (99/47 - 159/82)  BP(mean): 92 (08 Jul 2018 06:00) (59 - 104)  ABP: --  ABP(mean): --  RR: 18 (08 Jul 2018 06:00) (17 - 28)  SpO2: 95% (08 Jul 2018 06:00) (91% - 99%)           Input and Output:  I&O's Detail    07 Jul 2018 07:01  -  08 Jul 2018 07:00  --------------------------------------------------------  IN:    IV PiggyBack: 50 mL    lactated ringers.: 225 mL    Oral Fluid: 340 mL  Total IN: 615 mL    OUT:    Voided: 1850 mL  Total OUT: 1850 mL    Total NET: -1235 mL          Lab Data                        7.8    9.31  )-----------( 263      ( 08 Jul 2018 06:19 )             24.3     07-08    138  |  103  |  34<H>  ----------------------------<  104<H>  3.6   |  26  |  2.16<H>    Ca    8.1<L>      08 Jul 2018 06:19  Phos  3.0     07-06  Mg     2.0     07-06              Review of Systems	      Objective     Physical Examination    heart s1s2  lung dec BS  abd soft    Pertinent Lab findings & Imaging      Catalino:  NO   Adequate UO     I&O's Detail    07 Jul 2018 07:01  -  08 Jul 2018 07:00  --------------------------------------------------------  IN:    IV PiggyBack: 50 mL    lactated ringers.: 225 mL    Oral Fluid: 340 mL  Total IN: 615 mL    OUT:    Voided: 1850 mL  Total OUT: 1850 mL    Total NET: -1235 mL               Discussed with:     Cultures:	        Radiology

## 2018-07-08 NOTE — PROGRESS NOTE ADULT - ASSESSMENT
59 y/o man with PMH of nephrolithiasis s/p BL nephrostomies treated with Bactrim who presented with Hgb 4.9 after 2 weeks of melanotic stools. Required total of 9 units of pRBC and EGD ultimately showed large duodenal ulcer; course complicated now by bilateral DVTs; Clearly event is provoked with hospitalization and rehab stay and then admission for GI bleed  on 6/29/18  - Hg has remained overall stable but still low at arond 8 in setting of GI bleed  - would resume erythropoetic agent as per renal for anemia due to anemia due to CKD  - continue PPI and carafate as per GI  - patient is at high risk for recurrent bleeding given his duodenal bleed was within the last 10 days; would be very cautious with anticoagulation (started on prophylactic heparin); would advocate for IVC filter  - per notes from Dr. Hahn, vascular was called.  - please call with any additional heme recommendations

## 2018-07-08 NOTE — PROGRESS NOTE ADULT - ASSESSMENT
The patient is a 58 year old male with a history of HTN, DM, lymphedema, peripheral neuropathy, kidney stone, MEGHA who presents from rehab with bloody diarrhea.    Plan:  - Continue amlodipine for HTN  - Hemoglobin stable  - Atrial tachycardia - brief episode and asymptomatic. If recurrent episodes or patient develops palpitations, start metoprolol.  - NSVT - await final report. LV systolic function appears normal, no significant valve issues.  - GI follow-up  - Worsening renal function  - Febrile overnight. Started on antibiotics for UTI.  - CXR clear lungs. Continue IVF as needed.

## 2018-07-08 NOTE — CHART NOTE - NSCHARTNOTEFT_GEN_A_CORE
has b/l DVT - got call from radiology, discussed with hematology , in view of recent severe GI bleed needs filter, no anticoagulation- ( DR Calle), Dr wisdom called message left

## 2018-07-08 NOTE — PROGRESS NOTE ADULT - SUBJECTIVE AND OBJECTIVE BOX
INTERVAL HPI/OVERNIGHT EVENTS:  No new overnight event.  No N/V/D.  Tolerating diet.     MEDICATIONS  (STANDING):  amLODIPine   Tablet 10 milliGRAM(s) Oral daily  BACItracin   Ointment 1 Application(s) Topical every 12 hours  cefTRIAXone   IVPB 1 Gram(s) IV Intermittent every 24 hours  cefTRIAXone   IVPB      dextrose 50% Injectable 12.5 Gram(s) IV Push once  ferrous    sulfate 325 milliGRAM(s) Oral daily  folic acid 1 milliGRAM(s) Oral daily  heparin  Injectable 5000 Unit(s) SubCutaneous every 12 hours  insulin lispro (HumaLOG) corrective regimen sliding scale   SubCutaneous three times a day before meals  lactated ringers. 1000 milliLiter(s) (50 mL/Hr) IV Continuous <Continuous>  multivitamin 1 Tablet(s) Oral daily  nystatin Powder 1 Application(s) Topical two times a day  pantoprazole    Tablet 40 milliGRAM(s) Oral before breakfast  sucralfate suspension 1 Gram(s) Oral every 6 hours  tamsulosin 0.4 milliGRAM(s) Oral at bedtime    MEDICATIONS  (PRN):  dextrose 40% Gel 15 Gram(s) Oral once PRN Blood Glucose LESS THAN 70 milliGRAM(s)/deciliter  glucagon  Injectable 1 milliGRAM(s) IntraMuscular once PRN Glucose LESS THAN 70 milligrams/deciliter      Allergies    No Known Allergies    Intolerances        Review of Systems:    General:  No wt loss, fevers, chills, night sweats,fatigue,   Eyes:  Good vision, no reported pain  ENT:  No sore throat, pain, runny nose, dysphagia  CV:  No pain, palpitatioins, hypo/hypertension  Resp:  No dyspnea, cough, tachypnea, wheezing  GI:  No pain, No nausea, No vomiting, No diarrhea, No constipatiion, No weight loss, No fever, No pruritis, No rectal bleeding, No tarry stools, No dysphagia,  :  No pain, bleeding, incontinence, nocturia  Muscle:  No pain, weakness  Neuro:  No weakness, tingling, memory problems  Psych:  No fatigue, insomnia, mood problems, depression  Endocrine:  No polyuria, polydypsia, cold/heat intolerance  Heme:  No petechiae, ecchymosis, easy bruisability  Skin:  No rash, tattoos, scars, edema      Vital Signs Last 24 Hrs  T(C): 37.1 (2018 07:42), Max: 38.3 (2018 19:57)  T(F): 98.8 (2018 07:42), Max: 101 (2018 19:57)  HR: 91 (2018 12:00) (84 - 106)  BP: 117/67 (2018 12:00) (99/47 - 159/82)  BP(mean): 79 (2018 12:00) (59 - 104)  RR: 23 (2018 10:00) (17 - 28)  SpO2: 96% (2018 12:00) (91% - 99%)    PHYSICAL EXAM:    Constitutional: NAD, well-developed  HEENT: EOMI, throat clear  Neck: No LAD, supple  Respiratory: CTA and P  Cardiovascular: S1 and S2, RRR, no M  Gastrointestinal: BS+, soft, NT/ND, neg HSM,  Extremities: No peripheral edema, neg clubing, cyanosis  Vascular: 2+ peripheral pulses  Neurological: A/O x 3, no focal deficits  Psychiatric: Normal mood, normal affect  Skin: No rashes      LABS:                        7.8    9.31  )-----------( 263      ( 2018 06:19 )             24.3     07-08    138  |  103  |  34<H>  ----------------------------<  104<H>  3.6   |  26  |  2.16<H>    Ca    8.1<L>      2018 06:19  Phos  3.5     07-08  Mg     2.0     07-08        Urinalysis Basic - ( 2018 20:56 )    Color: Yellow / Appearance: Turbid / S.010 / pH: x  Gluc: x / Ketone: Negative  / Bili: Negative / Urobili: Negative mg/dL   Blood: x / Protein: 100 mg/dL / Nitrite: Positive   Leuk Esterase: Moderate / RBC: 6-10 /HPF / WBC 3-5   Sq Epi: x / Non Sq Epi: Moderate / Bacteria: Many        RADIOLOGY & ADDITIONAL TESTS:

## 2018-07-08 NOTE — CHART NOTE - NSCHARTNOTEFT_GEN_A_CORE
Assessment: 59yo male pt admitted from [facility] with GI bleed. Diet advanced from Clears, tolerating regular diet well. No N/V/D/C, reports occasional gas, had a BM yesterday. Written and verbal education on soft diet provided by RD yesterday 7/7/18 ( see outcome summary). Pt was requesting education on weight loss, verbal and written weight loss education provided for pt; reinforced the importance of portion control and balanced meals. Noted with +4 edema rt and lt foot. RD to continue to f/u and monitor.    Factors impacting intake: [ ] none [ ] nausea  [ ] vomiting [ ] diarrhea [ ] constipation  [ ]chewing problems [ ] swallowing issues  [X] other: GIB    Diet Presciption: Diet, Regular (07-05-18 @ 17:05)    Intake: fair -good po intake    Current Weight: no CBW  % Weight Change- no weight obtained since last f/u 7/5/18 (185.9kg)    Pertinent Medications: MEDICATIONS  (STANDING):  amLODIPine   Tablet 10 milliGRAM(s) Oral daily  BACItracin   Ointment 1 Application(s) Topical every 12 hours  cefTRIAXone   IVPB 1 Gram(s) IV Intermittent every 24 hours  cefTRIAXone   IVPB      dextrose 50% Injectable 12.5 Gram(s) IV Push once  ferrous    sulfate 325 milliGRAM(s) Oral daily  folic acid 1 milliGRAM(s) Oral daily  heparin  Injectable 5000 Unit(s) SubCutaneous every 12 hours  insulin lispro (HumaLOG) corrective regimen sliding scale   SubCutaneous three times a day before meals  lactated ringers. 1000 milliLiter(s) (50 mL/Hr) IV Continuous <Continuous>  multivitamin 1 Tablet(s) Oral daily  nystatin Powder 1 Application(s) Topical two times a day  pantoprazole    Tablet 40 milliGRAM(s) Oral before breakfast  sucralfate suspension 1 Gram(s) Oral every 6 hours  tamsulosin 0.4 milliGRAM(s) Oral at bedtime    MEDICATIONS  (PRN):  dextrose 40% Gel 15 Gram(s) Oral once PRN Blood Glucose LESS THAN 70 milliGRAM(s)/deciliter  glucagon  Injectable 1 milliGRAM(s) IntraMuscular once PRN Glucose LESS THAN 70 milligrams/deciliter    Pertinent Labs: 07-08 Na138 mmol/L Glu 104 mg/dL<H> K+ 3.6 mmol/L Cr  2.16 mg/dL<H> BUN 34 mg/dL<H> 07-08 Phos 3.5 mg/dL 07-02 Alb 2.4 g/dL<L> 06-29 [id number] 5.5 % 06-29 Chol 103 mg/dL LDL 49 mg/dL HDL 17 mg/dL<L> Trig 187 mg/dL<H>     CAPILLARY BLOOD GLUCOSE      POCT Blood Glucose.: 130 mg/dL (08 Jul 2018 11:59)  POCT Blood Glucose.: 112 mg/dL (08 Jul 2018 07:51)  POCT Blood Glucose.: 125 mg/dL (07 Jul 2018 23:55)  POCT Blood Glucose.: 127 mg/dL (07 Jul 2018 16:42)    Skin:  stage I pressure ulcer to left/right buttocks and stage I to sacrum    Estimated Needs:   [X] no change since previous assessment  [ ] recalculated:     Previous Nutrition Diagnosis:   [ ] Inadequate Energy Intake [ ]Inadequate Oral Intake [ ] Excessive Energy Intake   [ ] Underweight [ ] Increased Nutrient Needs [X] Overweight/Obesity   [X] Altered GI Function [ ] Unintended Weight Loss [ ] Food & Nutrition Related Knowledge Deficit [ ] Malnutrition     Nutrition Diagnosis is [X] ongoing  [ ] resolved [ ] not applicable     New Nutrition Diagnosis: [ ] not applicable       Interventions:   Recommend  [ ] Change Diet To:  [ ] Nutrition Supplement  [ ] Nutrition Support  [X] Other: Cont POC.    Monitoring and Evaluation:   [X] PO intake [ x ] Tolerance to diet prescription [ x ] weights [ x ] labs[ x ] follow up per protocol  [ ] other: pt will tolerate at least 51-75% of meals daily x5 days. Assessment: 59yo male pt admitted from [facility] with GI bleed. Diet advanced from Clears, tolerating regular diet well. No N/V/D/C, reports occasional gas, had a BM yesterday. Written and verbal education on soft diet provided by RD yesterday 7/7/18 ( see outcome summary). Pt was requesting education on weight loss and DM (pt is pre-DM), verbal and written weight loss and type 2 DM education provided for pt; reinforced the importance of portion control and balanced meals. Recommend diet change to Cons CHO/DASH TLC. Noted with +4 edema rt and lt foot. RD to continue to f/u and monitor.    Factors impacting intake: [ ] none [ ] nausea  [ ] vomiting [ ] diarrhea [ ] constipation  [ ]chewing problems [ ] swallowing issues  [X] other: GIB    Diet Presciption: Diet, Regular (07-05-18 @ 17:05)    Intake: fair -good po intake    Current Weight: no CBW  % Weight Change- no weight obtained since last f/u 7/5/18 (185.9kg)    Pertinent Medications: MEDICATIONS  (STANDING):  amLODIPine   Tablet 10 milliGRAM(s) Oral daily  BACItracin   Ointment 1 Application(s) Topical every 12 hours  cefTRIAXone   IVPB 1 Gram(s) IV Intermittent every 24 hours  cefTRIAXone   IVPB      dextrose 50% Injectable 12.5 Gram(s) IV Push once  ferrous    sulfate 325 milliGRAM(s) Oral daily  folic acid 1 milliGRAM(s) Oral daily  heparin  Injectable 5000 Unit(s) SubCutaneous every 12 hours  insulin lispro (HumaLOG) corrective regimen sliding scale   SubCutaneous three times a day before meals  lactated ringers. 1000 milliLiter(s) (50 mL/Hr) IV Continuous <Continuous>  multivitamin 1 Tablet(s) Oral daily  nystatin Powder 1 Application(s) Topical two times a day  pantoprazole    Tablet 40 milliGRAM(s) Oral before breakfast  sucralfate suspension 1 Gram(s) Oral every 6 hours  tamsulosin 0.4 milliGRAM(s) Oral at bedtime    MEDICATIONS  (PRN):  dextrose 40% Gel 15 Gram(s) Oral once PRN Blood Glucose LESS THAN 70 milliGRAM(s)/deciliter  glucagon  Injectable 1 milliGRAM(s) IntraMuscular once PRN Glucose LESS THAN 70 milligrams/deciliter    Pertinent Labs: 07-08 Na138 mmol/L Glu 104 mg/dL<H> K+ 3.6 mmol/L Cr  2.16 mg/dL<H> BUN 34 mg/dL<H> 07-08 Phos 3.5 mg/dL 07-02 Alb 2.4 g/dL<L> 06-29 [id number] 5.5 % 06-29 Chol 103 mg/dL LDL 49 mg/dL HDL 17 mg/dL<L> Trig 187 mg/dL<H>     CAPILLARY BLOOD GLUCOSE      POCT Blood Glucose.: 130 mg/dL (08 Jul 2018 11:59)  POCT Blood Glucose.: 112 mg/dL (08 Jul 2018 07:51)  POCT Blood Glucose.: 125 mg/dL (07 Jul 2018 23:55)  POCT Blood Glucose.: 127 mg/dL (07 Jul 2018 16:42)    Skin:  stage I pressure ulcer to left/right buttocks and stage I to sacrum    Estimated Needs:   [X] no change since previous assessment  [ ] recalculated:     Previous Nutrition Diagnosis:   [ ] Inadequate Energy Intake [ ]Inadequate Oral Intake [ ] Excessive Energy Intake   [ ] Underweight [ ] Increased Nutrient Needs [X] Overweight/Obesity   [X] Altered GI Function [ ] Unintended Weight Loss [ ] Food & Nutrition Related Knowledge Deficit [ ] Malnutrition     Nutrition Diagnosis is [X] ongoing  [ ] resolved [ ] not applicable     New Nutrition Diagnosis: [ ] not applicable       Interventions:   Recommend  [X] Change Diet To: Cons CHO/ DASH/TLC  [ ] Nutrition Supplement  [ ] Nutrition Support  [X] Other: Cont POC.    Monitoring and Evaluation:   [X] PO intake [ x ] Tolerance to diet prescription [ x ] weights [ x ] labs[ x ] follow up per protocol  [ ] other: pt will tolerate at least 51-75% of meals daily x5 days. Assessment: 57yo male pt admitted from [facility] with GI bleed. PMH of HTN, DM, morbid obesity, and lymphedema. Diet advanced from Clears, tolerating regular diet well. No N/V/D/C, reports occasional gas, had a BM yesterday. Written and verbal education on soft diet provided by RD yesterday 7/7/18 ( see outcome summary). Pt was requesting education on weight loss and DM (pt is pre-DM), verbal and written weight loss and type 2 DM education provided for pt; reinforced the importance of portion control and balanced meals. Recommend diet change to Cons CHO/DASH TLC. Noted with +4 edema rt and lt foot. RD to continue to f/u and monitor.    Factors impacting intake: [ ] none [ ] nausea  [ ] vomiting [ ] diarrhea [ ] constipation  [ ]chewing problems [ ] swallowing issues  [X] other: GIB    Diet Presciption: Diet, Regular (07-05-18 @ 17:05)    Intake: fair -good po intake    Current Weight: no CBW  % Weight Change- no weight obtained since last f/u 7/5/18 (185.9kg)    Pertinent Medications: MEDICATIONS  (STANDING):  amLODIPine   Tablet 10 milliGRAM(s) Oral daily  BACItracin   Ointment 1 Application(s) Topical every 12 hours  cefTRIAXone   IVPB 1 Gram(s) IV Intermittent every 24 hours  cefTRIAXone   IVPB      dextrose 50% Injectable 12.5 Gram(s) IV Push once  ferrous    sulfate 325 milliGRAM(s) Oral daily  folic acid 1 milliGRAM(s) Oral daily  heparin  Injectable 5000 Unit(s) SubCutaneous every 12 hours  insulin lispro (HumaLOG) corrective regimen sliding scale   SubCutaneous three times a day before meals  lactated ringers. 1000 milliLiter(s) (50 mL/Hr) IV Continuous <Continuous>  multivitamin 1 Tablet(s) Oral daily  nystatin Powder 1 Application(s) Topical two times a day  pantoprazole    Tablet 40 milliGRAM(s) Oral before breakfast  sucralfate suspension 1 Gram(s) Oral every 6 hours  tamsulosin 0.4 milliGRAM(s) Oral at bedtime    MEDICATIONS  (PRN):  dextrose 40% Gel 15 Gram(s) Oral once PRN Blood Glucose LESS THAN 70 milliGRAM(s)/deciliter  glucagon  Injectable 1 milliGRAM(s) IntraMuscular once PRN Glucose LESS THAN 70 milligrams/deciliter    Pertinent Labs: 07-08 Na138 mmol/L Glu 104 mg/dL<H> K+ 3.6 mmol/L Cr  2.16 mg/dL<H> BUN 34 mg/dL<H> 07-08 Phos 3.5 mg/dL 07-02 Alb 2.4 g/dL<L> 06-29 [id number] 5.5 % 06-29 Chol 103 mg/dL LDL 49 mg/dL HDL 17 mg/dL<L> Trig 187 mg/dL<H>     CAPILLARY BLOOD GLUCOSE      POCT Blood Glucose.: 130 mg/dL (08 Jul 2018 11:59)  POCT Blood Glucose.: 112 mg/dL (08 Jul 2018 07:51)  POCT Blood Glucose.: 125 mg/dL (07 Jul 2018 23:55)  POCT Blood Glucose.: 127 mg/dL (07 Jul 2018 16:42)    Skin:  stage I pressure ulcer to left/right buttocks and stage I to sacrum    Estimated Needs:   [X] no change since previous assessment  [ ] recalculated:     Previous Nutrition Diagnosis:   [ ] Inadequate Energy Intake [ ]Inadequate Oral Intake [ ] Excessive Energy Intake   [ ] Underweight [ ] Increased Nutrient Needs [X] Overweight/Obesity   [X] Altered GI Function [ ] Unintended Weight Loss [ ] Food & Nutrition Related Knowledge Deficit [ ] Malnutrition     Nutrition Diagnosis is [X] ongoing  [ ] resolved [ ] not applicable     New Nutrition Diagnosis: [ ] not applicable       Interventions:   Recommend  [X] Change Diet To: Cons CHO/ DASH/TLC  [ ] Nutrition Supplement  [ ] Nutrition Support  [X] Other: Cont POC.    Monitoring and Evaluation:   [X] PO intake [ x ] Tolerance to diet prescription [ x ] weights [ x ] labs[ x ] follow up per protocol  [ ] other: pt will tolerate at least 51-75% of meals daily x5 days.

## 2018-07-08 NOTE — CONSULT NOTE ADULT - SUBJECTIVE AND OBJECTIVE BOX
Patient is a 58y old  Male who presents with a chief complaint of "bloody diarrhea (2018 15:53)      HISTORY OF PRESENT ILLNESS:  59 y/o morbidly obese male with history of bilateral kidney stones, admitted with bloody diarrhea.  Pt found to have fever and acute on chronic renal insufficiency.  Pt with long history of kidney stones, s/p bilateral ureteral stent placement by Dr. Garcia 12.  Pt continued his care with Dr. Teixeira and has undergone PCNL of stones in the past.  He currently has bilateral nephrostomy tubes that have been capped, as well as bilateral ureteral stents.  He is scheduled for PCNL with Dr. Teixeira in August for treatment of his renal calculi.  No urinary complaints.    PAST MEDICAL & SURGICAL HISTORY:  Hx of peripheral neuropathy  Morbid obesity  H/O sleep apnea  Acquired lymphedema of leg  Calculus of kidney and ureter  Diabetes mellitus type II  HTN (hypertension)  Ureteral stents placement, bilateral  Hx of tonsillectomy: at 6 years old      REVIEW OF SYSTEMS:    CONSTITUTIONAL: (+)fever  SKIN: No itching, burning, rashes, or lesions   EYES/ENT: No visual changes;  No vertigo or throat pain   NECK: No pain or stiffness  RESPIRATORY: No cough, wheezing, hemoptysis; No shortness of breath  CARDIOVASCULAR: No chest pain or palpitations  GASTROINTESTINAL: No abdominal or epigastric pain. No nausea, vomiting, diarrhea  NEUROLOGICAL: No numbness or weakness  GENITOURINARY:     No hematuria, dysuria, incontinence    All other review of systems is negative unless indicated above.    MEDICATIONS  (STANDING):  amLODIPine   Tablet 10 milliGRAM(s) Oral daily  BACItracin   Ointment 1 Application(s) Topical every 12 hours  cefTRIAXone   IVPB 1 Gram(s) IV Intermittent every 24 hours  cefTRIAXone   IVPB      dextrose 50% Injectable 12.5 Gram(s) IV Push once  ferrous    sulfate 325 milliGRAM(s) Oral daily  folic acid 1 milliGRAM(s) Oral daily  heparin  Injectable 5000 Unit(s) SubCutaneous every 12 hours  insulin lispro (HumaLOG) corrective regimen sliding scale   SubCutaneous three times a day before meals  lactated ringers. 1000 milliLiter(s) (50 mL/Hr) IV Continuous <Continuous>  multivitamin 1 Tablet(s) Oral daily  nystatin Powder 1 Application(s) Topical two times a day  pantoprazole    Tablet 40 milliGRAM(s) Oral before breakfast  sucralfate suspension 1 Gram(s) Oral every 6 hours  tamsulosin 0.4 milliGRAM(s) Oral at bedtime    MEDICATIONS  (PRN):  dextrose 40% Gel 15 Gram(s) Oral once PRN Blood Glucose LESS THAN 70 milliGRAM(s)/deciliter  glucagon  Injectable 1 milliGRAM(s) IntraMuscular once PRN Glucose LESS THAN 70 milligrams/deciliter      Allergies    No Known Allergies      SOCIAL HISTORY:   Smoking: occasional cigars   Work: disabled       FAMILY HISTORY:  Family history of coronary artery disease  Family history of prostate cancer in father      Vital Signs Last 24 Hrs  T(C): 37.1 (2018 07:42), Max: 38.3 (2018 19:57)  T(F): 98.8 (2018 07:42), Max: 101 (2018 19:57)  HR: 92 (2018 06:00) (84 - 106)  BP: 138/71 (2018 06:00) (99/47 - 159/82)  BP(mean): 92 (2018 06:00) (59 - 104)  RR: 18 (2018 06:00) (17 - 28)  SpO2: 95% (2018 06:00) (91% - 99%)    PHYSICAL EXAM:    Constitutional: NAD, morbidly obese  HEENT: PERRLA, EOMI  Neck: Supple, full ROM  Back: (+) bilateral N-tubes, capped  Respiratory: Normal repiratory effort  Cardiovascular: RRR  Abd: Soft, NT/ND  Neurological: A&O x 3, no focal deficits  Psychiatric: Normal mood, normal affect  Skin: No rashes    LABS:                        7.8    9.31  )-----------( 263      ( 2018 06:19 )             24.3     07-08    138  |  103  |  34<H>  ----------------------------<  104<H>  3.6   |  26  |  2.16<H>    Ca    8.1<L>      2018 06:19  Phos  3.5     07-08  Mg     2.0     07-08        Urinalysis Basic - ( 2018 20:56 )    Color: Yellow / Appearance: Turbid / S.010 / pH: x  Gluc: x / Ketone: Negative  / Bili: Negative / Urobili: Negative mg/dL   Blood: x / Protein: 100 mg/dL / Nitrite: Positive   Leuk Esterase: Moderate / RBC: 6-10 /HPF / WBC 3-5   Sq Epi: x / Non Sq Epi: Moderate / Bacteria: Many      Urine Culture:

## 2018-07-08 NOTE — PROGRESS NOTE ADULT - ASSESSMENT
57 y/o M pt w/ PMHx morbid obesity, DM, HTN, peripheral neuropathy, calculus of kidney and ureter, sleep apnea, acquired lymphedema of leg and PSHx for obstructive neuropathy had  nephrostomy bilateral kidneys presents to the ED BIBA from [facility] c/o bloody diarrhea x 3 since yesterday, describes it as red. Also endorsing nausea. States that he had a blood transfusion at [facility] on 6/23 and was given an injection of Procrit yesterday. States hemoglobin was above 7 day prior to admission and on day of admission  i below 7, approx 5.  Pt stated he resides at [facility] for rehabilitation due to a fall 2 months ago. Pt denies vomiting, abd pain, fever or any other complaints at this time.  IN [facility] ED hemoglobin 4.9, and had central line placed and  2 units PRBC.vital signs stable    admitted for acute on ch anemia due to GI bl;eed with SMITH with CKD3 with h/o ureteral stents with MOrbid obesity , DM2 , HTN, peripheral neuropathy ,    for PRBC, GI consult , npo  , pantoprazole, started in ED, nephro consult , and pulm consult called   6/29 hemoglobin still low received 6 units PRBc,   patient was hemodyanamically stable for emegency endoscopy, and  intermediat risk for procedure, pulmonary clearance Dr Feng  d/w patient and wife by bed side, risk and benefits explained  d/w DR Hudson,  DR Feng as pulmonary critical care.  6/30 had EGd had bleeding duodenal ulcer , hemostatised in procedure , had post op respiratory failure and kept intubated till 7/1, extubated on 7/1, hemodyanamically stable, saturating well, no active bleeding, receiving PRBC, on regimen for gastritis, PUD,no active bleeding, hemoglobin improving , activity increased  Surgical consult If bleeding recurs, pt stable improving,hemoglobin stable, activity increased  DC plan started, PT, diet advance as per GI, had some arrhythmia on Tele ECHO ordered, will monitored for 24 hrs , NSVT transient resolved, LVEF normal, d/w cardiologist , stable for discharge,  pt came from rehab, but does not want to go back to rehab,    overnight 7/7 had fever UA suggestive of UTI started on ceftriaxone cultures sent, will await improvement, x ray chest clear.  ID consult called,   continue activity, hemoglobin 7.8 slight drop will continue to monitor.

## 2018-07-08 NOTE — PROGRESS NOTE ADULT - SUBJECTIVE AND OBJECTIVE BOX
Patient seen and examined;  Chart reviewed and events noted;   Patient previously seen by our group on 6/29/18 - 7/2/18 after admission for melenotic stools; ultimately found to have large duodenal ulcerations and gastritis  now noted to have bilateral DVTs; asked to evaluate    MEDICATIONS  (STANDING):  amLODIPine   Tablet 10 milliGRAM(s) Oral daily  BACItracin   Ointment 1 Application(s) Topical every 12 hours  cefTRIAXone   IVPB 1 Gram(s) IV Intermittent every 24 hours  cefTRIAXone   IVPB      dextrose 50% Injectable 12.5 Gram(s) IV Push once  ferrous    sulfate 325 milliGRAM(s) Oral daily  folic acid 1 milliGRAM(s) Oral daily  heparin  Injectable 5000 Unit(s) SubCutaneous every 12 hours  insulin lispro (HumaLOG) corrective regimen sliding scale   SubCutaneous three times a day before meals  lactated ringers. 1000 milliLiter(s) (50 mL/Hr) IV Continuous <Continuous>  multivitamin 1 Tablet(s) Oral daily  nystatin Powder 1 Application(s) Topical two times a day  pantoprazole    Tablet 40 milliGRAM(s) Oral before breakfast  sucralfate suspension 1 Gram(s) Oral every 6 hours  tamsulosin 0.4 milliGRAM(s) Oral at bedtime    MEDICATIONS  (PRN):  dextrose 40% Gel 15 Gram(s) Oral once PRN Blood Glucose LESS THAN 70 milliGRAM(s)/deciliter  glucagon  Injectable 1 milliGRAM(s) IntraMuscular once PRN Glucose LESS THAN 70 milligrams/deciliter      Vital Signs Last 24 Hrs  T(C): 36.9 (08 Jul 2018 16:03), Max: 38.3 (07 Jul 2018 19:57)  T(F): 98.5 (08 Jul 2018 16:03), Max: 101 (07 Jul 2018 19:57)  HR: 91 (08 Jul 2018 12:00) (84 - 106)  BP: 117/67 (08 Jul 2018 12:00) (99/47 - 159/82)  BP(mean): 79 (08 Jul 2018 12:00) (59 - 104)  RR: 23 (08 Jul 2018 10:00) (17 - 28)  SpO2: 96% (08 Jul 2018 12:00) (91% - 99%)    PHYSICAL EXAM  General: adult in NAD  HEENT: clear oropharynx, anicteric sclera, pink conjunctivae  Neck: supple; central line in place on right side  CV: normal S1S2 with no murmur rubs or gallops  Lungs: clear to auscultation on anterior exam  Abdomen:  obese, soft  Ext: + edema bilateral LEs  Skin: no rashes and no petichiae  Neuro: alert and oriented X3 no focal deficits      LABS:                        7.8    9.31  )-----------( 263      ( 08 Jul 2018 06:19 )             24.3     Hemoglobin: 7.8 g/dL (07-08 @ 06:19)  Hemoglobin: 8.2 g/dL (07-07 @ 20:56)  Hemoglobin: 8.0 g/dL (07-07 @ 05:12)  Hemoglobin: 8.0 g/dL (07-06 @ 07:12)  Hemoglobin: 8.3 g/dL (07-04 @ 06:43)    07-08    138  |  103  |  34<H>  ----------------------------<  104<H>  3.6   |  26  |  2.16<H>    Ca    8.1<L>      08 Jul 2018 06:19  Phos  3.5     07-08  Mg     2.0     07-08    US Duplex Venous Lower Ext Complete, Bilateral (07.08.18 @ 11:10) >    INTERPRETATION:  CLINICAL INFORMATION: Fever. Lower some edema.    TECHNIQUE: Duplex sonography of the BILATERAL LOWER extremities with   color and spectral Doppler, with and without compression.      FINDINGS:    Limited study due to patient body habitus.    There is occlusive and partially occlusive DVT in the rightproximal and   mid femoral vein, right popliteal vein and right posterior tibial veins.    There is occlusive and partially occlusive DVT in the left popliteal vein   and left posterior tibial veins.    There is normal compressibility of the bilateral common femoral veins.        IMPRESSION:     Evidence of bilateral DVT as described.

## 2018-07-08 NOTE — PROGRESS NOTE ADULT - SUBJECTIVE AND OBJECTIVE BOX
Patient is a 58y old  Male who presents with a chief complaint of "bloody diarrhea (2018 15:53)      INTERVAL HPI/OVERNIGHT EVENTS:    Home Medications:  amLODIPine 10 mg oral tablet: 1 tab(s) orally once a day (2018 12:43)  bacitracin 500 units/g topical ointment: Apply topically to affected area 2 times a day nephrostomy tube (2018 12:43)  Bactrim  mg-160 mg oral tablet: 1 tab(s) orally 2 times a day (2018 12:43)  Colace 100 mg oral capsule: 1 cap(s) orally 3 times a day (2018 12:43)  Flomax 0.4 mg oral capsule: 1 cap(s) orally once a day (2018 12:43)  gas relief: 180 milligram(s)  2 times a day (2018 12:43)  lactulose: 30 milliliter(s) orally 3 times a week, As Needed (2018 12:43)  Milk of Magnesia: 30 milliliter(s) orally 3 times a week, As Needed (2018 12:43)  Procrit 10,000 units/mL preservative-free injectable solution: injectable every 7 days (2018 12:43)  Senna 8.6 mg oral tablet: 2 tab(s) orally once a day (2018 12:43)  Tylenol 500 mg oral tablet: 1 tab(s) orally every 6 hours, As Needed (2018 12:43)  Zofran ODT 4 mg oral tablet, disintegratin tab(s) orally 6 times a day, As Needed (2018 12:43)      MEDICATIONS  (STANDING):  amLODIPine   Tablet 10 milliGRAM(s) Oral daily  BACItracin   Ointment 1 Application(s) Topical every 12 hours  cefTRIAXone   IVPB 1 Gram(s) IV Intermittent every 24 hours  cefTRIAXone   IVPB      dextrose 50% Injectable 12.5 Gram(s) IV Push once  ferrous    sulfate 325 milliGRAM(s) Oral daily  folic acid 1 milliGRAM(s) Oral daily  insulin lispro (HumaLOG) corrective regimen sliding scale   SubCutaneous three times a day before meals  lactated ringers. 1000 milliLiter(s) (50 mL/Hr) IV Continuous <Continuous>  multivitamin 1 Tablet(s) Oral daily  nystatin Powder 1 Application(s) Topical two times a day  pantoprazole    Tablet 40 milliGRAM(s) Oral before breakfast  sucralfate suspension 1 Gram(s) Oral every 6 hours  tamsulosin 0.4 milliGRAM(s) Oral at bedtime    MEDICATIONS  (PRN):  dextrose 40% Gel 15 Gram(s) Oral once PRN Blood Glucose LESS THAN 70 milliGRAM(s)/deciliter  glucagon  Injectable 1 milliGRAM(s) IntraMuscular once PRN Glucose LESS THAN 70 milligrams/deciliter      Allergies    No Known Allergies    Intolerances        REVIEW OF SYSTEMS:  CONSTITUTIONAL: had fever, has  fatigue  EYES: No eye pain, visual disturbances, or discharge  ENMT:  No difficulty hearing, tinnitus, vertigo; No sinus or throat pain  NECK: No pain or stiffness  BREASTS: No pain, masses, or nipple discharge  RESPIRATORY: No cough, wheezing, chills or hemoptysis; No shortness of breath  CARDIOVASCULAR: No chest pain, palpitations, dizziness, or leg swelling  GASTROINTESTINAL: No abdominal or epigastric pain. No nausea, vomiting, or hematemesis; No diarrhea or constipation. No melena or hematochezia.  GENITOURINARY: No dysuria ,has   frequency ,no  hematuria, or incontinence,   NEUROLOGICAL: No headaches, memory loss, loss of strength, numbness, or tremors  SKIN: No itching, burning, rashes, or lesions   ENDOCRINE: No heat or cold intolerance; No hair loss  MUSCULOSKELETAL: No joint pain or swelling; No muscle, back, or extremity pain  PSYCHIATRIC: No depression, anxiety, mood swings, or difficulty sleeping  HEME/LYMPH: No easy bruising, or bleeding gums  ALLERGY AND IMMUNOLOGIC: No hives or eczema    Vital Signs Last 24 Hrs  T(C): 37.1 (2018 07:42), Max: 38.3 (2018 19:57)  T(F): 98.8 (2018 07:42), Max: 101 (2018 19:57)  HR: 92 (2018 06:00) (84 - 106)  BP: 138/71 (2018 06:00) (99/47 - 159/82)  BP(mean): 92 (2018 06:00) (59 - 104)  RR: 18 (2018 06:00) (17 - 28)  SpO2: 95% (2018 06:00) (91% - 99%)    PHYSICAL EXAM:  GENERAL: NAD, well-groomed, well-developed  HEAD:  Atraumatic, Normocephalic  EYES: EOMI, PERRLA, conjunctiva and sclera clear  ENMT: Moist mucous membranes,   NECK: Supple, No JVD, Normal thyroid  NERVOUS SYSTEM:  Alert & Oriented X3, Good concentration; Motor Strength 5/5 B/L upper and lower extremities; DTRs 2+ intact and symmetric  CHEST/LUNG: Clear to percussion bilaterally; No rales, rhonchi, wheezing, or rubs  HEART: Regular rate and rhythm; No murmurs, rubs, or gallops  ABDOMEN: Soft, Nontender, Nondistended; Bowel sounds present  EXTREMITIES:  2+ Peripheral Pulses, No clubbing, cyanosis, or edema  SKIN: No rashes or lesions    LABS:                        7.8    9.31  )-----------( 263      ( 2018 06:19 )             24.3     07-    138  |  103  |  34<H>  ----------------------------<  104<H>  3.6   |  26  |  2.16<H>    Ca    8.1<L>      2018 06:19  Phos  3.5       Mg     2.0             Urinalysis Basic - ( 2018 20:56 )    Color: Yellow / Appearance: Turbid / S.010 / pH: x  Gluc: x / Ketone: Negative  / Bili: Negative / Urobili: Negative mg/dL   Blood: x / Protein: 100 mg/dL / Nitrite: Positive   Leuk Esterase: Moderate / RBC: 6-10 /HPF / WBC 3-5   Sq Epi: x / Non Sq Epi: Moderate / Bacteria: Many      CAPILLARY BLOOD GLUCOSE      POCT Blood Glucose.: 112 mg/dL (2018 07:51)  POCT Blood Glucose.: 125 mg/dL (2018 23:55)  POCT Blood Glucose.: 127 mg/dL (2018 16:42)  POCT Blood Glucose.: 145 mg/dL (2018 12:09)          I&O's Summary    2018 07:01  -  2018 07:00  --------------------------------------------------------  IN: 735 mL / OUT: 1850 mL / NET: -1115 mL        RADIOLOGY & ADDITIONAL TESTS:    Imaging Personally Reviewed:  [x ] YES  [ ] NO    Consultant(s) Notes Reviewed:  [x ] YES  [ ] NO    Care Discussed with Consultants/Other Providers [x ] YES  [ ] NO

## 2018-07-08 NOTE — CONSULT NOTE ADULT - SUBJECTIVE AND OBJECTIVE BOX
HPI:  57 y/o M pt w/ PMHx severe morbid obesity, DM, HTN, peripheral neuropathy, calculus of   kidney and ureter sp álvaro nephrostomy admitted from [facility]. On  had   EGd had bleeding duodenal ulcer complicated by post op respiratory failure, extubated on   . H/H stable, Had fever yesterday to 101. Denies cough SOB n/v/diarrhea. Denies urinary  symptoms. Of note he had álvaro LE venous Doppler which was + for álvaro DVT.    Infectious Disease consult was called to evaluate pt due to fever.    Past Medical & Surgical Hx:  PAST MEDICAL & SURGICAL HISTORY:  Hx of peripheral neuropathy  Morbid obesity  H/O sleep apnea  Acquired lymphedema of leg  Calculus of kidney and ureter  Diabetes mellitus type II  HTN (hypertension)  Ureteral stents placement, bilateral  Hx of tonsillectomy: at 6 years old      Social History--  EtOH: denies   Tobacco: denies   Drug Use: denies       FAMILY HISTORY:  Family history of coronary artery disease  Family history of prostate cancer in father      Allergies  No Known Allergies  Home Medications:  amLODIPine 10 mg oral tablet: 1 tab(s) orally once a day (2018 12:43)  bacitracin 500 units/g topical ointment: Apply topically to affected area 2 times a day nephrostomy tube (2018 12:43)  Bactrim  mg-160 mg oral tablet: 1 tab(s) orally 2 times a day (2018 12:43)  Colace 100 mg oral capsule: 1 cap(s) orally 3 times a day (2018 12:43)  Flomax 0.4 mg oral capsule: 1 cap(s) orally once a day (2018 12:43)  gas relief: 180 milligram(s)  2 times a day (2018 12:43)  lactulose: 30 milliliter(s) orally 3 times a week, As Needed (2018 12:43)  Milk of Magnesia: 30 milliliter(s) orally 3 times a week, As Needed (2018 12:43)  Procrit 10,000 units/mL preservative-free injectable solution: injectable every 7 days (2018 12:43)  Senna 8.6 mg oral tablet: 2 tab(s) orally once a day (2018 12:43)  Tylenol 500 mg oral tablet: 1 tab(s) orally every 6 hours, As Needed (2018 12:43)  Zofran ODT 4 mg oral tablet, disintegratin tab(s) orally 6 times a day, As Needed (2018 12:43)      Current Inpatient Medications :    ANTIBIOTICS:   cefTRIAXone   IVPB 1 Gram(s) IV Intermittent every 24 hours  cefTRIAXone   IVPB          OTHER RELEVANT MEDICATIONS :  amLODIPine   Tablet 10 milliGRAM(s) Oral daily  BACItracin   Ointment 1 Application(s) Topical every 12 hours  dextrose 40% Gel 15 Gram(s) Oral once PRN  dextrose 50% Injectable 12.5 Gram(s) IV Push once  ferrous    sulfate 325 milliGRAM(s) Oral daily  folic acid 1 milliGRAM(s) Oral daily  glucagon  Injectable 1 milliGRAM(s) IntraMuscular once PRN  insulin lispro (HumaLOG) corrective regimen sliding scale   SubCutaneous three times a day before meals  lactated ringers. 1000 milliLiter(s) IV Continuous <Continuous>  multivitamin 1 Tablet(s) Oral daily  nystatin Powder 1 Application(s) Topical two times a day  pantoprazole    Tablet 40 milliGRAM(s) Oral before breakfast  sucralfate suspension 1 Gram(s) Oral every 6 hours  tamsulosin 0.4 milliGRAM(s) Oral at bedtime    ROS:  CONSTITUTIONAL: + fever no chills, feels well, good appetite  EYES:  Negative  blurry vision or double vision  CARDIOVASCULAR:  Negative for chest pain or palpitations  RESPIRATORY:  Negative for cough, wheezing, or SOB   GASTROINTESTINAL:  Negative for nausea, vomiting, diarrhea, constipation, or abdominal pain  GENITOURINARY:  Negative frequency, urgency , dysuria or hematuria   NEUROLOGIC:  No headache, confusion, dizziness, lightheadedness  All other systems were reviewed and are negative    I&O's Detail    2018 07:  -  2018 07:00  --------------------------------------------------------  IN:    IV PiggyBack: 50 mL    lactated ringers.: 345 mL    Oral Fluid: 340 mL  Total IN: 735 mL    OUT:    Voided: 1850 mL  Total OUT: 1850 mL    Total NET: -1115 mL      2018 07:  -  2018 23:11  --------------------------------------------------------  IN:    lactated ringers.: 600 mL    Oral Fluid: 640 mL  Total IN: 1240 mL    OUT:    Voided: 900 mL  Total OUT: 900 mL    Total NET: 340 mL          Physical Exam:  Vital Signs Last 24 Hrs  T(C): 37.4 (2018 20:03), Max: 37.4 (2018 20:03)  T(F): 99.4 (2018 20:03), Max: 99.4 (2018 20:03)  HR: 99 (2018 22:00) (84 - 102)  BP: 112/71 (2018 22:00) (112/71 - 159/82)  BP(mean): 82 (2018 22:00) (79 - 104)  RR: 18 (2018 22:00) (17 - 23)  SpO2: 98% (2018 22:00) (91% - 99%)      General: Morbidly obese no acute distress  Eyes: sclera anicteric, pupils equal and reactive to light  ENMT: buccal mucosa moist, pharynx not injected  Neck: supple, trachea midline  Lungs: clear, no wheeze/rhonchi  Cardiovascular: regular rate and rhythm, S1 S2  Abdomen: soft, nontender, no organomegaly present, bowel sounds normal  Álvaro nephrostomy tubes-capped  Neurological:  alert and oriented x3, Cranial Nerves II-XII grossly intact  Skin:no increased ecchymosis/petechiae/purpura  Lymph Nodes: no palpable cervical/supraclavicular lymph nodes enlargements  Extremities: Álvaro LE lymphadema    Labs:                        7.8    9.31  )-----------( 263      ( 2018 06:19 )             24.3   07-08    138  |  103  |  34<H>  ----------------------------<  104<H>  3.6   |  26  |  2.16<H>    Ca    8.1<L>      2018 06:19  Phos  3.5     07-08  Mg     2.0     07-08         RECENT CULTURES:      Culture - Urine (collected 2018 02:46)  Source: .Urine Clean Catch (Midstream)  Preliminary Report (2018 20:04):    50,000 - 99,000 CFU/mL Proteus mirabilis    RADIOLOGY & ADDITIONAL STUDIES:    EXAM:  XR CHEST PORTABLE URGENT 1V                                  PROCEDURE DATE:  2018          INTERPRETATION:  Clinical Indication: Cough    Technique: Single Frontal view of the chest    Comparison: 2018    Findings:     Right-sided central venous catheter with its distal tip overlying the SVC.    Lungs are clear. No pleural effusions. Cardiomediastinal silhouette is   indeterminate in size due to technique. Bones are unremarkable.    IMPRESSION:    No significant change from the prior study.      Assessment :   57 y/o M pt w/ PMHx severe morbid obesity, DM, HTN, peripheral neuropathy, calculus of   kidney and ureter sp álvaro nephrostomy admitted from [facility] sec GIB. On  had   EGd had bleeding duodenal ulcer complicated by post op respiratory failure, extubated on   . H/H stable, Had fever yesterday to 101.  Possibly sec UTI  Also with álvaro LE DVT which can give fevers  CKD    Plan :   Cont Rocephin  Fu cultures  Trend temps and wbc  Trend H/H as high risk for GIB on anticoagulants  Will likely need IVC filter     Continue with present regime .  Approptiate use of antibiotics and adverse effects reviewed.      I have discussed the above plan of care with patient/family in detail. They expressed understanding of the treatment plan . Risks, benefits and alternatives discussed in detail. I have asked if they have any questions or concerns and appropriately addressed them to the best of my ability .      Critical Care > 45 minutes spent in direct patient care reviewing  the notes, lab data/ imaging , discussion with multidisciplinary team. All questions were addressed and answered to the best of my capacity .    Thank you for allowing me to participate in the care of your patient .      Maritza Maza MD  Infectious Disease  [phone] acre

## 2018-07-08 NOTE — PROGRESS NOTE ADULT - SUBJECTIVE AND OBJECTIVE BOX
Chief Complaint: GI bleed    Interval Events: No events overnight. No complaints.    Review of Systems:  General: No fevers, chills, weight loss or gain  Skin: No rashes, color changes  Cardiovascular: No chest pain, orthopnea  Respiratory: No shortness of breath, cough  Gastrointestinal: No nausea, abdominal pain  Genitourinary: No incontinence, pain with urination  Musculoskeletal: No pain, swelling, decreased range of motion  Neurological: No headache, weakness  Psychiatric: No depression, anxiety  Endocrine: No weight loss or gain, increased thirst  All other systems are comprehensively negative.    Physical Exam:  Vital Signs Last 24 Hrs  T(C): 37.1 (08 Jul 2018 07:42), Max: 38.3 (07 Jul 2018 19:57)  T(F): 98.8 (08 Jul 2018 07:42), Max: 101 (07 Jul 2018 19:57)  HR: 92 (08 Jul 2018 06:00) (84 - 106)  BP: 138/71 (08 Jul 2018 06:00) (99/47 - 159/82)  BP(mean): 92 (08 Jul 2018 06:00) (59 - 104)  RR: 18 (08 Jul 2018 06:00) (17 - 28)  SpO2: 95% (08 Jul 2018 06:00) (91% - 99%)  General: NAD  HEENT: MMM  Neck: No JVD, no carotid bruit  Lungs: CTAB  CV: RRR, nl S1/S2, no M/R/G  Abdomen: S/NT/ND, +BS  Extremities: No LE edema, no cyanosis  Neuro: AAOx3, non-focal  Skin: No rash    Labs:             07-08    138  |  103  |  34<H>  ----------------------------<  104<H>  3.6   |  26  |  2.16<H>    Ca    8.1<L>      08 Jul 2018 06:19  Phos  3.0     07-06  Mg     2.0     07-06                          7.8    9.31  )-----------( 263      ( 08 Jul 2018 06:19 )             24.3       Telemetry: Sinus rhythm, PVCs

## 2018-07-09 DIAGNOSIS — I82.409 ACUTE EMBOLISM AND THROMBOSIS OF UNSPECIFIED DEEP VEINS OF UNSPECIFIED LOWER EXTREMITY: ICD-10-CM

## 2018-07-09 LAB
-  AMIKACIN: SIGNIFICANT CHANGE UP
-  AMOXICILLIN/CLAVULANIC ACID: SIGNIFICANT CHANGE UP
-  AMPICILLIN/SULBACTAM: SIGNIFICANT CHANGE UP
-  AMPICILLIN: SIGNIFICANT CHANGE UP
-  AZTREONAM: SIGNIFICANT CHANGE UP
-  CEFAZOLIN: SIGNIFICANT CHANGE UP
-  CEFEPIME: SIGNIFICANT CHANGE UP
-  CEFOXITIN: SIGNIFICANT CHANGE UP
-  CEFTRIAXONE: SIGNIFICANT CHANGE UP
-  CIPROFLOXACIN: SIGNIFICANT CHANGE UP
-  COAGULASE NEGATIVE STAPHYLOCOCCUS: SIGNIFICANT CHANGE UP
-  ERTAPENEM: SIGNIFICANT CHANGE UP
-  GENTAMICIN: SIGNIFICANT CHANGE UP
-  LEVOFLOXACIN: SIGNIFICANT CHANGE UP
-  MEROPENEM: SIGNIFICANT CHANGE UP
-  NITROFURANTOIN: SIGNIFICANT CHANGE UP
-  PIPERACILLIN/TAZOBACTAM: SIGNIFICANT CHANGE UP
-  TOBRAMYCIN: SIGNIFICANT CHANGE UP
-  TRIMETHOPRIM/SULFAMETHOXAZOLE: SIGNIFICANT CHANGE UP
ALBUMIN SERPL ELPH-MCNC: 2.1 G/DL — LOW (ref 3.3–5)
ALP SERPL-CCNC: 68 U/L — SIGNIFICANT CHANGE UP (ref 30–120)
ALT FLD-CCNC: 47 U/L DA — SIGNIFICANT CHANGE UP (ref 10–60)
ANION GAP SERPL CALC-SCNC: 12 MMOL/L — SIGNIFICANT CHANGE UP (ref 5–17)
AST SERPL-CCNC: 26 U/L — SIGNIFICANT CHANGE UP (ref 10–40)
BILIRUB SERPL-MCNC: 0.2 MG/DL — SIGNIFICANT CHANGE UP (ref 0.2–1.2)
BUN SERPL-MCNC: 34 MG/DL — HIGH (ref 7–23)
CALCIUM SERPL-MCNC: 8.1 MG/DL — LOW (ref 8.4–10.5)
CHLORIDE SERPL-SCNC: 102 MMOL/L — SIGNIFICANT CHANGE UP (ref 96–108)
CO2 SERPL-SCNC: 22 MMOL/L — SIGNIFICANT CHANGE UP (ref 22–31)
CREAT SERPL-MCNC: 1.96 MG/DL — HIGH (ref 0.5–1.3)
CULTURE RESULTS: SIGNIFICANT CHANGE UP
GLUCOSE BLDC GLUCOMTR-MCNC: 101 MG/DL — HIGH (ref 70–99)
GLUCOSE BLDC GLUCOMTR-MCNC: 125 MG/DL — HIGH (ref 70–99)
GLUCOSE BLDC GLUCOMTR-MCNC: 134 MG/DL — HIGH (ref 70–99)
GLUCOSE BLDC GLUCOMTR-MCNC: 141 MG/DL — HIGH (ref 70–99)
GLUCOSE SERPL-MCNC: 100 MG/DL — HIGH (ref 70–99)
GRAM STN FLD: SIGNIFICANT CHANGE UP
HCT VFR BLD CALC: 24.9 % — LOW (ref 39–50)
HGB BLD-MCNC: 7.8 G/DL — LOW (ref 13–17)
MCHC RBC-ENTMCNC: 28.9 PG — SIGNIFICANT CHANGE UP (ref 27–34)
MCHC RBC-ENTMCNC: 31.3 GM/DL — LOW (ref 32–36)
MCV RBC AUTO: 92.2 FL — SIGNIFICANT CHANGE UP (ref 80–100)
METHOD TYPE: SIGNIFICANT CHANGE UP
METHOD TYPE: SIGNIFICANT CHANGE UP
NRBC # BLD: 0 /100 WBCS — SIGNIFICANT CHANGE UP (ref 0–0)
ORGANISM # SPEC MICROSCOPIC CNT: SIGNIFICANT CHANGE UP
ORGANISM # SPEC MICROSCOPIC CNT: SIGNIFICANT CHANGE UP
PLATELET # BLD AUTO: 304 K/UL — SIGNIFICANT CHANGE UP (ref 150–400)
POTASSIUM SERPL-MCNC: 3.7 MMOL/L — SIGNIFICANT CHANGE UP (ref 3.5–5.3)
POTASSIUM SERPL-SCNC: 3.7 MMOL/L — SIGNIFICANT CHANGE UP (ref 3.5–5.3)
PROT SERPL-MCNC: 6.2 G/DL — SIGNIFICANT CHANGE UP (ref 6–8.3)
RBC # BLD: 2.7 M/UL — LOW (ref 4.2–5.8)
RBC # FLD: 15.5 % — HIGH (ref 10.3–14.5)
SODIUM SERPL-SCNC: 136 MMOL/L — SIGNIFICANT CHANGE UP (ref 135–145)
SPECIMEN SOURCE: SIGNIFICANT CHANGE UP
WBC # BLD: 9.49 K/UL — SIGNIFICANT CHANGE UP (ref 3.8–10.5)
WBC # FLD AUTO: 9.49 K/UL — SIGNIFICANT CHANGE UP (ref 3.8–10.5)

## 2018-07-09 RX ORDER — VANCOMYCIN HCL 1 G
1500 VIAL (EA) INTRAVENOUS EVERY 12 HOURS
Qty: 0 | Refills: 0 | Status: DISCONTINUED | OUTPATIENT
Start: 2018-07-09 | End: 2018-07-09

## 2018-07-09 RX ORDER — VANCOMYCIN HCL 1 G
1500 VIAL (EA) INTRAVENOUS ONCE
Qty: 0 | Refills: 0 | Status: COMPLETED | OUTPATIENT
Start: 2018-07-09 | End: 2018-07-09

## 2018-07-09 RX ADMIN — Medication 1 GRAM(S): at 05:08

## 2018-07-09 RX ADMIN — AMLODIPINE BESYLATE 10 MILLIGRAM(S): 2.5 TABLET ORAL at 05:08

## 2018-07-09 RX ADMIN — CEFTRIAXONE 100 GRAM(S): 500 INJECTION, POWDER, FOR SOLUTION INTRAMUSCULAR; INTRAVENOUS at 20:33

## 2018-07-09 RX ADMIN — Medication 1 APPLICATION(S): at 19:12

## 2018-07-09 RX ADMIN — PANTOPRAZOLE SODIUM 40 MILLIGRAM(S): 20 TABLET, DELAYED RELEASE ORAL at 07:18

## 2018-07-09 RX ADMIN — Medication 1 MILLIGRAM(S): at 12:15

## 2018-07-09 RX ADMIN — NYSTATIN CREAM 1 APPLICATION(S): 100000 CREAM TOPICAL at 19:11

## 2018-07-09 RX ADMIN — Medication 300 MILLIGRAM(S): at 21:23

## 2018-07-09 RX ADMIN — Medication 1 TABLET(S): at 12:15

## 2018-07-09 RX ADMIN — TAMSULOSIN HYDROCHLORIDE 0.4 MILLIGRAM(S): 0.4 CAPSULE ORAL at 21:22

## 2018-07-09 RX ADMIN — Medication 325 MILLIGRAM(S): at 12:15

## 2018-07-09 RX ADMIN — Medication 1 GRAM(S): at 12:15

## 2018-07-09 RX ADMIN — Medication 1 APPLICATION(S): at 05:08

## 2018-07-09 RX ADMIN — NYSTATIN CREAM 1 APPLICATION(S): 100000 CREAM TOPICAL at 05:08

## 2018-07-09 RX ADMIN — SODIUM CHLORIDE 50 MILLILITER(S): 9 INJECTION, SOLUTION INTRAVENOUS at 15:14

## 2018-07-09 RX ADMIN — Medication 1 GRAM(S): at 19:11

## 2018-07-09 NOTE — PROGRESS NOTE ADULT - SUBJECTIVE AND OBJECTIVE BOX
ID progress note covering Dr. Maza    Name: LEONA RODRIGUEZ  Age: 58y  Gender: Male  MRN: [id number]    Interval History-- Events noted , afebrile , tmax 101 yesterday . noted to have bilateral DVT , has hx of recent GI bleed from duodenal ulcer , hx of kidney stones and UTI in May 2018   Notes reviewed    Past Medical History--  Hx of peripheral neuropathy  Morbid obesity  H/O sleep apnea  Acquired lymphedema of leg  Calculus of kidney and ureter  Diabetes mellitus type II  HTN (hypertension)  Ureteral stents placement, bilateral  Hx of tonsillectomy      For details regarding the patient's social history, family history, and other miscellaneous elements, please refer the initial infectious diseases consultation and/or the admitting history and physical examination for this admission.    Allergies--  Allergies    No Known Allergies    Intolerances        Medications--  Antibiotics:  cefTRIAXone   IVPB 1 Gram(s) IV Intermittent every 24 hours  cefTRIAXone   IVPB        Immunologic:    Other:  amLODIPine   Tablet  BACItracin   Ointment  dextrose 40% Gel PRN  dextrose 50% Injectable  ferrous    sulfate  folic acid  glucagon  Injectable PRN  insulin lispro (HumaLOG) corrective regimen sliding scale  lactated ringers.  multivitamin  nystatin Powder  pantoprazole    Tablet  sucralfate suspension  tamsulosin      Review of Systems--  A 10-point review of systems was obtained.     Pertinent positives and negatives--  Constitutional: No fevers. No Chills. No Rigors.   Cardiovascular: No chest pain. No palpitations.  Respiratory: No shortness of breath. No cough.  Gastrointestinal: No nausea or vomiting. No diarrhea or constipation.   Psychiatric: Pleasant. Appropriate affect.    Review of systems otherwise negative except as previously noted.    Physical Examination--  Vital Signs: T(F): 98.1 (07-09-18 @ 08:03), Max: 99.4 (07-08-18 @ 20:03)  HR: 86 (07-09-18 @ 06:01)  BP: 121/60 (07-09-18 @ 06:01)  RR: 21 (07-09-18 @ 06:01)  SpO2: 98% (07-09-18 @ 06:01)  Wt(kg): --  General: Nontoxic-appearing morbidly obese Male in no acute distress.  HEENT: AT/NC. PERRL. EOMI. Anicteric. Conjunctiva pink and moist. Oropharynx clear. Dentition fair.  Neck: Not rigid. No sense of mass.  Nodes: None palpable.  Lungs: Clear bilaterally without rales, wheezing or rhonchi  Heart: Regular rate and rhythm. No Murmur. No rub. No gallop. No palpable thrill.  Abdomen: Bowel sounds present and normoactive. Soft. Nondistended. Nontender. No sense of mass. No organomegaly.  Back: No spinal tenderness. No costovertebral angle tenderness.   Extremities: No cyanosis or clubbing. No edema.   Skin: Warm. Dry. Good turgor. No rash. No vasculitic stigmata.  Psychiatric: Appropriate affect and mood for situation.         Laboratory Studies--  CBC                        7.8    9.49  )-----------( 304      ( 09 Jul 2018 06:49 )             24.9       Chemistries  07-09    136  |  102  |  34<H>  ----------------------------<  100<H>  3.7   |  22  |  1.96<H>    Ca    8.1<L>      09 Jul 2018 06:49  Phos  3.5     07-08  Mg     2.0     07-08    TPro  6.2  /  Alb  2.1<L>  /  TBili  0.2  /  DBili  x   /  AST  26  /  ALT  47  /  AlkPhos  68  07-09      RECENT CULTURES    Culture - Urine (collected 08 Jul 2018 02:46)  Source: .Urine Clean Catch (Midstream)  Preliminary Report (08 Jul 2018 20:04):    50,000 - 99,000 CFU/mL Proteus mirabilis        RADIOLOGY:    US Duplex Venous Lower Ext Complete, Bilateral (07.08.18 @ 11:10) >    FINDINGS:    Limited study due to patient body habitus.    There is occlusive and partially occlusive DVT in the rightproximal and   mid femoral vein, right popliteal vein and right posterior tibial veins.    There is occlusive and partially occlusive DVT in the left popliteal vein   and left posterior tibial veins.    There is normal compressibility of the bilateral common femoral veins.     US Transthoracic Echocardiogram w/Doppler Complete (07.06.18 @ 12:12) >    CONCLUSIONS:  Dilated left ventricular size. Preserved left ventricular ejection   fraction. Left ventricular hypertrophy. Mild mitral regurgitation. Trace   aortic insufficiency. Dilated left atrium.Velocities across the aortic   valve mildly increased.      IMPRESSION:     Assessment--    59 y/o M pt w/ PMHx severe morbid obesity, DM, HTN, peripheral neuropathy, calculus of   kidney and ureter sp dayo nephrostomy admitted from [facility] sec GIB. On 6/30 had   EGd had bleeding duodenal ulcer complicated by post op respiratory failure, extubated on   7/1. H/H stable, Had fever yesterday to 101.  Possibly sec UTI  Also with dayo LE DVT which can give fevers  CKD      Suggestions--    Cont Rocephin  Fu cultures  Trend temps and wbc  Trend H/H as high risk for GIB on anticoagulants  Will likely need IVC filter     Continue with present regime .  Approptiate use of antibiotics and adverse effects reviewed. I have discussed the above plan of care with patient/family in detail. They expressed understanding of the treatment plan . Risks, benefits and alternatives discussed in detail. I have asked if they have any questions or concerns and appropriately addressed them to the best of my ability .      > 35 minutes spent in direct patient care reviewing  the notes, lab data/ imaging , discussion with multidisciplinary team. All questions were addressed and answered to the best of my capacity .    Thank you for allowing me to participate in the care of your patient .        Suellen Mohan MD  [phone]

## 2018-07-09 NOTE — CONSULT NOTE ADULT - ASSESSMENT
The patient is a 58 year old male with a history of HTN, DM, lymphedema, peripheral neuropathy, kidney stone, MEGHA who presents from rehab with bloody diarrhea.    Plan:  - Continue amlodipine for HTN  - SMITH - improving from a peak creatinine of 7 last month  - Continue transfusion  - Continue IVF  - GI evaluation  - If plan is for EGD or colonoscopy, no cardiac contraindication to proceeding
58 y.o. male with bilateral DVT is s/p recent GI bleed. He is not a candidate for AC therapy. I recommend a IVC filter. He is scheduled for filter on Wednesday 7/11/18. The patient was given informed consent. He will also have his right IJ triple lumen catheter changed.
Fever, possibly urine in origin  No  intervention warranted, as both renal units are stented  Antibiotics to treat UTI  Outpatient f/u with Dr. Teixeira  Reconsult prn
Pt with GI bleed, has sleep apnea but intolerant of cpap.
·	Recent SMITH, Obs uropathy, s/p Stent, B/l PCN  ·	Anemia, GI bleed  ·	Nephrolithiasis  ·	Diabetes  ·	Hypertension    Improving renal function trend from last hospitalisation. Monitor h/h trend. GI evaluation. Out pt  follow up as per wife. For surgical intervention for kidney stones.   Avoid nephrotoxins. Monitor blood sugar levels. Insulin coverage as needed. Dietary restriction. Monitor BP trend. Titrate BP meds as needed. Salt restriction.   Will follow electrolytes and renal function trend. Further recommendations pending clinical course. Thank you for the courtesy of this referral.
59 y/o man with PMH of nephrolithiasis s/p BL nephrostomies, Bactrim who presented with Hb 4.9 after 2 weeks of melanotic stools.    no h/o anemia in the past   was given 1 unit of blood a week ago for Hb of 7 ( sent to ER for transfusion from [place]) and was given dose of Procrit   presented with melanotic stools , s/p 3 units pRBCs repeat CBC os pending , GI workup is planned     PLAN:  anemia workup  suspect iron def / iron restriction   most likely will benefit from Venofer even with equivocal ferritin( LW done after transfusion)  not a candidate for oral iron at this time   GFR is 40, candidate for procrit ( to be d/w nephrology)      final recommendations pending above

## 2018-07-09 NOTE — PROGRESS NOTE ADULT - SUBJECTIVE AND OBJECTIVE BOX
Patient is a 58y old  Male who presents with a chief complaint of "bloody diarrhea (2018 15:53)      INTERVAL HPI/OVERNIGHT EVENTS:  has b/l DVt  Home Medications:  amLODIPine 10 mg oral tablet: 1 tab(s) orally once a day (2018 12:43)  bacitracin 500 units/g topical ointment: Apply topically to affected area 2 times a day nephrostomy tube (2018 12:43)  Bactrim  mg-160 mg oral tablet: 1 tab(s) orally 2 times a day (2018 12:43)  Colace 100 mg oral capsule: 1 cap(s) orally 3 times a day (2018 12:43)  Flomax 0.4 mg oral capsule: 1 cap(s) orally once a day (2018 12:43)  gas relief: 180 milligram(s)  2 times a day (2018 12:43)  lactulose: 30 milliliter(s) orally 3 times a week, As Needed (2018 12:43)  Milk of Magnesia: 30 milliliter(s) orally 3 times a week, As Needed (2018 12:43)  Procrit 10,000 units/mL preservative-free injectable solution: injectable every 7 days (2018 12:43)  Senna 8.6 mg oral tablet: 2 tab(s) orally once a day (2018 12:43)  Tylenol 500 mg oral tablet: 1 tab(s) orally every 6 hours, As Needed (2018 12:43)  Zofran ODT 4 mg oral tablet, disintegratin tab(s) orally 6 times a day, As Needed (2018 12:43)      MEDICATIONS  (STANDING):  amLODIPine   Tablet 10 milliGRAM(s) Oral daily  BACItracin   Ointment 1 Application(s) Topical every 12 hours  cefTRIAXone   IVPB 1 Gram(s) IV Intermittent every 24 hours  cefTRIAXone   IVPB      dextrose 50% Injectable 12.5 Gram(s) IV Push once  ferrous    sulfate 325 milliGRAM(s) Oral daily  folic acid 1 milliGRAM(s) Oral daily  insulin lispro (HumaLOG) corrective regimen sliding scale   SubCutaneous three times a day before meals  lactated ringers. 1000 milliLiter(s) (50 mL/Hr) IV Continuous <Continuous>  multivitamin 1 Tablet(s) Oral daily  nystatin Powder 1 Application(s) Topical two times a day  pantoprazole    Tablet 40 milliGRAM(s) Oral before breakfast  sucralfate suspension 1 Gram(s) Oral every 6 hours  tamsulosin 0.4 milliGRAM(s) Oral at bedtime    MEDICATIONS  (PRN):  dextrose 40% Gel 15 Gram(s) Oral once PRN Blood Glucose LESS THAN 70 milliGRAM(s)/deciliter  glucagon  Injectable 1 milliGRAM(s) IntraMuscular once PRN Glucose LESS THAN 70 milligrams/deciliter      Allergies    No Known Allergies    Intolerances        REVIEW OF SYSTEMS:  CONSTITUTIONAL: had fever, weight loss, or fatigue  EYES: No eye pain, visual disturbances, or discharge  ENMT:  No difficulty hearing, tinnitus, vertigo; No sinus or throat pain  NECK: No pain or stiffness  BREASTS: No pain, masses, or nipple discharge  RESPIRATORY: No cough, wheezing, chills or hemoptysis; No shortness of breath  CARDIOVASCULAR: No chest pain, palpitations, dizziness, or leg swelling  GASTROINTESTINAL: No abdominal or epigastric pain. No nausea, vomiting, or hematemesis; No diarrhea or constipation. No melena or hematochezia.  GENITOURINARY: No dysuria, frequency, hematuria, or incontinence  NEUROLOGICAL: No headaches, memory loss, loss of strength, numbness, or tremors  SKIN: No itching, burning, rashes, or lesions   ENDOCRINE: No heat or cold intolerance; No hair loss  MUSCULOSKELETAL: No joint pain or swelling; No muscle, back, or extremity pain, ch edema legs  PSYCHIATRIC: No depression, anxiety, mood swings, or difficulty sleeping  HEME/LYMPH: No easy bruising, or bleeding gums  ALLERGY AND IMMUNOLOGIC: No hives or eczema    Vital Signs Last 24 Hrs  T(C): 36.7 (2018 08:03), Max: 37.4 (2018 20:03)  T(F): 98.1 (2018 08:03), Max: 99.4 (2018 20:03)  HR: 86 (2018 06:01) (86 - 99)  BP: 121/60 (2018 06:01) (100/61 - 154/77)  BP(mean): 75 (2018 06:01) (73 - 104)  RR: 21 (2018 06:01) (16 - 23)  SpO2: 98% (2018 06:01) (94% - 100%)    PHYSICAL EXAM:  GENERAL: NAD, well-groomed, well-developed  HEAD:  Atraumatic, Normocephalic  EYES: EOMI, PERRLA, conjunctiva and sclera clear  ENMT: Moist mucous membranes,   NECK: Supple, No JVD, Normal thyroid  NERVOUS SYSTEM:  Alert & Oriented X3, Good concentration; Motor Strength 5/5 B/L upper and lower extremities; DTRs 2+ intact and symmetric  CHEST/LUNG: Clear to percussion bilaterally; No rales, rhonchi, wheezing, or rubs  HEART: Regular rate and rhythm; No murmurs, rubs, or gallops  ABDOMEN: Soft, Nontender, Nondistended; Bowel sounds present  EXTREMITIES:  2+ Peripheral Pulses, No clubbing, cyanosis, ch edema edema  LYMPH: No lymphadenopathy noted  SKIN: No rashes or lesions    LABS:                        7.8    9.49  )-----------( 304      ( 2018 06:49 )             24.9     07-    136  |  102  |  34<H>  ----------------------------<  100<H>  3.7   |  22  |  1.96<H>    Ca    8.1<L>      2018 06:49  Phos  3.5     07-08  Mg     2.0     07-08    TPro  6.2  /  Alb  2.1<L>  /  TBili  0.2  /  DBili  x   /  AST  26  /  ALT  47  /  AlkPhos  68  07-09      Urinalysis Basic - ( 2018 20:56 )    Color: Yellow / Appearance: Turbid / S.010 / pH: x  Gluc: x / Ketone: Negative  / Bili: Negative / Urobili: Negative mg/dL   Blood: x / Protein: 100 mg/dL / Nitrite: Positive   Leuk Esterase: Moderate / RBC: 6-10 /HPF / WBC 3-5   Sq Epi: x / Non Sq Epi: Moderate / Bacteria: Many      CAPILLARY BLOOD GLUCOSE      POCT Blood Glucose.: 101 mg/dL (2018 07:31)  POCT Blood Glucose.: 127 mg/dL (2018 22:31)  POCT Blood Glucose.: 144 mg/dL (2018 16:50)  POCT Blood Glucose.: 130 mg/dL (2018 11:59)        Culture - Urine (collected 18 @ 02:46)  Source: .Urine Clean Catch (Midstream)  Preliminary Report (18 @ 20:04):    50,000 - 99,000 CFU/mL Proteus mirabilis        I&O's Summary    2018 07:01  -  2018 07:00  --------------------------------------------------------  IN: 1640 mL / OUT: 1300 mL / NET: 340 mL        RADIOLOGY & ADDITIONAL TESTS:    Imaging Personally Reviewed:  [x ] YES  [ ] NO    Consultant(s) Notes Reviewed:  [ x] YES  [ ] NO    Care Discussed with Consultants/Other Providers [ ] YES  [ ] NO

## 2018-07-09 NOTE — PROGRESS NOTE ADULT - SUBJECTIVE AND OBJECTIVE BOX
Date/Time Patient Seen:  		  Referring MD:   Data Reviewed	       Patient is a 58y old  Male who presents with a chief complaint of "bloody diarrhea (05 Jul 2018 15:53)  in bed  on NIPPV      Subjective/HPI     PAST MEDICAL & SURGICAL HISTORY:  Hx of peripheral neuropathy  Morbid obesity  H/O sleep apnea  Acquired lymphedema of leg  Calculus of kidney and ureter  Diabetes mellitus type II  HTN (hypertension)  Ureteral stents placement, bilateral  Hx of tonsillectomy: at 6 years old        Medication list         MEDICATIONS  (STANDING):  amLODIPine   Tablet 10 milliGRAM(s) Oral daily  BACItracin   Ointment 1 Application(s) Topical every 12 hours  cefTRIAXone   IVPB 1 Gram(s) IV Intermittent every 24 hours  cefTRIAXone   IVPB      dextrose 50% Injectable 12.5 Gram(s) IV Push once  ferrous    sulfate 325 milliGRAM(s) Oral daily  folic acid 1 milliGRAM(s) Oral daily  insulin lispro (HumaLOG) corrective regimen sliding scale   SubCutaneous three times a day before meals  lactated ringers. 1000 milliLiter(s) (50 mL/Hr) IV Continuous <Continuous>  multivitamin 1 Tablet(s) Oral daily  nystatin Powder 1 Application(s) Topical two times a day  pantoprazole    Tablet 40 milliGRAM(s) Oral before breakfast  sucralfate suspension 1 Gram(s) Oral every 6 hours  tamsulosin 0.4 milliGRAM(s) Oral at bedtime    MEDICATIONS  (PRN):  dextrose 40% Gel 15 Gram(s) Oral once PRN Blood Glucose LESS THAN 70 milliGRAM(s)/deciliter  glucagon  Injectable 1 milliGRAM(s) IntraMuscular once PRN Glucose LESS THAN 70 milligrams/deciliter         Vitals log        ICU Vital Signs Last 24 Hrs  T(C): 37.2 (09 Jul 2018 04:04), Max: 37.4 (08 Jul 2018 20:03)  T(F): 98.9 (09 Jul 2018 04:04), Max: 99.4 (08 Jul 2018 20:03)  HR: 86 (09 Jul 2018 06:01) (86 - 99)  BP: 121/60 (09 Jul 2018 06:01) (100/61 - 154/77)  BP(mean): 75 (09 Jul 2018 06:01) (73 - 104)  ABP: --  ABP(mean): --  RR: 21 (09 Jul 2018 06:01) (16 - 23)  SpO2: 98% (09 Jul 2018 06:01) (94% - 100%)           Input and Output:  I&O's Detail    08 Jul 2018 07:01  -  09 Jul 2018 07:00  --------------------------------------------------------  IN:    lactated ringers.: 1000 mL    Oral Fluid: 640 mL  Total IN: 1640 mL    OUT:    Voided: 1300 mL  Total OUT: 1300 mL    Total NET: 340 mL          Lab Data                        7.8    9.49  )-----------( 304      ( 09 Jul 2018 06:49 )             24.9     07-08    138  |  103  |  34<H>  ----------------------------<  104<H>  3.6   |  26  |  2.16<H>    Ca    8.1<L>      08 Jul 2018 06:19  Phos  3.5     07-08  Mg     2.0     07-08              Review of Systems	      Objective     Physical Examination    heart s1s2  lung dec BS  obese  cn grossly int      Pertinent Lab findings & Imaging      Catalino:  NO   Adequate UO     I&O's Detail    08 Jul 2018 07:01  -  09 Jul 2018 07:00  --------------------------------------------------------  IN:    lactated ringers.: 1000 mL    Oral Fluid: 640 mL  Total IN: 1640 mL    OUT:    Voided: 1300 mL  Total OUT: 1300 mL    Total NET: 340 mL               Discussed with:     Cultures:	        Radiology

## 2018-07-09 NOTE — PROGRESS NOTE ADULT - ASSESSMENT
The patient is a 58 year old male with a history of HTN, DM, lymphedema, peripheral neuropathy, kidney stone, MEGHA who presents from rehab with bloody diarrhea.    Plan:  - Continue amlodipine for HTN  - Hemoglobin stable  - Atrial tachycardia - brief episode and asymptomatic. If recurrent episodes or patient develops palpitations, start metoprolol.  - NSVT - await final report. LV systolic function appears normal, no significant valve issues.  - GI follow-up  - Bilateral DVT - will need IVC filter given recent GI bleed. Can attempt anticoagulation in 2 weeks for DVT.

## 2018-07-09 NOTE — PROGRESS NOTE ADULT - SUBJECTIVE AND OBJECTIVE BOX
Chief Complaint: GI bleed    Interval Events: Found to have bilateral DVT.    Review of Systems:  General: No fevers, chills, weight loss or gain  Skin: No rashes, color changes  Cardiovascular: No chest pain, orthopnea  Respiratory: No shortness of breath, cough  Gastrointestinal: No nausea, abdominal pain  Genitourinary: No incontinence, pain with urination  Musculoskeletal: No pain, swelling, decreased range of motion  Neurological: No headache, weakness  Psychiatric: No depression, anxiety  Endocrine: No weight loss or gain, increased thirst  All other systems are comprehensively negative.    Physical Exam:  Vital Signs Last 24 Hrs  T(C): 36.7 (09 Jul 2018 08:03), Max: 37.4 (08 Jul 2018 20:03)  T(F): 98.1 (09 Jul 2018 08:03), Max: 99.4 (08 Jul 2018 20:03)  HR: 86 (09 Jul 2018 06:01) (86 - 99)  BP: 121/60 (09 Jul 2018 06:01) (100/61 - 154/77)  BP(mean): 75 (09 Jul 2018 06:01) (73 - 104)  RR: 21 (09 Jul 2018 06:01) (16 - 23)  SpO2: 98% (09 Jul 2018 06:01) (94% - 100%)  General: NAD  HEENT: MMM  Neck: No JVD, no carotid bruit  Lungs: CTAB  CV: RRR, nl S1/S2, no M/R/G  Abdomen: S/NT/ND, +BS  Extremities: No LE edema, no cyanosis  Neuro: AAOx3, non-focal  Skin: No rash    Labs:               07-09    136  |  102  |  34<H>  ----------------------------<  100<H>  3.7   |  22  |  1.96<H>    Ca    8.1<L>      09 Jul 2018 06:49  Phos  3.5     07-08  Mg     2.0     07-08    TPro  6.2  /  Alb  2.1<L>  /  TBili  0.2  /  DBili  x   /  AST  26  /  ALT  47  /  AlkPhos  68  07-09                        7.8    9.49  )-----------( 304      ( 09 Jul 2018 06:49 )             24.9       Telemetry: Sinus rhythm, PVCs

## 2018-07-09 NOTE — PROGRESS NOTE ADULT - PROBLEM SELECTOR PLAN 3
DVT  new  needs AC and poss IVC filter  Heme eval noted  vascular eval pending  may need Heparin with lower PTT goals, eg. 50 - 60

## 2018-07-09 NOTE — CONSULT NOTE ADULT - SUBJECTIVE AND OBJECTIVE BOX
History of Present Illness:  58y morbidly obese  Male with a history of diabetes and renal stones with b/l nephrostomy tubes was admitted with severe anemia secondary to gastric ulcer. The patient has chronic lymphedema of both legs. He had a low grade temperature and had a venous doppler that revealed incidental bilateral DVT. He denies any prior hx. of DVT. I was requested to evaluate him for a ivc filter.    PAST MEDICAL & SURGICAL HISTORY:  Hx of peripheral neuropathy  Morbid obesity  H/O sleep apnea  Acquired lymphedema of leg  Calculus of kidney and ureter  Diabetes mellitus type II  HTN (hypertension)  Ureteral stents placement, bilateral  Hx of tonsillectomy: at 6 years old      Allergies    No Known Allergies    Intolerances        MEDICATIONS  (STANDING):  amLODIPine   Tablet 10 milliGRAM(s) Oral daily  BACItracin   Ointment 1 Application(s) Topical every 12 hours  cefTRIAXone   IVPB 1 Gram(s) IV Intermittent every 24 hours  cefTRIAXone   IVPB      dextrose 50% Injectable 12.5 Gram(s) IV Push once  ferrous    sulfate 325 milliGRAM(s) Oral daily  folic acid 1 milliGRAM(s) Oral daily  insulin lispro (HumaLOG) corrective regimen sliding scale   SubCutaneous three times a day before meals  lactated ringers. 1000 milliLiter(s) (50 mL/Hr) IV Continuous <Continuous>  multivitamin 1 Tablet(s) Oral daily  nystatin Powder 1 Application(s) Topical two times a day  pantoprazole    Tablet 40 milliGRAM(s) Oral before breakfast  sucralfate suspension 1 Gram(s) Oral every 6 hours  tamsulosin 0.4 milliGRAM(s) Oral at bedtime    MEDICATIONS  (PRN):  dextrose 40% Gel 15 Gram(s) Oral once PRN Blood Glucose LESS THAN 70 milliGRAM(s)/deciliter  glucagon  Injectable 1 milliGRAM(s) IntraMuscular once PRN Glucose LESS THAN 70 milligrams/deciliter      Social History:  Smoking History:denies  Alcohol Use: social    REVIEW OF SYSTEMS:  CONSTITUTIONAL: No weakness, fevers or chills  EYES/ENT: No visual changes;  No vertigo or throat pain   NECK: No pain or stiffness  RESPIRATORY: No cough, wheezing, hemoptysis; No shortness of breath  CARDIOVASCULAR: No chest pain or palpitations  GASTROINTESTINAL: No abdominal or epigastric pain. No nausea, vomiting, or hematemesis; No diarrhea or constipation. No melena or hematochezia.  GENITOURINARY: No dysuria, frequency or hematuria  NEUROLOGICAL: No numbness or weakness  SKIN: No itching, burning, rashes, or lesions   Vascular:  No lower extremity claudication, pedal rest pain or digital ulcers. ++++ chronic lymphedema of both legs  All other review of systems is negative unless indicated above.    PHYSICAL EXAM:  General:  On exam, the patient is alert nontoxic appearing Male in no acute distress.  Vital Signs Last 24 Hrs  T(C): 36.7 (09 Jul 2018 12:09), Max: 37.4 (08 Jul 2018 20:03)  T(F): 98.1 (09 Jul 2018 12:09), Max: 99.4 (08 Jul 2018 20:03)  HR: 91 (09 Jul 2018 14:00) (81 - 99)  BP: 136/62 (09 Jul 2018 14:00) (100/61 - 154/77)  BP(mean): 83 (09 Jul 2018 14:00) (68 - 104)  RR: 21 (09 Jul 2018 14:00) (14 - 26)  SpO2: 85% (09 Jul 2018 14:00) (85% - 100%)    Neck:  4+/4+ bilateral carotid pulses; no carotid bruit, no palpable cervical masses.++ right IJ triple lumen catheter  Heart:  Regular, no murmurs, rubs or gallops.    Lungs:  decreased BS at both bases   Chest:  No chest wall deformities  Symmetrical chest expansion.   Abdomen: obese  Soft and nontender.  No palpable masses.  No rebound, guarding or rigidity.  Extremities:  Feet are warm, pink with normal capillary refill times.  There are no digital ulcers or heel decubiti.  The calf and thigh muscles are soft and nontender.  There are no palpable cords or limb cellulitis.  Alejandra's sign is negative bilaterally.  There is no lower extremity  cyanosis, or rubor. Chronic lymphedema of both legs  On examination of the peripheral pulses:  Left leg femoral pulse is  4/4  , popliteal pulse is  4/4  ,PT Pulse is   2/4 , DP Pulse is 2/4   Right leg femoral pulse is 4/4   ,popliteal pulse is  4/4  , PT Pulse is 2/4  , DP Pulse is 2/4   Neurological:  There are no motor or sensory deficits in either lower extremity.                          7.8    9.49  )-----------( 304      ( 09 Jul 2018 06:49 )             24.9     07-09    136  |  102  |  34<H>  ----------------------------<  100<H>  3.7   |  22  |  1.96<H>    Ca    8.1<L>      09 Jul 2018 06:49  Phos  3.5     07-08  Mg     2.0     07-08    TPro  6.2  /  Alb  2.1<L>  /  TBili  0.2  /  DBili  x   /  AST  26  /  ALT  47  /  AlkPhos  68  07-09            Radiology:

## 2018-07-09 NOTE — PROGRESS NOTE ADULT - SUBJECTIVE AND OBJECTIVE BOX
INTERVAL HPI/OVERNIGHT EVENTS:  No new overnight event.  No N/V/D.  Tolerating diet.   MEDICATIONS  (STANDING):  amLODIPine   Tablet 10 milliGRAM(s) Oral daily  BACItracin   Ointment 1 Application(s) Topical every 12 hours  cefTRIAXone   IVPB 1 Gram(s) IV Intermittent every 24 hours  cefTRIAXone   IVPB      dextrose 50% Injectable 12.5 Gram(s) IV Push once  ferrous    sulfate 325 milliGRAM(s) Oral daily  folic acid 1 milliGRAM(s) Oral daily  insulin lispro (HumaLOG) corrective regimen sliding scale   SubCutaneous three times a day before meals  lactated ringers. 1000 milliLiter(s) (50 mL/Hr) IV Continuous <Continuous>  multivitamin 1 Tablet(s) Oral daily  nystatin Powder 1 Application(s) Topical two times a day  pantoprazole    Tablet 40 milliGRAM(s) Oral before breakfast  sucralfate suspension 1 Gram(s) Oral every 6 hours  tamsulosin 0.4 milliGRAM(s) Oral at bedtime    MEDICATIONS  (PRN):  dextrose 40% Gel 15 Gram(s) Oral once PRN Blood Glucose LESS THAN 70 milliGRAM(s)/deciliter  glucagon  Injectable 1 milliGRAM(s) IntraMuscular once PRN Glucose LESS THAN 70 milligrams/deciliter      Allergies    No Known Allergies    Intolerances        Review of Systems:    General:  No wt loss, fevers, chills, night sweats,fatigue,   Eyes:  Good vision, no reported pain  ENT:  No sore throat, pain, runny nose, dysphagia  CV:  No pain, palpitatioins, hypo/hypertension  Resp:  No dyspnea, cough, tachypnea, wheezing  GI:  No pain, No nausea, No vomiting, No diarrhea, No constipatiion, No weight loss, No fever, No pruritis, No rectal bleeding, No tarry stools, No dysphagia,  :  No pain, bleeding, incontinence, nocturia  Muscle:  No pain, weakness  Neuro:  No weakness, tingling, memory problems  Psych:  No fatigue, insomnia, mood problems, depression  Endocrine:  No polyuria, polydypsia, cold/heat intolerance  Heme:  No petechiae, ecchymosis, easy bruisability  Skin:  No rash, tattoos, scars, edema      Vital Signs Last 24 Hrs  T(C): 36.7 (2018 12:09), Max: 37.4 (2018 20:03)  T(F): 98.1 (2018 12:09), Max: 99.4 (2018 20:03)  HR: 91 (2018 14:00) (81 - 99)  BP: 136/62 (2018 14:00) (100/61 - 154/77)  BP(mean): 83 (2018 14:00) (68 - 104)  RR: 21 (2018 14:00) (14 - 26)  SpO2: 85% (2018 14:00) (85% - 100%)    PHYSICAL EXAM:    Constitutional: NAD, well-developed  HEENT: EOMI, throat clear  Neck: No LAD, supple  Respiratory: CTA and P  Cardiovascular: S1 and S2, RRR, no M  Gastrointestinal: BS+, soft, NT/ND, neg HSM,  Extremities: No peripheral edema, neg clubing, cyanosis  Vascular: 2+ peripheral pulses  Neurological: A/O x 3, no focal deficits  Psychiatric: Normal mood, normal affect  Skin: No rashes      LABS:                        7.8    9.49  )-----------( 304      ( 2018 06:49 )             24.9     07-09    136  |  102  |  34<H>  ----------------------------<  100<H>  3.7   |  22  |  1.96<H>    Ca    8.1<L>      2018 06:49  Phos  3.5     07-08  Mg     2.0     07-08    TPro  6.2  /  Alb  2.1<L>  /  TBili  0.2  /  DBili  x   /  AST  26  /  ALT  47  /  AlkPhos  68  07-09      Urinalysis Basic - ( 2018 20:56 )    Color: Yellow / Appearance: Turbid / S.010 / pH: x  Gluc: x / Ketone: Negative  / Bili: Negative / Urobili: Negative mg/dL   Blood: x / Protein: 100 mg/dL / Nitrite: Positive   Leuk Esterase: Moderate / RBC: 6-10 /HPF / WBC 3-5   Sq Epi: x / Non Sq Epi: Moderate / Bacteria: Many        RADIOLOGY & ADDITIONAL TESTS:

## 2018-07-09 NOTE — PROGRESS NOTE ADULT - ASSESSMENT
59 y/o M pt w/ PMHx morbid obesity, DM, HTN, peripheral neuropathy, calculus of kidney and ureter, sleep apnea, acquired lymphedema of leg and PSHx for obstructive neuropathy had  nephrostomy bilateral kidneys presents to the ED BIBA from [facility] c/o bloody diarrhea x 3 since yesterday, describes it as red. Also endorsing nausea. States that he had a blood transfusion at [facility] on 6/23 and was given an injection of Procrit yesterday. States hemoglobin was above 7 day prior to admission and on day of admission  i below 7, approx 5.  Pt stated he resides at [facility] for rehabilitation due to a fall 2 months ago. Pt denies vomiting, abd pain, fever or any other complaints at this time.  [place] [facility] [place] 4.9, and had central line placed and  2 units PRBC.vital signs stable    admitted for acute on ch anemia due to GI bl;eed with SMITH with CKD3 with h/o ureteral stents with MOrbid obesity , DM2 , HTN, peripheral neuropathy ,    for PRBC, GI consult , npo  , pantoprazole, started in ED, nephro consult , and pulm consult called   6/29 hemoglobin still low received 6 units PRBc,   patient was hemodyanamically stable for emegency endoscopy, and  intermediat risk for procedure, pulmonary clearance Dr Feng  d/w patient and wife by bed side, risk and benefits explained  d/w DR Hudson,  DR Feng as pulmonary critical care.  6/30 had EGd had bleeding duodenal ulcer , hemostatised in procedure , had post op respiratory failure and kept intubated till 7/1, extubated on 7/1, hemodyanamically stable, saturating well, no active bleeding, receiving PRBC, on regimen for gastritis, PUD,no active bleeding, hemoglobin improving , activity increased  Surgical consult If bleeding recurs, pt stable improving,hemoglobin stable, activity increased  DC plan started, PT, diet advance as per GI, had some arrhythmia on Tele ECHO ordered, will monitored for 24 hrs , NSVT transient resolved, LVEF normal, d/w cardiologist , stable for discharge,  pt came from rehab, but does not want to go back to rehab,    overnight 7/7 had fever UA suggestive of UTI started on ceftriaxone cultures sent, will await improvement, x ray chest clear.  ID consult called,   continue activity, hemoglobin 7.8 slight drop will continue to monitor.   < from: US Duplex Venous Lower Ext Complete, Bilateral (07.08.18 @ 11:10) >  There is occlusive and partially occlusive DVT in the rightproximal and   mid femoral vein, right popliteal vein and right posterior tibial veins.    There is occlusive and partially occlusive DVT in the left popliteal vein   and left posterior tibial veins.    There is normal compressibility of the bilateral common femoral veins.  advised IVC filtered , in view of recent severe GI bleed with anemia, d/w silvano and Dr Acevedo , vascular   d/w wife , patient aware of condition, and prognosis and dilemma of tt.   erythropoietic agent as per hematology  cont current care.

## 2018-07-09 NOTE — PROVIDER CONTACT NOTE (CRITICAL VALUE NOTIFICATION) - SITUATION
blood culture done on 7/8/18  Preliminary report  growth in aerobic bottle gram positive cocci in clusters.

## 2018-07-09 NOTE — CONSULT NOTE ADULT - CONSULT REQUESTED DATE/TIME
08-Jul-2018
08-Jul-2018 17:11
09-Jul-2018 16:16
29-Jun-2018 07:18
29-Jun-2018 12:56
29-Jun-2018 15:12
29-Jun-2018
29-Jun-2018 08:43

## 2018-07-09 NOTE — PROGRESS NOTE ADULT - SUBJECTIVE AND OBJECTIVE BOX
Patient is a 58y old  Male who presents with a chief complaint of "bloody diarrhea (2018 04:16)  Patient seen in follow up for recent SMITH, Obs uropathy, s/p Stent, B/l PCN (capped)    Up trending creatinine. + DVT. On Rx for possible UTI,     PAST MEDICAL HISTORY:  Hx of peripheral neuropathy  Morbid obesity  H/O sleep apnea  Acquired lymphedema of leg  Calculus of kidney and ureter  Diabetes mellitus type II  HTN (hypertension)    MEDICATIONS  (STANDING):  amLODIPine   Tablet 10 milliGRAM(s) Oral daily  BACItracin   Ointment 1 Application(s) Topical every 12 hours  cefTRIAXone   IVPB 1 Gram(s) IV Intermittent every 24 hours  cefTRIAXone   IVPB      dextrose 50% Injectable 12.5 Gram(s) IV Push once  ferrous    sulfate 325 milliGRAM(s) Oral daily  folic acid 1 milliGRAM(s) Oral daily  insulin lispro (HumaLOG) corrective regimen sliding scale   SubCutaneous three times a day before meals  lactated ringers. 1000 milliLiter(s) (50 mL/Hr) IV Continuous <Continuous>  multivitamin 1 Tablet(s) Oral daily  nystatin Powder 1 Application(s) Topical two times a day  pantoprazole    Tablet 40 milliGRAM(s) Oral before breakfast  sucralfate suspension 1 Gram(s) Oral every 6 hours  tamsulosin 0.4 milliGRAM(s) Oral at bedtime    MEDICATIONS  (PRN):  dextrose 40% Gel 15 Gram(s) Oral once PRN Blood Glucose LESS THAN 70 milliGRAM(s)/deciliter  glucagon  Injectable 1 milliGRAM(s) IntraMuscular once PRN Glucose LESS THAN 70 milligrams/deciliter    T(C): 36.7 (18 @ 12:09), Max: 38.3 (18 @ 19:57)  HR: 86 (18 @ 06:01) (84 - 106)  BP: 121/60 (18 @ 06:01) (99/47 - 159/82)  RR: 21 (18 @ 06:01)  SpO2: 98% (18 @ 06:01)  Wt(kg): --  I&O's Detail    2018 07:01  -  2018 07:00  --------------------------------------------------------  IN:    lactated ringers.: 1000 mL    Oral Fluid: 640 mL  Total IN: 1640 mL    OUT:    Voided: 1300 mL  Total OUT: 1300 mL    Total NET: 340 mL        PHYSICAL EXAM:  General: NAD  Respiratory: b/l air entry  Cardiovascular: S1 S2  Gastrointestinal: obese, b/l PCN capped  Extremities:  edema b/l                   LABORATORY:                        7.8    9.49  )-----------( 304      ( 2018 06:49 )             24.9         136  |  102  |  34<H>  ----------------------------<  100<H>  3.7   |  22  |  1.96<H>    Ca    8.1<L>      2018 06:49  Phos  3.5       Mg     2.0         TPro  6.2  /  Alb  2.1<L>  /  TBili  0.2  /  DBili  x   /  AST  26  /  ALT  47  /  AlkPhos  68      Sodium, Serum: 136 mmol/L ( 06:49)  Sodium, Serum: 138 mmol/L ( 06:19)  Sodium, Serum: 136 mmol/L ( 20:56)    Potassium, Serum: 3.7 mmol/L ( 06:49)  Potassium, Serum: 3.6 mmol/L ( 06:19)  Potassium, Serum: 3.6 mmol/L ( 20:56)    Hemoglobin: 7.8 g/dL ( 06:49)  Hemoglobin: 7.8 g/dL ( 06:19)  Hemoglobin: 8.2 g/dL ( 20:56)  Hemoglobin: 8.0 g/dL ( 05:12)    Creatinine, Serum 1.96 ( 06:49)  Creatinine, Serum 2.16 ( 06:19)  Creatinine, Serum 2.16 ( 20:56)  Creatinine, Serum 1.95 ( 05:12)        LIVER FUNCTIONS - ( 2018 06:49 )  Alb: 2.1 g/dL / Pro: 6.2 g/dL / ALK PHOS: 68 U/L / ALT: 47 U/L DA / AST: 26 U/L / GGT: x           Urinalysis Basic - ( 2018 20:56 )    Color: Yellow / Appearance: Turbid / S.010 / pH: x  Gluc: x / Ketone: Negative  / Bili: Negative / Urobili: Negative mg/dL   Blood: x / Protein: 100 mg/dL / Nitrite: Positive   Leuk Esterase: Moderate / RBC: 6-10 /HPF / WBC 3-5   Sq Epi: x / Non Sq Epi: Moderate / Bacteria: Many

## 2018-07-09 NOTE — CONSULT NOTE ADULT - CONSULT REASON
anemia
Bilateral DVT
Fever
Gi bleed  Sleep apnea  Morbid obesity  Anemia
SMITH
anemia
history of kidney stones
Pre-operative evaluation, HTN, morbid obesity, GI bleed

## 2018-07-09 NOTE — PROGRESS NOTE ADULT - ASSESSMENT
·	Recent SMITH, Obs uropathy, s/p Stent, B/l PCN, CKD   ·	Anemia, GI bleed  ·	Nephrolithiasis  ·	Diabetes  ·	Hypertension  ·	DVT  ·	Fever, ? UTI    Stable renal function. Monitor h/h trend. To continue current meds. Avoid nephrotoxins. GI follow up. On IVF.   Monitor blood sugar levels. Insulin coverage as needed. Dietary restriction. On abx.   Monitor BP trend. Titrate BP meds as needed. Salt restriction. Will follow electrolytes and renal function trend.

## 2018-07-09 NOTE — PROVIDER CONTACT NOTE (CRITICAL VALUE NOTIFICATION) - ACTION/TREATMENT ORDERED:
1 unit PRBC  Endoscopy
Administer 1 unit of PRBC
new order rec.
no new orders
DR. HOLT MADE AWARE. WILL GIVE 2 UNITS OF RBC

## 2018-07-10 ENCOUNTER — TRANSCRIPTION ENCOUNTER (OUTPATIENT)
Age: 58
End: 2018-07-10

## 2018-07-10 DIAGNOSIS — N39.0 URINARY TRACT INFECTION, SITE NOT SPECIFIED: ICD-10-CM

## 2018-07-10 LAB
ANION GAP SERPL CALC-SCNC: 9 MMOL/L — SIGNIFICANT CHANGE UP (ref 5–17)
BASOPHILS # BLD AUTO: 0.03 K/UL — SIGNIFICANT CHANGE UP (ref 0–0.2)
BASOPHILS NFR BLD AUTO: 0.3 % — SIGNIFICANT CHANGE UP (ref 0–2)
BLD GP AB SCN SERPL QL: SIGNIFICANT CHANGE UP
BUN SERPL-MCNC: 28 MG/DL — HIGH (ref 7–23)
CALCIUM SERPL-MCNC: 8.4 MG/DL — SIGNIFICANT CHANGE UP (ref 8.4–10.5)
CHLORIDE SERPL-SCNC: 102 MMOL/L — SIGNIFICANT CHANGE UP (ref 96–108)
CO2 SERPL-SCNC: 25 MMOL/L — SIGNIFICANT CHANGE UP (ref 22–31)
CREAT SERPL-MCNC: 1.66 MG/DL — HIGH (ref 0.5–1.3)
EOSINOPHIL # BLD AUTO: 0.3 K/UL — SIGNIFICANT CHANGE UP (ref 0–0.5)
EOSINOPHIL NFR BLD AUTO: 3.2 % — SIGNIFICANT CHANGE UP (ref 0–6)
GLUCOSE BLDC GLUCOMTR-MCNC: 111 MG/DL — HIGH (ref 70–99)
GLUCOSE BLDC GLUCOMTR-MCNC: 113 MG/DL — HIGH (ref 70–99)
GLUCOSE BLDC GLUCOMTR-MCNC: 114 MG/DL — HIGH (ref 70–99)
GLUCOSE BLDC GLUCOMTR-MCNC: 140 MG/DL — HIGH (ref 70–99)
GLUCOSE SERPL-MCNC: 105 MG/DL — HIGH (ref 70–99)
GRAM STN FLD: SIGNIFICANT CHANGE UP
HCT VFR BLD CALC: 23.7 % — LOW (ref 39–50)
HGB BLD-MCNC: 7.6 G/DL — LOW (ref 13–17)
IMM GRANULOCYTES NFR BLD AUTO: 0.3 % — SIGNIFICANT CHANGE UP (ref 0–1.5)
LYMPHOCYTES # BLD AUTO: 1.46 K/UL — SIGNIFICANT CHANGE UP (ref 1–3.3)
LYMPHOCYTES # BLD AUTO: 15.5 % — SIGNIFICANT CHANGE UP (ref 13–44)
MCHC RBC-ENTMCNC: 29.5 PG — SIGNIFICANT CHANGE UP (ref 27–34)
MCHC RBC-ENTMCNC: 32.1 GM/DL — SIGNIFICANT CHANGE UP (ref 32–36)
MCV RBC AUTO: 91.9 FL — SIGNIFICANT CHANGE UP (ref 80–100)
MONOCYTES # BLD AUTO: 0.94 K/UL — HIGH (ref 0–0.9)
MONOCYTES NFR BLD AUTO: 10 % — SIGNIFICANT CHANGE UP (ref 2–14)
NEUTROPHILS # BLD AUTO: 6.67 K/UL — SIGNIFICANT CHANGE UP (ref 1.8–7.4)
NEUTROPHILS NFR BLD AUTO: 70.7 % — SIGNIFICANT CHANGE UP (ref 43–77)
NRBC # BLD: 0 /100 WBCS — SIGNIFICANT CHANGE UP (ref 0–0)
PLATELET # BLD AUTO: 317 K/UL — SIGNIFICANT CHANGE UP (ref 150–400)
POTASSIUM SERPL-MCNC: 3.5 MMOL/L — SIGNIFICANT CHANGE UP (ref 3.5–5.3)
POTASSIUM SERPL-SCNC: 3.5 MMOL/L — SIGNIFICANT CHANGE UP (ref 3.5–5.3)
RBC # BLD: 2.58 M/UL — LOW (ref 4.2–5.8)
RBC # FLD: 15.4 % — HIGH (ref 10.3–14.5)
SODIUM SERPL-SCNC: 136 MMOL/L — SIGNIFICANT CHANGE UP (ref 135–145)
WBC # BLD: 9.43 K/UL — SIGNIFICANT CHANGE UP (ref 3.8–10.5)
WBC # FLD AUTO: 9.43 K/UL — SIGNIFICANT CHANGE UP (ref 3.8–10.5)

## 2018-07-10 RX ADMIN — Medication 1 TABLET(S): at 12:44

## 2018-07-10 RX ADMIN — Medication 1 GRAM(S): at 12:44

## 2018-07-10 RX ADMIN — PANTOPRAZOLE SODIUM 40 MILLIGRAM(S): 20 TABLET, DELAYED RELEASE ORAL at 06:05

## 2018-07-10 RX ADMIN — Medication 1 GRAM(S): at 23:08

## 2018-07-10 RX ADMIN — AMLODIPINE BESYLATE 10 MILLIGRAM(S): 2.5 TABLET ORAL at 06:04

## 2018-07-10 RX ADMIN — Medication 1 APPLICATION(S): at 17:16

## 2018-07-10 RX ADMIN — Medication 1 GRAM(S): at 17:16

## 2018-07-10 RX ADMIN — TAMSULOSIN HYDROCHLORIDE 0.4 MILLIGRAM(S): 0.4 CAPSULE ORAL at 21:23

## 2018-07-10 RX ADMIN — Medication 325 MILLIGRAM(S): at 12:44

## 2018-07-10 RX ADMIN — Medication 1 GRAM(S): at 00:17

## 2018-07-10 RX ADMIN — Medication 1 APPLICATION(S): at 06:04

## 2018-07-10 RX ADMIN — CEFTRIAXONE 100 GRAM(S): 500 INJECTION, POWDER, FOR SOLUTION INTRAMUSCULAR; INTRAVENOUS at 20:18

## 2018-07-10 RX ADMIN — NYSTATIN CREAM 1 APPLICATION(S): 100000 CREAM TOPICAL at 17:16

## 2018-07-10 RX ADMIN — Medication 1 GRAM(S): at 06:04

## 2018-07-10 RX ADMIN — NYSTATIN CREAM 1 APPLICATION(S): 100000 CREAM TOPICAL at 06:56

## 2018-07-10 RX ADMIN — Medication 1 MILLIGRAM(S): at 12:44

## 2018-07-10 NOTE — PROGRESS NOTE ADULT - SUBJECTIVE AND OBJECTIVE BOX
INTERVAL HPI/OVERNIGHT EVENTS:  No new overnight event.  No N/V/D.  Tolerating diet.   MEDICATIONS  (STANDING):  amLODIPine   Tablet 10 milliGRAM(s) Oral daily  BACItracin   Ointment 1 Application(s) Topical every 12 hours  cefTRIAXone   IVPB 1 Gram(s) IV Intermittent every 24 hours  cefTRIAXone   IVPB      dextrose 50% Injectable 12.5 Gram(s) IV Push once  ferrous    sulfate 325 milliGRAM(s) Oral daily  folic acid 1 milliGRAM(s) Oral daily  insulin lispro (HumaLOG) corrective regimen sliding scale   SubCutaneous three times a day before meals  multivitamin 1 Tablet(s) Oral daily  nystatin Powder 1 Application(s) Topical two times a day  pantoprazole    Tablet 40 milliGRAM(s) Oral before breakfast  sucralfate suspension 1 Gram(s) Oral every 6 hours  tamsulosin 0.4 milliGRAM(s) Oral at bedtime    MEDICATIONS  (PRN):  dextrose 40% Gel 15 Gram(s) Oral once PRN Blood Glucose LESS THAN 70 milliGRAM(s)/deciliter  glucagon  Injectable 1 milliGRAM(s) IntraMuscular once PRN Glucose LESS THAN 70 milligrams/deciliter      Allergies    No Known Allergies    Intolerances        Review of Systems:    General:  No wt loss, fevers, chills, night sweats,fatigue,   Eyes:  Good vision, no reported pain  ENT:  No sore throat, pain, runny nose, dysphagia  CV:  No pain, palpitatioins, hypo/hypertension  Resp:  No dyspnea, cough, tachypnea, wheezing  GI:  No pain, No nausea, No vomiting, No diarrhea, No constipatiion, No weight loss, No fever, No pruritis, No rectal bleeding, No tarry stools, No dysphagia,  :  No pain, bleeding, incontinence, nocturia  Muscle:  No pain, weakness  Neuro:  No weakness, tingling, memory problems  Psych:  No fatigue, insomnia, mood problems, depression  Endocrine:  No polyuria, polydypsia, cold/heat intolerance  Heme:  No petechiae, ecchymosis, easy bruisability  Skin:  No rash, tattoos, scars, edema      Vital Signs Last 24 Hrs  T(C): 36.5 (10 Jul 2018 16:06), Max: 37.6 (09 Jul 2018 20:08)  T(F): 97.7 (10 Jul 2018 16:06), Max: 99.6 (09 Jul 2018 20:08)  HR: 82 (10 Jul 2018 16:00) (82 - 99)  BP: 143/68 (10 Jul 2018 16:00) (124/63 - 156/69)  BP(mean): 85 (10 Jul 2018 16:00) (81 - 92)  RR: 18 (10 Jul 2018 16:00) (13 - 23)  SpO2: 99% (10 Jul 2018 16:00) (88% - 100%)    PHYSICAL EXAM:    Constitutional: NAD, well-developed  HEENT: EOMI, throat clear  Neck: No LAD, supple  Respiratory: CTA and P  Cardiovascular: S1 and S2, RRR, no M  Gastrointestinal: BS+, soft, NT/ND, neg HSM,  Extremities: No peripheral edema, neg clubing, cyanosis  Vascular: 2+ peripheral pulses  Neurological: A/O x 3, no focal deficits  Psychiatric: Normal mood, normal affect  Skin: No rashes      LABS:                        7.6    9.43  )-----------( 317      ( 10 Jul 2018 06:44 )             23.7     07-10    136  |  102  |  28<H>  ----------------------------<  105<H>  3.5   |  25  |  1.66<H>    Ca    8.4      10 Jul 2018 06:44    TPro  6.2  /  Alb  2.1<L>  /  TBili  0.2  /  DBili  x   /  AST  26  /  ALT  47  /  AlkPhos  68  07-09          RADIOLOGY & ADDITIONAL TESTS:

## 2018-07-10 NOTE — PROGRESS NOTE ADULT - SUBJECTIVE AND OBJECTIVE BOX
Patient is a 58y old  Male who presents with a chief complaint of "bloody diarrhea (2018 15:53)      INTERVAL HPI/OVERNIGHT EVENTS:no new complaint    Home Medications:  amLODIPine 10 mg oral tablet: 1 tab(s) orally once a day (2018 12:43)  bacitracin 500 units/g topical ointment: Apply topically to affected area 2 times a day nephrostomy tube (2018 12:43)  Bactrim  mg-160 mg oral tablet: 1 tab(s) orally 2 times a day (2018 12:43)  Colace 100 mg oral capsule: 1 cap(s) orally 3 times a day (2018 12:43)  Flomax 0.4 mg oral capsule: 1 cap(s) orally once a day (2018 12:43)  gas relief: 180 milligram(s)  2 times a day (2018 12:43)  lactulose: 30 milliliter(s) orally 3 times a week, As Needed (2018 12:43)  Milk of Magnesia: 30 milliliter(s) orally 3 times a week, As Needed (2018 12:43)  Procrit 10,000 units/mL preservative-free injectable solution: injectable every 7 days (2018 12:43)  Senna 8.6 mg oral tablet: 2 tab(s) orally once a day (2018 12:43)  Tylenol 500 mg oral tablet: 1 tab(s) orally every 6 hours, As Needed (2018 12:43)  Zofran ODT 4 mg oral tablet, disintegratin tab(s) orally 6 times a day, As Needed (2018 12:43)      MEDICATIONS  (STANDING):  amLODIPine   Tablet 10 milliGRAM(s) Oral daily  BACItracin   Ointment 1 Application(s) Topical every 12 hours  cefTRIAXone   IVPB 1 Gram(s) IV Intermittent every 24 hours  cefTRIAXone   IVPB      dextrose 50% Injectable 12.5 Gram(s) IV Push once  ferrous    sulfate 325 milliGRAM(s) Oral daily  folic acid 1 milliGRAM(s) Oral daily  insulin lispro (HumaLOG) corrective regimen sliding scale   SubCutaneous three times a day before meals  multivitamin 1 Tablet(s) Oral daily  nystatin Powder 1 Application(s) Topical two times a day  pantoprazole    Tablet 40 milliGRAM(s) Oral before breakfast  sucralfate suspension 1 Gram(s) Oral every 6 hours  tamsulosin 0.4 milliGRAM(s) Oral at bedtime    MEDICATIONS  (PRN):  dextrose 40% Gel 15 Gram(s) Oral once PRN Blood Glucose LESS THAN 70 milliGRAM(s)/deciliter  glucagon  Injectable 1 milliGRAM(s) IntraMuscular once PRN Glucose LESS THAN 70 milligrams/deciliter      Allergies    No Known Allergies    Intolerances        REVIEW OF SYSTEMS:  CONSTITUTIONAL: No fever, weight loss, has  fatigue  EYES: No eye pain, visual disturbances, or discharge  ENMT:  No difficulty hearing, tinnitus, vertigo; No sinus or throat pain  NECK: No pain or stiffness  BREASTS: No pain, masses, or nipple discharge  RESPIRATORY: No cough, wheezing, chills or hemoptysis; No shortness of breath  CARDIOVASCULAR: No chest pain, palpitations, dizziness, or leg swelling  GASTROINTESTINAL: No abdominal or epigastric pain. No nausea, vomiting, or hematemesis; No diarrhea or constipation. No melena or hematochezia.  GENITOURINARY: No dysuria, frequency, hematuria, or incontinence  NEUROLOGICAL: No headaches, memory loss, loss of strength, numbness, or tremors  SKIN: No itching, burning, rashes, or lesions   ENDOCRINE: No heat or cold intolerance; No hair loss  MUSCULOSKELETAL: No joint pain or swelling; No muscle, back, or extremity pain, marked edema legs  PSYCHIATRIC: No depression, anxiety, mood swings, or difficulty sleeping  HEME/LYMPH: No easy bruising, or bleeding gums  ALLERGY AND IMMUNOLOGIC: No hives or eczema    Vital Signs Last 24 Hrs  T(C): 37.1 (10 Jul 2018 08:24), Max: 37.7 (2018 16:03)  T(F): 98.7 (10 Jul 2018 08:24), Max: 99.8 (2018 16:03)  HR: 83 (10 Jul 2018 10:00) (81 - 99)  BP: 146/70 (10 Jul 2018 10:00) (124/63 - 156/69)  BP(mean): 91 (10 Jul 2018 10:00) (81 - 92)  RR: 16 (10 Jul 2018 10:00) (13 - 23)  SpO2: 89% (10 Jul 2018 10:00) (74% - 100%)    PHYSICAL EXAM:  GENERAL: NAD, well-groomed, well-developed  HEAD:  Atraumatic, Normocephalic  EYES: EOMI, PERRLA, conjunctiva and sclera clear  ENMT: Moist mucous membranes,   NECK: Supple, No JVD, Normal thyroid  NERVOUS SYSTEM:  Alert & Oriented X3, Good concentration; Motor Strength 5/5 B/L upper and lower extremities; DTRs 2+ intact and symmetric  CHEST/LUNG: Clear to percussion bilaterally; No rales, rhonchi, wheezing, or rubs  HEART: Regular rate and rhythm; No murmurs, rubs, or gallops  ABDOMEN: Soft, Nontender, Nondistended; Bowel sounds present  EXTREMITIES:marked edema both LE lymphedema  SKIN: No rashes or lesions    LABS:                        7.6    9.43  )-----------( 317      ( 10 Jul 2018 06:44 )             23.7     07-10    136  |  102  |  28<H>  ----------------------------<  105<H>  3.5   |  25  |  1.66<H>    Ca    8.4      10 Jul 2018 06:44    TPro  6.2  /  Alb  2.1<L>  /  TBili  0.2  /  DBili  x   /  AST  26  /  ALT  47  /  AlkPhos  68          CAPILLARY BLOOD GLUCOSE      POCT Blood Glucose.: 111 mg/dL (10 Jul 2018 08:02)  POCT Blood Glucose.: 134 mg/dL (2018 21:33)  POCT Blood Glucose.: 141 mg/dL (2018 17:28)  POCT Blood Glucose.: 125 mg/dL (2018 12:14)        Culture - Blood (collected 18 @ 12:58)  Source: .Blood Triple Lumen WHITE  Gram Stain (07-10-18 @ 00:57):    Growth in aerobic bottle: Gram Positive Cocci in Clusters    Growth in anaerobic bottle: Gram Positive Cocci in Clusters  Preliminary Report (07-10-18 @ 00:57):    Growth in aerobic bottle: Gram Positive Cocci in Clusters    "Due to technical problems, Proteus sp. will Not be reported as part of    the BCID panel until further notice" ***Blood Panel PCR results on this    specimen are available    approximately 3 hours after the Gram stain result.***    Gram stain, PCR, and/or culture results may not always    correspond due to difference in methodologies.    ************************************************************    This PCR assay was performed using [employer].    The following targets are tested for: Enterococcus,    vancomycin resistant enterococci, Listeria monocytogenes,    coagulase negative staphylococci, S. aureus,    methicillin resistant S. aureus, Streptococcus agalactiae    (Group B), S. pneumoniae, S.pyogenes (Group A),    Acinetobacter baumannii, Enterobacter cloacae, E. coli,    Klebsiella oxytoca, K. pneumoniae, Proteus sp.,    Serratia marcescens, Haemophilus influenzae,    Neisseria meningitidis, Pseudomonas aeruginosa, Candida    albicans, C. glabrata, C krusei, C parapsilosis,    C. tropicalis and the KPC resistance gene.    Growth in anaerobic bottle: Gram Positive Cocci in Clusters  Organism: Blood Culture PCR (18 @ 16:43)  Organism: Blood Culture PCR (18 @ 16:43)      -  Coagulase negative Staphylococcus: Detec      Method Type: PCR    Culture - Blood (collected 18 @ 12:58)  Source: .Blood Blood-Peripheral  Preliminary Report (18 @ 13:00):    No growth to date.    Culture - Urine (collected 18 @ 02:46)  Source: .Urine Clean Catch (Midstream)  Final Report (18 @ 18:36):    >100,000 CFU/ml Proteus mirabilis  Organism: Proteus mirabilis (18 @ 18:36)  Organism: Proteus mirabilis (18 @ 18:36)      -  Amikacin: S <=8      -  Amoxicillin/Clavulanic Acid: S <=8/4      -  Ampicillin: S <=2 These ampicillin results predict results for amoxicillin      -  Ampicillin/Sulbactam: S <=4/2      -  Aztreonam: S <=4      -  Cefazolin: S <=2 This predicts results for oral agents cefaclor, cefdinir, cefpodoxime, cefprozil, cefuroxime axetil, cephalexin and locarbef for uncomplicated UTI. Note that some isolates may be susceptible to these agents while testing resistant to cefazolin.      -  Cefepime: S <=2      -  Cefoxitin: S <=4      -  Ceftriaxone: S <=1 Enterobacter, Citrobacter, and Serratia may develop resistance during prolonged therapy      -  Ciprofloxacin: S <=0.5      -  Ertapenem: S <=0.5      -  Gentamicin: S <=1      -  Levofloxacin: S <=1      -  Meropenem: S <=1      -  Nitrofurantoin: R >64 Should not be used to treat pyelonephritis      -  Piperacillin/Tazobactam: S <=8      -  Tobramycin: S <=2      -  Trimethoprim/Sulfamethoxazole: S <=0.5/9.5      Method Type: ARTHUR        I&O's Summary    2018 07:  -  10 Jul 2018 07:00  --------------------------------------------------------  IN: 2850 mL / OUT: 1450 mL / NET: 1400 mL    10 Jul 2018 07:  -  10 Jul 2018 11:29  --------------------------------------------------------  IN: 340 mL / OUT: 0 mL / NET: 340 mL        RADIOLOGY & ADDITIONAL TESTS:    Imaging Personally Reviewed:  [x ] YES  [ ] NO    Consultant(s) Notes Reviewed:  [x ] YES  [ ] NO    Care Discussed with Consultants/Other Providers [ ] YES  [ ] NO

## 2018-07-10 NOTE — PROGRESS NOTE ADULT - ASSESSMENT
59 y/o M pt w/ PMHx morbid obesity, DM, HTN, peripheral neuropathy, calculus of kidney and ureter, sleep apnea, acquired lymphedema of leg and PSHx for obstructive neuropathy had  nephrostomy bilateral kidneys presents to the ED BIBA from [facility] c/o bloody diarrhea x 3 since yesterday, describes it as red. Also endorsing nausea. States that he had a blood transfusion at [facility] on 6/23 and was given an injection of Procrit yesterday. States hemoglobin was above 7 day prior to admission and on day of admission  i below 7, approx 5.  Pt stated he resides at [facility] for rehabilitation due to a fall 2 months ago. Pt denies vomiting, abd pain, fever or any other complaints at this time.  [place] [facility] [place] 4.9, and had central line placed and  2 units PRBC.vital signs stable    admitted for acute on ch anemia due to GI bl;eed with SMITH with CKD3 with h/o ureteral stents with MOrbid obesity , DM2 , HTN, peripheral neuropathy ,    for PRBC, GI consult , npo  , pantoprazole, started in ED, nephro consult , and pulm consult called   6/29 hemoglobin still low received 6 units PRBc,   patient was hemodyanamically stable for emegency endoscopy, and  intermediat risk for procedure, pulmonary clearance Dr Feng  d/w patient and wife by bed side, risk and benefits explained  d/w DR Hudson,  DR Feng as pulmonary critical care.  6/30 had EGd had bleeding duodenal ulcer , hemostatised in procedure , had post op respiratory failure and kept intubated till 7/1, extubated on 7/1, hemodyanamically stable, saturating well, no active bleeding, receiving PRBC, on regimen for gastritis, PUD,no active bleeding, hemoglobin improving , activity increased  Surgical consult If bleeding recurs, pt stable improving,hemoglobin stable, activity increased  DC plan started, PT, diet advance as per GI, had some arrhythmia on Tele ECHO ordered, will monitored for 24 hrs , NSVT transient resolved, LVEF normal, d/w cardiologist , stable for discharge,  pt came from rehab, but does not want to go back to rehab,    overnight 7/7 had fever UA suggestive of UTI started on ceftriaxone cultures sent, will await improvement, x ray chest clear.  ID consult called,   continue activity, hemoglobin 7.8 slight drop will continue to monitor.   < from: US Duplex Venous Lower Ext Complete, Bilateral (07.08.18 @ 11:10) >  There is occlusive and partially occlusive DVT in the rightproximal and   mid femoral vein, right popliteal vein and right posterior tibial veins.    There is occlusive and partially occlusive DVT in the left popliteal vein   and left posterior tibial veins.    There is normal compressibility of the bilateral common femoral veins.  advised IVC filtered , in view of recent severe GI bleed with anemia, d/w hic and Dr Acevedo , vascular   d/w wife , patient aware of condition, and prognosis and dilemma of tt.   erythropoietic agent as per hematology  cont current care. for IVC filter on 7/11  intermediate risk for procedure

## 2018-07-10 NOTE — PROGRESS NOTE ADULT - ASSESSMENT
·	Recent SMITH, Obs uropathy, s/p Stent, B/l PCN, CKD   ·	Anemia, GI bleed  ·	Nephrolithiasis  ·	Diabetes  ·	Hypertension  ·	DVT  ·	Fever, ? UTI    Improving renal function. D/c IVF. Monitor h/h trend. To continue current meds. Avoid nephrotoxins. GI follow up. On IVF.   Monitor blood sugar levels. Insulin coverage as needed. Dietary restriction. On abx. For IVC filter placement.   Monitor BP trend. Titrate BP meds as needed. Salt restriction. Will follow electrolytes and renal function trend.

## 2018-07-10 NOTE — PROGRESS NOTE ADULT - SUBJECTIVE AND OBJECTIVE BOX
Patient is a 58y old  Male who presents with a chief complaint of "bloody diarrhea (29 Jun 2018 04:16)  Patient seen in follow up for recent SMITH, Obs uropathy, s/p Stent, B/l PCN (capped)  + DVT    For IVC filter placement.     PAST MEDICAL HISTORY:  Hx of peripheral neuropathy  Morbid obesity  H/O sleep apnea  Acquired lymphedema of leg  Calculus of kidney and ureter  Diabetes mellitus type II  HTN (hypertension)    MEDICATIONS  (STANDING):  amLODIPine   Tablet 10 milliGRAM(s) Oral daily  BACItracin   Ointment 1 Application(s) Topical every 12 hours  cefTRIAXone   IVPB 1 Gram(s) IV Intermittent every 24 hours  cefTRIAXone   IVPB      dextrose 50% Injectable 12.5 Gram(s) IV Push once  ferrous    sulfate 325 milliGRAM(s) Oral daily  folic acid 1 milliGRAM(s) Oral daily  insulin lispro (HumaLOG) corrective regimen sliding scale   SubCutaneous three times a day before meals  lactated ringers. 1000 milliLiter(s) (50 mL/Hr) IV Continuous <Continuous>  multivitamin 1 Tablet(s) Oral daily  nystatin Powder 1 Application(s) Topical two times a day  pantoprazole    Tablet 40 milliGRAM(s) Oral before breakfast  sucralfate suspension 1 Gram(s) Oral every 6 hours  tamsulosin 0.4 milliGRAM(s) Oral at bedtime    MEDICATIONS  (PRN):  dextrose 40% Gel 15 Gram(s) Oral once PRN Blood Glucose LESS THAN 70 milliGRAM(s)/deciliter  glucagon  Injectable 1 milliGRAM(s) IntraMuscular once PRN Glucose LESS THAN 70 milligrams/deciliter    T(C): 37.1 (07-10-18 @ 08:24), Max: 37.7 (07-09-18 @ 16:03)  HR: 89 (07-10-18 @ 06:00) (81 - 99)  BP: 146/67 (07-10-18 @ 06:00) (100/61 - 156/69)  RR: 19 (07-10-18 @ 06:00)  SpO2: 97% (07-10-18 @ 06:00)  Wt(kg): --  I&O's Detail    09 Jul 2018 07:01  -  10 Jul 2018 07:00  --------------------------------------------------------  IN:    IV PiggyBack: 550 mL    lactated ringers.: 1050 mL    Oral Fluid: 1200 mL  Total IN: 2800 mL    OUT:    Voided: 1450 mL  Total OUT: 1450 mL    Total NET: 1350 mL            PHYSICAL EXAM:  General: NAD  Respiratory: b/l air entry  Cardiovascular: S1 S2  Gastrointestinal: obese, b/l PCN capped  Extremities:  edema b/l                   LABORATORY:                        7.6    9.43  )-----------( 317      ( 10 Jul 2018 06:44 )             23.7     07-10    136  |  102  |  28<H>  ----------------------------<  105<H>  3.5   |  25  |  1.66<H>    Ca    8.4      10 Jul 2018 06:44    TPro  6.2  /  Alb  2.1<L>  /  TBili  0.2  /  DBili  x   /  AST  26  /  ALT  47  /  AlkPhos  68  07-09    Sodium, Serum: 136 mmol/L (07-10 @ 06:44)  Sodium, Serum: 136 mmol/L (07-09 @ 06:49)    Potassium, Serum: 3.5 mmol/L (07-10 @ 06:44)  Potassium, Serum: 3.7 mmol/L (07-09 @ 06:49)    Hemoglobin: 7.6 g/dL (07-10 @ 06:44)  Hemoglobin: 7.8 g/dL (07-09 @ 06:49)  Hemoglobin: 7.8 g/dL (07-08 @ 06:19)  Hemoglobin: 8.2 g/dL (07-07 @ 20:56)    Creatinine, Serum 1.66 (07-10 @ 06:44)  Creatinine, Serum 1.96 (07-09 @ 06:49)  Creatinine, Serum 2.16 (07-08 @ 06:19)  Creatinine, Serum 2.16 (07-07 @ 20:56)        LIVER FUNCTIONS - ( 09 Jul 2018 06:49 )  Alb: 2.1 g/dL / Pro: 6.2 g/dL / ALK PHOS: 68 U/L / ALT: 47 U/L DA / AST: 26 U/L / GGT: x

## 2018-07-10 NOTE — PROGRESS NOTE ADULT - ASSESSMENT
The patient is a 58 year old male with a history of HTN, DM, lymphedema, peripheral neuropathy, kidney stone, MEGHA who presents from rehab with bloody diarrhea.    Plan:  - Continue amlodipine for HTN  - Hemoglobin stable  - Atrial tachycardia - brief episode and asymptomatic. If recurrent episodes or patient develops palpitations, start metoprolol.  - NSVT - Continue to monitor. LV systolic function appears normal, no significant valve issues.  - GI follow-up  - Bilateral DVT - will need IVC filter given recent GI bleed. Can attempt anticoagulation in 2 weeks for DVT.  - Ok to proceed from a cardiac perspective for IVC filter placement

## 2018-07-10 NOTE — PROGRESS NOTE ADULT - SUBJECTIVE AND OBJECTIVE BOX
Chief Complaint: GI bleed    Interval Events: No events overnight. No complaints today.    Review of Systems:  General: No fevers, chills, weight loss or gain  Skin: No rashes, color changes  Cardiovascular: No chest pain, orthopnea  Respiratory: No shortness of breath, cough  Gastrointestinal: No nausea, abdominal pain  Genitourinary: No incontinence, pain with urination  Musculoskeletal: No pain, swelling, decreased range of motion  Neurological: No headache, weakness  Psychiatric: No depression, anxiety  Endocrine: No weight loss or gain, increased thirst  All other systems are comprehensively negative.    Physical Exam:  Vital Signs Last 24 Hrs  T(C): 37.1 (10 Jul 2018 08:24), Max: 37.7 (09 Jul 2018 16:03)  T(F): 98.7 (10 Jul 2018 08:24), Max: 99.8 (09 Jul 2018 16:03)  HR: 89 (10 Jul 2018 06:00) (81 - 99)  BP: 146/67 (10 Jul 2018 06:00) (118/59 - 156/69)  BP(mean): 89 (10 Jul 2018 06:00) (75 - 92)  RR: 19 (10 Jul 2018 06:00) (13 - 26)  SpO2: 97% (10 Jul 2018 06:00) (74% - 100%)  General: NAD  HEENT: MMM  Neck: No JVD, no carotid bruit  Lungs: CTAB  CV: RRR, nl S1/S2, no M/R/G  Abdomen: S/NT/ND, +BS  Extremities: Lymphedema, no cyanosis  Neuro: AAOx3, non-focal  Skin: No rash    Labs:             07-10    136  |  102  |  28<H>  ----------------------------<  105<H>  3.5   |  25  |  1.66<H>    Ca    8.4      10 Jul 2018 06:44    TPro  6.2  /  Alb  2.1<L>  /  TBili  0.2  /  DBili  x   /  AST  26  /  ALT  47  /  AlkPhos  68  07-09                        7.6    9.43  )-----------( 317      ( 10 Jul 2018 06:44 )             23.7       Telemetry: Sinus rhythm, PVCs, ventricular couplets

## 2018-07-10 NOTE — PROGRESS NOTE ADULT - SUBJECTIVE AND OBJECTIVE BOX
ID Progress note covering Dr. Maza    Name: LEONA RODRIGUEZ  Age: 58y  Gender: Male  MRN: [id number]    Interval History--Events noted , doing well, On CPAP , resting comfortably . Seen by vascular surgery , scheduled for IVC filter tommorow am   Notes reviewed    Past Medical History--  Hx of peripheral neuropathy  Morbid obesity  H/O sleep apnea  Acquired lymphedema of leg  Calculus of kidney and ureter  Diabetes mellitus type II  HTN (hypertension)  Ureteral stents placement, bilateral  Hx of tonsillectomy      For details regarding the patient's social history, family history, and other miscellaneous elements, please refer the initial infectious diseases consultation and/or the admitting history and physical examination for this admission.    Allergies--  Allergies    No Known Allergies    Intolerances        Medications--  Antibiotics:  cefTRIAXone   IVPB 1 Gram(s) IV Intermittent every 24 hours  cefTRIAXone   IVPB        Immunologic:    Other:  amLODIPine   Tablet  BACItracin   Ointment  dextrose 40% Gel PRN  dextrose 50% Injectable  ferrous    sulfate  folic acid  glucagon  Injectable PRN  insulin lispro (HumaLOG) corrective regimen sliding scale  lactated ringers.  multivitamin  nystatin Powder  pantoprazole    Tablet  sucralfate suspension  tamsulosin      Review of Systems--  Review of systems unable to be obtained secondary to clinical condition.    Physical Examination--    Vital Signs: T(F): 98.8 (07-10-18 @ 04:55), Max: 99.8 (07-09-18 @ 16:03)  HR: 89 (07-10-18 @ 06:00)  BP: 146/67 (07-10-18 @ 06:00)  RR: 19 (07-10-18 @ 06:00)  SpO2: 97% (07-10-18 @ 06:00)  Wt(kg): --  General: Nontoxic-appearing morbidly obese Male in no acute distress.  HEENT: AT/NC. PERRL. EOMI. Anicteric. Conjunctiva pink and moist. Oropharynx clear. Dentition fair.  Neck: Not rigid. No sense of mass.  Nodes: None palpable.  Lungs: Clear bilaterally without rales, wheezing or rhonchi  Heart: Regular rate and rhythm. No Murmur. No rub. No gallop. No palpable thrill.  Abdomen: Bowel sounds present and normoactive. Soft. Nondistended. Nontender. No sense of mass. No organomegaly.  Back: No spinal tenderness. No costovertebral angle tenderness.   Extremities: No cyanosis or clubbing. No edema.   Skin: Warm. Dry. Good turgor. No rash. No vasculitic stigmata.  Psychiatric: Appropriate affect and mood for situation.         Laboratory Studies--  CBC                        7.6    9.43  )-----------( 317      ( 10 Jul 2018 06:44 )             23.7       Chemistries  07-10    136  |  102  |  28<H>  ----------------------------<  105<H>  3.5   |  25  |  1.66<H>    Ca    8.4      10 Jul 2018 06:44  Phos  3.5     07-08  Mg     2.0     07-08    TPro  6.2  /  Alb  2.1<L>  /  TBili  0.2  /  DBili  x   /  AST  26  /  ALT  47  /  AlkPhos  68  07-09      Culture Data  Culture - Blood (07.08.18 @ 12:58)    -  Coagulase negative Staphylococcus: Detec    Gram Stain:   Growth in aerobic bottle: Gram Positive Cocci in Clusters  Growth in anaerobic bottle: Gram Positive Cocci in Clusters    Specimen Source: .Blood Triple Lumen WHITE    Organism: Blood Culture PCR    Culture Results:   Growth in aerobic bottle: Gram Positive Cocci in Clusters  "Due to technical problems, Proteus sp. will Not be reported as part of  the BCID panel until further notice" ***Blood Panel PCR results on this  specimen are available  approximately 3 hours after the Gram stain result.***  Gram stain, PCR, and/or culture results may not always  correspond due to difference in methodologies.  ************************************************************  This PCR assay was performed using [employer].  The following targets are tested for: Enterococcus,  vancomycin resistant enterococci, Listeria monocytogenes,  coagulase negative staphylococci, S. aureus,  methicillin resistant S. aureus, Streptococcus agalactiae  (Group B), S. pneumoniae, S.pyogenes (Group A),  Acinetobacter baumannii, Enterobacter cloacae, E. coli,  Klebsiella oxytoca, K. pneumoniae, Proteus sp.,  Serratia marcescens, Haemophilus influenzae,  Neisseria meningitidis, Pseudomonas aeruginosa, Candida  albicans, C. glabrata, C krusei, C parapsilosis,  C. tropicalis and the KPC resistance gene.  Growth in anaerobic bottle: Gram Positive Cocci in Clusters    Organism Identification: Blood Culture PCR    Method Type: PCR    Culture - Blood (collected 08 Jul 2018 12:58)  Source: .Blood Blood-Peripheral  Preliminary Report (09 Jul 2018 13:00):    No growth to date.    Culture - Urine (collected 08 Jul 2018 02:46)  Source: .Urine Clean Catch (Midstream)  Culture - Urine (07.08.18 @ 02:46)    -  Amikacin: S <=8    -  Amoxicillin/Clavulanic Acid: S <=8/4    -  Ampicillin: S <=2 These ampicillin results predict results for amoxicillin    -  Ampicillin/Sulbactam: S <=4/2    -  Aztreonam: S <=4    -  Cefazolin: S <=2 This predicts results for oral agents cefaclor, cefdinir, cefpodoxime, cefprozil, cefuroxime axetil, cephalexin and locarbef for uncomplicated UTI. Note that some isolates may be susceptible to these agents while testing resistant to cefazolin.    -  Cefepime: S <=2    -  Nitrofurantoin: R >64 Should not be used to treat pyelonephritis    -  Piperacillin/Tazobactam: S <=8    -  Tobramycin: S <=2    -  Trimethoprim/Sulfamethoxazole: S <=0.5/9.5    -  Cefoxitin: S <=4    -  Ceftriaxone: S <=1 Enterobacter, Citrobacter, and Serratia may develop resistance during prolonged therapy    -  Ciprofloxacin: S <=0.5    -  Ertapenem: S <=0.5    -  Meropenem: S <=1    -  Levofloxacin: S <=1    -  Gentamicin: S <=1    Specimen Source: .Urine Clean Catch (Midstream)    Culture Results:   >100,000 CFU/ml Proteus mirabilis    Organism Identification: Proteus mirabilis    Organism: Proteus mirabilis    Method Type: ARTHUR          Radiology:    US Duplex Venous Lower Ext Complete, Bilateral (07.08.18 @ 11:10) >    FINDINGS:    Limited study due to patient body habitus.    There is occlusive and partially occlusive DVT in the rightproximal and   mid femoral vein, right popliteal vein and right posterior tibial veins.    There is occlusive and partially occlusive DVT in the left popliteal vein   and left posterior tibial veins.    There is normal compressibility of the bilateral common femoral veins.     US Transthoracic Echocardiogram w/Doppler Complete (07.06.18 @ 12:12) >    CONCLUSIONS:  Dilated left ventricular size. Preserved left ventricular ejection   fraction. Left ventricular hypertrophy. Mild mitral regurgitation. Trace   aortic insufficiency. Dilated left atrium.Velocities across the aortic   valve mildly increased.      IMPRESSION:     Assessment--    57 y/o M pt w/ PMHx severe morbid obesity, DM, HTN, peripheral neuropathy, calculus of   kidney and ureter sp dayo nephrostomy admitted from [facility] sec GIB. On 6/30 had   EGd had bleeding duodenal ulcer complicated by post op respiratory failure, extubated on   7/1. H/H stable, Had fever yesterday to 101.  Possibly sec UTI  Also with dayo LE DVT which can give fevers  CKD      Suggestions--    Cont Rocephin change to po once IVC filter placed   Trend temps and wbc  Trend H/H as high risk for GIB on anticoagulants  for  IVC filter in am as per vascular surgery     Continue with present regime .  Appropriate use of antibiotics and adverse effects reviewed.    I have discussed the above plan of care with patient and family in detail. They expressed understanding of the treatment plan . Risks, benefits and alternatives discussed in detail. I have asked if they have any questions or concerns and appropriately addressed them to the best of my ability .      > 35 minutes spent in direct patient care reviewing  the notes, lab data/ imaging , discussion with multidisciplinary team. All questions were addressed and answered to the best of my capacity .    Thank you for allowing me to participate in the care of your patient .        Suellen Mohan MD  [phone]

## 2018-07-11 ENCOUNTER — RESULT REVIEW (OUTPATIENT)
Age: 58
End: 2018-07-11

## 2018-07-11 LAB
ANION GAP SERPL CALC-SCNC: 9 MMOL/L — SIGNIFICANT CHANGE UP (ref 5–17)
BASOPHILS # BLD AUTO: 0.04 K/UL — SIGNIFICANT CHANGE UP (ref 0–0.2)
BASOPHILS NFR BLD AUTO: 0.4 % — SIGNIFICANT CHANGE UP (ref 0–2)
BUN SERPL-MCNC: 26 MG/DL — HIGH (ref 7–23)
CALCIUM SERPL-MCNC: 8.5 MG/DL — SIGNIFICANT CHANGE UP (ref 8.4–10.5)
CHLORIDE SERPL-SCNC: 103 MMOL/L — SIGNIFICANT CHANGE UP (ref 96–108)
CO2 SERPL-SCNC: 26 MMOL/L — SIGNIFICANT CHANGE UP (ref 22–31)
CREAT SERPL-MCNC: 1.66 MG/DL — HIGH (ref 0.5–1.3)
CULTURE RESULTS: SIGNIFICANT CHANGE UP
EOSINOPHIL # BLD AUTO: 0.33 K/UL — SIGNIFICANT CHANGE UP (ref 0–0.5)
EOSINOPHIL NFR BLD AUTO: 3.1 % — SIGNIFICANT CHANGE UP (ref 0–6)
GLUCOSE BLDC GLUCOMTR-MCNC: 102 MG/DL — HIGH (ref 70–99)
GLUCOSE BLDC GLUCOMTR-MCNC: 107 MG/DL — HIGH (ref 70–99)
GLUCOSE BLDC GLUCOMTR-MCNC: 121 MG/DL — HIGH (ref 70–99)
GLUCOSE SERPL-MCNC: 106 MG/DL — HIGH (ref 70–99)
HCT VFR BLD CALC: 26.2 % — LOW (ref 39–50)
HGB BLD-MCNC: 8.3 G/DL — LOW (ref 13–17)
IMM GRANULOCYTES NFR BLD AUTO: 0.7 % — SIGNIFICANT CHANGE UP (ref 0–1.5)
LYMPHOCYTES # BLD AUTO: 1.59 K/UL — SIGNIFICANT CHANGE UP (ref 1–3.3)
LYMPHOCYTES # BLD AUTO: 15.1 % — SIGNIFICANT CHANGE UP (ref 13–44)
MCHC RBC-ENTMCNC: 28.9 PG — SIGNIFICANT CHANGE UP (ref 27–34)
MCHC RBC-ENTMCNC: 31.7 GM/DL — LOW (ref 32–36)
MCV RBC AUTO: 91.3 FL — SIGNIFICANT CHANGE UP (ref 80–100)
MONOCYTES # BLD AUTO: 1.06 K/UL — HIGH (ref 0–0.9)
MONOCYTES NFR BLD AUTO: 10.1 % — SIGNIFICANT CHANGE UP (ref 2–14)
NEUTROPHILS # BLD AUTO: 7.42 K/UL — HIGH (ref 1.8–7.4)
NEUTROPHILS NFR BLD AUTO: 70.6 % — SIGNIFICANT CHANGE UP (ref 43–77)
NRBC # BLD: 0 /100 WBCS — SIGNIFICANT CHANGE UP (ref 0–0)
ORGANISM # SPEC MICROSCOPIC CNT: SIGNIFICANT CHANGE UP
ORGANISM # SPEC MICROSCOPIC CNT: SIGNIFICANT CHANGE UP
PLATELET # BLD AUTO: 364 K/UL — SIGNIFICANT CHANGE UP (ref 150–400)
POTASSIUM SERPL-MCNC: 4 MMOL/L — SIGNIFICANT CHANGE UP (ref 3.5–5.3)
POTASSIUM SERPL-SCNC: 4 MMOL/L — SIGNIFICANT CHANGE UP (ref 3.5–5.3)
RBC # BLD: 2.87 M/UL — LOW (ref 4.2–5.8)
RBC # FLD: 15.4 % — HIGH (ref 10.3–14.5)
SODIUM SERPL-SCNC: 138 MMOL/L — SIGNIFICANT CHANGE UP (ref 135–145)
SPECIMEN SOURCE: SIGNIFICANT CHANGE UP
WBC # BLD: 10.51 K/UL — HIGH (ref 3.8–10.5)
WBC # FLD AUTO: 10.51 K/UL — HIGH (ref 3.8–10.5)

## 2018-07-11 PROCEDURE — 71045 X-RAY EXAM CHEST 1 VIEW: CPT | Mod: 26

## 2018-07-11 PROCEDURE — 88300 SURGICAL PATH GROSS: CPT | Mod: 26

## 2018-07-11 RX ORDER — SERTRALINE 25 MG/1
25 TABLET, FILM COATED ORAL DAILY
Qty: 0 | Refills: 0 | Status: DISCONTINUED | OUTPATIENT
Start: 2018-07-11 | End: 2018-07-14

## 2018-07-11 RX ORDER — DEXTROSE 50 % IN WATER 50 %
25 SYRINGE (ML) INTRAVENOUS ONCE
Qty: 0 | Refills: 0 | Status: DISCONTINUED | OUTPATIENT
Start: 2018-07-11 | End: 2018-07-14

## 2018-07-11 RX ORDER — FOLIC ACID 0.8 MG
1 TABLET ORAL DAILY
Qty: 0 | Refills: 0 | Status: DISCONTINUED | OUTPATIENT
Start: 2018-07-11 | End: 2018-07-14

## 2018-07-11 RX ORDER — METOPROLOL TARTRATE 50 MG
25 TABLET ORAL DAILY
Qty: 0 | Refills: 0 | Status: DISCONTINUED | OUTPATIENT
Start: 2018-07-11 | End: 2018-07-11

## 2018-07-11 RX ORDER — GLUCAGON INJECTION, SOLUTION 0.5 MG/.1ML
1 INJECTION, SOLUTION SUBCUTANEOUS ONCE
Qty: 0 | Refills: 0 | Status: DISCONTINUED | OUTPATIENT
Start: 2018-07-11 | End: 2018-07-14

## 2018-07-11 RX ORDER — OXYCODONE AND ACETAMINOPHEN 5; 325 MG/1; MG/1
1 TABLET ORAL EVERY 6 HOURS
Qty: 0 | Refills: 0 | Status: DISCONTINUED | OUTPATIENT
Start: 2018-07-11 | End: 2018-07-14

## 2018-07-11 RX ORDER — DEXTROSE 50 % IN WATER 50 %
12.5 SYRINGE (ML) INTRAVENOUS ONCE
Qty: 0 | Refills: 0 | Status: DISCONTINUED | OUTPATIENT
Start: 2018-07-11 | End: 2018-07-14

## 2018-07-11 RX ORDER — TAMSULOSIN HYDROCHLORIDE 0.4 MG/1
0.4 CAPSULE ORAL AT BEDTIME
Qty: 0 | Refills: 0 | Status: DISCONTINUED | OUTPATIENT
Start: 2018-07-11 | End: 2018-07-14

## 2018-07-11 RX ORDER — FERROUS SULFATE 325(65) MG
325 TABLET ORAL DAILY
Qty: 0 | Refills: 0 | Status: DISCONTINUED | OUTPATIENT
Start: 2018-07-11 | End: 2018-07-14

## 2018-07-11 RX ORDER — CEFTRIAXONE 500 MG/1
1 INJECTION, POWDER, FOR SOLUTION INTRAMUSCULAR; INTRAVENOUS EVERY 24 HOURS
Qty: 0 | Refills: 0 | Status: DISCONTINUED | OUTPATIENT
Start: 2018-07-11 | End: 2018-07-12

## 2018-07-11 RX ORDER — AMLODIPINE BESYLATE 2.5 MG/1
10 TABLET ORAL DAILY
Qty: 0 | Refills: 0 | Status: DISCONTINUED | OUTPATIENT
Start: 2018-07-11 | End: 2018-07-14

## 2018-07-11 RX ORDER — PANTOPRAZOLE SODIUM 20 MG/1
40 TABLET, DELAYED RELEASE ORAL
Qty: 0 | Refills: 0 | Status: DISCONTINUED | OUTPATIENT
Start: 2018-07-11 | End: 2018-07-14

## 2018-07-11 RX ORDER — INSULIN LISPRO 100/ML
VIAL (ML) SUBCUTANEOUS
Qty: 0 | Refills: 0 | Status: DISCONTINUED | OUTPATIENT
Start: 2018-07-11 | End: 2018-07-14

## 2018-07-11 RX ORDER — CEFAZOLIN SODIUM 1 G
3000 VIAL (EA) INJECTION ONCE
Qty: 0 | Refills: 0 | Status: DISCONTINUED | OUTPATIENT
Start: 2018-07-11 | End: 2018-07-11

## 2018-07-11 RX ORDER — DEXTROSE 50 % IN WATER 50 %
15 SYRINGE (ML) INTRAVENOUS ONCE
Qty: 0 | Refills: 0 | Status: DISCONTINUED | OUTPATIENT
Start: 2018-07-11 | End: 2018-07-14

## 2018-07-11 RX ORDER — SUCRALFATE 1 G
1 TABLET ORAL EVERY 6 HOURS
Qty: 0 | Refills: 0 | Status: DISCONTINUED | OUTPATIENT
Start: 2018-07-11 | End: 2018-07-14

## 2018-07-11 RX ORDER — METOPROLOL TARTRATE 50 MG
25 TABLET ORAL DAILY
Qty: 0 | Refills: 0 | Status: DISCONTINUED | OUTPATIENT
Start: 2018-07-11 | End: 2018-07-14

## 2018-07-11 RX ORDER — BACITRACIN ZINC 500 UNIT/G
1 OINTMENT IN PACKET (EA) TOPICAL EVERY 12 HOURS
Qty: 0 | Refills: 0 | Status: DISCONTINUED | OUTPATIENT
Start: 2018-07-11 | End: 2018-07-14

## 2018-07-11 RX ORDER — NYSTATIN CREAM 100000 [USP'U]/G
1 CREAM TOPICAL
Qty: 0 | Refills: 0 | Status: DISCONTINUED | OUTPATIENT
Start: 2018-07-11 | End: 2018-07-14

## 2018-07-11 RX ORDER — SODIUM CHLORIDE 9 MG/ML
1000 INJECTION, SOLUTION INTRAVENOUS
Qty: 0 | Refills: 0 | Status: DISCONTINUED | OUTPATIENT
Start: 2018-07-11 | End: 2018-07-12

## 2018-07-11 RX ORDER — SODIUM CHLORIDE 9 MG/ML
1000 INJECTION, SOLUTION INTRAVENOUS
Qty: 0 | Refills: 0 | Status: DISCONTINUED | OUTPATIENT
Start: 2018-07-11 | End: 2018-07-14

## 2018-07-11 RX ADMIN — PANTOPRAZOLE SODIUM 40 MILLIGRAM(S): 20 TABLET, DELAYED RELEASE ORAL at 05:36

## 2018-07-11 RX ADMIN — AMLODIPINE BESYLATE 10 MILLIGRAM(S): 2.5 TABLET ORAL at 05:33

## 2018-07-11 RX ADMIN — Medication 25 MILLIGRAM(S): at 10:32

## 2018-07-11 RX ADMIN — Medication 1 GRAM(S): at 23:26

## 2018-07-11 RX ADMIN — SODIUM CHLORIDE 100 MILLILITER(S): 9 INJECTION, SOLUTION INTRAVENOUS at 19:31

## 2018-07-11 RX ADMIN — CEFTRIAXONE 100 GRAM(S): 500 INJECTION, POWDER, FOR SOLUTION INTRAMUSCULAR; INTRAVENOUS at 22:23

## 2018-07-11 RX ADMIN — NYSTATIN CREAM 1 APPLICATION(S): 100000 CREAM TOPICAL at 05:35

## 2018-07-11 RX ADMIN — TAMSULOSIN HYDROCHLORIDE 0.4 MILLIGRAM(S): 0.4 CAPSULE ORAL at 22:23

## 2018-07-11 RX ADMIN — Medication 1 APPLICATION(S): at 05:33

## 2018-07-11 RX ADMIN — Medication 1 GRAM(S): at 05:34

## 2018-07-11 RX ADMIN — Medication 325 MILLIGRAM(S): at 12:19

## 2018-07-11 RX ADMIN — Medication 1 GRAM(S): at 12:16

## 2018-07-11 NOTE — PROGRESS NOTE ADULT - ASSESSMENT
59 y/o M pt w/ PMHx morbid obesity, DM, HTN, peripheral neuropathy, calculus of kidney and ureter, sleep apnea, acquired lymphedema of leg and PSHx for obstructive neuropathy had  nephrostomy bilateral kidneys presents to the ED BIBA from [facility] c/o bloody diarrhea x 3 since yesterday, describes it as red. Also endorsing nausea. States that he had a blood transfusion at [facility] on 6/23 and was given an injection of Procrit yesterday. States hemoglobin was above 7 day prior to admission and on day of admission  i below 7, approx 5.  Pt stated he resides at [facility] for rehabilitation due to a fall 2 months ago. Pt denies vomiting, abd pain, fever or any other complaints at this time.  [place] [facility] [place] hemoglobin 4.9, and had central line placed and  2 units PRBC.vital signs stable    admitted for acute on ch anemia due to GI bl;eed with SMITH with CKD3 with h/o ureteral stents with MOrbid obesity , DM2 , HTN, peripheral neuropathy ,    for PRBC, GI consult , npo  , pantoprazole, started in ED, nephro consult , and pulm consult called   6/29 hemoglobin still low received 6 units PRBc,   patient was hemodyanamically stable for emegency endoscopy, and  intermediat risk for procedure, pulmonary clearance Dr Feng  d/w patient and wife by bed side, risk and benefits explained  d/w DR Hudson,  DR Feng as pulmonary critical care.  6/30 had EGd had bleeding duodenal ulcer , hemostatised in procedure , had post op respiratory failure and kept intubated till 7/1, extubated on 7/1, hemodyanamically stable, saturating well, no active bleeding, receiving PRBC, on regimen for gastritis, PUD,no active bleeding, hemoglobin improving , activity increased  Surgical consult If bleeding recurs, pt stable improving,hemoglobin stable, activity increased  DC plan started, PT, diet advance as per GI, had some arrhythmia on Tele ECHO ordered, will monitored for 24 hrs , NSVT transient resolved, LVEF normal, d/w cardiologist , stable for discharge,  pt came from rehab, but does not want to go back to rehab,    overnight 7/7 had fever UA suggestive of UTI started on ceftriaxone cultures sent, will await improvement, x ray chest clear.  ID consult called,   continue activity, hemoglobin 7.8 slight drop will continue to monitor.   < from: US Duplex Venous Lower Ext Complete, Bilateral (07.08.18 @ 11:10) >  There is occlusive and partially occlusive DVT in the rightproximal and   mid femoral vein, right popliteal vein and right posterior tibial veins.    There is occlusive and partially occlusive DVT in the left popliteal vein   and left posterior tibial veins.    There is normal compressibility of the bilateral common femoral veins.  advised IVC filtered , in view of recent severe GI bleed with anemia, d/w kuldiponc and Dr Acevedo , vascular   d/w wife , patient aware of condition, and prognosis and dilemma of tt.   erythropoietic agent as per hematology  cont current care. for IVC filter on 7/11  intermediate risk for procedure, hemodyanamically stable

## 2018-07-11 NOTE — BRIEF OPERATIVE NOTE - OPERATION/FINDINGS
6/6/2017          To Whom It May Concern:    Tiera Celeste is currently under my medical care. She had normal physical examination today. She is free from communicable disease. She may participate in study abroad without restriction.     If you req consistent with bilateral DVT

## 2018-07-11 NOTE — PROGRESS NOTE ADULT - ASSESSMENT
·	Recent SMITH, Obs uropathy, s/p Stent, B/l PCN, CKD   ·	Anemia, GI bleed  ·	Nephrolithiasis  ·	Diabetes  ·	Hypertension  ·	DVT  ·	Fever, ? UTI    Stable renal function. Monitor h/h trend. To continue current meds. Avoid nephrotoxins. GI follow up.   Monitor blood sugar levels. Insulin coverage as needed. Dietary restriction. On abx.   Monitor BP trend. Titrate BP meds as needed. Salt restriction. Will follow electrolytes and renal function trend.

## 2018-07-11 NOTE — PROGRESS NOTE ADULT - ASSESSMENT
The patient is a 58 year old male with a history of HTN, DM, lymphedema, peripheral neuropathy, kidney stone, MEGHA who presents from rehab with bloody diarrhea.    Plan:  - Continue amlodipine for HTN  - Hemoglobin stable  - Atrial tachycardia - brief episode and asymptomatic.  - NSVT - Continue to monitor. LV systolic function appears normal, no significant valve issues.  - For episodes of atrial tachycardia and NSVT, will start metoprolol succinate 25 mg daily  - GI follow-up  - Bilateral DVT - will need IVC filter given recent GI bleed. Can attempt anticoagulation in 2 weeks for DVT.  - Ok to proceed from a cardiac perspective for IVC filter placement

## 2018-07-11 NOTE — PROGRESS NOTE ADULT - SUBJECTIVE AND OBJECTIVE BOX
Chief Complaint: GI bleed    Interval Events: No events overnight. No complaints today.    Review of Systems:  General: No fevers, chills, weight loss or gain  Skin: No rashes, color changes  Cardiovascular: No chest pain, orthopnea  Respiratory: No shortness of breath, cough  Gastrointestinal: No nausea, abdominal pain  Genitourinary: No incontinence, pain with urination  Musculoskeletal: No pain, swelling, decreased range of motion  Neurological: No headache, weakness  Psychiatric: No depression, anxiety  Endocrine: No weight loss or gain, increased thirst  All other systems are comprehensively negative.    Physical Exam:  Vital Signs Last 24 Hrs  T(C): 36.9 (11 Jul 2018 05:30), Max: 36.9 (10 Jul 2018 12:45)  T(F): 98.5 (11 Jul 2018 05:30), Max: 98.5 (10 Jul 2018 23:55)  HR: 85 (11 Jul 2018 08:57) (80 - 97)  BP: 106/68 (11 Jul 2018 08:00) (106/68 - 152/69)  BP(mean): 80 (11 Jul 2018 08:00) (80 - 94)  RR: 16 (11 Jul 2018 08:00) (16 - 24)  SpO2: 98% (11 Jul 2018 08:57) (89% - 100%)  General: NAD  HEENT: MMM  Neck: No JVD, no carotid bruit  Lungs: CTAB  CV: RRR, nl S1/S2, no M/R/G  Abdomen: S/NT/ND, +BS  Extremities: Lymphedema, no cyanosis  Neuro: AAOx3, non-focal  Skin: No rash    Labs:             07-11    138  |  103  |  26<H>  ----------------------------<  106<H>  4.0   |  26  |  1.66<H>    Ca    8.5      11 Jul 2018 06:49                          8.3    10.51 )-----------( 364      ( 11 Jul 2018 06:49 )             26.2     Telemetry: Sinus rhythm, PVCs, NSVT

## 2018-07-11 NOTE — PROGRESS NOTE ADULT - SUBJECTIVE AND OBJECTIVE BOX
INTERVAL HPI/OVERNIGHT EVENTS:  No new overnight event.  No N/V/D.  Tolerating diet.     MEDICATIONS  (STANDING):  amLODIPine   Tablet 10 milliGRAM(s) Oral daily  BACItracin   Ointment 1 Application(s) Topical every 12 hours  cefTRIAXone   IVPB 1 Gram(s) IV Intermittent every 24 hours  cefTRIAXone   IVPB      dextrose 50% Injectable 12.5 Gram(s) IV Push once  ferrous    sulfate 325 milliGRAM(s) Oral daily  folic acid 1 milliGRAM(s) Oral daily  insulin lispro (HumaLOG) corrective regimen sliding scale   SubCutaneous three times a day before meals  metoprolol succinate ER 25 milliGRAM(s) Oral daily  multivitamin 1 Tablet(s) Oral daily  nystatin Powder 1 Application(s) Topical two times a day  pantoprazole    Tablet 40 milliGRAM(s) Oral before breakfast  sucralfate suspension 1 Gram(s) Oral every 6 hours  tamsulosin 0.4 milliGRAM(s) Oral at bedtime    MEDICATIONS  (PRN):  dextrose 40% Gel 15 Gram(s) Oral once PRN Blood Glucose LESS THAN 70 milliGRAM(s)/deciliter  glucagon  Injectable 1 milliGRAM(s) IntraMuscular once PRN Glucose LESS THAN 70 milligrams/deciliter      Allergies    No Known Allergies    Intolerances        Review of Systems:    General:  No wt loss, fevers, chills, night sweats,fatigue,   Eyes:  Good vision, no reported pain  ENT:  No sore throat, pain, runny nose, dysphagia  CV:  No pain, palpitatioins, hypo/hypertension  Resp:  No dyspnea, cough, tachypnea, wheezing  GI:  No pain, No nausea, No vomiting, No diarrhea, No constipatiion, No weight loss, No fever, No pruritis, No rectal bleeding, No tarry stools, No dysphagia,  :  No pain, bleeding, incontinence, nocturia  Muscle:  No pain, weakness  Neuro:  No weakness, tingling, memory problems  Psych:  No fatigue, insomnia, mood problems, depression  Endocrine:  No polyuria, polydypsia, cold/heat intolerance  Heme:  No petechiae, ecchymosis, easy bruisability  Skin:  No rash, tattoos, scars, edema      Vital Signs Last 24 Hrs  T(C): 36.8 (11 Jul 2018 08:04), Max: 36.9 (10 Jul 2018 20:01)  T(F): 98.3 (11 Jul 2018 08:04), Max: 98.5 (10 Jul 2018 23:55)  HR: 78 (11 Jul 2018 14:00) (78 - 97)  BP: 112/63 (11 Jul 2018 14:00) (106/68 - 152/69)  BP(mean): 77 (11 Jul 2018 14:00) (77 - 94)  RR: 17 (11 Jul 2018 14:00) (16 - 24)  SpO2: 100% (11 Jul 2018 14:00) (91% - 100%)    PHYSICAL EXAM:    Constitutional: NAD, well-developed  HEENT: EOMI, throat clear  Neck: No LAD, supple  Respiratory: CTA and P  Cardiovascular: S1 and S2, RRR, no M  Gastrointestinal: BS+, soft, NT/ND, neg HSM,  Extremities: No peripheral edema, neg clubing, cyanosis  Vascular: 2+ peripheral pulses  Neurological: A/O x 3, no focal deficits  Psychiatric: Normal mood, normal affect  Skin: No rashes      LABS:                        8.3    10.51 )-----------( 364      ( 11 Jul 2018 06:49 )             26.2     07-11    138  |  103  |  26<H>  ----------------------------<  106<H>  4.0   |  26  |  1.66<H>    Ca    8.5      11 Jul 2018 06:49            RADIOLOGY & ADDITIONAL TESTS:

## 2018-07-11 NOTE — PROGRESS NOTE ADULT - SUBJECTIVE AND OBJECTIVE BOX
Date/Time Patient Seen:  		  Referring MD:   Data Reviewed	       Patient is a 58y old  Male who presents with a chief complaint of "bloody diarrhea (05 Jul 2018 15:53)  in bed  on CPAP overnight        Subjective/HPI     PAST MEDICAL & SURGICAL HISTORY:  Hx of peripheral neuropathy  Morbid obesity  H/O sleep apnea  Acquired lymphedema of leg  Calculus of kidney and ureter  Diabetes mellitus type II  HTN (hypertension)  Ureteral stents placement, bilateral  Hx of tonsillectomy: at 6 years old        Medication list         MEDICATIONS  (STANDING):  amLODIPine   Tablet 10 milliGRAM(s) Oral daily  BACItracin   Ointment 1 Application(s) Topical every 12 hours  cefTRIAXone   IVPB 1 Gram(s) IV Intermittent every 24 hours  cefTRIAXone   IVPB      dextrose 50% Injectable 12.5 Gram(s) IV Push once  ferrous    sulfate 325 milliGRAM(s) Oral daily  folic acid 1 milliGRAM(s) Oral daily  insulin lispro (HumaLOG) corrective regimen sliding scale   SubCutaneous three times a day before meals  multivitamin 1 Tablet(s) Oral daily  nystatin Powder 1 Application(s) Topical two times a day  pantoprazole    Tablet 40 milliGRAM(s) Oral before breakfast  sucralfate suspension 1 Gram(s) Oral every 6 hours  tamsulosin 0.4 milliGRAM(s) Oral at bedtime    MEDICATIONS  (PRN):  dextrose 40% Gel 15 Gram(s) Oral once PRN Blood Glucose LESS THAN 70 milliGRAM(s)/deciliter  glucagon  Injectable 1 milliGRAM(s) IntraMuscular once PRN Glucose LESS THAN 70 milligrams/deciliter         Vitals log        ICU Vital Signs Last 24 Hrs  T(C): 36.9 (11 Jul 2018 05:30), Max: 37.1 (10 Jul 2018 08:24)  T(F): 98.5 (11 Jul 2018 05:30), Max: 98.7 (10 Jul 2018 08:24)  HR: 83 (11 Jul 2018 06:29) (80 - 97)  BP: 130/65 (11 Jul 2018 06:29) (127/72 - 152/69)  BP(mean): 85 (11 Jul 2018 06:29) (83 - 94)  ABP: --  ABP(mean): --  RR: 17 (11 Jul 2018 06:29) (14 - 24)  SpO2: 95% (11 Jul 2018 06:29) (89% - 100%)           Input and Output:  I&O's Detail    10 Jul 2018 07:01  -  11 Jul 2018 07:00  --------------------------------------------------------  IN:    lactated ringers.: 100 mL    Oral Fluid: 1190 mL  Total IN: 1290 mL    OUT:    Voided: 1225 mL  Total OUT: 1225 mL    Total NET: 65 mL          Lab Data                        8.3    10.51 )-----------( 364      ( 11 Jul 2018 06:49 )             26.2     07-11    138  |  103  |  26<H>  ----------------------------<  106<H>  4.0   |  26  |  1.66<H>    Ca    8.5      11 Jul 2018 06:49              Review of Systems	      Objective     Physical Examination    heart s1s2  lung dec BS  abd soft      Pertinent Lab findings & Imaging      Catalino:  NO   Adequate UO     I&O's Detail    10 Jul 2018 07:01  -  11 Jul 2018 07:00  --------------------------------------------------------  IN:    lactated ringers.: 100 mL    Oral Fluid: 1190 mL  Total IN: 1290 mL    OUT:    Voided: 1225 mL  Total OUT: 1225 mL    Total NET: 65 mL               Discussed with:     Cultures:	        Radiology

## 2018-07-11 NOTE — BRIEF OPERATIVE NOTE - PROCEDURE
<<-----Click on this checkbox to enter Procedure Fluoroscopic guidance for placement of filter in inferior vena cava with intravenous contrast  07/11/2018    Active  LWERMELING

## 2018-07-11 NOTE — PRE-OP CHECKLIST - SELECT TESTS ORDERED
BMP/CBC/Type and Screen/POCT Blood Glucose INR/PT/PTT/BMP/CBC/EKG/POCT Blood Glucose/Type and Screen

## 2018-07-11 NOTE — PROGRESS NOTE ADULT - SUBJECTIVE AND OBJECTIVE BOX
Patient is a 58y old  Male who presents with a chief complaint of "bloody diarrhea (2018 15:53)      INTERVAL HPI/OVERNIGHT EVENTS:no acute event overnight    Home Medications:  amLODIPine 10 mg oral tablet: 1 tab(s) orally once a day (2018 12:43)  bacitracin 500 units/g topical ointment: Apply topically to affected area 2 times a day nephrostomy tube (2018 12:43)  Bactrim  mg-160 mg oral tablet: 1 tab(s) orally 2 times a day (2018 12:43)  Colace 100 mg oral capsule: 1 cap(s) orally 3 times a day (2018 12:43)  Flomax 0.4 mg oral capsule: 1 cap(s) orally once a day (2018 12:43)  gas relief: 180 milligram(s)  2 times a day (2018 12:43)  lactulose: 30 milliliter(s) orally 3 times a week, As Needed (2018 12:43)  Milk of Magnesia: 30 milliliter(s) orally 3 times a week, As Needed (2018 12:43)  Procrit 10,000 units/mL preservative-free injectable solution: injectable every 7 days (2018 12:43)  Senna 8.6 mg oral tablet: 2 tab(s) orally once a day (2018 12:43)  Tylenol 500 mg oral tablet: 1 tab(s) orally every 6 hours, As Needed (2018 12:43)  Zofran ODT 4 mg oral tablet, disintegratin tab(s) orally 6 times a day, As Needed (2018 12:43)      MEDICATIONS  (STANDING):  amLODIPine   Tablet 10 milliGRAM(s) Oral daily  BACItracin   Ointment 1 Application(s) Topical every 12 hours  cefTRIAXone   IVPB 1 Gram(s) IV Intermittent every 24 hours  cefTRIAXone   IVPB      dextrose 50% Injectable 12.5 Gram(s) IV Push once  ferrous    sulfate 325 milliGRAM(s) Oral daily  folic acid 1 milliGRAM(s) Oral daily  insulin lispro (HumaLOG) corrective regimen sliding scale   SubCutaneous three times a day before meals  multivitamin 1 Tablet(s) Oral daily  nystatin Powder 1 Application(s) Topical two times a day  pantoprazole    Tablet 40 milliGRAM(s) Oral before breakfast  sucralfate suspension 1 Gram(s) Oral every 6 hours  tamsulosin 0.4 milliGRAM(s) Oral at bedtime    MEDICATIONS  (PRN):  dextrose 40% Gel 15 Gram(s) Oral once PRN Blood Glucose LESS THAN 70 milliGRAM(s)/deciliter  glucagon  Injectable 1 milliGRAM(s) IntraMuscular once PRN Glucose LESS THAN 70 milligrams/deciliter      Allergies    No Known Allergies    Intolerances        REVIEW OF SYSTEMS:  CONSTITUTIONAL: No fever, weight loss, has fatigue  EYES: No eye pain, visual disturbances, or discharge  ENMT:  No difficulty hearing, tinnitus, vertigo; No sinus or throat pain  NECK: No pain or stiffness  BREASTS: No pain, masses, or nipple discharge  RESPIRATORY: No cough, wheezing, chills or hemoptysis; No shortness of breath  CARDIOVASCULAR: No chest pain, palpitations, dizziness, or leg swelling  GASTROINTESTINAL: No abdominal or epigastric pain. No nausea, vomiting, or hematemesis; No diarrhea or constipation. No melena or hematochezia.  GENITOURINARY: No dysuria, frequency, hematuria, or incontinence  NEUROLOGICAL: No headaches, memory loss, loss of strength, numbness, or tremors  SKIN: No itching, burning, rashes, or lesions   ENDOCRINE: No heat or cold intolerance; No hair loss  MUSCULOSKELETAL: No joint pain or swelling; No muscle, back, or extremity pain, edema both legs chronic , no pain  PSYCHIATRIC: No depression, anxiety, mood swings, or difficulty sleeping  HEME/LYMPH: No easy bruising, or bleeding gums  ALLERGY AND IMMUNOLOGIC: No hives or eczema    Vital Signs Last 24 Hrs  T(C): 36.9 (10 Jul 2018 23:55), Max: 37.1 (10 Jul 2018 08:24)  T(F): 98.5 (10 Jul 2018 23:55), Max: 98.7 (10 Jul 2018 08:24)  HR: 80 (2018 04:00) (80 - 97)  BP: 139/77 (2018 04:00) (127/72 - 152/69)  BP(mean): 94 (2018 04:00) (83 - 94)  RR: 16 (2018 04:00) (14 - 24)  SpO2: 100% (2018 04:00) (89% - 100%)    PHYSICAL EXAM:  GENERAL: NAD, well-groomed, well-developed  HEAD:  Atraumatic, Normocephalic  EYES: EOMI, PERRLA, conjunctiva and sclera clear  ENMT: Moist mucous membranes,   NECK: Supple, No JVD, Normal thyroid  NERVOUS SYSTEM:  Alert & Oriented X3, Good concentration; Motor Strength 5/5 B/L upper and lower extremities; DTRs 2+ intact and symmetric  CHEST/LUNG: Clear to percussion bilaterally; No rales, rhonchi, wheezing, or rubs  HEART: Regular rate and rhythm; No murmurs, rubs, or gallops  ABDOMEN: Soft, Nontender, Nondistended; Bowel sounds present  EXTREMITIES:  2+ Peripheral Pulses, No clubbing, cyanosis,  edema both legs  SKIN: No rashes or lesions    LABS:                        7.6    9.43  )-----------( 317      ( 10 Jul 2018 06:44 )             23.7     07-10    136  |  102  |  28<H>  ----------------------------<  105<H>  3.5   |  25  |  1.66<H>    Ca    8.4      10 Jul 2018 06:44    TPro  6.2  /  Alb  2.1<L>  /  TBili  0.2  /  DBili  x   /  AST  26  /  ALT  47  /  AlkPhos  68          CAPILLARY BLOOD GLUCOSE      POCT Blood Glucose.: 114 mg/dL (10 Jul 2018 23:22)  POCT Blood Glucose.: 140 mg/dL (10 Jul 2018 16:32)  POCT Blood Glucose.: 113 mg/dL (10 Jul 2018 12:12)  POCT Blood Glucose.: 111 mg/dL (10 Jul 2018 08:02)        Culture - Blood (collected 18 @ 21:58)  Source: .Blood Blood-Peripheral  Preliminary Report (07-10-18 @ 22:01):    No growth to date.    Culture - Blood (collected 18 @ 21:58)  Source: .Blood Blood-Peripheral  Preliminary Report (07-10-18 @ 22:01):    No growth to date.    Culture - Blood (collected 18 @ 12:58)  Source: .Blood Triple Lumen WHITE  Gram Stain (07-10-18 @ 00:57):    Growth in aerobic bottle: Gram Positive Cocci in Clusters    Growth in anaerobic bottle: Gram Positive Cocci in Clusters  Preliminary Report (07-10-18 @ 14:01):    Growth in aerobic bottle: Coag Negative Staphylococcus    Single set isolate, possible contaminant. Contact    Microbiology if susceptibility testing clinically    indicated.    "Due to technical problems, Proteus sp. will Not be reported as part of    the BCID panel until further notice" ***Blood Panel PCR results on this    specimen are available    approximately 3 hours after the Gram stain result.***    Gram stain, PCR, and/or culture results may not always    correspond due to difference in methodologies.    ************************************************************    This PCR assay was performed using [employer].    The following targets are tested for: Enterococcus,    vancomycin resistant enterococci, Listeria monocytogenes,    coagulase negative staphylococci, S. aureus,    methicillin resistant S. aureus, Streptococcus agalactiae    (Group B), S. pneumoniae, S. pyogenes (Group A),    Acinetobacter baumannii, Enterobacter cloacae, E. coli,    Klebsiella oxytoca, K. pneumoniae, Proteus sp.,    Serratia marcescens, Haemophilus influenzae,    Neisseria meningitidis, Pseudomonas aeruginosa, Candida    albicans, C. glabrata, C krusei, C parapsilosis,    C. tropicalis and the KPC resistance gene.    Growth in anaerobic bottle: Gram Positive Cocci in Clusters  Organism: Blood Culture PCR (18 @ 16:43)  Organism: Blood Culture PCR (18 @ 16:43)      -  Coagulase negative Staphylococcus: Detec      Method Type: PCR    Culture - Blood (collected 18 @ 12:58)  Source: .Blood Blood-Peripheral  Preliminary Report (18 @ 13:00):    No growth to date.    Culture - Urine (collected 18 @ 02:46)  Source: .Urine Clean Catch (Midstream)  Final Report (18 @ 18:36):    >100,000 CFU/ml Proteus mirabilis  Organism: Proteus mirabilis (18 @ 18:36)  Organism: Proteus mirabilis (18 @ 18:36)      -  Amikacin: S <=8      -  Amoxicillin/Clavulanic Acid: S <=8/4      -  Ampicillin: S <=2 These ampicillin results predict results for amoxicillin      -  Ampicillin/Sulbactam: S <=4/2      -  Aztreonam: S <=4      -  Cefazolin: S <=2 This predicts results for oral agents cefaclor, cefdinir, cefpodoxime, cefprozil, cefuroxime axetil, cephalexin and locarbef for uncomplicated UTI. Note that some isolates may be susceptible to these agents while testing resistant to cefazolin.      -  Cefepime: S <=2      -  Cefoxitin: S <=4      -  Ceftriaxone: S <=1 Enterobacter, Citrobacter, and Serratia may develop resistance during prolonged therapy      -  Ciprofloxacin: S <=0.5      -  Ertapenem: S <=0.5      -  Gentamicin: S <=1      -  Levofloxacin: S <=1      -  Meropenem: S <=1      -  Nitrofurantoin: R >64 Should not be used to treat pyelonephritis      -  Piperacillin/Tazobactam: S <=8      -  Tobramycin: S <=2      -  Trimethoprim/Sulfamethoxazole: S <=0.5/9.5      Method Type: ARTHUR        I&O's Summary    2018 07:  -  10 Jul 2018 07:00  --------------------------------------------------------  IN: 2850 mL / OUT: 1450 mL / NET: 1400 mL    10 Jul 2018 07:01  -  2018 06:10  --------------------------------------------------------  IN: 1290 mL / OUT: 1225 mL / NET: 65 mL        RADIOLOGY & ADDITIONAL TESTS:    Imaging Personally Reviewed:  [ x] YES  [ ] NO    Consultant(s) Notes Reviewed:  [x ] YES  [ ] NO    Care Discussed with Consultants/Other Providers [ ] YES  [ ] NO

## 2018-07-11 NOTE — PROGRESS NOTE ADULT - SUBJECTIVE AND OBJECTIVE BOX
Patient is a 58y old  Male who presents with a chief complaint of "bloody diarrhea (29 Jun 2018 04:16)  Patient seen in follow up for recent SMITH, Obs uropathy, s/p Stent, B/l PCN (capped)  + DVT      PAST MEDICAL HISTORY:  Hx of peripheral neuropathy  Morbid obesity  H/O sleep apnea  Acquired lymphedema of leg  Calculus of kidney and ureter  Diabetes mellitus type II  HTN (hypertension)    MEDICATIONS  (STANDING):  amLODIPine   Tablet 10 milliGRAM(s) Oral daily  BACItracin   Ointment 1 Application(s) Topical every 12 hours  cefTRIAXone   IVPB 1 Gram(s) IV Intermittent every 24 hours  cefTRIAXone   IVPB      dextrose 50% Injectable 12.5 Gram(s) IV Push once  ferrous    sulfate 325 milliGRAM(s) Oral daily  folic acid 1 milliGRAM(s) Oral daily  insulin lispro (HumaLOG) corrective regimen sliding scale   SubCutaneous three times a day before meals  metoprolol succinate ER 25 milliGRAM(s) Oral daily  multivitamin 1 Tablet(s) Oral daily  nystatin Powder 1 Application(s) Topical two times a day  pantoprazole    Tablet 40 milliGRAM(s) Oral before breakfast  sucralfate suspension 1 Gram(s) Oral every 6 hours  tamsulosin 0.4 milliGRAM(s) Oral at bedtime    MEDICATIONS  (PRN):  dextrose 40% Gel 15 Gram(s) Oral once PRN Blood Glucose LESS THAN 70 milliGRAM(s)/deciliter  glucagon  Injectable 1 milliGRAM(s) IntraMuscular once PRN Glucose LESS THAN 70 milligrams/deciliter    T(C): 36.8 (07-11-18 @ 08:04), Max: 37.7 (07-09-18 @ 16:03)  HR: 86 (07-11-18 @ 12:00) (80 - 99)  BP: 128/66 (07-11-18 @ 12:00) (106/68 - 156/69)  RR: 21 (07-11-18 @ 12:00)  SpO2: 91% (07-11-18 @ 12:00)  Wt(kg): --  I&O's Detail    10 Jul 2018 07:01  -  11 Jul 2018 07:00  --------------------------------------------------------  IN:    lactated ringers.: 100 mL    Oral Fluid: 1190 mL  Total IN: 1290 mL    OUT:    Voided: 1225 mL  Total OUT: 1225 mL    Total NET: 65 mL      11 Jul 2018 07:01  -  11 Jul 2018 14:33  --------------------------------------------------------  IN:  Total IN: 0 mL    OUT:    Voided: 350 mL  Total OUT: 350 mL    Total NET: -350 mL                PHYSICAL EXAM:  General: NAD  Respiratory: b/l air entry  Cardiovascular: S1 S2  Gastrointestinal: obese, b/l PCN capped  Extremities:  edema b/l                   LABORATORY:                        8.3    10.51 )-----------( 364      ( 11 Jul 2018 06:49 )             26.2     07-11    138  |  103  |  26<H>  ----------------------------<  106<H>  4.0   |  26  |  1.66<H>    Ca    8.5      11 Jul 2018 06:49      Sodium, Serum: 138 mmol/L (07-11 @ 06:49)  Sodium, Serum: 136 mmol/L (07-10 @ 06:44)    Potassium, Serum: 4.0 mmol/L (07-11 @ 06:49)  Potassium, Serum: 3.5 mmol/L (07-10 @ 06:44)    Hemoglobin: 8.3 g/dL (07-11 @ 06:49)  Hemoglobin: 7.6 g/dL (07-10 @ 06:44)  Hemoglobin: 7.8 g/dL (07-09 @ 06:49)    Creatinine, Serum 1.66 (07-11 @ 06:49)  Creatinine, Serum 1.66 (07-10 @ 06:44)  Creatinine, Serum 1.96 (07-09 @ 06:49)

## 2018-07-11 NOTE — PROGRESS NOTE ADULT - SUBJECTIVE AND OBJECTIVE BOX
ID Progress note covering Dr. Maza    Name: LEONA RODRIGUEZ  Age: 58y  Gender: Male  MRN: [id number]    Interval History--Events noted , doing well, On CPAP , resting comfortably . Seen by vascular surgery , scheduled for IVC filter today.   Notes reviewed    Past Medical History--  Hx of peripheral neuropathy  Morbid obesity  H/O sleep apnea  Acquired lymphedema of leg  Calculus of kidney and ureter  Diabetes mellitus type II  HTN (hypertension)  Ureteral stents placement, bilateral  Hx of tonsillectomy      For details regarding the patient's social history, family history, and other miscellaneous elements, please refer the initial infectious diseases consultation and/or the admitting history and physical examination for this admission.    Allergies--  Allergies    No Known Allergies    Intolerances        Medications--  Antibiotics:  cefTRIAXone   IVPB 1 Gram(s) IV Intermittent every 24 hours  cefTRIAXone   IVPB        Immunologic:    Other:  amLODIPine   Tablet  BACItracin   Ointment  dextrose 40% Gel PRN  dextrose 50% Injectable  ferrous    sulfate  folic acid  glucagon  Injectable PRN  insulin lispro (HumaLOG) corrective regimen sliding scale  multivitamin  nystatin Powder  pantoprazole    Tablet  sucralfate suspension  tamsulosin      Review of Systems--  Review of systems unable to be obtained secondary to clinical condition.    Physical Examination--    Vital Signs: T(F): 98.5 (07-11-18 @ 05:30), Max: 98.7 (07-10-18 @ 08:24)  HR: 83 (07-11-18 @ 06:29)  BP: 130/65 (07-11-18 @ 06:29)  RR: 17 (07-11-18 @ 06:29)  SpO2: 95% (07-11-18 @ 06:29)  Wt(kg): --  General: Nontoxic-appearing morbidly obese Male in no acute distress.  HEENT: AT/NC. PERRL. EOMI. Anicteric. Conjunctiva pink and moist. Oropharynx clear. Dentition fair.  Neck: Not rigid. No sense of mass.  Nodes: None palpable.  Lungs: Clear bilaterally without rales, wheezing or rhonchi  Heart: Regular rate and rhythm. No Murmur. No rub. No gallop. No palpable thrill.  Abdomen: Bowel sounds present and normoactive. Soft. Nondistended. Nontender. No sense of mass. No organomegaly.  Back: No spinal tenderness. No costovertebral angle tenderness.   Extremities: No cyanosis or clubbing. No edema.   Skin: Warm. Dry. Good turgor. No rash. No vasculitic stigmata.  Psychiatric: Appropriate affect and mood for situation.       Laboratory Studies--  CBC                        8.3    10.51 )-----------( 364      ( 11 Jul 2018 06:49 )             26.2       Chemistries  07-11    138  |  103  |  26<H>  ----------------------------<  106<H>  4.0   |  26  |  1.66<H>    Ca    8.5      11 Jul 2018 06:49        Culture Data    Culture - Blood (collected 09 Jul 2018 21:58)  Source: .Blood Blood-Peripheral  Preliminary Report (10 Jul 2018 22:01):    No growth to date.    Culture - Blood (collected 09 Jul 2018 21:58)  Source: .Blood Blood-Peripheral  Preliminary Report (10 Jul 2018 22:01):    No growth to date.    Culture - Blood (collected 08 Jul 2018 12:58)  Source: .Blood Triple Lumen WHITE  Gram Stain (10 Jul 2018 00:57):    Growth in aerobic bottle: Gram Positive Cocci in Clusters    Growth in anaerobic bottle: Gram Positive Cocci in Clusters  Preliminary Report (10 Jul 2018 14:01):    Growth in aerobic bottle: Coag Negative Staphylococcus    Single set isolate, possible contaminant. Contact    Microbiology if susceptibility testing clinically    indicated.    "Due to technical problems, Proteus sp. will Not be reported as part of    the BCID panel until further notice" ***Blood Panel PCR results on this    specimen are available    approximately 3 hours after the Gram stain result.***    Gram stain, PCR, and/or culture results may not always    correspond due to difference in methodologies.    ************************************************************    This PCR assay was performed using [employer].    The following targets are tested for: Enterococcus,    vancomycin resistant enterococci, Listeria monocytogenes,    coagulase negative staphylococci, S. aureus,    methicillin resistant S. aureus, Streptococcus agalactiae    (Group B), S. pneumoniae, S. pyogenes (Group A),    Acinetobacter baumannii, Enterobacter cloacae, E. coli,    Klebsiella oxytoca, K. pneumoniae, Proteus sp.,    Serratia marcescens, Haemophilus influenzae,    Neisseria meningitidis, Pseudomonas aeruginosa, Candida    albicans, C. glabrata, C krusei, C parapsilosis,    C. tropicalis and the KPC resistance gene.    Growth in anaerobic bottle: Gram Positive Cocci in Clusters  Organism: Blood Culture PCR (09 Jul 2018 16:43)  Organism: Blood Culture PCR (09 Jul 2018 16:43)    Culture - Blood (collected 08 Jul 2018 12:58)  Source: .Blood Blood-Peripheral  Preliminary Report (09 Jul 2018 13:00):    No growth to date.    Culture - Urine (collected 08 Jul 2018 02:46)  Source: .Urine Clean Catch (Midstream)  Final Report (09 Jul 2018 18:36):    >100,000 CFU/ml Proteus mirabilis  Organism: Proteus mirabilis (09 Jul 2018 18:36)  Organism: Proteus mirabilis (09 Jul 2018 18:36)        Radiology:    US Duplex Venous Lower Ext Complete, Bilateral (07.08.18 @ 11:10) >    FINDINGS:    Limited study due to patient body habitus.    There is occlusive and partially occlusive DVT in the rightproximal and   mid femoral vein, right popliteal vein and right posterior tibial veins.    There is occlusive and partially occlusive DVT in the left popliteal vein   and left posterior tibial veins.    There is normal compressibility of the bilateral common femoral veins.     US Transthoracic Echocardiogram w/Doppler Complete (07.06.18 @ 12:12) >    CONCLUSIONS:  Dilated left ventricular size. Preserved left ventricular ejection   fraction. Left ventricular hypertrophy. Mild mitral regurgitation. Trace   aortic insufficiency. Dilated left atrium.Velocities across the aortic   valve mildly increased.      IMPRESSION:     Assessment--    57 y/o M pt w/ PMHx severe morbid obesity, DM, HTN, peripheral neuropathy, calculus of   kidney and ureter sp dayo nephrostomy admitted from [facility] sec GIB. On 6/30 had   EGd had bleeding duodenal ulcer complicated by post op respiratory failure, extubated on   7/1. H/H stable, Had fever yesterday to 101.  Possibly sec UTI  Also with dayo LE DVT which can give fevers  CKD      Suggestions--    Cont Rocephin change to po once IVC filter placed   Trend temps and wbc  Trend H/H as high risk for GIB on anticoagulants  for  IVC filter today as per vascular surgery     Continue with present regime .  Appropriate use of antibiotics and adverse effects reviewed.  I have discussed the above plan of care with patient/family in detail. They expressed understanding of the treatment plan . Risks, benefits and alternatives discussed in detail. I have asked if they have any questions or concerns and appropriately addressed them to the best of my ability .      > 25 minutes spent in direct patient care reviewing  the notes, lab data/ imaging , discussion with multidisciplinary team. All questions were addressed and answered to the best of my capacity .    Thank you for allowing me to participate in the care of your patient .        Suellen Mohan MD  [phone]

## 2018-07-12 LAB
ANION GAP SERPL CALC-SCNC: 7 MMOL/L — SIGNIFICANT CHANGE UP (ref 5–17)
BUN SERPL-MCNC: 20 MG/DL — SIGNIFICANT CHANGE UP (ref 7–23)
CALCIUM SERPL-MCNC: 8.3 MG/DL — LOW (ref 8.4–10.5)
CHLORIDE SERPL-SCNC: 102 MMOL/L — SIGNIFICANT CHANGE UP (ref 96–108)
CO2 SERPL-SCNC: 26 MMOL/L — SIGNIFICANT CHANGE UP (ref 22–31)
CREAT SERPL-MCNC: 1.48 MG/DL — HIGH (ref 0.5–1.3)
GLUCOSE BLDC GLUCOMTR-MCNC: 102 MG/DL — HIGH (ref 70–99)
GLUCOSE BLDC GLUCOMTR-MCNC: 107 MG/DL — HIGH (ref 70–99)
GLUCOSE BLDC GLUCOMTR-MCNC: 118 MG/DL — HIGH (ref 70–99)
GLUCOSE BLDC GLUCOMTR-MCNC: 129 MG/DL — HIGH (ref 70–99)
GLUCOSE SERPL-MCNC: 108 MG/DL — HIGH (ref 70–99)
HCT VFR BLD CALC: 22.7 % — LOW (ref 39–50)
HGB BLD-MCNC: 7.2 G/DL — LOW (ref 13–17)
MCHC RBC-ENTMCNC: 28.8 PG — SIGNIFICANT CHANGE UP (ref 27–34)
MCHC RBC-ENTMCNC: 31.7 GM/DL — LOW (ref 32–36)
MCV RBC AUTO: 90.8 FL — SIGNIFICANT CHANGE UP (ref 80–100)
NRBC # BLD: 0 /100 WBCS — SIGNIFICANT CHANGE UP (ref 0–0)
PLATELET # BLD AUTO: 343 K/UL — SIGNIFICANT CHANGE UP (ref 150–400)
POTASSIUM SERPL-MCNC: 3.8 MMOL/L — SIGNIFICANT CHANGE UP (ref 3.5–5.3)
POTASSIUM SERPL-SCNC: 3.8 MMOL/L — SIGNIFICANT CHANGE UP (ref 3.5–5.3)
RBC # BLD: 2.5 M/UL — LOW (ref 4.2–5.8)
RBC # FLD: 15.4 % — HIGH (ref 10.3–14.5)
SODIUM SERPL-SCNC: 135 MMOL/L — SIGNIFICANT CHANGE UP (ref 135–145)
WBC # BLD: 9.12 K/UL — SIGNIFICANT CHANGE UP (ref 3.8–10.5)
WBC # FLD AUTO: 9.12 K/UL — SIGNIFICANT CHANGE UP (ref 3.8–10.5)

## 2018-07-12 RX ORDER — CEFUROXIME AXETIL 250 MG
500 TABLET ORAL EVERY 12 HOURS
Qty: 0 | Refills: 0 | Status: DISCONTINUED | OUTPATIENT
Start: 2018-07-12 | End: 2018-07-14

## 2018-07-12 RX ORDER — ACETAMINOPHEN 500 MG
650 TABLET ORAL EVERY 6 HOURS
Qty: 0 | Refills: 0 | Status: DISCONTINUED | OUTPATIENT
Start: 2018-07-12 | End: 2018-07-14

## 2018-07-12 RX ADMIN — Medication 1 GRAM(S): at 06:20

## 2018-07-12 RX ADMIN — Medication 500 MILLIGRAM(S): at 10:37

## 2018-07-12 RX ADMIN — Medication 650 MILLIGRAM(S): at 23:48

## 2018-07-12 RX ADMIN — Medication 1 GRAM(S): at 12:45

## 2018-07-12 RX ADMIN — SODIUM CHLORIDE 100 MILLILITER(S): 9 INJECTION, SOLUTION INTRAVENOUS at 01:33

## 2018-07-12 RX ADMIN — PANTOPRAZOLE SODIUM 40 MILLIGRAM(S): 20 TABLET, DELAYED RELEASE ORAL at 06:20

## 2018-07-12 RX ADMIN — Medication 1 GRAM(S): at 17:16

## 2018-07-12 RX ADMIN — Medication 1 TABLET(S): at 12:44

## 2018-07-12 RX ADMIN — NYSTATIN CREAM 1 APPLICATION(S): 100000 CREAM TOPICAL at 06:23

## 2018-07-12 RX ADMIN — Medication 325 MILLIGRAM(S): at 12:44

## 2018-07-12 RX ADMIN — Medication 1 APPLICATION(S): at 06:22

## 2018-07-12 RX ADMIN — Medication 25 MILLIGRAM(S): at 06:20

## 2018-07-12 RX ADMIN — AMLODIPINE BESYLATE 10 MILLIGRAM(S): 2.5 TABLET ORAL at 06:20

## 2018-07-12 RX ADMIN — NYSTATIN CREAM 1 APPLICATION(S): 100000 CREAM TOPICAL at 18:42

## 2018-07-12 RX ADMIN — Medication 500 MILLIGRAM(S): at 18:42

## 2018-07-12 RX ADMIN — TAMSULOSIN HYDROCHLORIDE 0.4 MILLIGRAM(S): 0.4 CAPSULE ORAL at 23:00

## 2018-07-12 RX ADMIN — Medication 1 MILLIGRAM(S): at 12:44

## 2018-07-12 RX ADMIN — Medication 1 APPLICATION(S): at 18:42

## 2018-07-12 RX ADMIN — Medication 1 GRAM(S): at 23:00

## 2018-07-12 RX ADMIN — SERTRALINE 25 MILLIGRAM(S): 25 TABLET, FILM COATED ORAL at 12:45

## 2018-07-12 NOTE — PROGRESS NOTE ADULT - ASSESSMENT
Clinically stable  s/p IVC filter. Discharge planning as per Dr. Hahn. I will sign off the case at this time.

## 2018-07-12 NOTE — PROGRESS NOTE ADULT - ASSESSMENT
The patient is a 58 year old male with a history of HTN, DM, lymphedema, peripheral neuropathy, kidney stone, MEGHA who presents from rehab with bloody diarrhea.    Plan:  - Continue amlodipine for HTN  - Hemoglobin stable  - For episodes of atrial tachycardia and NSVT, continue metoprolol succinate 25 mg daily  - GI follow-up. Hemoglobin on lower side today  - Bilateral DVT - IVC filter placed as patient is not a candidate for AC at this time due to GI bleed  - Discharge planning

## 2018-07-12 NOTE — PROGRESS NOTE ADULT - SUBJECTIVE AND OBJECTIVE BOX
Patient is a 58y old  Male who presents with a chief complaint of "bloody diarrhea (29 Jun 2018 04:16)  Patient seen in follow up for recent SMITH, Obs uropathy, s/p Stent, B/l PCN (capped)  + DVT, s/p IVC filter placement.       PAST MEDICAL HISTORY:  Hx of peripheral neuropathy  Morbid obesity  H/O sleep apnea  Acquired lymphedema of leg  Calculus of kidney and ureter  Diabetes mellitus type II  HTN (hypertension)    MEDICATIONS  (STANDING):  amLODIPine   Tablet 10 milliGRAM(s) Oral daily  BACItracin   Ointment 1 Application(s) Topical every 12 hours  cefuroxime   Tablet 500 milliGRAM(s) Oral every 12 hours  dextrose 5%. 1000 milliLiter(s) (50 mL/Hr) IV Continuous <Continuous>  dextrose 50% Injectable 12.5 Gram(s) IV Push once  dextrose 50% Injectable 25 Gram(s) IV Push once  dextrose 50% Injectable 25 Gram(s) IV Push once  ferrous    sulfate 325 milliGRAM(s) Oral daily  folic acid 1 milliGRAM(s) Oral daily  insulin lispro (HumaLOG) corrective regimen sliding scale   SubCutaneous three times a day before meals  metoprolol succinate ER 25 milliGRAM(s) Oral daily  multivitamin 1 Tablet(s) Oral daily  nystatin Powder 1 Application(s) Topical two times a day  pantoprazole    Tablet 40 milliGRAM(s) Oral before breakfast  sertraline 25 milliGRAM(s) Oral daily  sucralfate 1 Gram(s) Oral every 6 hours  tamsulosin 0.4 milliGRAM(s) Oral at bedtime    MEDICATIONS  (PRN):  dextrose 40% Gel 15 Gram(s) Oral once PRN Blood Glucose LESS THAN 70 milliGRAM(s)/deciliter  glucagon  Injectable 1 milliGRAM(s) IntraMuscular once PRN Glucose LESS THAN 70 milligrams/deciliter  oxyCODONE    5 mG/acetaminophen 325 mG 1 Tablet(s) Oral every 6 hours PRN Moderate Pain (4 - 6)    T(C): 37 (07-12-18 @ 07:52), Max: 37.6 (07-11-18 @ 12:25)  HR: 77 (07-12-18 @ 06:00) (77 - 97)  BP: 139/76 (07-12-18 @ 06:00) (106/68 - 177/84)  RR: 15 (07-12-18 @ 06:00)  SpO2: 96% (07-12-18 @ 06:00)  Wt(kg): --  I&O's Detail    11 Jul 2018 07:01  -  12 Jul 2018 07:00  --------------------------------------------------------  IN:    IV PiggyBack: 50 mL    lactated ringers.: 1450 mL    Oral Fluid: 1080 mL  Total IN: 2580 mL    OUT:    Voided: 1450 mL  Total OUT: 1450 mL    Total NET: 1130 mL    PHYSICAL EXAM:  General: NAD  Respiratory: b/l air entry  Cardiovascular: S1 S2  Gastrointestinal: obese, b/l PCN capped  Extremities:  edema b/l                   LABORATORY:                        7.2    9.12  )-----------( 343      ( 12 Jul 2018 06:43 )             22.7     07-12    135  |  102  |  20  ----------------------------<  108<H>  3.8   |  26  |  1.48<H>    Ca    8.3<L>      12 Jul 2018 06:43      Sodium, Serum: 135 mmol/L (07-12 @ 06:43)  Sodium, Serum: 138 mmol/L (07-11 @ 06:49)    Potassium, Serum: 3.8 mmol/L (07-12 @ 06:43)  Potassium, Serum: 4.0 mmol/L (07-11 @ 06:49)    Hemoglobin: 7.2 g/dL (07-12 @ 06:43)  Hemoglobin: 8.3 g/dL (07-11 @ 06:49)  Hemoglobin: 7.6 g/dL (07-10 @ 06:44)    Creatinine, Serum 1.48 (07-12 @ 06:43)  Creatinine, Serum 1.66 (07-11 @ 06:49)  Creatinine, Serum 1.66 (07-10 @ 06:44)

## 2018-07-12 NOTE — PROGRESS NOTE ADULT - SUBJECTIVE AND OBJECTIVE BOX
Date/Time Patient Seen:  		  Referring MD:   Data Reviewed	       Patient is a 58y old  Male who presents with a chief complaint of "bloody diarrhea (05 Jul 2018 15:53)  post IVC filter      Subjective/HPI     PAST MEDICAL & SURGICAL HISTORY:  Hx of peripheral neuropathy  Morbid obesity  H/O sleep apnea  Acquired lymphedema of leg  Calculus of kidney and ureter  Diabetes mellitus type II  HTN (hypertension)  Ureteral stents placement, bilateral  Hx of tonsillectomy: at 6 years old        Medication list         MEDICATIONS  (STANDING):  amLODIPine   Tablet 10 milliGRAM(s) Oral daily  BACItracin   Ointment 1 Application(s) Topical every 12 hours  cefuroxime   Tablet 500 milliGRAM(s) Oral every 12 hours  dextrose 5%. 1000 milliLiter(s) (50 mL/Hr) IV Continuous <Continuous>  dextrose 50% Injectable 12.5 Gram(s) IV Push once  dextrose 50% Injectable 25 Gram(s) IV Push once  dextrose 50% Injectable 25 Gram(s) IV Push once  ferrous    sulfate 325 milliGRAM(s) Oral daily  folic acid 1 milliGRAM(s) Oral daily  insulin lispro (HumaLOG) corrective regimen sliding scale   SubCutaneous three times a day before meals  metoprolol succinate ER 25 milliGRAM(s) Oral daily  multivitamin 1 Tablet(s) Oral daily  nystatin Powder 1 Application(s) Topical two times a day  pantoprazole    Tablet 40 milliGRAM(s) Oral before breakfast  sertraline 25 milliGRAM(s) Oral daily  sucralfate 1 Gram(s) Oral every 6 hours  tamsulosin 0.4 milliGRAM(s) Oral at bedtime    MEDICATIONS  (PRN):  dextrose 40% Gel 15 Gram(s) Oral once PRN Blood Glucose LESS THAN 70 milliGRAM(s)/deciliter  glucagon  Injectable 1 milliGRAM(s) IntraMuscular once PRN Glucose LESS THAN 70 milligrams/deciliter  oxyCODONE    5 mG/acetaminophen 325 mG 1 Tablet(s) Oral every 6 hours PRN Moderate Pain (4 - 6)         Vitals log        ICU Vital Signs Last 24 Hrs  T(C): 37 (12 Jul 2018 07:52), Max: 37.6 (11 Jul 2018 12:25)  T(F): 98.6 (12 Jul 2018 07:52), Max: 99.6 (11 Jul 2018 12:25)  HR: 77 (12 Jul 2018 06:00) (77 - 96)  BP: 139/76 (12 Jul 2018 06:00) (112/63 - 177/84)  BP(mean): 92 (12 Jul 2018 06:00) (77 - 98)  ABP: --  ABP(mean): --  RR: 15 (12 Jul 2018 06:00) (13 - 23)  SpO2: 96% (12 Jul 2018 06:00) (91% - 100%)           Input and Output:  I&O's Detail    11 Jul 2018 07:01  -  12 Jul 2018 07:00  --------------------------------------------------------  IN:    IV PiggyBack: 50 mL    lactated ringers.: 1450 mL    Oral Fluid: 1080 mL  Total IN: 2580 mL    OUT:    Voided: 1450 mL  Total OUT: 1450 mL    Total NET: 1130 mL          Lab Data                        7.2    9.12  )-----------( 343      ( 12 Jul 2018 06:43 )             22.7     07-12    135  |  102  |  20  ----------------------------<  108<H>  3.8   |  26  |  1.48<H>    Ca    8.3<L>      12 Jul 2018 06:43              Review of Systems	      Objective     Physical Examination    heart s1s2  lung dec BS  abd soft      Pertinent Lab findings & Imaging      Catalino:  NO   Adequate UO     I&O's Detail    11 Jul 2018 07:01  -  12 Jul 2018 07:00  --------------------------------------------------------  IN:    IV PiggyBack: 50 mL    lactated ringers.: 1450 mL    Oral Fluid: 1080 mL  Total IN: 2580 mL    OUT:    Voided: 1450 mL  Total OUT: 1450 mL    Total NET: 1130 mL               Discussed with:     Cultures:	        Radiology

## 2018-07-12 NOTE — PROGRESS NOTE ADULT - ASSESSMENT
59 y/o M pt w/ PMHx morbid obesity, DM, HTN, peripheral neuropathy, calculus of kidney and ureter, sleep apnea, acquired lymphedema of leg and PSHx for obstructive neuropathy had  nephrostomy bilateral kidneys presents to the ED BIBA from [facility] c/o bloody diarrhea x 3 since yesterday, describes it as red. Also endorsing nausea. States that he had a blood transfusion at [facility] on 6/23 and was given an injection of Procrit yesterday. States hemoglobin was above 7 day prior to admission and on day of admission  i below 7, approx 5.  Pt stated he resides at [facility] for rehabilitation due to a fall 2 months ago. Pt denies vomiting, abd pain, fever or any other complaints at this time.  [place] [facility] [place] 4.9, and had central line placed and  2 units PRBC.vital signs stable    admitted for acute on ch anemia due to GI bl;eed with SMITH with CKD3 with h/o ureteral stents with MOrbid obesity , DM2 , HTN, peripheral neuropathy ,    for PRBC, GI consult , npo  , pantoprazole, started in ED, nephro consult , and pulm consult called   6/29 hemoglobin still low received 6 units PRBc,   patient was hemodyanamically stable for emegency endoscopy, and  intermediat risk for procedure, pulmonary clearance Dr Feng  d/w patient and wife by bed side, risk and benefits explained  d/w DR Hudson,  DR Feng as pulmonary critical care.  6/30 had EGd had bleeding duodenal ulcer , hemostatised in procedure , had post op respiratory failure and kept intubated till 7/1, extubated on 7/1, hemodyanamically stable, saturating well, no active bleeding, receiving PRBC, on regimen for gastritis, PUD,no active bleeding, hemoglobin improving , activity increased  Surgical consult If bleeding recurs, pt stable improving,hemoglobin stable, activity increased  DC plan started, PT, diet advance as per GI, had some arrhythmia on Tele ECHO ordered, will monitored for 24 hrs , NSVT transient resolved, LVEF normal, d/w cardiologist , stable for discharge,  pt came from rehab, but does not want to go back to rehab,    overnight 7/7 had fever UA suggestive of UTI started on ceftriaxone cultures sent, will await improvement, x ray chest clear.  ID consult called,   continue activity, hemoglobin 7.8 slight drop will continue to monitor.   < from: US Duplex Venous Lower Ext Complete, Bilateral (07.08.18 @ 11:10) >  There is occlusive and partially occlusive DVT in the rightproximal and   mid femoral vein, right popliteal vein and right posterior tibial veins.    There is occlusive and partially occlusive DVT in the left popliteal vein   and left posterior tibial veins.    There is normal compressibility of the bilateral common femoral veins.  advised IVC filtered , in view of recent severe GI bleed with anemia, d/w hemeonc and Dr Acevedo , vascular   d/w wife , patient aware of condition, and prognosis and dilemma of tt.   erythropoietic agent as per hematology  cont current care.  IVC filter on 7/11  intermediate risk for procedure, hemodyanamically stable  hemoglobin 7.2, will transfuse 1 unit PRBC,PT eval, dc plan to rehab if pt agrees

## 2018-07-12 NOTE — PROGRESS NOTE ADULT - SUBJECTIVE AND OBJECTIVE BOX
ID Progress note covering Dr. Maza    Name: LEONA RODRIGUEZ  Age: 58y  Gender: Male  MRN: [id number]    Interval History-- Events noted, doing well, s/p IVC filter placement . No melena  Notes reviewed    Past Medical History--  Hx of peripheral neuropathy  Morbid obesity  H/O sleep apnea  Acquired lymphedema of leg  Calculus of kidney and ureter  Diabetes mellitus type II  HTN (hypertension)  Ureteral stents placement, bilateral  Hx of tonsillectomy      For details regarding the patient's social history, family history, and other miscellaneous elements, please refer the initial infectious diseases consultation and/or the admitting history and physical examination for this admission.    Allergies--  Allergies    No Known Allergies    Intolerances        Medications--  Antibiotics:  cefTRIAXone   IVPB 1 Gram(s) IV Intermittent every 24 hours    Immunologic:    Other:  amLODIPine   Tablet  BACItracin   Ointment  dextrose 40% Gel PRN  dextrose 5%.  dextrose 50% Injectable  dextrose 50% Injectable  dextrose 50% Injectable  ferrous    sulfate  folic acid  glucagon  Injectable PRN  insulin lispro (HumaLOG) corrective regimen sliding scale  lactated ringers.  metoprolol succinate ER  multivitamin  nystatin Powder  oxyCODONE    5 mG/acetaminophen 325 mG PRN  pantoprazole    Tablet  sertraline  sucralfate  tamsulosin      Review of Systems--  Review of systems unable to be obtained secondary to clinical condition.    Physical Examination--    Vital Signs: T(F): 98.2 (07-12-18 @ 04:05), Max: 99.6 (07-11-18 @ 12:25)  HR: 77 (07-12-18 @ 06:00)  BP: 139/76 (07-12-18 @ 06:00)  RR: 15 (07-12-18 @ 06:00)  SpO2: 96% (07-12-18 @ 06:00)  Wt(kg): --      General: Nontoxic-appearing morbidly obese Male in no acute distress.  HEENT: AT/NC. PERRL. EOMI. Anicteric. Conjunctiva pink and moist. Oropharynx clear. Dentition fair.  Neck: Not rigid. No sense of mass.  Nodes: None palpable.  Lungs: Clear bilaterally without rales, wheezing or rhonchi  Heart: Regular rate and rhythm. No Murmur. No rub. No gallop. No palpable thrill.  Abdomen: Bowel sounds present and normoactive. Soft. Nondistended. Nontender. No sense of mass. No organomegaly.  Back: No spinal tenderness. No costovertebral angle tenderness.   Extremities: No cyanosis or clubbing. No edema.   Skin: Warm. Dry. Good turgor. No rash. No vasculitic stigmata.  Psychiatric: Appropriate affect and mood for situation.     Laboratory Studies--  CBC                        7.2    9.12  )-----------( 343      ( 12 Jul 2018 06:43 )             22.7       Chemistries  07-12    135  |  102  |  20  ----------------------------<  108<H>  3.8   |  26  |  1.48<H>    Ca    8.3<L>      12 Jul 2018 06:43        Culture Data    Culture - Blood (collected 09 Jul 2018 21:58)  Source: .Blood Blood-Peripheral  Preliminary Report (10 Jul 2018 22:01):    No growth to date.    Culture - Blood (collected 09 Jul 2018 21:58)  Source: .Blood Blood-Peripheral  Preliminary Report (10 Jul 2018 22:01):    No growth to date.    Culture - Blood (collected 08 Jul 2018 12:58)  Source: .Blood Triple Lumen WHITE  Gram Stain (10 Jul 2018 00:57):    Growth in aerobic bottle: Gram Positive Cocci in Clusters    Growth in anaerobic bottle: Gram Positive Cocci in Clusters  Final Report (11 Jul 2018 11:26):    Growth in aerobic and anaerobic bottles: Coag Negative Staphylococcus    Single set isolate, possible contaminant. Contact    Microbiology if susceptibility testing clinically    indicated.    "Due to technical problems, Proteus sp. will Not be reported aspart of    the BCID panel until further notice" ***Blood Panel PCR results on this    specimen are available    approximately 3 hours after the Gram stain result.***    Gram stain, PCR, and/or culture results may not always    correspond due to difference in methodologies.    ************************************************************    This PCR assay was performed using [employer].    The following targets are tested for: Enterococcus,    vancomycin resistant enterococci, Listeria monocytogenes,    coagulase negative staphylococci, S. aureus,    methicillin resistant S. aureus, Streptococcus agalactiae    (Group B), S. pneumoniae, S. pyogenes (Group A),    Acinetobacter baumannii, Enterobacter cloacae, E. coli,    Klebsiella oxytoca, K. pneumoniae, Proteus sp.,    Serratia marcescens, Haemophilus influenzae,    Neisseria meningitidis, Pseudomonas aeruginosa, Candida    albicans, C. glabrata, C krusei, C parapsilosis,    C. tropicalis and the KPC resistance gene.  Organism: Blood Culture PCR (11 Jul 2018 11:26)  Organism: Blood Culture PCR (11 Jul 2018 11:26)    Culture - Blood (collected 08 Jul 2018 12:58)  Source: .Blood Blood-Peripheral  Preliminary Report (09 Jul 2018 13:00):    No growth to date.    Culture - Urine (07.08.18 @ 02:46)    -  Amoxicillin/Clavulanic Acid: S <=8/4    -  Ampicillin: S <=2 These ampicillin results predict results for amoxicillin    -  Ampicillin/Sulbactam: S <=4/2    -  Aztreonam: S <=4    -  Tobramycin: S <=2    -  Trimethoprim/Sulfamethoxazole: S <=0.5/9.5    -  Meropenem: S <=1    -  Levofloxacin: S <=1    -  Nitrofurantoin: R >64 Should not be used to treat pyelonephritis    -  Piperacillin/Tazobactam: S <=8    -  Gentamicin: S <=1    -  Ciprofloxacin: S <=0.5    -  Ertapenem: S <=0.5    -  Cefazolin: S <=2 This predicts results for oral agents cefaclor, cefdinir, cefpodoxime, cefprozil, cefuroxime axetil, cephalexin and locarbef for uncomplicated UTI. Note that some isolates may be susceptible to these agents while testing resistant to cefazolin.    -  Cefepime: S <=2    -  Cefoxitin: S <=4    -  Ceftriaxone: S <=1 Enterobacter, Citrobacter, and Serratia may develop resistance during prolonged therapy    -  Amikacin: S <=8    Specimen Source: .Urine Clean Catch (Midstream)    Culture Results:   >100,000 CFU/ml Proteus mirabilis    Organism Identification: Proteus mirabilis    Organism: Proteus mirabilis    Method Type: [facility]      Radiology::   Xray Chest 1 View AP/PA (07.11.18 @ 19:20) >    Comparison:  Chest x-ray 7/7/2018    The cardiac silhouette is within normal limits. The lungs are clear. No   pleural abnormality. Right IJ line tip in the SVC.    IMPRESSION: Right IJ line tip in the SVC. No pneumothorax. Clear lungs.          Assessment--    59 y/o M pt w/ PMHx severe morbid obesity, DM, HTN, peripheral neuropathy, calculus of   kidney and ureter sp dayo nephrostomy admitted from [facility] sec GIB. On 6/30 had   EGd had bleeding duodenal ulcer complicated by post op respiratory failure, extubated on   7/1. H/H stable, Had fever yesterday to 101.  Possibly sec UTI  Also with dayo LE DVT which can give fevers  CKD      Suggestions--  will change to PO Ceftin 500 mg bid x 4 days   DC CVP line   DC planning   Trend temps and wbc  Trend H/H as high risk for GIB on anticoagulants  PT evaluation     Continue with present regime .  Appropriate use of antibiotics and adverse effects reviewed.    I have discussed the above plan of care with patient in detail. He expressed understanding of the treatment plan . Risks, benefits and alternatives discussed in detail. I have asked if he has any questions or concerns and appropriately addressed them to the best of my ability .      > 25 minutes spent in direct patient care reviewing  the notes, lab data/ imaging , discussion with multidisciplinary team. All questions were addressed and answered to the best of my capacity .    Thank you for allowing me to participate in the care of your patient .        Suellen Mohan MD  [phone] ID Progress note covering Dr. Maza    Name: LEONA RODRIGUEZ  Age: 58y  Gender: Male  MRN: [id number]    Interval History-- Events noted, doing well, s/p IVC filter placement . No melena  Notes reviewed    Past Medical History--  Hx of peripheral neuropathy  Morbid obesity  H/O sleep apnea  Acquired lymphedema of leg  Calculus of kidney and ureter  Diabetes mellitus type II  HTN (hypertension)  Ureteral stents placement, bilateral  Hx of tonsillectomy      For details regarding the patient's social history, family history, and other miscellaneous elements, please refer the initial infectious diseases consultation and/or the admitting history and physical examination for this admission.    Allergies--  Allergies    No Known Allergies    Intolerances        Medications--  Antibiotics:  cefTRIAXone   IVPB 1 Gram(s) IV Intermittent every 24 hours    Immunologic:    Other:  amLODIPine   Tablet  BACItracin   Ointment  dextrose 40% Gel PRN  dextrose 5%.  dextrose 50% Injectable  dextrose 50% Injectable  dextrose 50% Injectable  ferrous    sulfate  folic acid  glucagon  Injectable PRN  insulin lispro (HumaLOG) corrective regimen sliding scale  lactated ringers.  metoprolol succinate ER  multivitamin  nystatin Powder  oxyCODONE    5 mG/acetaminophen 325 mG PRN  pantoprazole    Tablet  sertraline  sucralfate  tamsulosin      Review of Systems--  Review of systems unable to be obtained secondary to clinical condition.    Physical Examination--    Vital Signs: T(F): 98.2 (07-12-18 @ 04:05), Max: 99.6 (07-11-18 @ 12:25)  HR: 77 (07-12-18 @ 06:00)  BP: 139/76 (07-12-18 @ 06:00)  RR: 15 (07-12-18 @ 06:00)  SpO2: 96% (07-12-18 @ 06:00)  Wt(kg): --      General: Nontoxic-appearing morbidly obese Male in no acute distress.  HEENT: AT/NC. PERRL. EOMI. Anicteric. Conjunctiva pink and moist. Oropharynx clear. Dentition fair.  Neck: Not rigid. No sense of mass.  Nodes: None palpable.  Lungs: Clear bilaterally without rales, wheezing or rhonchi  Heart: Regular rate and rhythm. No Murmur. No rub. No gallop. No palpable thrill.  Abdomen: Bowel sounds present and normoactive. Soft. Nondistended. Nontender. No sense of mass. No organomegaly.  Back: No spinal tenderness. No costovertebral angle tenderness.   Extremities: No cyanosis or clubbing. No edema.   Skin: Warm. Dry. Good turgor. No rash. No vasculitic stigmata.  Psychiatric: Appropriate affect and mood for situation.     Laboratory Studies--  CBC                        7.2    9.12  )-----------( 343      ( 12 Jul 2018 06:43 )             22.7       Chemistries  07-12    135  |  102  |  20  ----------------------------<  108<H>  3.8   |  26  |  1.48<H>    Ca    8.3<L>      12 Jul 2018 06:43        Culture Data    Culture - Blood (collected 09 Jul 2018 21:58)  Source: .Blood Blood-Peripheral  Preliminary Report (10 Jul 2018 22:01):    No growth to date.    Culture - Blood (collected 09 Jul 2018 21:58)  Source: .Blood Blood-Peripheral  Preliminary Report (10 Jul 2018 22:01):    No growth to date.    Culture - Blood (collected 08 Jul 2018 12:58)  Source: .Blood Triple Lumen WHITE  Gram Stain (10 Jul 2018 00:57):    Growth in aerobic bottle: Gram Positive Cocci in Clusters    Growth in anaerobic bottle: Gram Positive Cocci in Clusters  Final Report (11 Jul 2018 11:26):    Growth in aerobic and anaerobic bottles: Coag Negative Staphylococcus    Single set isolate, possible contaminant. Contact    Microbiology if susceptibility testing clinically    indicated.    "Due to technical problems, Proteus sp. will Not be reported aspart of    the BCID panel until further notice" ***Blood Panel PCR results on this    specimen are available    approximately 3 hours after the Gram stain result.***    Gram stain, PCR, and/or culture results may not always    correspond due to difference in methodologies.    ************************************************************    This PCR assay was performed using [employer].    The following targets are tested for: Enterococcus,    vancomycin resistant enterococci, Listeria monocytogenes,    coagulase negative staphylococci, S. aureus,    methicillin resistant S. aureus, Streptococcus agalactiae    (Group B), S. pneumoniae, S. pyogenes (Group A),    Acinetobacter baumannii, Enterobacter cloacae, E. coli,    Klebsiella oxytoca, K. pneumoniae, Proteus sp.,    Serratia marcescens, Haemophilus influenzae,    Neisseria meningitidis, Pseudomonas aeruginosa, Candida    albicans, C. glabrata, C krusei, C parapsilosis,    C. tropicalis and the KPC resistance gene.  Organism: Blood Culture PCR (11 Jul 2018 11:26)  Organism: Blood Culture PCR (11 Jul 2018 11:26)    Culture - Blood (collected 08 Jul 2018 12:58)  Source: .Blood Blood-Peripheral  Preliminary Report (09 Jul 2018 13:00):    No growth to date.    Culture - Urine (07.08.18 @ 02:46)    -  Amoxicillin/Clavulanic Acid: S <=8/4    -  Ampicillin: S <=2 These ampicillin results predict results for amoxicillin    -  Ampicillin/Sulbactam: S <=4/2    -  Aztreonam: S <=4    -  Tobramycin: S <=2    -  Trimethoprim/Sulfamethoxazole: S <=0.5/9.5    -  Meropenem: S <=1    -  Levofloxacin: S <=1    -  Nitrofurantoin: R >64 Should not be used to treat pyelonephritis    -  Piperacillin/Tazobactam: S <=8    -  Gentamicin: S <=1    -  Ciprofloxacin: S <=0.5    -  Ertapenem: S <=0.5    -  Cefazolin: S <=2 This predicts results for oral agents cefaclor, cefdinir, cefpodoxime, cefprozil, cefuroxime axetil, cephalexin and locarbef for uncomplicated UTI. Note that some isolates may be susceptible to these agents while testing resistant to cefazolin.    -  Cefepime: S <=2    -  Cefoxitin: S <=4    -  Ceftriaxone: S <=1 Enterobacter, Citrobacter, and Serratia may develop resistance during prolonged therapy    -  Amikacin: S <=8    Specimen Source: .Urine Clean Catch (Midstream)    Culture Results:   >100,000 CFU/ml Proteus mirabilis    Organism Identification: Proteus mirabilis    Organism: Proteus mirabilis    Method Type: [facility]      Radiology::   Xray Chest 1 View AP/PA (07.11.18 @ 19:20) >    Comparison:  Chest x-ray 7/7/2018    The cardiac silhouette is within normal limits. The lungs are clear. No   pleural abnormality. Right IJ line tip in the SVC.    IMPRESSION: Right IJ line tip in the SVC. No pneumothorax. Clear lungs.          Assessment--    57 y/o M pt w/ PMHx severe morbid obesity, DM, HTN, peripheral neuropathy, calculus of   kidney and ureter sp dayo nephrostomy admitted from [facility] sec GIB. On 6/30 had   EGd had bleeding duodenal ulcer complicated by post op respiratory failure, extubated on   7/1. H/H stable, Had fever yesterday to 101.  Possibly sec UTI  Also with dayo LE DVT which can give fevers  CKD      Suggestions--  will change to PO Ceftin 500 mg bid x 4 days   DC CVP line   DC planning   Trend temps and wbc  Trend H/H as high risk for GIB on anticoagulants  PT evaluation , he wants to go home     Continue with present regime .  Appropriate use of antibiotics and adverse effects reviewed.    I have discussed the above plan of care with patient in detail. He expressed understanding of the treatment plan . Risks, benefits and alternatives discussed in detail. I have asked if he has any questions or concerns and appropriately addressed them to the best of my ability .      > 25 minutes spent in direct patient care reviewing  the notes, lab data/ imaging , discussion with multidisciplinary team. All questions were addressed and answered to the best of my capacity .    Thank you for allowing me to participate in the care of your patient .        Suellen Mohan MD  [phone]

## 2018-07-12 NOTE — PROGRESS NOTE ADULT - SUBJECTIVE AND OBJECTIVE BOX
Chief Complaint: GI bleed    Interval Events: Underwent IVC filter.    Review of Systems:  General: No fevers, chills, weight loss or gain  Skin: No rashes, color changes  Cardiovascular: No chest pain, orthopnea  Respiratory: No shortness of breath, cough  Gastrointestinal: No nausea, abdominal pain  Genitourinary: No incontinence, pain with urination  Musculoskeletal: No pain, swelling, decreased range of motion  Neurological: No headache, weakness  Psychiatric: No depression, anxiety  Endocrine: No weight loss or gain, increased thirst  All other systems are comprehensively negative.    Physical Exam:  Vital Signs Last 24 Hrs  T(C): 37 (12 Jul 2018 07:52), Max: 37.6 (11 Jul 2018 12:25)  T(F): 98.6 (12 Jul 2018 07:52), Max: 99.6 (11 Jul 2018 12:25)  HR: 77 (12 Jul 2018 06:00) (77 - 96)  BP: 139/76 (12 Jul 2018 06:00) (112/63 - 177/84)  BP(mean): 92 (12 Jul 2018 06:00) (77 - 98)  RR: 15 (12 Jul 2018 06:00) (13 - 23)  SpO2: 96% (12 Jul 2018 06:00) (91% - 100%)  General: NAD  HEENT: MMM  Neck: No JVD, no carotid bruit  Lungs: CTAB  CV: RRR, nl S1/S2, no M/R/G  Abdomen: S/NT/ND, +BS  Extremities: Lymphedema, no cyanosis  Neuro: AAOx3, non-focal  Skin: No rash    Labs:             07-12    135  |  102  |  20  ----------------------------<  108<H>  3.8   |  26  |  1.48<H>    Ca    8.3<L>      12 Jul 2018 06:43                          7.2    9.12  )-----------( 343      ( 12 Jul 2018 06:43 )             22.7       Telemetry: Sinus rhythm, PVCs, atrial run

## 2018-07-12 NOTE — PROGRESS NOTE ADULT - ASSESSMENT
·	Recent SMITH, Obs uropathy, s/p Stent, B/l PCN, CKD   ·	Anemia, GI bleed  ·	Nephrolithiasis  ·	Diabetes  ·	Hypertension  ·	DVT, s/p IVC filter placement  ·	Fever, UTI    Stable renal function. Monitor h/h trend. Transfuse PRN. Will d/c IVF.   To continue current meds. Avoid nephrotoxins. GI follow up.   Monitor blood sugar levels. Insulin coverage as needed. Dietary restriction. On abx.   Monitor BP trend. Titrate BP meds as needed. Salt restriction. Will follow electrolytes and renal function trend.

## 2018-07-12 NOTE — PROGRESS NOTE ADULT - SUBJECTIVE AND OBJECTIVE BOX
Patient is a 58y old  Male who presents with a chief complaint of "bloody diarrhea (05 Jul 2018 15:53)      Interval HPI: POD # 1 s/p IVC filter. Patient is doing well. No chest pain or shortness of breath. No complaints.    Vital Signs Last 24 Hrs  T(C): 37 (12 Jul 2018 07:52), Max: 37.6 (11 Jul 2018 12:25)  T(F): 98.6 (12 Jul 2018 07:52), Max: 99.6 (11 Jul 2018 12:25)  HR: 95 (12 Jul 2018 09:37) (77 - 96)  BP: 117/69 (12 Jul 2018 09:37) (112/63 - 177/84)  BP(mean): 81 (12 Jul 2018 09:37) (77 - 98)  RR: 25 (12 Jul 2018 09:37) (13 - 25)  SpO2: 97% (12 Jul 2018 09:37) (91% - 100%)    Physical Exam:    Gen: NAD, A&Ox3  CV: Regular rhythm, No murmurs rubs, or gallops.  Chest:  Lungs clear to auscultation  Abdomen:  Soft and non-tender  Legs:  Soft and non-tender. Negative Alejandra's sign, bilaterally. Palpable pedal  pulses bilaterally.    Wound is clean, dry and intact. No infections or hematoma.    Stable S/P Filter placement.    LABS:                        7.2    9.12  )-----------( 343      ( 12 Jul 2018 06:43 )             22.7     07-12    135  |  102  |  20  ----------------------------<  108<H>  3.8   |  26  |  1.48<H>    Ca    8.3<L>      12 Jul 2018 06:43

## 2018-07-12 NOTE — PROGRESS NOTE ADULT - SUBJECTIVE AND OBJECTIVE BOX
Patient is a 58y old  Male who presents with a chief complaint of "bloody diarrhea (2018 15:53)      INTERVAL HPI/OVERNIGHT EVENTS:s/p ivc filter    Home Medications:  amLODIPine 10 mg oral tablet: 1 tab(s) orally once a day (2018 12:43)  bacitracin 500 units/g topical ointment: Apply topically to affected area 2 times a day nephrostomy tube (2018 12:43)  Bactrim  mg-160 mg oral tablet: 1 tab(s) orally 2 times a day (2018 12:43)  Colace 100 mg oral capsule: 1 cap(s) orally 3 times a day (2018 12:43)  Flomax 0.4 mg oral capsule: 1 cap(s) orally once a day (2018 12:43)  gas relief: 180 milligram(s)  2 times a day (2018 12:43)  lactulose: 30 milliliter(s) orally 3 times a week, As Needed (2018 12:43)  Milk of Magnesia: 30 milliliter(s) orally 3 times a week, As Needed (2018 12:43)  Procrit 10,000 units/mL preservative-free injectable solution: injectable every 7 days (2018 12:43)  Senna 8.6 mg oral tablet: 2 tab(s) orally once a day (2018 12:43)  Tylenol 500 mg oral tablet: 1 tab(s) orally every 6 hours, As Needed (2018 12:43)  Zofran ODT 4 mg oral tablet, disintegratin tab(s) orally 6 times a day, As Needed (2018 12:43)      MEDICATIONS  (STANDING):  amLODIPine   Tablet 10 milliGRAM(s) Oral daily  BACItracin   Ointment 1 Application(s) Topical every 12 hours  cefuroxime   Tablet 500 milliGRAM(s) Oral every 12 hours  dextrose 5%. 1000 milliLiter(s) (50 mL/Hr) IV Continuous <Continuous>  dextrose 50% Injectable 12.5 Gram(s) IV Push once  dextrose 50% Injectable 25 Gram(s) IV Push once  dextrose 50% Injectable 25 Gram(s) IV Push once  ferrous    sulfate 325 milliGRAM(s) Oral daily  folic acid 1 milliGRAM(s) Oral daily  insulin lispro (HumaLOG) corrective regimen sliding scale   SubCutaneous three times a day before meals  metoprolol succinate ER 25 milliGRAM(s) Oral daily  multivitamin 1 Tablet(s) Oral daily  nystatin Powder 1 Application(s) Topical two times a day  pantoprazole    Tablet 40 milliGRAM(s) Oral before breakfast  sertraline 25 milliGRAM(s) Oral daily  sucralfate 1 Gram(s) Oral every 6 hours  tamsulosin 0.4 milliGRAM(s) Oral at bedtime    MEDICATIONS  (PRN):  dextrose 40% Gel 15 Gram(s) Oral once PRN Blood Glucose LESS THAN 70 milliGRAM(s)/deciliter  glucagon  Injectable 1 milliGRAM(s) IntraMuscular once PRN Glucose LESS THAN 70 milligrams/deciliter  oxyCODONE    5 mG/acetaminophen 325 mG 1 Tablet(s) Oral every 6 hours PRN Moderate Pain (4 - 6)      Allergies    No Known Allergies    Intolerances        REVIEW OF SYSTEMS:  CONSTITUTIONAL: No fever, weight loss, or fatigue  EYES: No eye pain, visual disturbances, or discharge  ENMT:  No difficulty hearing, tinnitus, vertigo; No sinus or throat pain  NECK: No pain or stiffness  BREASTS: No pain, masses, or nipple discharge  RESPIRATORY: No cough, wheezing, chills or hemoptysis; No shortness of breath  CARDIOVASCULAR: No chest pain, palpitations, dizziness, or leg swelling  GASTROINTESTINAL: No abdominal or epigastric pain. No nausea, vomiting, or hematemesis; No diarrhea or constipation. No melena or hematochezia.  GENITOURINARY: No dysuria, frequency, hematuria, or incontinence  NEUROLOGICAL: No headaches, memory loss, loss of strength, numbness, or tremors  SKIN: No itching, burning, rashes, or lesions   ENDOCRINE: No heat or cold intolerance; No hair loss  MUSCULOSKELETAL: No joint pain or swelling; No muscle, back, or extremity pain, difficulty ambulating, leg edema  PSYCHIATRIC: No depression, anxiety, mood swings, or difficulty sleeping  HEME/LYMPH: No easy bruising, or bleeding gums  ALLERGY AND IMMUNOLOGIC: No hives or eczema    Vital Signs Last 24 Hrs  T(C): 37 (2018 07:52), Max: 37.6 (2018 12:25)  T(F): 98.6 (2018 07:52), Max: 99.6 (2018 12:25)  HR: 95 (2018 09:37) (77 - 96)  BP: 117/69 (2018 09:37) (112/63 - 177/84)  BP(mean): 81 (2018 09:37) (77 - 98)  RR: 25 (2018 09:37) (13 - 25)  SpO2: 97% (2018 09:37) (92% - 100%)    PHYSICAL EXAM:  GENERAL: morbidly obese, well-groomed, well-developed  HEAD:  Atraumatic, Normocephalic  EYES: EOMI, PERRLA, conjunctiva and sclera clear  ENMT: Moist mucous membranes,   NECK: Supple, No JVD, Normal thyroid  NERVOUS SYSTEM:  Alert & Oriented X3, Good concentration; Motor Strength 5/5 B/L upper and lower extremities; DTRs 2+ intact and symmetric  CHEST/LUNG: Clear to percussion bilaterally; No rales, rhonchi, wheezing, or rubs  HEART: Regular rate and rhythm; No murmurs, rubs, or gallops  ABDOMEN: Soft, Nontender, Nondistended; Bowel sounds present  EXTREMITIES:  2+ Peripheral Pulses, No clubbing, cyanosis, b/l LE chronic  edema  SKIN: No rashes or lesions    LABS:                        7.2    9.12  )-----------( 343      ( 2018 06:43 )             22.7     07-12    135  |  102  |  20  ----------------------------<  108<H>  3.8   |  26  |  1.48<H>    Ca    8.3<L>      2018 06:43          CAPILLARY BLOOD GLUCOSE      POCT Blood Glucose.: 107 mg/dL (2018 12:02)  POCT Blood Glucose.: 102 mg/dL (2018 07:40)  POCT Blood Glucose.: 121 mg/dL (2018 22:28)        Culture - Blood (collected 18 @ 21:58)  Source: .Blood Blood-Peripheral  Preliminary Report (07-10-18 @ 22:01):    No growth to date.    Culture - Blood (collected 18 @ 21:58)  Source: .Blood Blood-Peripheral  Preliminary Report (07-10-18 @ 22:01):    No growth to date.    Culture - Blood (collected 18 @ 12:58)  Source: .Blood Triple Lumen WHITE  Gram Stain (07-10-18 @ 00:57):    Growth in aerobic bottle: Gram Positive Cocci in Clusters    Growth in anaerobic bottle: Gram Positive Cocci in Clusters  Final Report (18 @ 11:26):    Growth in aerobic and anaerobic bottles: Coag Negative Staphylococcus    Single set isolate, possible contaminant. Contact    Microbiology if susceptibility testing clinically    indicated.    "Due to technical problems, Proteus sp. will Not be reported aspart of    the BCID panel until further notice" ***Blood Panel PCR results on this    specimen are available    approximately 3 hours after the Gram stain result.***    Gram stain, PCR, and/or culture results may not always    correspond due to difference in methodologies.    ************************************************************    This PCR assay was performed using [employer].    The following targets are tested for: Enterococcus,    vancomycin resistant enterococci, Listeria monocytogenes,    coagulase negative staphylococci, S. aureus,    methicillin resistant S. aureus, Streptococcus agalactiae    (Group B), S. pneumoniae, S. pyogenes (Group A),    Acinetobacter baumannii, Enterobacter cloacae, E. coli,    Klebsiella oxytoca, K. pneumoniae, Proteus sp.,    Serratia marcescens, Haemophilus influenzae,    Neisseria meningitidis, Pseudomonas aeruginosa, Candida    albicans, C. glabrata, C krusei, C parapsilosis,    C. tropicalis and the KPC resistance gene.  Organism: Blood Culture PCR (18 @ 11:26)  Organism: Blood Culture PCR (18 @ 11:26)      -  Coagulase negative Staphylococcus: Detec      Method Type: PCR    Culture - Blood (collected 18 @ 12:58)  Source: .Blood Blood-Peripheral  Preliminary Report (18 @ 13:00):    No growth to date.    Culture - Urine (collected 18 @ 02:46)  Source: .Urine Clean Catch (Midstream)  Final Report (18 @ 18:36):    >100,000 CFU/ml Proteus mirabilis  Organism: Proteus mirabilis (18 @ 18:36)  Organism: Proteus mirabilis (18 @ 18:36)      -  Amikacin: S <=8      -  Amoxicillin/Clavulanic Acid: S <=8/4      -  Ampicillin: S <=2 These ampicillin results predict results for amoxicillin      -  Ampicillin/Sulbactam: S <=4/2      -  Aztreonam: S <=4      -  Cefazolin: S <=2 This predicts results for oral agents cefaclor, cefdinir, cefpodoxime, cefprozil, cefuroxime axetil, cephalexin and locarbef for uncomplicated UTI. Note that some isolates may be susceptible to these agents while testing resistant to cefazolin.      -  Cefepime: S <=2      -  Cefoxitin: S <=4      -  Ceftriaxone: S <=1 Enterobacter, Citrobacter, and Serratia may develop resistance during prolonged therapy      -  Ciprofloxacin: S <=0.5      -  Ertapenem: S <=0.5      -  Gentamicin: S <=1      -  Levofloxacin: S <=1      -  Meropenem: S <=1      -  Nitrofurantoin: R >64 Should not be used to treat pyelonephritis      -  Piperacillin/Tazobactam: S <=8      -  Tobramycin: S <=2      -  Trimethoprim/Sulfamethoxazole: S <=0.5/9.5      Method Type: ARTHUR        I&O's Summary    2018 07:  -  2018 07:00  --------------------------------------------------------  IN: 2580 mL / OUT: 1450 mL / NET: 1130 mL    2018 07:  -  2018 12:09  --------------------------------------------------------  IN: 240 mL / OUT: 250 mL / NET: -10 mL        RADIOLOGY & ADDITIONAL TESTS:    Imaging Personally Reviewed:  [x ] YES  [ ] NO    Consultant(s) Notes Reviewed:  [x ] YES  [ ] NO    Care Discussed with Consultants/Other Providers [x ] YES  [ ] NO

## 2018-07-13 LAB
ANION GAP SERPL CALC-SCNC: 9 MMOL/L — SIGNIFICANT CHANGE UP (ref 5–17)
BUN SERPL-MCNC: 23 MG/DL — SIGNIFICANT CHANGE UP (ref 7–23)
CALCIUM SERPL-MCNC: 8.4 MG/DL — SIGNIFICANT CHANGE UP (ref 8.4–10.5)
CHLORIDE SERPL-SCNC: 101 MMOL/L — SIGNIFICANT CHANGE UP (ref 96–108)
CO2 SERPL-SCNC: 26 MMOL/L — SIGNIFICANT CHANGE UP (ref 22–31)
CREAT SERPL-MCNC: 1.7 MG/DL — HIGH (ref 0.5–1.3)
CULTURE RESULTS: SIGNIFICANT CHANGE UP
GLUCOSE BLDC GLUCOMTR-MCNC: 112 MG/DL — HIGH (ref 70–99)
GLUCOSE BLDC GLUCOMTR-MCNC: 126 MG/DL — HIGH (ref 70–99)
GLUCOSE BLDC GLUCOMTR-MCNC: 131 MG/DL — HIGH (ref 70–99)
GLUCOSE BLDC GLUCOMTR-MCNC: 152 MG/DL — HIGH (ref 70–99)
GLUCOSE SERPL-MCNC: 105 MG/DL — HIGH (ref 70–99)
HCT VFR BLD CALC: 25.2 % — LOW (ref 39–50)
HGB BLD-MCNC: 8.2 G/DL — LOW (ref 13–17)
MCHC RBC-ENTMCNC: 29.4 PG — SIGNIFICANT CHANGE UP (ref 27–34)
MCHC RBC-ENTMCNC: 32.5 GM/DL — SIGNIFICANT CHANGE UP (ref 32–36)
MCV RBC AUTO: 90.3 FL — SIGNIFICANT CHANGE UP (ref 80–100)
NRBC # BLD: 0 /100 WBCS — SIGNIFICANT CHANGE UP (ref 0–0)
PLATELET # BLD AUTO: 376 K/UL — SIGNIFICANT CHANGE UP (ref 150–400)
POTASSIUM SERPL-MCNC: 3.8 MMOL/L — SIGNIFICANT CHANGE UP (ref 3.5–5.3)
POTASSIUM SERPL-SCNC: 3.8 MMOL/L — SIGNIFICANT CHANGE UP (ref 3.5–5.3)
RBC # BLD: 2.79 M/UL — LOW (ref 4.2–5.8)
RBC # FLD: 15.6 % — HIGH (ref 10.3–14.5)
SODIUM SERPL-SCNC: 136 MMOL/L — SIGNIFICANT CHANGE UP (ref 135–145)
SPECIMEN SOURCE: SIGNIFICANT CHANGE UP
WBC # BLD: 9.7 K/UL — SIGNIFICANT CHANGE UP (ref 3.8–10.5)
WBC # FLD AUTO: 9.7 K/UL — SIGNIFICANT CHANGE UP (ref 3.8–10.5)

## 2018-07-13 RX ORDER — TAMSULOSIN HYDROCHLORIDE 0.4 MG/1
1 CAPSULE ORAL
Qty: 0 | Refills: 0 | COMMUNITY

## 2018-07-13 RX ORDER — ONDANSETRON 8 MG/1
1 TABLET, FILM COATED ORAL
Qty: 0 | Refills: 0 | COMMUNITY

## 2018-07-13 RX ORDER — TAMSULOSIN HYDROCHLORIDE 0.4 MG/1
1 CAPSULE ORAL
Qty: 0 | Refills: 0 | DISCHARGE
Start: 2018-07-13

## 2018-07-13 RX ORDER — AMLODIPINE BESYLATE 2.5 MG/1
1 TABLET ORAL
Qty: 0 | Refills: 0 | COMMUNITY

## 2018-07-13 RX ORDER — NYSTATIN CREAM 100000 [USP'U]/G
1 CREAM TOPICAL
Qty: 1 | Refills: 0
Start: 2018-07-13 | End: 2018-08-11

## 2018-07-13 RX ORDER — ERYTHROPOIETIN 10000 [IU]/ML
0 INJECTION, SOLUTION INTRAVENOUS; SUBCUTANEOUS
Qty: 0 | Refills: 0 | COMMUNITY

## 2018-07-13 RX ORDER — LACTULOSE 10 G/15ML
30 SOLUTION ORAL
Qty: 0 | Refills: 0 | COMMUNITY

## 2018-07-13 RX ORDER — AZTREONAM 2 G
1 VIAL (EA) INJECTION
Qty: 0 | Refills: 0 | COMMUNITY

## 2018-07-13 RX ORDER — FOLIC ACID 0.8 MG
1 TABLET ORAL
Qty: 0 | Refills: 0 | DISCHARGE
Start: 2018-07-13

## 2018-07-13 RX ORDER — METOPROLOL TARTRATE 50 MG
1 TABLET ORAL
Qty: 30 | Refills: 0
Start: 2018-07-13 | End: 2018-08-11

## 2018-07-13 RX ORDER — SUCRALFATE 1 G
1 TABLET ORAL
Qty: 120 | Refills: 0 | OUTPATIENT
Start: 2018-07-13 | End: 2018-08-11

## 2018-07-13 RX ORDER — CEFUROXIME AXETIL 250 MG
1 TABLET ORAL
Qty: 8 | Refills: 0 | OUTPATIENT
Start: 2018-07-13 | End: 2018-07-16

## 2018-07-13 RX ORDER — FERROUS SULFATE 325(65) MG
1 TABLET ORAL
Qty: 60 | Refills: 0
Start: 2018-07-13 | End: 2018-08-11

## 2018-07-13 RX ORDER — AMLODIPINE BESYLATE 2.5 MG/1
1 TABLET ORAL
Qty: 30 | Refills: 0
Start: 2018-07-13 | End: 2018-08-11

## 2018-07-13 RX ORDER — PANTOPRAZOLE SODIUM 20 MG/1
1 TABLET, DELAYED RELEASE ORAL
Qty: 30 | Refills: 0 | OUTPATIENT
Start: 2018-07-13 | End: 2018-08-11

## 2018-07-13 RX ORDER — MAGNESIUM HYDROXIDE 400 MG/1
30 TABLET, CHEWABLE ORAL
Qty: 0 | Refills: 0 | COMMUNITY

## 2018-07-13 RX ADMIN — Medication 500 MILLIGRAM(S): at 17:12

## 2018-07-13 RX ADMIN — SERTRALINE 25 MILLIGRAM(S): 25 TABLET, FILM COATED ORAL at 11:49

## 2018-07-13 RX ADMIN — PANTOPRAZOLE SODIUM 40 MILLIGRAM(S): 20 TABLET, DELAYED RELEASE ORAL at 06:17

## 2018-07-13 RX ADMIN — NYSTATIN CREAM 1 APPLICATION(S): 100000 CREAM TOPICAL at 06:18

## 2018-07-13 RX ADMIN — Medication 1 APPLICATION(S): at 06:18

## 2018-07-13 RX ADMIN — Medication 650 MILLIGRAM(S): at 23:03

## 2018-07-13 RX ADMIN — Medication 1 GRAM(S): at 06:17

## 2018-07-13 RX ADMIN — Medication 1 GRAM(S): at 23:03

## 2018-07-13 RX ADMIN — Medication 1 TABLET(S): at 11:49

## 2018-07-13 RX ADMIN — Medication 1 GRAM(S): at 17:13

## 2018-07-13 RX ADMIN — TAMSULOSIN HYDROCHLORIDE 0.4 MILLIGRAM(S): 0.4 CAPSULE ORAL at 22:02

## 2018-07-13 RX ADMIN — NYSTATIN CREAM 1 APPLICATION(S): 100000 CREAM TOPICAL at 17:13

## 2018-07-13 RX ADMIN — Medication 1 MILLIGRAM(S): at 11:49

## 2018-07-13 RX ADMIN — Medication 25 MILLIGRAM(S): at 06:17

## 2018-07-13 RX ADMIN — Medication 1 APPLICATION(S): at 17:12

## 2018-07-13 RX ADMIN — Medication 325 MILLIGRAM(S): at 11:49

## 2018-07-13 RX ADMIN — AMLODIPINE BESYLATE 10 MILLIGRAM(S): 2.5 TABLET ORAL at 06:17

## 2018-07-13 RX ADMIN — Medication 650 MILLIGRAM(S): at 00:45

## 2018-07-13 RX ADMIN — Medication 2: at 16:51

## 2018-07-13 RX ADMIN — Medication 650 MILLIGRAM(S): at 22:14

## 2018-07-13 RX ADMIN — Medication 500 MILLIGRAM(S): at 06:17

## 2018-07-13 RX ADMIN — Medication 1 GRAM(S): at 11:49

## 2018-07-13 NOTE — PROGRESS NOTE ADULT - SUBJECTIVE AND OBJECTIVE BOX
Patient is a 58y old  Male who presents with a chief complaint of "bloody diarrhea (2018 15:53)      INTERVAL HPI/OVERNIGHT EVENTS:no acute event overnight    Home Medications:  amLODIPine 10 mg oral tablet: 1 tab(s) orally once a day (2018 12:43)  bacitracin 500 units/g topical ointment: Apply topically to affected area 2 times a day nephrostomy tube (2018 12:43)  Bactrim  mg-160 mg oral tablet: 1 tab(s) orally 2 times a day (2018 12:43)  Colace 100 mg oral capsule: 1 cap(s) orally 3 times a day (2018 12:43)  Flomax 0.4 mg oral capsule: 1 cap(s) orally once a day (2018 12:43)  gas relief: 180 milligram(s)  2 times a day (2018 12:43)  lactulose: 30 milliliter(s) orally 3 times a week, As Needed (2018 12:43)  Milk of Magnesia: 30 milliliter(s) orally 3 times a week, As Needed (2018 12:43)  Procrit 10,000 units/mL preservative-free injectable solution: injectable every 7 days (2018 12:43)  Senna 8.6 mg oral tablet: 2 tab(s) orally once a day (2018 12:43)  Tylenol 500 mg oral tablet: 1 tab(s) orally every 6 hours, As Needed (2018 12:43)  Zofran ODT 4 mg oral tablet, disintegratin tab(s) orally 6 times a day, As Needed (2018 12:43)      MEDICATIONS  (STANDING):  amLODIPine   Tablet 10 milliGRAM(s) Oral daily  BACItracin   Ointment 1 Application(s) Topical every 12 hours  cefuroxime   Tablet 500 milliGRAM(s) Oral every 12 hours  dextrose 5%. 1000 milliLiter(s) (50 mL/Hr) IV Continuous <Continuous>  dextrose 50% Injectable 12.5 Gram(s) IV Push once  dextrose 50% Injectable 25 Gram(s) IV Push once  dextrose 50% Injectable 25 Gram(s) IV Push once  ferrous    sulfate 325 milliGRAM(s) Oral daily  folic acid 1 milliGRAM(s) Oral daily  insulin lispro (HumaLOG) corrective regimen sliding scale   SubCutaneous three times a day before meals  metoprolol succinate ER 25 milliGRAM(s) Oral daily  multivitamin 1 Tablet(s) Oral daily  nystatin Powder 1 Application(s) Topical two times a day  pantoprazole    Tablet 40 milliGRAM(s) Oral before breakfast  sertraline 25 milliGRAM(s) Oral daily  sucralfate 1 Gram(s) Oral every 6 hours  tamsulosin 0.4 milliGRAM(s) Oral at bedtime    MEDICATIONS  (PRN):  acetaminophen    Suspension. 650 milliGRAM(s) Oral every 6 hours PRN Mild Pain (1 - 3)  dextrose 40% Gel 15 Gram(s) Oral once PRN Blood Glucose LESS THAN 70 milliGRAM(s)/deciliter  glucagon  Injectable 1 milliGRAM(s) IntraMuscular once PRN Glucose LESS THAN 70 milligrams/deciliter  oxyCODONE    5 mG/acetaminophen 325 mG 1 Tablet(s) Oral every 6 hours PRN Moderate Pain (4 - 6)      Allergies    No Known Allergies    Intolerances        REVIEW OF SYSTEMS:  CONSTITUTIONAL: No fever, weight loss, or fatigue  EYES: No eye pain, visual disturbances, or discharge  ENMT:  No difficulty hearing, tinnitus, vertigo; No sinus or throat pain  NECK: No pain or stiffness  BREASTS: No pain, masses, or nipple discharge  RESPIRATORY: No cough, wheezing, chills or hemoptysis; No shortness of breath  CARDIOVASCULAR: No chest pain, palpitations, dizziness, or leg swelling  GASTROINTESTINAL: No abdominal or epigastric pain. No nausea, vomiting, or hematemesis; No diarrhea or constipation. No melena or hematochezia.  GENITOURINARY: No dysuria, frequency, hematuria, or incontinence  NEUROLOGICAL: No headaches, memory loss, loss of strength, numbness, or tremors  SKIN: No itching, burning, rashes, or lesions   ENDOCRINE: No heat or cold intolerance; No hair loss  MUSCULOSKELETAL: intermittent  joint pain , b/l LE swelling;   PSYCHIATRIC: No depression, anxiety, mood swings, or difficulty sleeping  HEME/LYMPH: No easy bruising, or bleeding gums  ALLERGY AND IMMUNOLOGIC: No hives or eczema    Vital Signs Last 24 Hrs  T(C): 36.4 (2018 04:13), Max: 98.8 (2018 00:29)  T(F): 97.5 (2018 04:13), Max: 209.8 (2018 00:29)  HR: 88 (2018 06:17) (77 - 95)  BP: 113/65 (2018 06:17) (109/69 - 142/68)  BP(mean): 81 (2018 06:17) (56 - 93)  RR: 19 (2018 06:17) (16 - 27)  SpO2: 93% (2018 06:17) (92% - 99%)    PHYSICAL EXAM:  GENERAL: morbidly obese, well-groomed, well-developed  HEAD:  Atraumatic, Normocephalic  EYES: EOMI, PERRLA, conjunctiva and sclera clear  ENMT: Moist mucous membranes,   NECK: Supple, No JVD, Normal thyroid  NERVOUS SYSTEM:  Alert & Oriented X3, Good concentration; Motor Strength 5/5 B/L upper and lower extremities; DTRs 2+ intact and symmetric  CHEST/LUNG: Clear to percussion bilaterally; No rales, rhonchi, wheezing, or rubs  HEART: Regular rate and rhythm; No murmurs, rubs, or gallops  ABDOMEN: Soft, Nontender, Nondistended; Bowel sounds present  EXTREMITIES:  2+ Peripheral Pulses, No clubbing, cyanosis,  edema b/l LE chronic lymphedema  SKIN: No rashes or lesions    LABS:                        8.2    9.70  )-----------( 376      ( 2018 06:59 )             25.2         136  |  101  |  23  ----------------------------<  105<H>  3.8   |  26  |  1.70<H>    Ca    8.4      2018 06:59          CAPILLARY BLOOD GLUCOSE      POCT Blood Glucose.: 126 mg/dL (2018 07:48)  POCT Blood Glucose.: 129 mg/dL (2018 23:11)  POCT Blood Glucose.: 118 mg/dL (2018 16:45)  POCT Blood Glucose.: 107 mg/dL (2018 12:02)        Culture - Blood (collected 18 @ 21:58)  Source: .Blood Blood-Peripheral  Preliminary Report (07-10-18 @ 22:01):    No growth to date.    Culture - Blood (collected 18 @ 21:58)  Source: .Blood Blood-Peripheral  Preliminary Report (07-10-18 @ 22:01):    No growth to date.    Culture - Blood (collected 18 @ 12:58)  Source: .Blood Triple Lumen WHITE  Gram Stain (07-10-18 @ 00:57):    Growth in aerobic bottle: Gram Positive Cocci in Clusters    Growth in anaerobic bottle: Gram Positive Cocci in Clusters  Final Report (18 @ 11:26):    Growth in aerobic and anaerobic bottles: Coag Negative Staphylococcus    Single set isolate, possible contaminant. Contact    Microbiology if susceptibility testing clinically    indicated.    "Due to technical problems, Proteus sp. will Not be reported aspart of    the BCID panel until further notice" ***Blood Panel PCR results on this    specimen are available    approximately 3 hours after the Gram stain result.***    Gram stain, PCR, and/or culture results may not always    correspond due to difference in methodologies.    ************************************************************    This PCR assay was performed using [employer].    The following targets are tested for: Enterococcus,    vancomycin resistant enterococci, Listeria monocytogenes,    coagulase negative staphylococci, S. aureus,    methicillin resistant S. aureus, Streptococcus agalactiae    (Group B), S. pneumoniae, S. pyogenes (Group A),    Acinetobacter baumannii, Enterobacter cloacae, E. coli,    Klebsiella oxytoca, K. pneumoniae, Proteus sp.,    Serratia marcescens, Haemophilus influenzae,    Neisseria meningitidis, Pseudomonas aeruginosa, Candida    albicans, C. glabrata, C krusei, C parapsilosis,    C. tropicalis and the KPC resistance gene.  Organism: Blood Culture PCR (18 @ 11:26)  Organism: Blood Culture PCR (18 @ 11:26)      -  Coagulase negative Staphylococcus: Detec      Method Type: PCR    Culture - Blood (collected 18 @ 12:58)  Source: .Blood Blood-Peripheral  Preliminary Report (18 @ 13:00):    No growth to date.    Culture - Urine (collected 07-08-18 @ 02:46)  Source: .Urine Clean Catch (Midstream)  Final Report (18 @ 18:36):    >100,000 CFU/ml Proteus mirabilis  Organism: Proteus mirabilis (18 @ 18:36)  Organism: Proteus mirabilis (18 @ 18:36)      -  Amikacin: S <=8      -  Amoxicillin/Clavulanic Acid: S <=8/4      -  Ampicillin: S <=2 These ampicillin results predict results for amoxicillin      -  Ampicillin/Sulbactam: S <=4/2      -  Aztreonam: S <=4      -  Cefazolin: S <=2 This predicts results for oral agents cefaclor, cefdinir, cefpodoxime, cefprozil, cefuroxime axetil, cephalexin and locarbef for uncomplicated UTI. Note that some isolates may be susceptible to these agents while testing resistant to cefazolin.      -  Cefepime: S <=2      -  Cefoxitin: S <=4      -  Ceftriaxone: S <=1 Enterobacter, Citrobacter, and Serratia may develop resistance during prolonged therapy      -  Ciprofloxacin: S <=0.5      -  Ertapenem: S <=0.5      -  Gentamicin: S <=1      -  Levofloxacin: S <=1      -  Meropenem: S <=1      -  Nitrofurantoin: R >64 Should not be used to treat pyelonephritis      -  Piperacillin/Tazobactam: S <=8      -  Tobramycin: S <=2      -  Trimethoprim/Sulfamethoxazole: S <=0.5/9.5      Method Type: ARTHUR        I&O's Summary    2018 07:01  -  2018 07:00  --------------------------------------------------------  IN: 1820 mL / OUT: 1375 mL / NET: 445 mL        RADIOLOGY & ADDITIONAL TESTS:    Imaging Personally Reviewed:  [x ] YES  [ ] NO    Consultant(s) Notes Reviewed:  [x ] YES  [ ] NO    Care Discussed with Consultants/Other Providers [x ] YES  [ ] NO

## 2018-07-13 NOTE — CHART NOTE - NSCHARTNOTEFT_GEN_A_CORE
Assessment: 57yo male pt admitted from [facility] with GI bleed. Pt tolerating diet advancement to regular well. Pt will often call down to diet office to make appropriate meal changes.  Written and verbal diet education on weight loss and DM (pt is pre-DM) given on previous nutrition f/u. Recommend diet change to Cons CHO/DASH TLC. During this adm, pt also c DVT, s/p ivc filter and s/p transfusion.  Possible d/c home today pending.       Factors impacting intake: [ ] none [ ] nausea  [ ] vomiting [ ] diarrhea [ ] constipation  [ ]chewing problems [ ] swallowing issues  [x ] other: recent gib    Diet Presciption: Diet, Regular (07-11-18 @ 19:31)    Intake: good intake and appetite     Current Weight: Weight (kg): 189.1 (07-11 @ 16:44)  % Weight Change 406.7#, ~10lbs wt loss noted, possible fluid changes; noted c generalized non pitting edema    Pertinent Medications: MEDICATIONS  (STANDING):  amLODIPine   Tablet 10 milliGRAM(s) Oral daily  BACItracin   Ointment 1 Application(s) Topical every 12 hours  cefuroxime   Tablet 500 milliGRAM(s) Oral every 12 hours  dextrose 5%. 1000 milliLiter(s) (50 mL/Hr) IV Continuous <Continuous>  dextrose 50% Injectable 12.5 Gram(s) IV Push once  dextrose 50% Injectable 25 Gram(s) IV Push once  dextrose 50% Injectable 25 Gram(s) IV Push once  ferrous    sulfate 325 milliGRAM(s) Oral daily  folic acid 1 milliGRAM(s) Oral daily  insulin lispro (HumaLOG) corrective regimen sliding scale   SubCutaneous three times a day before meals  metoprolol succinate ER 25 milliGRAM(s) Oral daily  multivitamin 1 Tablet(s) Oral daily  nystatin Powder 1 Application(s) Topical two times a day  pantoprazole    Tablet 40 milliGRAM(s) Oral before breakfast  sertraline 25 milliGRAM(s) Oral daily  sucralfate 1 Gram(s) Oral every 6 hours  tamsulosin 0.4 milliGRAM(s) Oral at bedtime    MEDICATIONS  (PRN):  acetaminophen    Suspension. 650 milliGRAM(s) Oral every 6 hours PRN Mild Pain (1 - 3)  dextrose 40% Gel 15 Gram(s) Oral once PRN Blood Glucose LESS THAN 70 milliGRAM(s)/deciliter  glucagon  Injectable 1 milliGRAM(s) IntraMuscular once PRN Glucose LESS THAN 70 milligrams/deciliter  oxyCODONE    5 mG/acetaminophen 325 mG 1 Tablet(s) Oral every 6 hours PRN Moderate Pain (4 - 6)    Pertinent Labs: 07-13 Na136 mmol/L Glu 105 mg/dL<H> K+ 3.8 mmol/L Cr  1.70 mg/dL<H> BUN 23 mg/dL 07-08 Phos 3.5 mg/dL 07-09 Alb 2.1 g/dL<L> 06-29 [id number] 5.5 % 06-29 Chol 103 mg/dL LDL 49 mg/dL HDL 17 mg/dL<L> Trig 187 mg/dL<H>     CAPILLARY BLOOD GLUCOSE      POCT Blood Glucose.: 131 mg/dL (13 Jul 2018 11:42)  POCT Blood Glucose.: 126 mg/dL (13 Jul 2018 07:48)  POCT Blood Glucose.: 129 mg/dL (12 Jul 2018 23:11)  POCT Blood Glucose.: 118 mg/dL (12 Jul 2018 16:45)    Skin: stage 1 to sacrum, L buttock stage I, R buttock stage I    Estimated Needs:   [x ] no change since previous assessment  [ ] recalculated:     Previous Nutrition Diagnosis:   [ ] Inadequate Energy Intake [ ]Inadequate Oral Intake [ ] Excessive Energy Intake   [ ] Underweight [ ] Increased Nutrient Needs [ ] Overweight/Obesity   [x ] Altered GI Function [ ] Unintended Weight Loss [ ] Food & Nutrition Related Knowledge Deficit [ ] Malnutrition     Nutrition Diagnosis is [x ] ongoing  [ ] resolved [ ] not applicable     New Nutrition Diagnosis: [ ] not applicable       Interventions:   Recommend  [ ] Change Diet To:  [ ] Nutrition Supplement  [ ] Nutrition Support  [ ] Other:     Monitoring and Evaluation:   [ x] PO intake [ x ] Tolerance to diet prescription [ x ] weights [ x ] labs[ x ] follow up per protocol  [x ] other: pt will tolerate at least 51-75% of meals daily x5 days.

## 2018-07-13 NOTE — PROGRESS NOTE ADULT - SUBJECTIVE AND OBJECTIVE BOX
Patient is a 58y old  Male who presents with a chief complaint of "bloody diarrhea (29 Jun 2018 04:16)  Patient seen in follow up for recent SMITH, Obs uropathy, s/p Stent, B/l PCN (capped)  + DVT, s/p IVC filter placement.       PAST MEDICAL HISTORY:  Hx of peripheral neuropathy  Morbid obesity  H/O sleep apnea  Acquired lymphedema of leg  Calculus of kidney and ureter  Diabetes mellitus type II  HTN (hypertension)    MEDICATIONS  (STANDING):  amLODIPine   Tablet 10 milliGRAM(s) Oral daily  BACItracin   Ointment 1 Application(s) Topical every 12 hours  cefuroxime   Tablet 500 milliGRAM(s) Oral every 12 hours  dextrose 5%. 1000 milliLiter(s) (50 mL/Hr) IV Continuous <Continuous>  dextrose 50% Injectable 12.5 Gram(s) IV Push once  dextrose 50% Injectable 25 Gram(s) IV Push once  dextrose 50% Injectable 25 Gram(s) IV Push once  ferrous    sulfate 325 milliGRAM(s) Oral daily  folic acid 1 milliGRAM(s) Oral daily  insulin lispro (HumaLOG) corrective regimen sliding scale   SubCutaneous three times a day before meals  metoprolol succinate ER 25 milliGRAM(s) Oral daily  multivitamin 1 Tablet(s) Oral daily  nystatin Powder 1 Application(s) Topical two times a day  pantoprazole    Tablet 40 milliGRAM(s) Oral before breakfast  sertraline 25 milliGRAM(s) Oral daily  sucralfate 1 Gram(s) Oral every 6 hours  tamsulosin 0.4 milliGRAM(s) Oral at bedtime    MEDICATIONS  (PRN):  acetaminophen    Suspension. 650 milliGRAM(s) Oral every 6 hours PRN Mild Pain (1 - 3)  dextrose 40% Gel 15 Gram(s) Oral once PRN Blood Glucose LESS THAN 70 milliGRAM(s)/deciliter  glucagon  Injectable 1 milliGRAM(s) IntraMuscular once PRN Glucose LESS THAN 70 milligrams/deciliter  oxyCODONE    5 mG/acetaminophen 325 mG 1 Tablet(s) Oral every 6 hours PRN Moderate Pain (4 - 6)    T(C): 36.8 (07-13-18 @ 08:26), Max: 98.8 (07-13-18 @ 00:29)  HR: 78 (07-13-18 @ 14:43) (77 - 98)  BP: 131/85 (07-13-18 @ 14:43) (103/69 - 165/77)  RR: 16 (07-13-18 @ 14:43)  SpO2: 99% (07-13-18 @ 14:43)  Wt(kg): --  I&O's Detail    12 Jul 2018 07:01  -  13 Jul 2018 07:00  --------------------------------------------------------  IN:    Oral Fluid: 1820 mL  Total IN: 1820 mL    OUT:    Voided: 1375 mL  Total OUT: 1375 mL    Total NET: 445 mL      13 Jul 2018 07:01  -  13 Jul 2018 15:06  --------------------------------------------------------  IN:    Oral Fluid: 240 mL  Total IN: 240 mL    OUT:    Voided: 150 mL  Total OUT: 150 mL    Total NET: 90 mL    PHYSICAL EXAM:  General: NAD  Respiratory: b/l air entry  Cardiovascular: S1 S2  Gastrointestinal: obese, b/l PCN capped  Extremities:  edema b/l                   LABORATORY:                        8.2    9.70  )-----------( 376      ( 13 Jul 2018 06:59 )             25.2     07-13    136  |  101  |  23  ----------------------------<  105<H>  3.8   |  26  |  1.70<H>    Ca    8.4      13 Jul 2018 06:59      Sodium, Serum: 136 mmol/L (07-13 @ 06:59)  Sodium, Serum: 135 mmol/L (07-12 @ 06:43)    Potassium, Serum: 3.8 mmol/L (07-13 @ 06:59)  Potassium, Serum: 3.8 mmol/L (07-12 @ 06:43)    Hemoglobin: 8.2 g/dL (07-13 @ 06:59)  Hemoglobin: 7.2 g/dL (07-12 @ 06:43)  Hemoglobin: 8.3 g/dL (07-11 @ 06:49)    Creatinine, Serum 1.70 (07-13 @ 06:59)  Creatinine, Serum 1.48 (07-12 @ 06:43)  Creatinine, Serum 1.66 (07-11 @ 06:49)

## 2018-07-13 NOTE — PROGRESS NOTE ADULT - ASSESSMENT
58M admitted with:    1. GI Bleed, gastritis  2. Morbid obesity  3. Renal insufficiency  4. MEGHA    Plan:   -continue to monitor respiratory status. CPAP at night for MEGHA  -OOBTC, PT/OT   -avoid nehrotoxic agents. Medications reviewed for offending agents. Strict I&Os. monitor renal indices  -Continue PPI, carafate  -H/H in am, transfuse for signs/sx anemia

## 2018-07-13 NOTE — PROGRESS NOTE ADULT - ASSESSMENT
The patient is a 58 year old male with a history of HTN, DM, lymphedema, peripheral neuropathy, kidney stone, MEGHA who presents from rehab with bloody diarrhea.    Plan:  - Continue amlodipine for HTN  - Hemoglobin stable  - For episodes of atrial tachycardia and NSVT, continue metoprolol succinate 25 mg daily  - Bilateral DVT - IVC filter placed as patient is not a candidate for AC at this time due to GI bleed  - Discharge planning

## 2018-07-13 NOTE — PROGRESS NOTE ADULT - SUBJECTIVE AND OBJECTIVE BOX
Chief Complaint: GI bleed    Interval Events: No events overnight. No complaints.    Review of Systems:  General: No fevers, chills, weight loss or gain  Skin: No rashes, color changes  Cardiovascular: No chest pain, orthopnea  Respiratory: No shortness of breath, cough  Gastrointestinal: No nausea, abdominal pain  Genitourinary: No incontinence, pain with urination  Musculoskeletal: No pain, swelling, decreased range of motion  Neurological: No headache, weakness  Psychiatric: No depression, anxiety  Endocrine: No weight loss or gain, increased thirst  All other systems are comprehensively negative.    Physical Exam:  Vital Signs Last 24 Hrs  T(C): 37 (12 Jul 2018 07:52), Max: 37.6 (11 Jul 2018 12:25)  T(F): 98.6 (12 Jul 2018 07:52), Max: 99.6 (11 Jul 2018 12:25)  HR: 77 (12 Jul 2018 06:00) (77 - 96)  BP: 139/76 (12 Jul 2018 06:00) (112/63 - 177/84)  BP(mean): 92 (12 Jul 2018 06:00) (77 - 98)  RR: 15 (12 Jul 2018 06:00) (13 - 23)  SpO2: 96% (12 Jul 2018 06:00) (91% - 100%)  General: NAD  HEENT: MMM  Neck: No JVD, no carotid bruit  Lungs: CTAB  CV: RRR, nl S1/S2, no M/R/G  Abdomen: S/NT/ND, +BS  Extremities: Lymphedema, no cyanosis  Neuro: AAOx3, non-focal  Skin: No rash    Labs:             07-13    136  |  101  |  23  ----------------------------<  105<H>  3.8   |  26  |  1.70<H>    Ca    8.4      13 Jul 2018 06:59                          8.2    9.70  )-----------( 376      ( 13 Jul 2018 06:59 )             25.2       Telemetry: Sinus rhythm, PVCs

## 2018-07-13 NOTE — PROGRESS NOTE ADULT - ASSESSMENT
59 y/o M pt w/ PMHx morbid obesity, DM, HTN, peripheral neuropathy, calculus of kidney and ureter, sleep apnea, acquired lymphedema of leg and PSHx for obstructive neuropathy had  nephrostomy bilateral kidneys presents to the ED BIBA from [facility] c/o bloody diarrhea x 3 since yesterday, describes it as red. Also endorsing nausea. States that he had a blood transfusion at [facility] on 6/23 and was given an injection of Procrit yesterday. States hemoglobin was above 7 day prior to admission and on day of admission  i below 7, approx 5.  Pt stated he resides at [facility] for rehabilitation due to a fall 2 months ago. Pt denies vomiting, abd pain, fever or any other complaints at this time.  IN [facility] ED hemoglobin 4.9, and had central line placed and  2 units PRBC.vital signs stable    admitted for acute on ch anemia due to GI bl;eed with SMITH with CKD3 with h/o ureteral stents with MOrbid obesity , DM2 , HTN, peripheral neuropathy ,    for PRBC, GI consult , npo  , pantoprazole, started in ED, nephro consult , and pulm consult called   6/29 hemoglobin still low received 6 units PRBc,   patient was hemodyanamically stable for emegency endoscopy, and  intermediat risk for procedure, pulmonary clearance Dr Feng  d/w patient and wife by bed side, risk and benefits explained  d/w DR Hudson,  DR Feng as pulmonary critical care.  6/30 had EGd had bleeding duodenal ulcer , hemostatised in procedure , had post op respiratory failure and kept intubated till 7/1, extubated on 7/1, hemodyanamically stable, saturating well, no active bleeding, receiving PRBC, on regimen for gastritis, PUD,no active bleeding, hemoglobin improving , activity increased  Surgical consult If bleeding recurs, pt stable improving,hemoglobin stable, activity increased  DC plan started, PT, diet advance as per GI, had some arrhythmia on Tele ECHO ordered, will monitored for 24 hrs , NSVT transient resolved, LVEF normal, d/w cardiologist , stable for discharge,  pt came from rehab, but does not want to go back to rehab,    overnight 7/7 had fever UA suggestive of UTI started on ceftriaxone cultures sent, will await improvement, x ray chest clear.  ID consult called,   continue activity, hemoglobin 7.8 slight drop will continue to monitor.   < from: US Duplex Venous Lower Ext Complete, Bilateral (07.08.18 @ 11:10) >  There is occlusive and partially occlusive DVT in the rightproximal and   mid femoral vein, right popliteal vein and right posterior tibial veins.    There is occlusive and partially occlusive DVT in the left popliteal vein   and left posterior tibial veins.    There is normal compressibility of the bilateral common femoral veins.  advised IVC filtered , in view of recent severe GI bleed with anemia, d/w hemeonc and Dr Acevedo , vascular   d/w wife , patient aware of condition, and prognosis and dilemma of tt.  cont current care.  IVC filter on 7/11  intermediate risk for procedure, hemodyanamically stable, MEGHA stable pulmonary f up noted CPAP at night.  hemoglobin 7.2, will transfuse 1 unit PRBC,PT eval, dc plan to rehab discussed with patient which he refused, received PRBC one unit HB 8.2,   Pt stable for discharge as per ID, vascular and cardiologist

## 2018-07-13 NOTE — PROGRESS NOTE ADULT - SUBJECTIVE AND OBJECTIVE BOX
HISTORY    HPI:  57 y/o M pt w/ PMHx morbid obesity, DM, HTN, peripheral neuropathy, calculus of kidney and ureter, sleep apnea, acquired lymphedema of leg and PSHx nephrostomy bilateral kidneys presents to the ED BIBA from [facility] c/o bloody diarrhea x 3 since yesterday, describes it as red. Also endorsing nausea. States that he had a blood transfusion at [facility] on 6/23 and was given an injection of Procrit yesterday. States hemoglobin was above 7 yesterday and today it is below 7, approx 5.  Pt states he resides at [facility] for rehabilitation due to a fall 2 months ago. Pt denies vomiting, abd pain, fever or any other complaints at this time.  IN [facility] ED hemoglobin 4.9, and had central line placed and ordered 2 units PRBC.vital signs stable (28 Jun 2018 22:42)    24 HOUR EVENTS:  Patient tolerating diet. No further episodes of GI bleeding noted. Remains anemic, no transfusion within last 24 hours. Creatinine remains above baseline.  + Void. Afebrile. No complaints of pain. States fatigue.     SUBJECTIVE/ROS:  [x ] A ten-point review of systems was otherwise negative except as noted.  [ ] Due to altered mental status/intubation, subjective information were not able to be obtained from the patient. History was obtained, to the extent possible, from review of the chart and collateral sources of information.      NEURO  RASS:  0   GCS:15     CAM ICU:negative  Exam: A&O x 3, no focal deficits  Meds: acetaminophen    Suspension. 650 milliGRAM(s) Oral every 6 hours PRN Mild Pain (1 - 3)  oxyCODONE    5 mG/acetaminophen 325 mG 1 Tablet(s) Oral every 6 hours PRN Moderate Pain (4 - 6)  sertraline 25 milliGRAM(s) Oral daily    [x] Adequacy of sedation and pain control has been assessed and adjusted      RESPIRATORY  RR: 22 (07-13-18 @ 20:00) (16 - 26)  SpO2: 92% (07-13-18 @ 20:00) (92% - 99%)  Wt(kg): --  Exam: unlabored, clear to auscultation bilaterally  Mechanical Ventilation: NA    [NA ] Extubation Readiness Assessed  Meds:       CARDIOVASCULAR  HR: 91 (07-13-18 @ 20:00) (77 - 98)  BP: 134/62 (07-13-18 @ 20:00) (103/69 - 144/77)  BP(mean): 83 (07-13-18 @ 20:00) (76 - 97)  ABP: --  ABP(mean): --  Wt(kg): --  CVP(cm H2O): --      Exam:  Cardiac Rhythm:  Perfusion     [x ]Adequate   [ ]Inadequate  Mentation   [x ]Normal       [ ]Reduced  Extremities  [x ]Warm         [ ]Cool  Volume Status [ ]Hypervolemic [ x]Euvolemic [ ]Hypovolemic  Meds: amLODIPine   Tablet 10 milliGRAM(s) Oral daily  metoprolol succinate ER 25 milliGRAM(s) Oral daily  tamsulosin 0.4 milliGRAM(s) Oral at bedtime        GI/NUTRITION  Exam: Regular  Diet: Soft NT ND, Obese, no guarding or rebound  Meds: pantoprazole    Tablet 40 milliGRAM(s) Oral before breakfast  sucralfate 1 Gram(s) Oral every 6 hours      GENITOURINARY  I&O's Detail    07-12 @ 07:01 - 07-13 @ 07:00  --------------------------------------------------------  IN:    Oral Fluid: 1820 mL  Total IN: 1820 mL    OUT:    Voided: 1375 mL  Total OUT: 1375 mL    Total NET: 445 mL      07-13 @ 07:01 - 07-13 @ 20:55  --------------------------------------------------------  IN:    Oral Fluid: 240 mL  Total IN: 240 mL    OUT:    Voided: 150 mL  Total OUT: 150 mL    Total NET: 90 mL          07-13    136  |  101  |  23  ----------------------------<  105<H>  3.8   |  26  |  1.70<H>    Ca    8.4      13 Jul 2018 06:59      [ ] Bae catheter, indication: N/A  Meds: dextrose 5%. 1000 milliLiter(s) IV Continuous <Continuous>  ferrous    sulfate 325 milliGRAM(s) Oral daily  folic acid 1 milliGRAM(s) Oral daily  multivitamin 1 Tablet(s) Oral daily        HEMATOLOGIC  Meds:   [x] VTE Prophylaxis                        8.2    9.70  )-----------( 376      ( 13 Jul 2018 06:59 )             25.2       Transfusion     [ ] PRBC   [ ] Platelets   [ ] FFP   [ ] Cryoprecipitate      INFECTIOUS DISEASES  T(C): 37.2 (07-13-18 @ 20:04), Max: 98.8 (07-13-18 @ 00:29)  Wt(kg): --  WBC Count: 9.70 K/uL (07-13 @ 06:59)    Recent Cultures:  Specimen Source: .Blood Blood-Peripheral, 07-09 @ 21:58; Results   No growth to date.; Gram Stain: --; Organism: --  Specimen Source: .Blood Blood-Peripheral, 07-08 @ 12:58; Results   No growth at 5 days.; Gram Stain:   Growth in aerobic bottle: Gram Positive Cocci in Clusters  Growth in anaerobic bottle: Gram Positive Cocci in Clusters; Organism: Blood Culture PCR  Specimen Source: .Urine Clean Catch (Midstream), 07-08 @ 02:46; Results   >100,000 CFU/ml Proteus mirabilis; Gram Stain: --; Organism: Proteus mirabilis    Meds: cefuroxime   Tablet 500 milliGRAM(s) Oral every 12 hours        ENDOCRINE  Capillary Blood Glucose    Meds: dextrose 40% Gel 15 Gram(s) Oral once PRN  dextrose 50% Injectable 12.5 Gram(s) IV Push once  dextrose 50% Injectable 25 Gram(s) IV Push once  dextrose 50% Injectable 25 Gram(s) IV Push once  glucagon  Injectable 1 milliGRAM(s) IntraMuscular once PRN  insulin lispro (HumaLOG) corrective regimen sliding scale   SubCutaneous three times a day before meals        ACCESS DEVICES:  [ x] Peripheral IV  [ ] Central Venous Line	[ ] R	[ ] L	[ ] IJ	[ ] Fem	[ ] SC	Placed:   [ ] Arterial Line		[ ] R	[ ] L	[ ] Fem	[ ] Rad	[ ] Ax	Placed:   [ ] PICC:					[ ] Mediport  [ ] Urinary Catheter, Date Placed:   [ ] Necessity of urinary, arterial, and venous catheters discussed    OTHER MEDICATIONS:  BACItracin   Ointment 1 Application(s) Topical every 12 hours  nystatin Powder 1 Application(s) Topical two times a day      CODE STATUS: Full    IMAGING:

## 2018-07-13 NOTE — PROGRESS NOTE ADULT - ASSESSMENT
·	Recent SMITH, Obs uropathy, s/p Stent, B/l PCN, CKD   ·	Anemia, GI bleed  ·	Nephrolithiasis  ·	Diabetes  ·	Hypertension  ·	DVT, s/p IVC filter placement  ·	Fever, UTI    Stable renal function. Monitor h/h trend. Transfuse PRN.   To continue current meds. Avoid nephrotoxins. GI follow up.   Monitor blood sugar levels. Insulin coverage as needed. Dietary restriction. On abx.   Monitor BP trend. Titrate BP meds as needed. Salt restriction.   Will follow electrolytes and renal function trend.

## 2018-07-13 NOTE — PROGRESS NOTE ADULT - SUBJECTIVE AND OBJECTIVE BOX
Date/Time Patient Seen:  		  Referring MD:   Data Reviewed	       Patient is a 58y old  Male who presents with a chief complaint of "bloody diarrhea (05 Jul 2018 15:53)  in bed  on CPAP overnight      Subjective/HPI     PAST MEDICAL & SURGICAL HISTORY:  Hx of peripheral neuropathy  Morbid obesity  H/O sleep apnea  Acquired lymphedema of leg  Calculus of kidney and ureter  Diabetes mellitus type II  HTN (hypertension)  Ureteral stents placement, bilateral  Hx of tonsillectomy: at 6 years old        Medication list         MEDICATIONS  (STANDING):  amLODIPine   Tablet 10 milliGRAM(s) Oral daily  BACItracin   Ointment 1 Application(s) Topical every 12 hours  cefuroxime   Tablet 500 milliGRAM(s) Oral every 12 hours  dextrose 5%. 1000 milliLiter(s) (50 mL/Hr) IV Continuous <Continuous>  dextrose 50% Injectable 12.5 Gram(s) IV Push once  dextrose 50% Injectable 25 Gram(s) IV Push once  dextrose 50% Injectable 25 Gram(s) IV Push once  ferrous    sulfate 325 milliGRAM(s) Oral daily  folic acid 1 milliGRAM(s) Oral daily  insulin lispro (HumaLOG) corrective regimen sliding scale   SubCutaneous three times a day before meals  metoprolol succinate ER 25 milliGRAM(s) Oral daily  multivitamin 1 Tablet(s) Oral daily  nystatin Powder 1 Application(s) Topical two times a day  pantoprazole    Tablet 40 milliGRAM(s) Oral before breakfast  sertraline 25 milliGRAM(s) Oral daily  sucralfate 1 Gram(s) Oral every 6 hours  tamsulosin 0.4 milliGRAM(s) Oral at bedtime    MEDICATIONS  (PRN):  acetaminophen    Suspension. 650 milliGRAM(s) Oral every 6 hours PRN Mild Pain (1 - 3)  dextrose 40% Gel 15 Gram(s) Oral once PRN Blood Glucose LESS THAN 70 milliGRAM(s)/deciliter  glucagon  Injectable 1 milliGRAM(s) IntraMuscular once PRN Glucose LESS THAN 70 milligrams/deciliter  oxyCODONE    5 mG/acetaminophen 325 mG 1 Tablet(s) Oral every 6 hours PRN Moderate Pain (4 - 6)         Vitals log        ICU Vital Signs Last 24 Hrs  T(C): 36.4 (13 Jul 2018 04:13), Max: 98.8 (13 Jul 2018 00:29)  T(F): 97.5 (13 Jul 2018 04:13), Max: 209.8 (13 Jul 2018 00:29)  HR: 88 (13 Jul 2018 06:17) (77 - 95)  BP: 113/65 (13 Jul 2018 06:17) (109/69 - 142/68)  BP(mean): 81 (13 Jul 2018 06:17) (56 - 93)  ABP: --  ABP(mean): --  RR: 19 (13 Jul 2018 06:17) (15 - 27)  SpO2: 93% (13 Jul 2018 06:17) (92% - 99%)           Input and Output:  I&O's Detail    12 Jul 2018 07:01  -  13 Jul 2018 07:00  --------------------------------------------------------  IN:    Oral Fluid: 1820 mL  Total IN: 1820 mL    OUT:    Voided: 1375 mL  Total OUT: 1375 mL    Total NET: 445 mL          Lab Data                        8.2    9.70  )-----------( 376      ( 13 Jul 2018 06:59 )             25.2     07-12    135  |  102  |  20  ----------------------------<  108<H>  3.8   |  26  |  1.48<H>    Ca    8.3<L>      12 Jul 2018 06:43              Review of Systems	      Objective     Physical Examination    heart s1s2  lung dec BS  abd soft      Pertinent Lab findings & Imaging      Catalino:  NO   Adequate UO     I&O's Detail    12 Jul 2018 07:01  -  13 Jul 2018 07:00  --------------------------------------------------------  IN:    Oral Fluid: 1820 mL  Total IN: 1820 mL    OUT:    Voided: 1375 mL  Total OUT: 1375 mL    Total NET: 445 mL               Discussed with:     Cultures:	        Radiology

## 2018-07-14 VITALS
HEART RATE: 70 BPM | RESPIRATION RATE: 18 BRPM | OXYGEN SATURATION: 98 % | SYSTOLIC BLOOD PRESSURE: 118 MMHG | DIASTOLIC BLOOD PRESSURE: 70 MMHG

## 2018-07-14 LAB
CULTURE RESULTS: SIGNIFICANT CHANGE UP
CULTURE RESULTS: SIGNIFICANT CHANGE UP
GLUCOSE BLDC GLUCOMTR-MCNC: 106 MG/DL — HIGH (ref 70–99)
SPECIMEN SOURCE: SIGNIFICANT CHANGE UP
SPECIMEN SOURCE: SIGNIFICANT CHANGE UP

## 2018-07-14 PROCEDURE — 93306 TTE W/DOPPLER COMPLETE: CPT

## 2018-07-14 PROCEDURE — 36430 TRANSFUSION BLD/BLD COMPNT: CPT

## 2018-07-14 PROCEDURE — 87040 BLOOD CULTURE FOR BACTERIA: CPT

## 2018-07-14 PROCEDURE — 82272 OCCULT BLD FECES 1-3 TESTS: CPT

## 2018-07-14 PROCEDURE — 86923 COMPATIBILITY TEST ELECTRIC: CPT

## 2018-07-14 PROCEDURE — 85730 THROMBOPLASTIN TIME PARTIAL: CPT

## 2018-07-14 PROCEDURE — 74176 CT ABD & PELVIS W/O CONTRAST: CPT

## 2018-07-14 PROCEDURE — 71045 X-RAY EXAM CHEST 1 VIEW: CPT

## 2018-07-14 PROCEDURE — 97535 SELF CARE MNGMENT TRAINING: CPT

## 2018-07-14 PROCEDURE — 88305 TISSUE EXAM BY PATHOLOGIST: CPT

## 2018-07-14 PROCEDURE — 76000 FLUOROSCOPY <1 HR PHYS/QHP: CPT

## 2018-07-14 PROCEDURE — C1769: CPT

## 2018-07-14 PROCEDURE — 85045 AUTOMATED RETICULOCYTE COUNT: CPT

## 2018-07-14 PROCEDURE — 97166 OT EVAL MOD COMPLEX 45 MIN: CPT

## 2018-07-14 PROCEDURE — 82728 ASSAY OF FERRITIN: CPT

## 2018-07-14 PROCEDURE — 87086 URINE CULTURE/COLONY COUNT: CPT

## 2018-07-14 PROCEDURE — 94660 CPAP INITIATION&MGMT: CPT

## 2018-07-14 PROCEDURE — 88312 SPECIAL STAINS GROUP 1: CPT

## 2018-07-14 PROCEDURE — 97116 GAIT TRAINING THERAPY: CPT

## 2018-07-14 PROCEDURE — 87150 DNA/RNA AMPLIFIED PROBE: CPT

## 2018-07-14 PROCEDURE — 97530 THERAPEUTIC ACTIVITIES: CPT

## 2018-07-14 PROCEDURE — 97110 THERAPEUTIC EXERCISES: CPT

## 2018-07-14 PROCEDURE — 83010 ASSAY OF HAPTOGLOBIN QUANT: CPT

## 2018-07-14 PROCEDURE — 97162 PT EVAL MOD COMPLEX 30 MIN: CPT

## 2018-07-14 PROCEDURE — 80048 BASIC METABOLIC PNL TOTAL CA: CPT

## 2018-07-14 PROCEDURE — 83615 LACTATE (LD) (LDH) ENZYME: CPT

## 2018-07-14 PROCEDURE — 86850 RBC ANTIBODY SCREEN: CPT

## 2018-07-14 PROCEDURE — 84100 ASSAY OF PHOSPHORUS: CPT

## 2018-07-14 PROCEDURE — C1880: CPT

## 2018-07-14 PROCEDURE — 82746 ASSAY OF FOLIC ACID SERUM: CPT

## 2018-07-14 PROCEDURE — 85610 PROTHROMBIN TIME: CPT

## 2018-07-14 PROCEDURE — 80061 LIPID PANEL: CPT

## 2018-07-14 PROCEDURE — 99285 EMERGENCY DEPT VISIT HI MDM: CPT | Mod: 25

## 2018-07-14 PROCEDURE — 86901 BLOOD TYPING SEROLOGIC RH(D): CPT

## 2018-07-14 PROCEDURE — 94003 VENT MGMT INPAT SUBQ DAY: CPT

## 2018-07-14 PROCEDURE — 93970 EXTREMITY STUDY: CPT

## 2018-07-14 PROCEDURE — 83036 HEMOGLOBIN GLYCOSYLATED A1C: CPT

## 2018-07-14 PROCEDURE — 85014 HEMATOCRIT: CPT

## 2018-07-14 PROCEDURE — 82607 VITAMIN B-12: CPT

## 2018-07-14 PROCEDURE — 83735 ASSAY OF MAGNESIUM: CPT

## 2018-07-14 PROCEDURE — 84466 ASSAY OF TRANSFERRIN: CPT

## 2018-07-14 PROCEDURE — C1751: CPT

## 2018-07-14 PROCEDURE — 94664 DEMO&/EVAL PT USE INHALER: CPT

## 2018-07-14 PROCEDURE — 87507 IADNA-DNA/RNA PROBE TQ 12-25: CPT

## 2018-07-14 PROCEDURE — 83550 IRON BINDING TEST: CPT

## 2018-07-14 PROCEDURE — 36415 COLL VENOUS BLD VENIPUNCTURE: CPT

## 2018-07-14 PROCEDURE — 93005 ELECTROCARDIOGRAM TRACING: CPT

## 2018-07-14 PROCEDURE — 94002 VENT MGMT INPAT INIT DAY: CPT

## 2018-07-14 PROCEDURE — 81001 URINALYSIS AUTO W/SCOPE: CPT

## 2018-07-14 PROCEDURE — 86900 BLOOD TYPING SEROLOGIC ABO: CPT

## 2018-07-14 PROCEDURE — 87186 SC STD MICRODIL/AGAR DIL: CPT

## 2018-07-14 PROCEDURE — P9016: CPT

## 2018-07-14 PROCEDURE — 82803 BLOOD GASES ANY COMBINATION: CPT

## 2018-07-14 PROCEDURE — 82962 GLUCOSE BLOOD TEST: CPT

## 2018-07-14 PROCEDURE — 85027 COMPLETE CBC AUTOMATED: CPT

## 2018-07-14 PROCEDURE — 94760 N-INVAS EAR/PLS OXIMETRY 1: CPT

## 2018-07-14 PROCEDURE — 80053 COMPREHEN METABOLIC PANEL: CPT

## 2018-07-14 PROCEDURE — 88300 SURGICAL PATH GROSS: CPT

## 2018-07-14 PROCEDURE — 85018 HEMOGLOBIN: CPT

## 2018-07-14 PROCEDURE — 83605 ASSAY OF LACTIC ACID: CPT

## 2018-07-14 RX ADMIN — Medication 500 MILLIGRAM(S): at 06:27

## 2018-07-14 RX ADMIN — PANTOPRAZOLE SODIUM 40 MILLIGRAM(S): 20 TABLET, DELAYED RELEASE ORAL at 06:27

## 2018-07-14 RX ADMIN — Medication 1 APPLICATION(S): at 06:27

## 2018-07-14 RX ADMIN — AMLODIPINE BESYLATE 10 MILLIGRAM(S): 2.5 TABLET ORAL at 06:27

## 2018-07-14 RX ADMIN — NYSTATIN CREAM 1 APPLICATION(S): 100000 CREAM TOPICAL at 06:32

## 2018-07-14 RX ADMIN — Medication 1 GRAM(S): at 06:27

## 2018-07-14 RX ADMIN — Medication 25 MILLIGRAM(S): at 06:27

## 2018-07-14 NOTE — PROGRESS NOTE ADULT - PROBLEM SELECTOR PLAN 8
pulmonary consult , on cpap
nephro f up , uro f up out pt
hydration , avoid nephrotoxins, nephro f up
nephro f up , uro f up out pt
hydration , avoid nephrotoxins, nephro f up
nephro f up , uro f up out pt
pulmonary consult for cpap

## 2018-07-14 NOTE — PROGRESS NOTE ADULT - PROVIDER SPECIALTY LIST ADULT
Cardiology
Critical Care
Gastroenterology
Heme/Onc
Infectious Disease
Internal Medicine
Nephrology
Pulmonology
Vascular Surgery
Gastroenterology
Heme/Onc
Gastroenterology
Critical Care
Pulmonology
Critical Care
Pulmonology
Internal Medicine

## 2018-07-14 NOTE — PROGRESS NOTE ADULT - PROBLEM SELECTOR PROBLEM 6
Essential hypertension
Morbid obesity
H/O sleep apnea
Morbid obesity
Fever
H/O sleep apnea
Essential hypertension
H/O sleep apnea

## 2018-07-14 NOTE — PROGRESS NOTE ADULT - PROBLEM SELECTOR PROBLEM 1
GI bleeding
Gastrointestinal hemorrhage associated with gastritis, unspecified gastritis type
Sleep apnea
Sleep apnea
Calculus of kidney and ureter
Calculus of kidney and ureter
DVT (deep venous thrombosis)
GI bleeding
Gastrointestinal hemorrhage associated with gastritis, unspecified gastritis type
Morbid obesity
Morbid obesity
R/O GI bleed
Sleep apnea
DVT (deep venous thrombosis)
GI bleeding
Gastrointestinal hemorrhage associated with gastritis, unspecified gastritis type
UTI (urinary tract infection)
Sleep apnea
DVT (deep venous thrombosis)

## 2018-07-14 NOTE — PROGRESS NOTE ADULT - PROBLEM SELECTOR PLAN 6
amlodipine
pulmonary consult for cpap
fever this am  Tm 100.9 this am  monitor vs and HD and Sat  will need to assess and monitor for acute infection
pulmonary consult for cpap
amlodipine
pulmonary consult for cpap

## 2018-07-14 NOTE — PROGRESS NOTE ADULT - SUBJECTIVE AND OBJECTIVE BOX
Date/Time Patient Seen:  		  Referring MD:   Data Reviewed	       Patient is a 58y old  Male who presents with a chief complaint of "bloody diarrhea (05 Jul 2018 15:53)  in bed  seen and examined  vs and meds reviewed      Subjective/HPI     PAST MEDICAL & SURGICAL HISTORY:  Hx of peripheral neuropathy  Morbid obesity  H/O sleep apnea  Acquired lymphedema of leg  Calculus of kidney and ureter  Diabetes mellitus type II  HTN (hypertension)  Ureteral stents placement, bilateral  Hx of tonsillectomy: at 6 years old        Medication list         MEDICATIONS  (STANDING):  amLODIPine   Tablet 10 milliGRAM(s) Oral daily  BACItracin   Ointment 1 Application(s) Topical every 12 hours  cefuroxime   Tablet 500 milliGRAM(s) Oral every 12 hours  dextrose 5%. 1000 milliLiter(s) (50 mL/Hr) IV Continuous <Continuous>  dextrose 50% Injectable 12.5 Gram(s) IV Push once  dextrose 50% Injectable 25 Gram(s) IV Push once  dextrose 50% Injectable 25 Gram(s) IV Push once  ferrous    sulfate 325 milliGRAM(s) Oral daily  folic acid 1 milliGRAM(s) Oral daily  insulin lispro (HumaLOG) corrective regimen sliding scale   SubCutaneous three times a day before meals  metoprolol succinate ER 25 milliGRAM(s) Oral daily  multivitamin 1 Tablet(s) Oral daily  nystatin Powder 1 Application(s) Topical two times a day  pantoprazole    Tablet 40 milliGRAM(s) Oral before breakfast  sertraline 25 milliGRAM(s) Oral daily  sucralfate 1 Gram(s) Oral every 6 hours  tamsulosin 0.4 milliGRAM(s) Oral at bedtime    MEDICATIONS  (PRN):  acetaminophen    Suspension. 650 milliGRAM(s) Oral every 6 hours PRN Mild Pain (1 - 3)  dextrose 40% Gel 15 Gram(s) Oral once PRN Blood Glucose LESS THAN 70 milliGRAM(s)/deciliter  glucagon  Injectable 1 milliGRAM(s) IntraMuscular once PRN Glucose LESS THAN 70 milligrams/deciliter  oxyCODONE    5 mG/acetaminophen 325 mG 1 Tablet(s) Oral every 6 hours PRN Moderate Pain (4 - 6)         Vitals log        ICU Vital Signs Last 24 Hrs  T(C): 36.8 (14 Jul 2018 04:33), Max: 37.2 (13 Jul 2018 20:04)  T(F): 98.3 (14 Jul 2018 04:33), Max: 99 (13 Jul 2018 20:04)  HR: 81 (14 Jul 2018 06:00) (77 - 98)  BP: 131/68 (14 Jul 2018 06:00) (103/69 - 144/77)  BP(mean): 86 (14 Jul 2018 06:00) (78 - 97)  ABP: --  ABP(mean): --  RR: 15 (14 Jul 2018 06:00) (14 - 26)  SpO2: 99% (14 Jul 2018 06:00) (92% - 99%)           Input and Output:  I&O's Detail    12 Jul 2018 07:01  -  13 Jul 2018 07:00  --------------------------------------------------------  IN:    Oral Fluid: 1820 mL  Total IN: 1820 mL    OUT:    Voided: 1375 mL  Total OUT: 1375 mL    Total NET: 445 mL      13 Jul 2018 07:01  -  14 Jul 2018 06:54  --------------------------------------------------------  IN:    Oral Fluid: 340 mL  Total IN: 340 mL    OUT:    Voided: 350 mL  Total OUT: 350 mL    Total NET: -10 mL          Lab Data                        8.2    9.70  )-----------( 376      ( 13 Jul 2018 06:59 )             25.2     07-13    136  |  101  |  23  ----------------------------<  105<H>  3.8   |  26  |  1.70<H>    Ca    8.4      13 Jul 2018 06:59              Review of Systems	      Objective     Physical Examination    heart s1s2  lung dec BS  abd soft      Pertinent Lab findings & Imaging      Catalino:  NO   Adequate UO     I&O's Detail    12 Jul 2018 07:01  -  13 Jul 2018 07:00  --------------------------------------------------------  IN:    Oral Fluid: 1820 mL  Total IN: 1820 mL    OUT:    Voided: 1375 mL  Total OUT: 1375 mL    Total NET: 445 mL      13 Jul 2018 07:01  -  14 Jul 2018 06:54  --------------------------------------------------------  IN:    Oral Fluid: 340 mL  Total IN: 340 mL    OUT:    Voided: 350 mL  Total OUT: 350 mL    Total NET: -10 mL               Discussed with:     Cultures:	        Radiology

## 2018-07-14 NOTE — PROGRESS NOTE ADULT - PROBLEM SELECTOR PROBLEM 9
Morbid obesity
CKD (chronic kidney disease), stage III
CKD (chronic kidney disease), stage III
Fever
CKD (chronic kidney disease), stage III
Fever
Morbid obesity

## 2018-07-14 NOTE — PROGRESS NOTE ADULT - SUBJECTIVE AND OBJECTIVE BOX
Patient is a 58y old  Male who presents with a chief complaint of "bloody diarrhea (05 Jul 2018 15:53)      INTERVAL HPI/OVERNIGHT EVENTS:no acute event,    Home Medications:  bacitracin 500 units/g topical ointment: Apply topically to affected area 2 times a day nephrostomy tube (29 Jun 2018 12:43)  Colace 100 mg oral capsule: 1 cap(s) orally 3 times a day (29 Jun 2018 12:43)  folic acid 1 mg oral tablet: 1 tab(s) orally once a day (13 Jul 2018 08:43)  Multiple Vitamins oral tablet: 1 tab(s) orally once a day (13 Jul 2018 08:43)  Senna 8.6 mg oral tablet: 2 tab(s) orally once a day (29 Jun 2018 12:43)  tamsulosin 0.4 mg oral capsule: 1 cap(s) orally once a day (at bedtime) (13 Jul 2018 08:43)  Tylenol 500 mg oral tablet: 1 tab(s) orally every 6 hours, As Needed (29 Jun 2018 12:43)      MEDICATIONS  (STANDING):  amLODIPine   Tablet 10 milliGRAM(s) Oral daily  BACItracin   Ointment 1 Application(s) Topical every 12 hours  cefuroxime   Tablet 500 milliGRAM(s) Oral every 12 hours  dextrose 5%. 1000 milliLiter(s) (50 mL/Hr) IV Continuous <Continuous>  dextrose 50% Injectable 12.5 Gram(s) IV Push once  dextrose 50% Injectable 25 Gram(s) IV Push once  dextrose 50% Injectable 25 Gram(s) IV Push once  ferrous    sulfate 325 milliGRAM(s) Oral daily  folic acid 1 milliGRAM(s) Oral daily  insulin lispro (HumaLOG) corrective regimen sliding scale   SubCutaneous three times a day before meals  metoprolol succinate ER 25 milliGRAM(s) Oral daily  multivitamin 1 Tablet(s) Oral daily  nystatin Powder 1 Application(s) Topical two times a day  pantoprazole    Tablet 40 milliGRAM(s) Oral before breakfast  sertraline 25 milliGRAM(s) Oral daily  sucralfate 1 Gram(s) Oral every 6 hours  tamsulosin 0.4 milliGRAM(s) Oral at bedtime    MEDICATIONS  (PRN):  acetaminophen    Suspension. 650 milliGRAM(s) Oral every 6 hours PRN Mild Pain (1 - 3)  dextrose 40% Gel 15 Gram(s) Oral once PRN Blood Glucose LESS THAN 70 milliGRAM(s)/deciliter  glucagon  Injectable 1 milliGRAM(s) IntraMuscular once PRN Glucose LESS THAN 70 milligrams/deciliter  oxyCODONE    5 mG/acetaminophen 325 mG 1 Tablet(s) Oral every 6 hours PRN Moderate Pain (4 - 6)      Allergies    No Known Allergies    Intolerances        REVIEW OF SYSTEMS:  CONSTITUTIONAL: No fever, weight loss, or fatigue  EYES: No eye pain, visual disturbances, or discharge  ENMT:  No difficulty hearing, tinnitus, vertigo; No sinus or throat pain  NECK: No pain or stiffness  BREASTS: No pain, masses, or nipple discharge  RESPIRATORY: No cough, wheezing, chills or hemoptysis; No shortness of breath  CARDIOVASCULAR: No chest pain, palpitations, dizziness, or leg swelling  GASTROINTESTINAL: No abdominal or epigastric pain. No nausea, vomiting, or hematemesis; No diarrhea or constipation. No melena or hematochezia.  GENITOURINARY: No dysuria, frequency, hematuria, or incontinence  NEUROLOGICAL: No headaches, memory loss, loss of strength, numbness, or tremors  SKIN: No itching, burning, rashes, or lesions   ENDOCRINE: No heat or cold intolerance; No hair loss  MUSCULOSKELETAL: No joint pain or swelling; No muscle, back, or extremity pain, ch leg edema  PSYCHIATRIC: No depression, anxiety, mood swings, or difficulty sleeping  HEME/LYMPH: No easy bruising, or bleeding gums  ALLERGY AND IMMUNOLOGIC: No hives or eczema    Vital Signs Last 24 Hrs  T(C): 36.8 (14 Jul 2018 04:33), Max: 37.2 (13 Jul 2018 20:04)  T(F): 98.3 (14 Jul 2018 04:33), Max: 99 (13 Jul 2018 20:04)  HR: 78 (14 Jul 2018 08:00) (77 - 98)  BP: 123/63 (14 Jul 2018 08:00) (103/69 - 144/77)  BP(mean): 78 (14 Jul 2018 08:00) (78 - 97)  RR: 20 (14 Jul 2018 08:00) (14 - 26)  SpO2: 94% (14 Jul 2018 08:00) (92% - 99%)    PHYSICAL EXAM:  GENERAL: NAD, well-groomed, well-developed  HEAD:  Atraumatic, Normocephalic  EYES: EOMI, PERRLA, conjunctiva and sclera clear  ENMT: Moist mucous membranes,   NECK: Supple, No JVD, Normal thyroid  NERVOUS SYSTEM:  Alert & Oriented X3, Good concentration; Motor Strength 5/5 B/L upper and lower extremities; DTRs 2+ intact and symmetric  CHEST/LUNG: Clear to percussion bilaterally; No rales, rhonchi, wheezing, or rubs  HEART: Regular rate and rhythm; No murmurs, rubs, or gallops  ABDOMEN: Soft, Nontender, Nondistended; Bowel sounds present  EXTREMITIES:  2+ Peripheral Pulses, No clubbing, cyanosis, ch b/l LE edema  SKIN: No rashes or lesions    LABS:                        8.2    9.70  )-----------( 376      ( 13 Jul 2018 06:59 )             25.2     07-13    136  |  101  |  23  ----------------------------<  105<H>  3.8   |  26  |  1.70<H>    Ca    8.4      13 Jul 2018 06:59          CAPILLARY BLOOD GLUCOSE      POCT Blood Glucose.: 106 mg/dL (14 Jul 2018 07:02)  POCT Blood Glucose.: 112 mg/dL (13 Jul 2018 21:59)  POCT Blood Glucose.: 152 mg/dL (13 Jul 2018 16:31)  POCT Blood Glucose.: 131 mg/dL (13 Jul 2018 11:42)        Culture - Blood (collected 07-09-18 @ 21:58)  Source: .Blood Blood-Peripheral  Preliminary Report (07-10-18 @ 22:01):    No growth to date.    Culture - Blood (collected 07-09-18 @ 21:58)  Source: .Blood Blood-Peripheral  Preliminary Report (07-10-18 @ 22:01):    No growth to date.    Culture - Blood (collected 07-08-18 @ 12:58)  Source: .Blood Triple Lumen WHITE  Gram Stain (07-10-18 @ 00:57):    Growth in aerobic bottle: Gram Positive Cocci in Clusters    Growth in anaerobic bottle: Gram Positive Cocci in Clusters  Final Report (07-11-18 @ 11:26):    Growth in aerobic and anaerobic bottles: Coag Negative Staphylococcus    Single set isolate, possible contaminant. Contact    Microbiology if susceptibility testing clinically    indicated.    "Due to technical problems, Proteus sp. will Not be reported aspart of    the BCID panel until further notice" ***Blood Panel PCR results on this    specimen are available    approximately 3 hours after the Gram stain result.***    Gram stain, PCR, and/or culture results may not always    correspond due to difference in methodologies.    ************************************************************    This PCR assay was performed using [employer].    The following targets are tested for: Enterococcus,    vancomycin resistant enterococci, Listeria monocytogenes,    coagulase negative staphylococci, S. aureus,    methicillin resistant S. aureus, Streptococcus agalactiae    (Group B), S. pneumoniae, S. pyogenes (Group A),    Acinetobacter baumannii, Enterobacter cloacae, E. coli,    Klebsiella oxytoca, K. pneumoniae, Proteus sp.,    Serratia marcescens, Haemophilus influenzae,    Neisseria meningitidis, Pseudomonas aeruginosa, Candida    albicans, C. glabrata, C krusei, C parapsilosis,    C. tropicalis and the KPC resistance gene.  Organism: Blood Culture PCR (07-11-18 @ 11:26)  Organism: Blood Culture PCR (07-11-18 @ 11:26)      -  Coagulase negative Staphylococcus: Detec      Method Type: PCR    Culture - Blood (collected 07-08-18 @ 12:58)  Source: .Blood Blood-Peripheral  Final Report (07-13-18 @ 13:00):    No growth at 5 days.    Culture - Urine (collected 07-08-18 @ 02:46)  Source: .Urine Clean Catch (Midstream)  Final Report (07-09-18 @ 18:36):    >100,000 CFU/ml Proteus mirabilis  Organism: Proteus mirabilis (07-09-18 @ 18:36)  Organism: Proteus mirabilis (07-09-18 @ 18:36)      -  Amikacin: S <=8      -  Amoxicillin/Clavulanic Acid: S <=8/4      -  Ampicillin: S <=2 These ampicillin results predict results for amoxicillin      -  Ampicillin/Sulbactam: S <=4/2      -  Aztreonam: S <=4      -  Cefazolin: S <=2 This predicts results for oral agents cefaclor, cefdinir, cefpodoxime, cefprozil, cefuroxime axetil, cephalexin and locarbef for uncomplicated UTI. Note that some isolates may be susceptible to these agents while testing resistant to cefazolin.      -  Cefepime: S <=2      -  Cefoxitin: S <=4      -  Ceftriaxone: S <=1 Enterobacter, Citrobacter, and Serratia may develop resistance during prolonged therapy      -  Ciprofloxacin: S <=0.5      -  Ertapenem: S <=0.5      -  Gentamicin: S <=1      -  Levofloxacin: S <=1      -  Meropenem: S <=1      -  Nitrofurantoin: R >64 Should not be used to treat pyelonephritis      -  Piperacillin/Tazobactam: S <=8      -  Tobramycin: S <=2      -  Trimethoprim/Sulfamethoxazole: S <=0.5/9.5      Method Type: ARTHUR        I&O's Summary    13 Jul 2018 07:01  -  14 Jul 2018 07:00  --------------------------------------------------------  IN: 340 mL / OUT: 350 mL / NET: -10 mL        RADIOLOGY & ADDITIONAL TESTS:    Imaging Personally Reviewed:  [ x] YES  [ ] NO    Consultant(s) Notes Reviewed:  [ x] YES  [ ] NO    Care Discussed with Consultants/Other Providers [x ] YES  [ ] NO

## 2018-07-14 NOTE — PROGRESS NOTE ADULT - PROBLEM SELECTOR PROBLEM 5
Type 2 diabetes mellitus with diabetic nephropathy, unspecified whether long term insulin use
Essential hypertension
Sleep apnea
Lymphedema
Essential hypertension
Essential hypertension
Lymphedema
Type 2 diabetes mellitus with diabetic nephropathy, unspecified whether long term insulin use
Type 2 diabetes mellitus with diabetic nephropathy, unspecified whether long term insulin use
Lymphedema
Type 2 diabetes mellitus with diabetic nephropathy, unspecified whether long term insulin use

## 2018-07-14 NOTE — PROGRESS NOTE ADULT - PROBLEM SELECTOR PROBLEM 4
Type 2 diabetes mellitus with diabetic nephropathy, unspecified whether long term insulin use
R/O GI bleed
Essential hypertension
Gastrointestinal hemorrhage associated with gastritis, unspecified gastritis type
Type 2 diabetes mellitus with diabetic nephropathy, unspecified whether long term insulin use
Calculus of kidney and ureter
DVT (deep venous thrombosis)
Essential hypertension
H/O sleep apnea
H/O sleep apnea
Morbid obesity
Morbid obesity
Sleep apnea
Anemia
Essential hypertension
Anemia

## 2018-07-14 NOTE — PROGRESS NOTE ADULT - ASSESSMENT
59 y/o M pt w/ PMHx morbid obesity, DM, HTN, peripheral neuropathy, calculus of kidney and ureter, sleep apnea, acquired lymphedema of leg and PSHx for obstructive neuropathy had  nephrostomy bilateral kidneys presents to the ED BIBA from [facility] c/o bloody diarrhea x 3 since yesterday, describes it as red. Also endorsing nausea. States that he had a blood transfusion at [facility] on 6/23 and was given an injection of Procrit yesterday. States hemoglobin was above 7 day prior to admission and on day of admission  i below 7, approx 5.  Pt stated he resides at [facility] for rehabilitation due to a fall 2 months ago. Pt denies vomiting, abd pain, fever or any other complaints at this time.  IN [facility] ED hemoglobin 4.9, and had central line placed and  2 units PRBC.vital signs stable    admitted for acute on ch anemia due to GI bl;eed with SMITH with CKD3 with h/o ureteral stents with MOrbid obesity , DM2 , HTN, peripheral neuropathy ,    for PRBC, GI consult , npo  , pantoprazole, started in ED, nephro consult , and pulm consult called   6/29 hemoglobin still low received 6 units PRBc,   patient was hemodyanamically stable for emegency endoscopy, and  intermediat risk for procedure, pulmonary clearance Dr Feng  d/w patient and wife by bed side, risk and benefits explained  d/w DR Hudson,  DR Feng as pulmonary critical care.  6/30 had EGd had bleeding duodenal ulcer , hemostatised in procedure , had post op respiratory failure and kept intubated till 7/1, extubated on 7/1, hemodyanamically stable, saturating well, no active bleeding, receiving PRBC, on regimen for gastritis, PUD,no active bleeding, hemoglobin improving , activity increased  Surgical consult If bleeding recurs, pt stable improving,hemoglobin stable, activity increased  DC plan started, PT, diet advance as per GI, had some arrhythmia on Tele ECHO ordered, will monitored for 24 hrs , NSVT transient resolved, LVEF normal, d/w cardiologist , stable for discharge,  pt came from rehab, but does not want to go back to rehab,    overnight 7/7 had fever UA suggestive of UTI started on ceftriaxone cultures sent, will await improvement, x ray chest clear.  ID consult called,   continue activity, hemoglobin 7.8 slight drop will continue to monitor.   < from: US Duplex Venous Lower Ext Complete, Bilateral (07.08.18 @ 11:10) >  There is occlusive and partially occlusive DVT in the rightproximal and   mid femoral vein, right popliteal vein and right posterior tibial veins.    There is occlusive and partially occlusive DVT in the left popliteal vein   and left posterior tibial veins.    There is normal compressibility of the bilateral common femoral veins.  advised IVC filtered , in view of recent severe GI bleed with anemia, d/w kuldiponc and Dr Acevedo , vascular   d/w wife , patient aware of condition, and prognosis and dilemma of tt.  cont current care.  IVC filter on 7/11  intermediate risk for procedure, hemodyanamically stable, MEGHA stable pulmonary f up noted CPAP at night.  hemoglobin 7.2, transfused 1 unit PRBC,PT eval, dc plan to rehab discussed with patient which he refused, received PRBC one unit HB 8.2,   Pt stable for discharge as per ID, vascular and cardiologist  central line removed on 7/13, and sutures removed 7/14  patient for dc home today with home care 57 y/o M pt w/ PMHx morbid obesity, DM, HTN, peripheral neuropathy, calculus of kidney and ureter, sleep apnea, acquired lymphedema of leg and PSHx for obstructive neuropathy had  nephrostomy bilateral kidneys presents to the ED BIBA from [facility] c/o bloody diarrhea x 3 since yesterday, describes it as red. Also endorsing nausea. States that he had a blood transfusion at [facility] on 6/23 and was given an injection of Procrit yesterday. States hemoglobin was above 7 day prior to admission and on day of admission  i below 7, approx 5.  Pt stated he resides at [facility] for rehabilitation due to a fall 2 months ago. Pt denies vomiting, abd pain, fever or any other complaints at this time.  [place] [facility] [place] hemoglobin 4.9, and had central line placed and  2 units PRBC.vital signs stable    admitted for acute on ch anemia due to GI bl;eed with SMITH with CKD3 with h/o ureteral stents with MOrbid obesity , DM2 , HTN, peripheral neuropathy ,    for PRBC, GI consult , npo  , pantoprazole, started in ED, nephro consult , and pulm consult called   6/29 hemoglobin still low received 6 units PRBc,   patient was hemodyanamically stable for emegency endoscopy, and  intermediat risk for procedure, pulmonary clearance Dr Feng  d/w patient and wife by bed side, risk and benefits explained  d/w DR Hudson,  DR Feng as pulmonary critical care.  6/30 had EGd had bleeding duodenal ulcer , hemostatised in procedure , had post op respiratory failure and kept intubated till 7/1, extubated on 7/1, hemodyanamically stable, saturating well, no active bleeding, receiving PRBC, on regimen for gastritis, PUD,no active bleeding, hemoglobin improving , activity increased  Surgical consult If bleeding recurs, pt stable improving,hemoglobin stable, activity increased  DC plan started, PT, diet advance as per GI, had some arrhythmia on Tele ECHO ordered, will monitored for 24 hrs , NSVT transient resolved, LVEF normal, d/w cardiologist , stable for discharge,  pt came from rehab, but does not want to go back to rehab,    overnight 7/7 had fever UA suggestive of UTI started on ceftriaxone cultures sent, will await improvement, x ray chest clear.  ID consult called,   continue activity, hemoglobin 7.8 slight drop will continue to monitor.   < from: US Duplex Venous Lower Ext Complete, Bilateral (07.08.18 @ 11:10) >  There is occlusive and partially occlusive DVT in the rightproximal and   mid femoral vein, right popliteal vein and right posterior tibial veins.    There is occlusive and partially occlusive DVT in the left popliteal vein   and left posterior tibial veins.    There is normal compressibility of the bilateral common femoral veins.  advised IVC filtered , in view of recent severe GI bleed with anemia, d/w kuldiponc and Dr Acevedo , vascular   d/w wife , patient aware of condition, and prognosis and dilemma of tt.  cont current care.  IVC filter on 7/11  intermediate risk for procedure, hemodyanamically stable, MEGHA stable pulmonary f up noted CPAP at night.  hemoglobin 7.2, transfused 1 unit PRBC,PT eval, dc plan to rehab discussed with patient which he refused, received PRBC one unit HB 8.2,   Pt stable for discharge as per ID, vascular and cardiologist  central line removed on 7/13, has sutures in neck from ivc filter entry on neck 7/11,  patient for dc home today with home care

## 2018-07-14 NOTE — PROGRESS NOTE ADULT - PROBLEM SELECTOR PLAN 5
ssi, lw carb diet
MEGHA  CPAP  keep sat > 88 pct  sleep hygiene discussed
supportive care
DM   DM diet  DM care  serial FS
HTN  cvs regimen  BP control  dietary discretion
supportive care
ssi, lw carb diet
supportive care

## 2018-07-14 NOTE — PROGRESS NOTE ADULT - ATTENDING COMMENTS
60 minutes spent on this visit, 50% visit time spent in care co-ordination with other attendings and counselling patient  I have discussed care plan with patient and HCP,expressed understanding of problems treatment and their effect and side effects, alternatives in detail,I have asked if they have any questions and concerns and appropriately addressed them to best of my ability  Reviewed all diagonostic tests, lab results and drug drug interactions, and medications
45 minutes spent on this visit, 50% visit time spent in care co-ordination with other attendings and counselling patient  I have discussed care plan with patient and HCP,expressed understanding of problems treatment and their effect and side effects, alternatives in detail,I have asked if they have any questions and concerns and appropriately addressed them to best of my ability  Reviewed all diagonostic tests, lab results and drug drug interactions, and medications
60 minutes spent on this visit, 50% visit time spent in care co-ordination with other attendings and counselling patient  I have discussed care plan with patient and HCP,expressed understanding of problems treatment and their effect and side effects, alternatives in detail,I have asked if they have any questions and concerns and appropriately addressed them to best of my ability  Reviewed all diagonostic tests, lab results and drug drug interactions, and medications
45 minutes spent on this visit, 50% visit time spent in care co-ordination with other attendings and counselling patient  I have discussed care plan with patient and HCP,expressed understanding of problems treatment and their effect and side effects, alternatives in detail,I have asked if they have any questions and concerns and appropriately addressed them to best of my ability  Reviewed all diagonostic tests, lab results and drug drug interactions, and medications
60 minutes spent on this visit, 50% visit time spent in care co-ordination with other attendings and counselling patient  I have discussed care plan with patient and HCP,expressed understanding of problems treatment and their effect and side effects, alternatives in detail,I have asked if they have any questions and concerns and appropriately addressed them to best of my ability  Reviewed all diagonostic tests, lab results and drug drug interactions, and medications

## 2018-07-14 NOTE — PROGRESS NOTE ADULT - PROBLEM SELECTOR PLAN 7
supportive care

## 2018-07-14 NOTE — PROGRESS NOTE ADULT - PROBLEM SELECTOR PLAN 10
hydration , avoid nephrotoxins, nephro f up out patient
hydration , avoid nephrotoxins, nephro f up

## 2018-07-14 NOTE — PROGRESS NOTE ADULT - NSHPATTENDINGPLANDISCUSS_GEN_ALL_CORE
nursing and Respiratory and PA  in detail
team and family
nursing and IDALIA GUZMAN in detail
staff, wife sarah, Dr Boyd, Dr Coronado
staff, wife Dr Jung smith
staff, wife sarah, Dr Boyd, Dr Coronado
staff, wife Dr Jung smith
staff, wife Dr Jung smith
staff, wife sarah, Dr Boyd, Dr Coronado

## 2018-07-14 NOTE — PROGRESS NOTE ADULT - PROBLEM SELECTOR PROBLEM 3
CKD (chronic kidney disease), stage III
DVT (deep venous thrombosis)
R/O GI bleed
Type 2 diabetes mellitus with diabetic nephropathy, unspecified whether long term insulin use
Anemia
DVT (deep venous thrombosis)
H/O sleep apnea
Morbid obesity
Morbid obesity
R/O GI bleed
Sleep apnea
Sleep apnea
Type 2 diabetes mellitus with diabetic nephropathy, unspecified whether long term insulin use
GI bleeding
Type 2 diabetes mellitus with diabetic nephropathy, unspecified whether long term insulin use
R/O GI bleed
GI bleeding

## 2018-07-14 NOTE — PROGRESS NOTE ADULT - PROBLEM SELECTOR PLAN 9
supportive care
hydration , avoid nephrotoxins, nephro f up
hydration , avoid nephrotoxins, nephro f up
possible uti, continue ceftriaxone
hydration , avoid nephrotoxins, nephro f up
possible uti, continue ceftriaxone
supportive care

## 2018-07-14 NOTE — PROGRESS NOTE ADULT - PROBLEM SELECTOR PROBLEM 7
Lymphedema
Morbid obesity
Lymphedema
Morbid obesity

## 2018-07-14 NOTE — PROGRESS NOTE ADULT - PROBLEM SELECTOR PLAN 2
DVT  planned for IVC filter  vascular follow up  high risk for AC
PRBC, stable
UTI  ABX  ID follow up  serial labs  serial clinical exam  am labs pending  I and O  monitor HD
Watch Hct  Transfuse
Anemia  GI Bleed  serial labs  I and O  monitor HD  off AC at present - monitoring Hgb and HD
Anemia  GI bleed  PPI  am Hgb pending  HD stable  follow up with GI as outpatient
GI Bleed  am Hgb pending  GI follow up noted  scope as outpatient
GI bleed  PPI  GI regimen  am hgb pending  GI planning for outpatient scope investigation
MEGHA  keep Sat > 88 pct  compliance assured  sleep hygiene discussed  weight loss discussed
PCN  monitor Cr and Lytes  I and O  skin and wound care
PRBC
PRBC, stable
PRBC, stable
UTI  oral ABX  ID follow up noted  supportive measures
daily cbc   transfuse prn   consider hematology eval  check iron studies   ppi once a day  check stool occult blood  further recommendations pending above
weight loss  dietary discretion  MEGHA  lifestyle modification
PRBC
PRBC, stable
rocephine
Watch Hct  Transfuse
rocephine changed to po ceftin

## 2018-07-14 NOTE — PROGRESS NOTE ADULT - PROBLEM SELECTOR PROBLEM 10
CKD (chronic kidney disease), stage III

## 2018-07-14 NOTE — PROGRESS NOTE ADULT - PROBLEM SELECTOR PROBLEM 2
Anemia
Other iron deficiency anemia
R/O Anemia
UTI (urinary tract infection)
Anemia
Calculus of kidney and ureter
Gastrointestinal hemorrhage associated with gastritis, unspecified gastritis type
H/O sleep apnea
Morbid obesity
Other iron deficiency anemia
R/O Anemia
R/O Anemia
R/O GI bleed
UTI (urinary tract infection)
Anemia
UTI (urinary tract infection)
DVT (deep venous thrombosis)
R/O Anemia
UTI (urinary tract infection)

## 2018-07-14 NOTE — PROGRESS NOTE ADULT - PROBLEM SELECTOR PROBLEM 8
H/O sleep apnea
Calculus of kidney and ureter
CKD (chronic kidney disease), stage III
Calculus of kidney and ureter
CKD (chronic kidney disease), stage III
Calculus of kidney and ureter
H/O sleep apnea

## 2018-07-24 NOTE — ED PROVIDER NOTE - RELIEVING FACTORS
EECP (External Counterpulsation)    Possible Heart Cath per Dr. Mcdonough.    Daily weight first thing in the AM after going to the bathroom & before eating!  Call clinic if weight increases by 3 pounds overnight or 5 pounds in 1 week.  Remember salt/sodium restriction of 2000 mg/day--Do not add salt to foods.  Fluid restriction of 64 ounces--2 Quarts--2 Liters/day--THIS INCLUDES ALL FLUIDS!  Do NOT take NSAIDs--Non-Steroidal-Anti-Inflamartory Drugs--this includes: ibuprofen, Advil, Aleve, Etc.    Return to clinic in 6 months.    Any questions/concerns, please call [facility] clinic:  [phone]  You may receive a survey in the mail.  We would appreciate if you would take the time to fill this out.    
none

## 2018-08-16 ENCOUNTER — OUTPATIENT (OUTPATIENT)
Dept: OUTPATIENT SERVICES | Facility: HOSPITAL | Age: 58
LOS: 1 days | End: 2018-08-16
Payer: COMMERCIAL

## 2018-08-16 VITALS
SYSTOLIC BLOOD PRESSURE: 145 MMHG | WEIGHT: 315 LBS | DIASTOLIC BLOOD PRESSURE: 78 MMHG | RESPIRATION RATE: 20 BRPM | HEART RATE: 75 BPM | OXYGEN SATURATION: 96 % | TEMPERATURE: 98 F | HEIGHT: 69 IN

## 2018-08-16 DIAGNOSIS — N20.0 CALCULUS OF KIDNEY: ICD-10-CM

## 2018-08-16 DIAGNOSIS — Z95.828 PRESENCE OF OTHER VASCULAR IMPLANTS AND GRAFTS: Chronic | ICD-10-CM

## 2018-08-16 DIAGNOSIS — Z01.818 ENCOUNTER FOR OTHER PREPROCEDURAL EXAMINATION: ICD-10-CM

## 2018-08-16 DIAGNOSIS — Z86.69 PERSONAL HISTORY OF OTHER DISEASES OF THE NERVOUS SYSTEM AND SENSE ORGANS: ICD-10-CM

## 2018-08-16 DIAGNOSIS — E66.01 MORBID (SEVERE) OBESITY DUE TO EXCESS CALORIES: ICD-10-CM

## 2018-08-16 DIAGNOSIS — E11.9 TYPE 2 DIABETES MELLITUS WITHOUT COMPLICATIONS: ICD-10-CM

## 2018-08-16 DIAGNOSIS — Z93.6 OTHER ARTIFICIAL OPENINGS OF URINARY TRACT STATUS: Chronic | ICD-10-CM

## 2018-08-16 DIAGNOSIS — Z98.890 OTHER SPECIFIED POSTPROCEDURAL STATES: Chronic | ICD-10-CM

## 2018-08-16 LAB
ANION GAP SERPL CALC-SCNC: 12 MMOL/L — SIGNIFICANT CHANGE UP (ref 5–17)
BLD GP AB SCN SERPL QL: NEGATIVE — SIGNIFICANT CHANGE UP
BUN SERPL-MCNC: 21 MG/DL — SIGNIFICANT CHANGE UP (ref 7–23)
CALCIUM SERPL-MCNC: 8.7 MG/DL — SIGNIFICANT CHANGE UP (ref 8.4–10.5)
CHLORIDE SERPL-SCNC: 100 MMOL/L — SIGNIFICANT CHANGE UP (ref 96–108)
CO2 SERPL-SCNC: 23 MMOL/L — SIGNIFICANT CHANGE UP (ref 22–31)
CREAT SERPL-MCNC: 1.54 MG/DL — HIGH (ref 0.5–1.3)
GLUCOSE SERPL-MCNC: 125 MG/DL — HIGH (ref 70–99)
HBA1C BLD-MCNC: 5.2 % — SIGNIFICANT CHANGE UP (ref 4–5.6)
HCT VFR BLD CALC: 35.5 % — LOW (ref 39–50)
HGB BLD-MCNC: 10.7 G/DL — LOW (ref 13–17)
MCHC RBC-ENTMCNC: 26.6 PG — LOW (ref 27–34)
MCHC RBC-ENTMCNC: 30.1 GM/DL — LOW (ref 32–36)
MCV RBC AUTO: 88.1 FL — SIGNIFICANT CHANGE UP (ref 80–100)
PLATELET # BLD AUTO: 348 K/UL — SIGNIFICANT CHANGE UP (ref 150–400)
POTASSIUM SERPL-MCNC: 3.6 MMOL/L — SIGNIFICANT CHANGE UP (ref 3.5–5.3)
POTASSIUM SERPL-SCNC: 3.6 MMOL/L — SIGNIFICANT CHANGE UP (ref 3.5–5.3)
RBC # BLD: 4.03 M/UL — LOW (ref 4.2–5.8)
RBC # FLD: 16.9 % — HIGH (ref 10.3–14.5)
RH IG SCN BLD-IMP: POSITIVE — SIGNIFICANT CHANGE UP
SODIUM SERPL-SCNC: 135 MMOL/L — SIGNIFICANT CHANGE UP (ref 135–145)
WBC # BLD: 7.2 K/UL — SIGNIFICANT CHANGE UP (ref 3.8–10.5)
WBC # FLD AUTO: 7.2 K/UL — SIGNIFICANT CHANGE UP (ref 3.8–10.5)

## 2018-08-16 PROCEDURE — 83036 HEMOGLOBIN GLYCOSYLATED A1C: CPT

## 2018-08-16 PROCEDURE — 93010 ELECTROCARDIOGRAM REPORT: CPT

## 2018-08-16 PROCEDURE — 87186 SC STD MICRODIL/AGAR DIL: CPT

## 2018-08-16 PROCEDURE — 86901 BLOOD TYPING SEROLOGIC RH(D): CPT

## 2018-08-16 PROCEDURE — 80048 BASIC METABOLIC PNL TOTAL CA: CPT

## 2018-08-16 PROCEDURE — 86900 BLOOD TYPING SEROLOGIC ABO: CPT

## 2018-08-16 PROCEDURE — 86850 RBC ANTIBODY SCREEN: CPT

## 2018-08-16 PROCEDURE — 87086 URINE CULTURE/COLONY COUNT: CPT

## 2018-08-16 PROCEDURE — 85027 COMPLETE CBC AUTOMATED: CPT

## 2018-08-16 PROCEDURE — G0463: CPT

## 2018-08-16 PROCEDURE — 93005 ELECTROCARDIOGRAM TRACING: CPT

## 2018-08-16 RX ORDER — DOCUSATE SODIUM 100 MG
1 CAPSULE ORAL
Qty: 0 | Refills: 0 | COMMUNITY

## 2018-08-16 NOTE — H&P PST ADULT - PSH
Hx of tonsillectomy  at 6 years old  Presence of IVC filter  7/18  S/P endoscopy    Ureteral stents placement, bilateral H/O nephrostomy  bilateral  Hx of tonsillectomy  at 6 years old  Presence of IVC filter  7/18  S/P endoscopy    Ureteral stents placement, bilateral

## 2018-08-16 NOTE — H&P PST ADULT - PROBLEM SELECTOR PLAN 1
Left percutaneous nephrolithotomy  Pt to follow up with PMD prior to procedure.  EKG, Urine culture, T&S, cbc, bmp done today

## 2018-08-16 NOTE — H&P PST ADULT - PMH
Acquired lymphedema of leg    Calculus of kidney and ureter    Diabetes mellitus type II    DVT (deep venous thrombosis)  7/18 ivc filter inserted  GI bleed  recently transfused 10 units  H/O sleep apnea    HTN (hypertension)    Hx of peripheral neuropathy    Morbid obesity Acquired lymphedema of leg    Anemia  s/p 10 units of blood [facility] 7/18, on iron now  Calculus of kidney and ureter    Diabetes mellitus type II    Duodenal ulcer    DVT (deep venous thrombosis)  7/18 ivc filter inserted  Gastritis    GI bleed  recently transfused 10 units  H/O sleep apnea  no CPAP used, to follow up ASAP  HTN (hypertension)    Hx of peripheral neuropathy    Morbid obesity    OA (osteoarthritis)  right hip  Respiratory failure, post-operative  post endoscopy [facility] 7/18  Ventricular arrhythmia  post endoscopy  echo done EF 60%

## 2018-08-16 NOTE — H&P PST ADULT - HISTORY OF PRESENT ILLNESS
59 yo male hx of Morbid obesity, HTN, MEGHA,  Bilateral lower extremity lymphedema T2DM, had recent fall complicated with SMITH and kidney stone, BIlateral nephrostomy tubes inserted, GI bleed requiring 10 units of blood,  Endoscopy with post procedure respiratory failure and ventricular tachycardia, DVT with recent IVC filter inserted [facility], now for treatment of kidney stone. Pt arrived today in wheelchair stating his hospital stay was lenghty and resulted in almost a 100lb wt loss. He appears well, but with limited mobility and is accompanied by his wife.  He c/o increased frequency with urination but denies dysuria.

## 2018-08-17 PROBLEM — I49.9 CARDIAC ARRHYTHMIA, UNSPECIFIED: Chronic | Status: ACTIVE | Noted: 2018-08-16

## 2018-08-18 LAB
-  AMIKACIN: SIGNIFICANT CHANGE UP
-  AMOXICILLIN/CLAVULANIC ACID: SIGNIFICANT CHANGE UP
-  AMPICILLIN/SULBACTAM: SIGNIFICANT CHANGE UP
-  AMPICILLIN: SIGNIFICANT CHANGE UP
-  AZTREONAM: SIGNIFICANT CHANGE UP
-  CEFAZOLIN: SIGNIFICANT CHANGE UP
-  CEFEPIME: SIGNIFICANT CHANGE UP
-  CEFOXITIN: SIGNIFICANT CHANGE UP
-  CEFTRIAXONE: SIGNIFICANT CHANGE UP
-  CIPROFLOXACIN: SIGNIFICANT CHANGE UP
-  ERTAPENEM: SIGNIFICANT CHANGE UP
-  GENTAMICIN: SIGNIFICANT CHANGE UP
-  LEVOFLOXACIN: SIGNIFICANT CHANGE UP
-  MEROPENEM: SIGNIFICANT CHANGE UP
-  NITROFURANTOIN: SIGNIFICANT CHANGE UP
-  PIPERACILLIN/TAZOBACTAM: SIGNIFICANT CHANGE UP
-  TOBRAMYCIN: SIGNIFICANT CHANGE UP
-  TRIMETHOPRIM/SULFAMETHOXAZOLE: SIGNIFICANT CHANGE UP
CULTURE RESULTS: SIGNIFICANT CHANGE UP
METHOD TYPE: SIGNIFICANT CHANGE UP
ORGANISM # SPEC MICROSCOPIC CNT: SIGNIFICANT CHANGE UP
ORGANISM # SPEC MICROSCOPIC CNT: SIGNIFICANT CHANGE UP
SPECIMEN SOURCE: SIGNIFICANT CHANGE UP

## 2018-08-20 DIAGNOSIS — Z87.440 PERSONAL HISTORY OF URINARY (TRACT) INFECTIONS: ICD-10-CM

## 2018-08-21 ENCOUNTER — APPOINTMENT (OUTPATIENT)
Dept: UROLOGY | Facility: HOSPITAL | Age: 58
End: 2018-08-21

## 2018-09-07 PROBLEM — K26.9 DUODENAL ULCER, UNSPECIFIED AS ACUTE OR CHRONIC, WITHOUT HEMORRHAGE OR PERFORATION: Chronic | Status: ACTIVE | Noted: 2018-08-16

## 2018-09-07 PROBLEM — J95.821 ACUTE POSTPROCEDURAL RESPIRATORY FAILURE: Chronic | Status: ACTIVE | Noted: 2018-08-16

## 2018-09-07 PROBLEM — I82.409 ACUTE EMBOLISM AND THROMBOSIS OF UNSPECIFIED DEEP VEINS OF UNSPECIFIED LOWER EXTREMITY: Chronic | Status: ACTIVE | Noted: 2018-08-16

## 2018-09-07 PROBLEM — M19.90 UNSPECIFIED OSTEOARTHRITIS, UNSPECIFIED SITE: Chronic | Status: ACTIVE | Noted: 2018-08-16

## 2018-09-07 PROBLEM — K92.2 GASTROINTESTINAL HEMORRHAGE, UNSPECIFIED: Chronic | Status: ACTIVE | Noted: 2018-08-16

## 2018-09-07 PROBLEM — K29.70 GASTRITIS, UNSPECIFIED, WITHOUT BLEEDING: Chronic | Status: ACTIVE | Noted: 2018-08-16

## 2018-09-10 NOTE — ED PROVIDER NOTE - NS ED MD DISPO DISCHARGE CCDA
Abnormal uterine bleeding  09/10/2018    Active  Mary Hannah  IUD (intrauterine device) in place  09/10/2018  - embedded IUd  Active  Mary Hannah Patient/Caregiver provided printed discharge information.

## 2018-09-14 ENCOUNTER — APPOINTMENT (OUTPATIENT)
Dept: UROLOGY | Facility: CLINIC | Age: 58
End: 2018-09-14
Payer: COMMERCIAL

## 2018-09-14 VITALS
DIASTOLIC BLOOD PRESSURE: 76 MMHG | RESPIRATION RATE: 16 BRPM | SYSTOLIC BLOOD PRESSURE: 127 MMHG | HEIGHT: 69 IN | WEIGHT: 315 LBS | HEART RATE: 75 BPM | BODY MASS INDEX: 46.65 KG/M2 | TEMPERATURE: 98 F

## 2018-09-14 PROCEDURE — 99213 OFFICE O/P EST LOW 20 MIN: CPT

## 2018-09-18 LAB
APPEARANCE: ABNORMAL
BACTERIA UR CULT: NORMAL
BACTERIA: NEGATIVE
BILIRUBIN URINE: NEGATIVE
BLOOD URINE: ABNORMAL
COLOR: YELLOW
GLUCOSE QUALITATIVE U: NEGATIVE MG/DL
HYALINE CASTS: 3 /LPF
KETONES URINE: NEGATIVE
LEUKOCYTE ESTERASE URINE: ABNORMAL
MICROSCOPIC-UA: NORMAL
NITRITE URINE: NEGATIVE
PH URINE: 7
PROTEIN URINE: 100 MG/DL
RED BLOOD CELLS URINE: 30 /HPF
SPECIFIC GRAVITY URINE: 1.01
SQUAMOUS EPITHELIAL CELLS: 2 /HPF
TRIPLE PHOSPHATE CRYSTALS: ABNORMAL
UROBILINOGEN URINE: NEGATIVE MG/DL
WHITE BLOOD CELLS URINE: 41 /HPF

## 2018-09-18 RX ORDER — LEVOFLOXACIN 250 MG/1
250 TABLET, FILM COATED ORAL DAILY
Qty: 14 | Refills: 0 | Status: ACTIVE | COMMUNITY
Start: 2018-08-20 | End: 1900-01-01

## 2018-09-26 ENCOUNTER — OUTPATIENT (OUTPATIENT)
Dept: OUTPATIENT SERVICES | Facility: HOSPITAL | Age: 58
LOS: 1 days | End: 2018-09-26
Payer: COMMERCIAL

## 2018-09-26 VITALS
DIASTOLIC BLOOD PRESSURE: 94 MMHG | WEIGHT: 315 LBS | HEIGHT: 69 IN | RESPIRATION RATE: 16 BRPM | SYSTOLIC BLOOD PRESSURE: 148 MMHG | OXYGEN SATURATION: 97 % | TEMPERATURE: 98 F | HEART RATE: 80 BPM

## 2018-09-26 DIAGNOSIS — E11.9 TYPE 2 DIABETES MELLITUS WITHOUT COMPLICATIONS: ICD-10-CM

## 2018-09-26 DIAGNOSIS — Z86.69 PERSONAL HISTORY OF OTHER DISEASES OF THE NERVOUS SYSTEM AND SENSE ORGANS: ICD-10-CM

## 2018-09-26 DIAGNOSIS — Z01.818 ENCOUNTER FOR OTHER PREPROCEDURAL EXAMINATION: ICD-10-CM

## 2018-09-26 DIAGNOSIS — Z95.828 PRESENCE OF OTHER VASCULAR IMPLANTS AND GRAFTS: Chronic | ICD-10-CM

## 2018-09-26 DIAGNOSIS — Z93.6 OTHER ARTIFICIAL OPENINGS OF URINARY TRACT STATUS: Chronic | ICD-10-CM

## 2018-09-26 DIAGNOSIS — E66.01 MORBID (SEVERE) OBESITY DUE TO EXCESS CALORIES: ICD-10-CM

## 2018-09-26 DIAGNOSIS — Z98.890 OTHER SPECIFIED POSTPROCEDURAL STATES: Chronic | ICD-10-CM

## 2018-09-26 DIAGNOSIS — N20.0 CALCULUS OF KIDNEY: ICD-10-CM

## 2018-09-26 DIAGNOSIS — I10 ESSENTIAL (PRIMARY) HYPERTENSION: ICD-10-CM

## 2018-09-26 LAB
ANION GAP SERPL CALC-SCNC: 10 MMOL/L — SIGNIFICANT CHANGE UP (ref 5–17)
BLD GP AB SCN SERPL QL: NEGATIVE — SIGNIFICANT CHANGE UP
BUN SERPL-MCNC: 23 MG/DL — SIGNIFICANT CHANGE UP (ref 7–23)
CALCIUM SERPL-MCNC: 9.6 MG/DL — SIGNIFICANT CHANGE UP (ref 8.4–10.5)
CHLORIDE SERPL-SCNC: 102 MMOL/L — SIGNIFICANT CHANGE UP (ref 96–108)
CO2 SERPL-SCNC: 25 MMOL/L — SIGNIFICANT CHANGE UP (ref 22–31)
CREAT SERPL-MCNC: 1.4 MG/DL — HIGH (ref 0.5–1.3)
GLUCOSE SERPL-MCNC: 101 MG/DL — HIGH (ref 70–99)
HCT VFR BLD CALC: 38.8 % — LOW (ref 39–50)
HGB BLD-MCNC: 11.9 G/DL — LOW (ref 13–17)
MCHC RBC-ENTMCNC: 26.5 PG — LOW (ref 27–34)
MCHC RBC-ENTMCNC: 30.7 GM/DL — LOW (ref 32–36)
MCV RBC AUTO: 86.4 FL — SIGNIFICANT CHANGE UP (ref 80–100)
PLATELET # BLD AUTO: 297 K/UL — SIGNIFICANT CHANGE UP (ref 150–400)
POTASSIUM SERPL-MCNC: 4.3 MMOL/L — SIGNIFICANT CHANGE UP (ref 3.5–5.3)
POTASSIUM SERPL-SCNC: 4.3 MMOL/L — SIGNIFICANT CHANGE UP (ref 3.5–5.3)
RBC # BLD: 4.49 M/UL — SIGNIFICANT CHANGE UP (ref 4.2–5.8)
RBC # FLD: 16.1 % — HIGH (ref 10.3–14.5)
RH IG SCN BLD-IMP: POSITIVE — SIGNIFICANT CHANGE UP
SODIUM SERPL-SCNC: 137 MMOL/L — SIGNIFICANT CHANGE UP (ref 135–145)
WBC # BLD: 9.14 K/UL — SIGNIFICANT CHANGE UP (ref 3.8–10.5)
WBC # FLD AUTO: 9.14 K/UL — SIGNIFICANT CHANGE UP (ref 3.8–10.5)

## 2018-09-26 RX ORDER — SENNA PLUS 8.6 MG/1
2 TABLET ORAL
Qty: 0 | Refills: 0 | COMMUNITY

## 2018-09-26 NOTE — H&P PST ADULT - PROBLEM SELECTOR PLAN 1
Left percutaneous nephrolithotomy   Antegrade ureteroscopy laser lithotripsy,change nephrostomy tubes   PST instruction given    CBC/BMP type and screen drawn sent pending result  To continue taking prophylaxis antibiotic preop for prevention of preop infection   has urine culture done Sept 14,2018 negative result Left percutaneous nephrostolithotomy  antegrade ureteroscopy laser lithotripsy ,change nephrostomy tubes   PSt instruction given with 3 days antibacterial for prevention of post op infection   CBC/BMP and type and screen drawn sent has result of urine culture Sept 14,2018 negative result  To continue taking antibiotic given for prevention of urine infection preop

## 2018-09-26 NOTE — H&P PST ADULT - BACK COMMENTS
bilateral nephrostomy clamped with dressing in place mild redness noted  benign site no drainage no foul smell noted

## 2018-09-26 NOTE — H&P PST ADULT - PROBLEM SELECTOR PLAN 2
OR notified to use MEGHA precautions To continue taking blood pressure medication regularly and on DOS

## 2018-09-26 NOTE — H&P PST ADULT - PMH
Acquired lymphedema of leg    Anemia  s/p 10 units of blood [facility] 7/18, on iron now   due to GI bleeding stable now  Calculus of kidney and ureter    Diabetes mellitus type II    Duodenal ulcer    DVT (deep venous thrombosis)  7/18 ivc filter inserted  Gastritis    GI bleed  recently transfused 10 units  H/O sleep apnea  no CPAP used, to follow up ASAP    had sleep study done no pending result  HTN (hypertension)    Hx of peripheral neuropathy    Morbid obesity    OA (osteoarthritis)  right hip  Respiratory failure, post-operative  post endoscopy [facility] 7/18  Ventricular arrhythmia  post endoscopy  echo done EF 60%

## 2018-09-26 NOTE — H&P PST ADULT - MUSCULOSKELETAL
details… detailed exam decreased ROM due to pain/walks with walker / uses wheelchair for long distance walking

## 2018-09-26 NOTE — H&P PST ADULT - PSH
H/O nephrostomy  bilateral  May 2018 clamped  JUNE and had ureteral stent  Hx of tonsillectomy  at 6 years old  Presence of IVC filter  7/18  S/P endoscopy    Ureteral stents placement, bilateral

## 2018-09-26 NOTE — H&P PST ADULT - HISTORY OF PRESENT ILLNESS
59 yo male hx of Morbid obesity, HTN, MEGHA,  Bilateral lower extremity lymphedema T2DM, had recent fall complicated with SMITH and kidney stone, Bilateral nephrostomy tubes inserted, GI bleed requiring 10 units of blood,  Endoscopy with post procedure respiratory failure and ventricular tachycardia, DVT with recent IVC filter inserted [facility], now for treatment of kidney stone. Pt arrived today in wheelchair stating his hospital stay was lenghty and resulted in almost a 100lb wt loss. He appears well, but with limited mobility and is accompanied by his wife.  He c/o increased frequency with urination but denies dysuria.      *** was canceled Aug.  31,2018 previous surgery schedule  due to episode of UTI was treated and latest urine culture was negative and now rescheduled this procedure for treatment ,taking prophylaxis antibiotics preop 57 yo male hx of Morbid obesity, HTN, MEGHA,  Bilateral lower extremity lymphedema T2DM, had recent fall complicated with SMITH and kidney stone, Bilateral nephrostomy tubes inserted, GI bleed requiring 10 units of blood,  Endoscopy with post procedure respiratory failure and ventricular tachycardia, DVT with recent IVC filter inserted [facility], now for treatment of kidney stone. Pt arrived today in wheelchair  . He appears well, but with limited mobility and is accompanied by his wife.  He c/o increased frequency with urination but denies dysuria.      *** was canceled Aug.  31,2018 previous surgery schedule  due to episode of UTI was treated and latest urine culture was negative and now rescheduled this procedure for treatment ,taking prophylaxis antibiotics preop

## 2018-09-26 NOTE — H&P PST ADULT - GENITOURINARY COMMENTS
hx kidney stones had nephrostomy tube bilateral had abnormal creatinine  clamped and had stent implant as per wife see HPI above

## 2018-09-27 PROBLEM — D64.9 ANEMIA, UNSPECIFIED: Chronic | Status: ACTIVE | Noted: 2018-08-16

## 2018-10-01 ENCOUNTER — TRANSCRIPTION ENCOUNTER (OUTPATIENT)
Age: 58
End: 2018-10-01

## 2018-10-02 ENCOUNTER — INPATIENT (INPATIENT)
Facility: HOSPITAL | Age: 58
LOS: 1 days | Discharge: HOME HEALTH | DRG: 660 | End: 2018-10-04
Attending: UROLOGY | Admitting: UROLOGY
Payer: COMMERCIAL

## 2018-10-02 ENCOUNTER — RESULT REVIEW (OUTPATIENT)
Age: 58
End: 2018-10-02

## 2018-10-02 ENCOUNTER — APPOINTMENT (OUTPATIENT)
Dept: UROLOGY | Facility: HOSPITAL | Age: 58
End: 2018-10-02

## 2018-10-02 VITALS — OXYGEN SATURATION: 97 % | HEART RATE: 83 BPM | RESPIRATION RATE: 18 BRPM | TEMPERATURE: 99 F

## 2018-10-02 DIAGNOSIS — Z95.828 PRESENCE OF OTHER VASCULAR IMPLANTS AND GRAFTS: Chronic | ICD-10-CM

## 2018-10-02 DIAGNOSIS — N20.0 CALCULUS OF KIDNEY: ICD-10-CM

## 2018-10-02 DIAGNOSIS — Z98.890 OTHER SPECIFIED POSTPROCEDURAL STATES: Chronic | ICD-10-CM

## 2018-10-02 DIAGNOSIS — Z93.6 OTHER ARTIFICIAL OPENINGS OF URINARY TRACT STATUS: Chronic | ICD-10-CM

## 2018-10-02 LAB
ANION GAP SERPL CALC-SCNC: 11 MMOL/L — SIGNIFICANT CHANGE UP (ref 5–17)
BASOPHILS # BLD AUTO: 0 K/UL — SIGNIFICANT CHANGE UP (ref 0–0.2)
BASOPHILS NFR BLD AUTO: 0.6 % — SIGNIFICANT CHANGE UP (ref 0–2)
BLD GP AB SCN SERPL QL: NEGATIVE — SIGNIFICANT CHANGE UP
BUN SERPL-MCNC: 28 MG/DL — HIGH (ref 7–23)
CALCIUM SERPL-MCNC: 8.7 MG/DL — SIGNIFICANT CHANGE UP (ref 8.4–10.5)
CHLORIDE SERPL-SCNC: 105 MMOL/L — SIGNIFICANT CHANGE UP (ref 96–108)
CO2 SERPL-SCNC: 23 MMOL/L — SIGNIFICANT CHANGE UP (ref 22–31)
CREAT SERPL-MCNC: 1.63 MG/DL — HIGH (ref 0.5–1.3)
EOSINOPHIL # BLD AUTO: 0.2 K/UL — SIGNIFICANT CHANGE UP (ref 0–0.5)
EOSINOPHIL NFR BLD AUTO: 2.6 % — SIGNIFICANT CHANGE UP (ref 0–6)
GLUCOSE BLDC GLUCOMTR-MCNC: 101 MG/DL — HIGH (ref 70–99)
GLUCOSE BLDC GLUCOMTR-MCNC: 97 MG/DL — SIGNIFICANT CHANGE UP (ref 70–99)
GLUCOSE SERPL-MCNC: 103 MG/DL — HIGH (ref 70–99)
HCT VFR BLD CALC: 35.9 % — LOW (ref 39–50)
HGB BLD-MCNC: 11.3 G/DL — LOW (ref 13–17)
LYMPHOCYTES # BLD AUTO: 1.5 K/UL — SIGNIFICANT CHANGE UP (ref 1–3.3)
LYMPHOCYTES # BLD AUTO: 21.5 % — SIGNIFICANT CHANGE UP (ref 13–44)
MCHC RBC-ENTMCNC: 26.5 PG — LOW (ref 27–34)
MCHC RBC-ENTMCNC: 31.3 GM/DL — LOW (ref 32–36)
MCV RBC AUTO: 84.7 FL — SIGNIFICANT CHANGE UP (ref 80–100)
MONOCYTES # BLD AUTO: 0.5 K/UL — SIGNIFICANT CHANGE UP (ref 0–0.9)
MONOCYTES NFR BLD AUTO: 7.4 % — SIGNIFICANT CHANGE UP (ref 2–14)
NEUTROPHILS # BLD AUTO: 4.8 K/UL — SIGNIFICANT CHANGE UP (ref 1.8–7.4)
NEUTROPHILS NFR BLD AUTO: 67.9 % — SIGNIFICANT CHANGE UP (ref 43–77)
PLATELET # BLD AUTO: 248 K/UL — SIGNIFICANT CHANGE UP (ref 150–400)
POTASSIUM SERPL-MCNC: 4.2 MMOL/L — SIGNIFICANT CHANGE UP (ref 3.5–5.3)
POTASSIUM SERPL-SCNC: 4.2 MMOL/L — SIGNIFICANT CHANGE UP (ref 3.5–5.3)
RBC # BLD: 4.24 M/UL — SIGNIFICANT CHANGE UP (ref 4.2–5.8)
RBC # FLD: 16.3 % — HIGH (ref 10.3–14.5)
RH IG SCN BLD-IMP: POSITIVE — SIGNIFICANT CHANGE UP
SODIUM SERPL-SCNC: 139 MMOL/L — SIGNIFICANT CHANGE UP (ref 135–145)
WBC # BLD: 7 K/UL — SIGNIFICANT CHANGE UP (ref 3.8–10.5)
WBC # FLD AUTO: 7 K/UL — SIGNIFICANT CHANGE UP (ref 3.8–10.5)

## 2018-10-02 PROCEDURE — 85027 COMPLETE CBC AUTOMATED: CPT

## 2018-10-02 PROCEDURE — 86901 BLOOD TYPING SEROLOGIC RH(D): CPT

## 2018-10-02 PROCEDURE — 76000 FLUOROSCOPY <1 HR PHYS/QHP: CPT

## 2018-10-02 PROCEDURE — 86850 RBC ANTIBODY SCREEN: CPT

## 2018-10-02 PROCEDURE — G0463: CPT

## 2018-10-02 PROCEDURE — 80048 BASIC METABOLIC PNL TOTAL CA: CPT

## 2018-10-02 PROCEDURE — 86900 BLOOD TYPING SEROLOGIC ABO: CPT

## 2018-10-02 PROCEDURE — 88300 SURGICAL PATH GROSS: CPT | Mod: 26

## 2018-10-02 PROCEDURE — 71045 X-RAY EXAM CHEST 1 VIEW: CPT | Mod: 26

## 2018-10-02 RX ORDER — SODIUM CHLORIDE 9 MG/ML
3 INJECTION INTRAMUSCULAR; INTRAVENOUS; SUBCUTANEOUS EVERY 8 HOURS
Qty: 0 | Refills: 0 | Status: DISCONTINUED | OUTPATIENT
Start: 2018-10-02 | End: 2018-10-02

## 2018-10-02 RX ORDER — OXYCODONE AND ACETAMINOPHEN 5; 325 MG/1; MG/1
1 TABLET ORAL EVERY 4 HOURS
Qty: 0 | Refills: 0 | Status: DISCONTINUED | OUTPATIENT
Start: 2018-10-02 | End: 2018-10-04

## 2018-10-02 RX ORDER — HYDROMORPHONE HYDROCHLORIDE 2 MG/ML
0.5 INJECTION INTRAMUSCULAR; INTRAVENOUS; SUBCUTANEOUS
Qty: 0 | Refills: 0 | Status: DISCONTINUED | OUTPATIENT
Start: 2018-10-02 | End: 2018-10-02

## 2018-10-02 RX ORDER — LABETALOL HCL 100 MG
10 TABLET ORAL ONCE
Qty: 0 | Refills: 0 | Status: COMPLETED | OUTPATIENT
Start: 2018-10-02 | End: 2018-10-02

## 2018-10-02 RX ORDER — GLUCAGON INJECTION, SOLUTION 0.5 MG/.1ML
1 INJECTION, SOLUTION SUBCUTANEOUS ONCE
Qty: 0 | Refills: 0 | Status: DISCONTINUED | OUTPATIENT
Start: 2018-10-02 | End: 2018-10-04

## 2018-10-02 RX ORDER — PANTOPRAZOLE SODIUM 20 MG/1
40 TABLET, DELAYED RELEASE ORAL
Qty: 0 | Refills: 0 | Status: DISCONTINUED | OUTPATIENT
Start: 2018-10-02 | End: 2018-10-04

## 2018-10-02 RX ORDER — AMLODIPINE BESYLATE 2.5 MG/1
10 TABLET ORAL DAILY
Qty: 0 | Refills: 0 | Status: DISCONTINUED | OUTPATIENT
Start: 2018-10-02 | End: 2018-10-04

## 2018-10-02 RX ORDER — DEXTROSE 50 % IN WATER 50 %
12.5 SYRINGE (ML) INTRAVENOUS ONCE
Qty: 0 | Refills: 0 | Status: DISCONTINUED | OUTPATIENT
Start: 2018-10-02 | End: 2018-10-04

## 2018-10-02 RX ORDER — INSULIN LISPRO 100/ML
VIAL (ML) SUBCUTANEOUS AT BEDTIME
Qty: 0 | Refills: 0 | Status: DISCONTINUED | OUTPATIENT
Start: 2018-10-02 | End: 2018-10-04

## 2018-10-02 RX ORDER — LIDOCAINE HCL 20 MG/ML
0.2 VIAL (ML) INJECTION ONCE
Qty: 0 | Refills: 0 | Status: DISCONTINUED | OUTPATIENT
Start: 2018-10-02 | End: 2018-10-02

## 2018-10-02 RX ORDER — DEXTROSE 50 % IN WATER 50 %
15 SYRINGE (ML) INTRAVENOUS ONCE
Qty: 0 | Refills: 0 | Status: DISCONTINUED | OUTPATIENT
Start: 2018-10-02 | End: 2018-10-04

## 2018-10-02 RX ORDER — METOPROLOL TARTRATE 50 MG
25 TABLET ORAL DAILY
Qty: 0 | Refills: 0 | Status: DISCONTINUED | OUTPATIENT
Start: 2018-10-02 | End: 2018-10-04

## 2018-10-02 RX ORDER — PIPERACILLIN AND TAZOBACTAM 4; .5 G/20ML; G/20ML
3.38 INJECTION, POWDER, LYOPHILIZED, FOR SOLUTION INTRAVENOUS EVERY 8 HOURS
Qty: 0 | Refills: 0 | Status: DISCONTINUED | OUTPATIENT
Start: 2018-10-02 | End: 2018-10-04

## 2018-10-02 RX ORDER — HYDROMORPHONE HYDROCHLORIDE 2 MG/ML
1 INJECTION INTRAMUSCULAR; INTRAVENOUS; SUBCUTANEOUS
Qty: 0 | Refills: 0 | Status: DISCONTINUED | OUTPATIENT
Start: 2018-10-02 | End: 2018-10-02

## 2018-10-02 RX ORDER — HEPARIN SODIUM 5000 [USP'U]/ML
5000 INJECTION INTRAVENOUS; SUBCUTANEOUS EVERY 8 HOURS
Qty: 0 | Refills: 0 | Status: DISCONTINUED | OUTPATIENT
Start: 2018-10-02 | End: 2018-10-04

## 2018-10-02 RX ORDER — DEXTROSE 50 % IN WATER 50 %
25 SYRINGE (ML) INTRAVENOUS ONCE
Qty: 0 | Refills: 0 | Status: DISCONTINUED | OUTPATIENT
Start: 2018-10-02 | End: 2018-10-04

## 2018-10-02 RX ORDER — CEFAZOLIN SODIUM 1 G
3000 VIAL (EA) INJECTION ONCE
Qty: 0 | Refills: 0 | Status: DISCONTINUED | OUTPATIENT
Start: 2018-10-02 | End: 2018-10-02

## 2018-10-02 RX ORDER — SODIUM CHLORIDE 9 MG/ML
1000 INJECTION, SOLUTION INTRAVENOUS
Qty: 0 | Refills: 0 | Status: DISCONTINUED | OUTPATIENT
Start: 2018-10-02 | End: 2018-10-04

## 2018-10-02 RX ORDER — FERROUS SULFATE 325(65) MG
325 TABLET ORAL DAILY
Qty: 0 | Refills: 0 | Status: DISCONTINUED | OUTPATIENT
Start: 2018-10-02 | End: 2018-10-04

## 2018-10-02 RX ORDER — INSULIN LISPRO 100/ML
VIAL (ML) SUBCUTANEOUS
Qty: 0 | Refills: 0 | Status: DISCONTINUED | OUTPATIENT
Start: 2018-10-02 | End: 2018-10-04

## 2018-10-02 RX ORDER — SENNA PLUS 8.6 MG/1
2 TABLET ORAL AT BEDTIME
Qty: 0 | Refills: 0 | Status: DISCONTINUED | OUTPATIENT
Start: 2018-10-02 | End: 2018-10-04

## 2018-10-02 RX ORDER — FOLIC ACID 0.8 MG
1 TABLET ORAL DAILY
Qty: 0 | Refills: 0 | Status: DISCONTINUED | OUTPATIENT
Start: 2018-10-02 | End: 2018-10-04

## 2018-10-02 RX ORDER — ONDANSETRON 8 MG/1
4 TABLET, FILM COATED ORAL ONCE
Qty: 0 | Refills: 0 | Status: DISCONTINUED | OUTPATIENT
Start: 2018-10-02 | End: 2018-10-02

## 2018-10-02 RX ORDER — OXYCODONE AND ACETAMINOPHEN 5; 325 MG/1; MG/1
2 TABLET ORAL EVERY 6 HOURS
Qty: 0 | Refills: 0 | Status: DISCONTINUED | OUTPATIENT
Start: 2018-10-02 | End: 2018-10-04

## 2018-10-02 RX ORDER — TAMSULOSIN HYDROCHLORIDE 0.4 MG/1
0.4 CAPSULE ORAL AT BEDTIME
Qty: 0 | Refills: 0 | Status: DISCONTINUED | OUTPATIENT
Start: 2018-10-02 | End: 2018-10-04

## 2018-10-02 RX ORDER — SODIUM CHLORIDE 9 MG/ML
1000 INJECTION, SOLUTION INTRAVENOUS
Qty: 0 | Refills: 0 | Status: DISCONTINUED | OUTPATIENT
Start: 2018-10-02 | End: 2018-10-03

## 2018-10-02 RX ORDER — NYSTATIN CREAM 100000 [USP'U]/G
1 CREAM TOPICAL
Qty: 0 | Refills: 0 | Status: DISCONTINUED | OUTPATIENT
Start: 2018-10-02 | End: 2018-10-04

## 2018-10-02 RX ORDER — ACETAMINOPHEN 500 MG
1000 TABLET ORAL ONCE
Qty: 0 | Refills: 0 | Status: COMPLETED | OUTPATIENT
Start: 2018-10-02 | End: 2018-10-02

## 2018-10-02 RX ADMIN — SODIUM CHLORIDE 125 MILLILITER(S): 9 INJECTION, SOLUTION INTRAVENOUS at 17:22

## 2018-10-02 RX ADMIN — HEPARIN SODIUM 5000 UNIT(S): 5000 INJECTION INTRAVENOUS; SUBCUTANEOUS at 14:54

## 2018-10-02 RX ADMIN — HEPARIN SODIUM 5000 UNIT(S): 5000 INJECTION INTRAVENOUS; SUBCUTANEOUS at 22:21

## 2018-10-02 RX ADMIN — PIPERACILLIN AND TAZOBACTAM 25 GRAM(S): 4; .5 INJECTION, POWDER, LYOPHILIZED, FOR SOLUTION INTRAVENOUS at 22:21

## 2018-10-02 RX ADMIN — Medication 10 MILLIGRAM(S): at 16:08

## 2018-10-02 RX ADMIN — TAMSULOSIN HYDROCHLORIDE 0.4 MILLIGRAM(S): 0.4 CAPSULE ORAL at 22:21

## 2018-10-02 RX ADMIN — Medication 1000 MILLIGRAM(S): at 18:00

## 2018-10-02 RX ADMIN — SODIUM CHLORIDE 3 MILLILITER(S): 9 INJECTION INTRAMUSCULAR; INTRAVENOUS; SUBCUTANEOUS at 08:27

## 2018-10-02 RX ADMIN — Medication 400 MILLIGRAM(S): at 17:10

## 2018-10-02 RX ADMIN — PIPERACILLIN AND TAZOBACTAM 25 GRAM(S): 4; .5 INJECTION, POWDER, LYOPHILIZED, FOR SOLUTION INTRAVENOUS at 14:54

## 2018-10-02 RX ADMIN — NYSTATIN CREAM 1 APPLICATION(S): 100000 CREAM TOPICAL at 22:21

## 2018-10-02 NOTE — BRIEF OPERATIVE NOTE - PROCEDURE
<<-----Click on this checkbox to enter Procedure Percutaneous nephrolithotomy  10/02/2018  right, with antegrade ureteroscopy, nephrostomy tube exchange  Active  VVASSTONE  Antegrade ureteroscopy  10/02/2018  left, with antegrade nephroscopy, exchange of nephroureteral catheter  Active  VVASUDEVAN

## 2018-10-02 NOTE — PROGRESS NOTE ADULT - SUBJECTIVE AND OBJECTIVE BOX
Post op Check    Pt seen and examined without complaints. Pain is controlled. Denies SOB/CP/N/V.     Vital Signs Last 24 Hrs  T(C): 36.3 (02 Oct 2018 13:05), Max: 37.4 (02 Oct 2018 08:24)  T(F): 97.3 (02 Oct 2018 13:05), Max: 99.3 (02 Oct 2018 08:24)  HR: 62 (02 Oct 2018 16:15) (53 - 83)  BP: 162/80 (02 Oct 2018 16:15) (138/75 - 177/90)  BP(mean): 113 (02 Oct 2018 16:15) (101 - 121)  RR: 17 (02 Oct 2018 16:15) (17 - 18)  SpO2: 93% (02 Oct 2018 16:15) (89% - 100%)    I&O's Summary    02 Oct 2018 07:01  -  02 Oct 2018 17:01  --------------------------------------------------------  IN: 400 mL / OUT: 615 mL / NET: -215 mL    I&O's Detail    02 Oct 2018 07:01  -  02 Oct 2018 17:01  --------------------------------------------------------  IN:    IV PiggyBack: 25 mL    lactated ringers.: 375 mL  Total IN: 400 mL    OUT:    Indwelling Catheter - Urethral: 100 mL    Nephrostomy Tube: 15 mL    Nephrostomy Tube: 500 mL  Total OUT: 615 mL    Total NET: -215 mL          Physical Exam  Gen: NAD  Pulm: No respiratory distress, no subcostal retractions  CV: RRR, no JVD  Abd: Soft, NT, ND  Back: L NT dressing CDI, draining pink urine, R NT dressing CDI, draining maroon colored urine  : Bae draining light pink urine                           11.3   7.0   )-----------( 248      ( 02 Oct 2018 14:00 )             35.9       10-02    139  |  105  |  28<H>  ----------------------------<  103<H>  4.2   |  23  |  1.63<H>    Ca    8.7      02 Oct 2018 14:00    Chest XR prelim neg     A/P: 58y Male s/p Bilateral PCNL   DVT prophylaxis/OOB  Incentive spirometry  Strict I&O's  Analgesia and antiemetics as needed  Diet- regular   AM labs  AM CT  AM TOV

## 2018-10-02 NOTE — BRIEF OPERATIVE NOTE - OPERATION/FINDINGS
see dictated report  RIGHT SIDE:  1 stick: 1 dilation: EBL < 50 cc  Dilation:  2 minutes 29 seconds  Primary: Lluvia  Assist: None  Details: Performed over a sensor wire with Amplatz dilators    LEFT SIDE:  0 sticks: 1 dilation: EBL< 10cc  Dilation details:  Primary: Leonor  Assist: Lluvia  Details: Complex dilation performed using a Navigator access sheath initially, then Amplatz dilators up to 20 Fr after which the navigator was replaced.  All performed over a sensor wire.

## 2018-10-03 LAB
ANION GAP SERPL CALC-SCNC: 9 MMOL/L — SIGNIFICANT CHANGE UP (ref 5–17)
BASOPHILS # BLD AUTO: 0.1 K/UL — SIGNIFICANT CHANGE UP (ref 0–0.2)
BASOPHILS NFR BLD AUTO: 0.5 % — SIGNIFICANT CHANGE UP (ref 0–2)
BUN SERPL-MCNC: 24 MG/DL — HIGH (ref 7–23)
CALCIUM SERPL-MCNC: 8.4 MG/DL — SIGNIFICANT CHANGE UP (ref 8.4–10.5)
CHLORIDE SERPL-SCNC: 102 MMOL/L — SIGNIFICANT CHANGE UP (ref 96–108)
CO2 SERPL-SCNC: 26 MMOL/L — SIGNIFICANT CHANGE UP (ref 22–31)
CREAT SERPL-MCNC: 1.78 MG/DL — HIGH (ref 0.5–1.3)
EOSINOPHIL # BLD AUTO: 0.1 K/UL — SIGNIFICANT CHANGE UP (ref 0–0.5)
EOSINOPHIL NFR BLD AUTO: 0.5 % — SIGNIFICANT CHANGE UP (ref 0–6)
GLUCOSE BLDC GLUCOMTR-MCNC: 109 MG/DL — HIGH (ref 70–99)
GLUCOSE BLDC GLUCOMTR-MCNC: 111 MG/DL — HIGH (ref 70–99)
GLUCOSE BLDC GLUCOMTR-MCNC: 113 MG/DL — HIGH (ref 70–99)
GLUCOSE BLDC GLUCOMTR-MCNC: 142 MG/DL — HIGH (ref 70–99)
GLUCOSE SERPL-MCNC: 115 MG/DL — HIGH (ref 70–99)
HCT VFR BLD CALC: 33.5 % — LOW (ref 39–50)
HGB BLD-MCNC: 10.8 G/DL — LOW (ref 13–17)
LYMPHOCYTES # BLD AUTO: 1 K/UL — SIGNIFICANT CHANGE UP (ref 1–3.3)
LYMPHOCYTES # BLD AUTO: 8 % — LOW (ref 13–44)
MCHC RBC-ENTMCNC: 27.3 PG — SIGNIFICANT CHANGE UP (ref 27–34)
MCHC RBC-ENTMCNC: 32.4 GM/DL — SIGNIFICANT CHANGE UP (ref 32–36)
MCV RBC AUTO: 84.2 FL — SIGNIFICANT CHANGE UP (ref 80–100)
MONOCYTES # BLD AUTO: 0.9 K/UL — SIGNIFICANT CHANGE UP (ref 0–0.9)
MONOCYTES NFR BLD AUTO: 7.3 % — SIGNIFICANT CHANGE UP (ref 2–14)
NEUTROPHILS # BLD AUTO: 10.7 K/UL — HIGH (ref 1.8–7.4)
NEUTROPHILS NFR BLD AUTO: 83.7 % — HIGH (ref 43–77)
PLAT MORPH BLD: NORMAL — SIGNIFICANT CHANGE UP
PLATELET # BLD AUTO: 235 K/UL — SIGNIFICANT CHANGE UP (ref 150–400)
POTASSIUM SERPL-MCNC: 4.7 MMOL/L — SIGNIFICANT CHANGE UP (ref 3.5–5.3)
POTASSIUM SERPL-SCNC: 4.7 MMOL/L — SIGNIFICANT CHANGE UP (ref 3.5–5.3)
RBC # BLD: 3.97 M/UL — LOW (ref 4.2–5.8)
RBC # FLD: 16.1 % — HIGH (ref 10.3–14.5)
RBC BLD AUTO: SIGNIFICANT CHANGE UP
SODIUM SERPL-SCNC: 137 MMOL/L — SIGNIFICANT CHANGE UP (ref 135–145)
WBC # BLD: 12.8 K/UL — HIGH (ref 3.8–10.5)
WBC # FLD AUTO: 12.8 K/UL — HIGH (ref 3.8–10.5)

## 2018-10-03 PROCEDURE — 74176 CT ABD & PELVIS W/O CONTRAST: CPT | Mod: 26

## 2018-10-03 RX ORDER — INFLUENZA VIRUS VACCINE 15; 15; 15; 15 UG/.5ML; UG/.5ML; UG/.5ML; UG/.5ML
0.5 SUSPENSION INTRAMUSCULAR ONCE
Qty: 0 | Refills: 0 | Status: COMPLETED | OUTPATIENT
Start: 2018-10-03 | End: 2018-10-04

## 2018-10-03 RX ADMIN — HEPARIN SODIUM 5000 UNIT(S): 5000 INJECTION INTRAVENOUS; SUBCUTANEOUS at 05:54

## 2018-10-03 RX ADMIN — Medication 1 MILLIGRAM(S): at 13:48

## 2018-10-03 RX ADMIN — Medication 325 MILLIGRAM(S): at 13:47

## 2018-10-03 RX ADMIN — PIPERACILLIN AND TAZOBACTAM 25 GRAM(S): 4; .5 INJECTION, POWDER, LYOPHILIZED, FOR SOLUTION INTRAVENOUS at 21:46

## 2018-10-03 RX ADMIN — OXYCODONE AND ACETAMINOPHEN 2 TABLET(S): 5; 325 TABLET ORAL at 01:26

## 2018-10-03 RX ADMIN — Medication 1 TABLET(S): at 13:47

## 2018-10-03 RX ADMIN — OXYCODONE AND ACETAMINOPHEN 2 TABLET(S): 5; 325 TABLET ORAL at 02:26

## 2018-10-03 RX ADMIN — TAMSULOSIN HYDROCHLORIDE 0.4 MILLIGRAM(S): 0.4 CAPSULE ORAL at 21:39

## 2018-10-03 RX ADMIN — PANTOPRAZOLE SODIUM 40 MILLIGRAM(S): 20 TABLET, DELAYED RELEASE ORAL at 08:55

## 2018-10-03 RX ADMIN — SODIUM CHLORIDE 125 MILLILITER(S): 9 INJECTION, SOLUTION INTRAVENOUS at 08:56

## 2018-10-03 RX ADMIN — SODIUM CHLORIDE 125 MILLILITER(S): 9 INJECTION, SOLUTION INTRAVENOUS at 00:49

## 2018-10-03 RX ADMIN — OXYCODONE AND ACETAMINOPHEN 2 TABLET(S): 5; 325 TABLET ORAL at 18:35

## 2018-10-03 RX ADMIN — HEPARIN SODIUM 5000 UNIT(S): 5000 INJECTION INTRAVENOUS; SUBCUTANEOUS at 21:39

## 2018-10-03 RX ADMIN — PIPERACILLIN AND TAZOBACTAM 25 GRAM(S): 4; .5 INJECTION, POWDER, LYOPHILIZED, FOR SOLUTION INTRAVENOUS at 05:54

## 2018-10-03 RX ADMIN — PIPERACILLIN AND TAZOBACTAM 25 GRAM(S): 4; .5 INJECTION, POWDER, LYOPHILIZED, FOR SOLUTION INTRAVENOUS at 13:48

## 2018-10-03 RX ADMIN — AMLODIPINE BESYLATE 10 MILLIGRAM(S): 2.5 TABLET ORAL at 05:54

## 2018-10-03 RX ADMIN — HEPARIN SODIUM 5000 UNIT(S): 5000 INJECTION INTRAVENOUS; SUBCUTANEOUS at 13:48

## 2018-10-03 RX ADMIN — NYSTATIN CREAM 1 APPLICATION(S): 100000 CREAM TOPICAL at 18:37

## 2018-10-03 RX ADMIN — Medication 25 MILLIGRAM(S): at 05:53

## 2018-10-03 RX ADMIN — NYSTATIN CREAM 1 APPLICATION(S): 100000 CREAM TOPICAL at 05:54

## 2018-10-03 NOTE — PROVIDER CONTACT NOTE (OTHER) - ASSESSMENT
pt with b/l nephrostomy sites, R tube d/c today & L tube remains intact, both with large amount of drainage. pt did not void by dtv time at 3 pm. pt denies discomfort or feeling like he has to void.

## 2018-10-03 NOTE — PROGRESS NOTE ADULT - SUBJECTIVE AND OBJECTIVE BOX
Subjective  complains of lower right back pains; bearable with medication otherwise well and without complaints   Objective    Vital signs  T(F): , Max: 99.3 (10-02-18 @ 08:24)  HR: 98 (10-03-18 @ 05:46)  BP: 122/81 (10-03-18 @ 05:46)  SpO2: 98% (10-03-18 @ 05:46)  Wt(kg): --    Output     10-02 @ 07:01  -  10-03 @ 06:46  --------------------------------------------------------  IN: 955 mL / OUT: 3925 mL / NET: -2970 mL        Gen awake alert nad axox3  Abd obese ntnd   Back bl tube in place no hematoma/ ecchymosis  urine concentrated dilute cherry  holman pink      Labs      10-02 @ 14:00    WBC 7.0   / Hct 35.9  / SCr 1.63

## 2018-10-03 NOTE — PROVIDER CONTACT NOTE (OTHER) - ACTION/TREATMENT ORDERED:
IDALIA Hicks made aware, will do random bladder scan to check residual & continue to monitor IDALIA Kirkpatrick made aware, will do random bladder scan to check residual & continue to monitor

## 2018-10-04 ENCOUNTER — TRANSCRIPTION ENCOUNTER (OUTPATIENT)
Age: 58
End: 2018-10-04

## 2018-10-04 LAB
COMPN STONE: SIGNIFICANT CHANGE UP
GLUCOSE BLDC GLUCOMTR-MCNC: 100 MG/DL — HIGH (ref 70–99)
GLUCOSE BLDC GLUCOMTR-MCNC: 113 MG/DL — HIGH (ref 70–99)
GLUCOSE BLDC GLUCOMTR-MCNC: 98 MG/DL — SIGNIFICANT CHANGE UP (ref 70–99)

## 2018-10-04 PROCEDURE — 90686 IIV4 VACC NO PRSV 0.5 ML IM: CPT

## 2018-10-04 PROCEDURE — 82365 CALCULUS SPECTROSCOPY: CPT

## 2018-10-04 PROCEDURE — C1729: CPT

## 2018-10-04 PROCEDURE — 86900 BLOOD TYPING SEROLOGIC ABO: CPT

## 2018-10-04 PROCEDURE — C1726: CPT

## 2018-10-04 PROCEDURE — 74176 CT ABD & PELVIS W/O CONTRAST: CPT

## 2018-10-04 PROCEDURE — 80048 BASIC METABOLIC PNL TOTAL CA: CPT

## 2018-10-04 PROCEDURE — C1758: CPT

## 2018-10-04 PROCEDURE — 71045 X-RAY EXAM CHEST 1 VIEW: CPT

## 2018-10-04 PROCEDURE — C1889: CPT

## 2018-10-04 PROCEDURE — 97161 PT EVAL LOW COMPLEX 20 MIN: CPT

## 2018-10-04 PROCEDURE — 85027 COMPLETE CBC AUTOMATED: CPT

## 2018-10-04 PROCEDURE — 86850 RBC ANTIBODY SCREEN: CPT

## 2018-10-04 PROCEDURE — 94660 CPAP INITIATION&MGMT: CPT

## 2018-10-04 PROCEDURE — 88300 SURGICAL PATH GROSS: CPT

## 2018-10-04 PROCEDURE — 86901 BLOOD TYPING SEROLOGIC RH(D): CPT

## 2018-10-04 PROCEDURE — C1769: CPT

## 2018-10-04 PROCEDURE — 82962 GLUCOSE BLOOD TEST: CPT

## 2018-10-04 RX ORDER — SENNA PLUS 8.6 MG/1
2 TABLET ORAL
Qty: 0 | Refills: 0 | DISCHARGE
Start: 2018-10-04

## 2018-10-04 RX ORDER — PANTOPRAZOLE SODIUM 20 MG/1
1 TABLET, DELAYED RELEASE ORAL
Qty: 0 | Refills: 0 | DISCHARGE
Start: 2018-10-04

## 2018-10-04 RX ORDER — ACETAMINOPHEN 500 MG
1 TABLET ORAL
Qty: 0 | Refills: 0 | COMMUNITY

## 2018-10-04 RX ORDER — OXYCODONE AND ACETAMINOPHEN 5; 325 MG/1; MG/1
1 TABLET ORAL
Qty: 20 | Refills: 0
Start: 2018-10-04

## 2018-10-04 RX ORDER — NITROFURANTOIN MACROCRYSTAL 50 MG
1 CAPSULE ORAL
Qty: 20 | Refills: 0
Start: 2018-10-04 | End: 2018-10-13

## 2018-10-04 RX ADMIN — AMLODIPINE BESYLATE 10 MILLIGRAM(S): 2.5 TABLET ORAL at 05:36

## 2018-10-04 RX ADMIN — NYSTATIN CREAM 1 APPLICATION(S): 100000 CREAM TOPICAL at 05:36

## 2018-10-04 RX ADMIN — OXYCODONE AND ACETAMINOPHEN 2 TABLET(S): 5; 325 TABLET ORAL at 15:25

## 2018-10-04 RX ADMIN — PIPERACILLIN AND TAZOBACTAM 25 GRAM(S): 4; .5 INJECTION, POWDER, LYOPHILIZED, FOR SOLUTION INTRAVENOUS at 05:36

## 2018-10-04 RX ADMIN — PIPERACILLIN AND TAZOBACTAM 25 GRAM(S): 4; .5 INJECTION, POWDER, LYOPHILIZED, FOR SOLUTION INTRAVENOUS at 12:52

## 2018-10-04 RX ADMIN — Medication 325 MILLIGRAM(S): at 12:52

## 2018-10-04 RX ADMIN — Medication 1 TABLET(S): at 12:52

## 2018-10-04 RX ADMIN — OXYCODONE AND ACETAMINOPHEN 2 TABLET(S): 5; 325 TABLET ORAL at 02:04

## 2018-10-04 RX ADMIN — Medication 25 MILLIGRAM(S): at 05:36

## 2018-10-04 RX ADMIN — Medication 1 MILLIGRAM(S): at 12:52

## 2018-10-04 RX ADMIN — OXYCODONE AND ACETAMINOPHEN 2 TABLET(S): 5; 325 TABLET ORAL at 01:34

## 2018-10-04 RX ADMIN — OXYCODONE AND ACETAMINOPHEN 2 TABLET(S): 5; 325 TABLET ORAL at 15:55

## 2018-10-04 RX ADMIN — HEPARIN SODIUM 5000 UNIT(S): 5000 INJECTION INTRAVENOUS; SUBCUTANEOUS at 12:52

## 2018-10-04 RX ADMIN — INFLUENZA VIRUS VACCINE 0.5 MILLILITER(S): 15; 15; 15; 15 SUSPENSION INTRAMUSCULAR at 18:35

## 2018-10-04 RX ADMIN — HEPARIN SODIUM 5000 UNIT(S): 5000 INJECTION INTRAVENOUS; SUBCUTANEOUS at 05:36

## 2018-10-04 RX ADMIN — PANTOPRAZOLE SODIUM 40 MILLIGRAM(S): 20 TABLET, DELAYED RELEASE ORAL at 05:36

## 2018-10-04 NOTE — PHYSICAL THERAPY INITIAL EVALUATION ADULT - GAIT DEVIATIONS NOTED, PT EVAL
decreased weight-shifting ability/decreased step length/Pt with slow gait pattern 2/2 pt reports of stiffness in R hip (pt reports is chronic in nature and generally improves with activity). Pt able to walk and talk throughout, no buckling or LOB. Pt with good understanding of RW sequencing.

## 2018-10-04 NOTE — PHYSICAL THERAPY INITIAL EVALUATION ADULT - ASR EQUIP NEEDS DISCH PT EVAL
none, pt states he may look into ordering shower chair in the future if it fits his shower stall, in order to protect R hip

## 2018-10-04 NOTE — DISCHARGE NOTE ADULT - PATIENT PORTAL LINK FT
You can access the [facility] Patient Portal, offered by [facility], by registering with the following website: [email or web address]

## 2018-10-04 NOTE — DISCHARGE NOTE ADULT - CARE PROVIDERS DIRECT ADDRESSES
[email or web address]

## 2018-10-04 NOTE — DISCHARGE NOTE ADULT - HOME CARE AGENCY
[facility] AT [place] - [phone] to start visit the day after discharge, for RN DAMION, Reinforce nephrostomy tube care, HOME PT , AIDE EVALUATION

## 2018-10-04 NOTE — DISCHARGE NOTE ADULT - HOSPITAL COURSE
57 y/o male with PMHx of  HTN, MEGHA, Bilateral lower extremity lymphedema, T2DM, kidney stones s/p b/l nephrostomy tubes under went a underwent b/l PCNL. Patient tolerated procedure well. Bae was removed postop day 1, pt voided. CT scan performed showed right intrarenal calculi measuring up to 0.8cm and NT in pararenal space therefore tube was removed. Stone burden resolved on left. Pt tolerated clamp trial on left.   Upon discharge pt remained afebrile, voiding, tolerating diet, and pain well managed. Pt will follow up with Dr. Teixeira next week. 57 y/o male with PMHx of  HTN, MEGHA, Bilateral lower extremity lymphedema, T2DM, kidney stones s/p b/l nephrostomy tubes under went a underwent b/l PCNL. Patient tolerated procedure well. Bae was removed postop day 1, pt voided. CT scan performed showed right intrarenal calculi measuring up to 0.8cm and NT in pararenal space therefore tube was removed. Stone burden resolved on left. Pt tolerated clamp trial on left.   Upon discharge pt remained afebrile, voiding, tolerating diet, and pain well managed. Pt will follow up with Dr. Coon next week.

## 2018-10-04 NOTE — DISCHARGE NOTE ADULT - MEDICATION SUMMARY - MEDICATIONS TO TAKE
I will START or STAY ON the medications listed below when I get home from the hospital:    oxyCODONE-acetaminophen 5 mg-325 mg oral tablet  -- 1 tab(s) by mouth every 4 hours, As needed, Mild Pain (1 - 3) MDD:8  -- Indication: For pain medication     tamsulosin 0.4 mg oral capsule  -- 1 cap(s) by mouth once a day (at bedtime)  -- Indication: For prostate    metoprolol succinate 25 mg oral tablet, extended release  -- 1 tab(s) by mouth once a day  -- Indication: For blood pressure    amLODIPine 10 mg oral tablet  -- 1 tab(s) by mouth once a day  -- Indication: For blood pressure    nystatin 100,000 units/g topical powder  -- 1 application on skin 2 times a day  -- Indication: For topical powder    bacitracin 500 units/g topical ointment  -- Apply on skin to affected area 2 times a day nephrostomy tube  -- Indication: For topical antibiotic     ferrous sulfate 325 mg (65 mg elemental iron) oral tablet  -- 1 tab(s) by mouth 2 times a day   -- Indication: For iron     senna oral tablet  -- 2 tab(s) by mouth once a day (at bedtime), As needed, Constipation  -- Indication: For Stool softener     pantoprazole 40 mg oral delayed release tablet  -- 1 tab(s) by mouth once a day (before a meal)  -- Indication: For heartburn     Macrobid 100 mg oral capsule  -- 1 cap(s) by mouth 2 times a day   -- Finish all this medication unless otherwise directed by prescriber.  May discolor urine or feces.  Take with food or milk.    -- Indication: For antibiotic     Multiple Vitamins oral tablet  -- 1 tab(s) by mouth once a day  -- Indication: For vitamin     folic acid 1 mg oral tablet  -- 1 tab(s) by mouth once a day  -- Indication: For vitamin

## 2018-10-04 NOTE — PHYSICAL THERAPY INITIAL EVALUATION ADULT - ADDITIONAL COMMENTS
Pt states he lives in a ranch style house with spouse, son, and step daughter, with a ramp to enter home and a ramp to reach kitchen (first level set up inside). Pt states prior to admission being independent with all functional mobility and ADLs. Pt states he was ambulatory with a RW (at bedside) prior to admission. Pt states he has a grab bar in stall shower.

## 2018-10-04 NOTE — PHYSICAL THERAPY INITIAL EVALUATION ADULT - PERTINENT HX OF CURRENT PROBLEM, REHAB EVAL
57 yo male s/p bl PCNL 59 yo male with h/o morbid obesity and R hip OA now presents s/p bl PCNL. Pt states he was seeing a pain management specialist regarding R hip pain/stiffness, as pt states he cannot take certain medications 2/2 h/o GI bleed.

## 2018-10-04 NOTE — PHYSICAL THERAPY INITIAL EVALUATION ADULT - MANUAL MUSCLE TESTING RESULTS, REHAB EVAL
grossly assessed due to/BUE and LLE at least 3+/5 throughout; R hip and knee extension at least 3/5; R ankle at least 3+/5 throughout

## 2018-10-04 NOTE — DISCHARGE NOTE ADULT - PLAN OF CARE
Resolution of stone burden Call the office or go to the emergency room if you experience fever greater than 101, chills, pain not relieved by oral medication, inability to tolerate food or liquids or are unable to void. No heavy lifting greater than 10lbs, you may shower regularly but no baths, use stool softener to avoid straining and constipation.  Return to daily living activities slowly as tolerated. Maintain normal blood sugar Continue home regimen

## 2018-10-04 NOTE — DISCHARGE NOTE ADULT - CARE PLAN
Principal Discharge DX:	Kidney stone  Goal:	Resolution of stone burden  Assessment and plan of treatment:	Call the office or go to the emergency room if you experience fever greater than 101, chills, pain not relieved by oral medication, inability to tolerate food or liquids or are unable to void. No heavy lifting greater than 10lbs, you may shower regularly but no baths, use stool softener to avoid straining and constipation.  Return to daily living activities slowly as tolerated.  Secondary Diagnosis:	Controlled diabetes mellitus type 2 with complications, unspecified whether long term insulin use  Goal:	Maintain normal blood sugar  Assessment and plan of treatment:	Continue home regimen

## 2018-10-04 NOTE — PHYSICAL THERAPY INITIAL EVALUATION ADULT - DID THE PATIENT HAVE SURGERY?
yes/Percutaneous Nephrolithotomy, right, with antegrade ureteroscopy, nephrostomy tube exchange. Antegrade ureteroscopy, left, with antegrade nephroscopy, exchange of nephroureteral catheter

## 2018-10-04 NOTE — PHYSICAL THERAPY INITIAL EVALUATION ADULT - GENERAL OBSERVATIONS, REHAB EVAL
Pt malathi 45 min eval well. Pt agreed to session. Pt rec'd in conor chair, +nephrostomy tube, and NAD. Pt h/o morbid obesity and R hip OA. Pt states he has been ambulating to/from bathroom independently.

## 2018-10-04 NOTE — PROGRESS NOTE ADULT - SUBJECTIVE AND OBJECTIVE BOX
Subjective  feeling well without complaints  right nt yesterday   Objective    Vital signs  T(F): , Max: 99 (10-03-18 @ 10:57)  HR: 80 (10-04-18 @ 05:10)  BP: 141/75 (10-04-18 @ 05:03)  SpO2: 98% (10-04-18 @ 05:10)  Wt(kg): --    Output     10-03 @ 07:01  -  10-04 @ 07:00  --------------------------------------------------------  IN: 2820 mL / OUT: 4695 mL / NET: -1875 mL        Gen awake alert nad axox3  Abd obese soft ntnd   Back right flank c/d/i no uop  left flank nt in place c/d/i no hematoma or ecchymosis urine mark anthony             10-03 @ 08:09    WBC 12.8  / Hct 33.5  / SCr 1.78     10-02 @ 14:00    WBC 7.0   / Hct 35.9  / SCr 1.63

## 2018-10-05 VITALS
TEMPERATURE: 98 F | DIASTOLIC BLOOD PRESSURE: 77 MMHG | SYSTOLIC BLOOD PRESSURE: 150 MMHG | HEART RATE: 70 BPM | RESPIRATION RATE: 18 BRPM | OXYGEN SATURATION: 99 %

## 2018-10-10 ENCOUNTER — APPOINTMENT (OUTPATIENT)
Dept: UROLOGY | Facility: CLINIC | Age: 58
End: 2018-10-10
Payer: COMMERCIAL

## 2018-10-10 DIAGNOSIS — N20.1 CALCULUS OF URETER: ICD-10-CM

## 2018-10-10 PROCEDURE — 99024 POSTOP FOLLOW-UP VISIT: CPT

## 2018-10-11 LAB — SURGICAL PATHOLOGY STUDY: SIGNIFICANT CHANGE UP

## 2018-10-30 NOTE — DIETITIAN INITIAL EVALUATION ADULT. - 25 CAL
Gastrointestinal Colonoscopy/Flexible Sigmoidoscopy  Lower Exam Discharge Instructions      1. Call your doctor for any problems or questions. 2. Contact the doctors office for follow up appointment as directed  3. Medication may cause drowsiness for several hours, therefore, do not drive or operate machinery for remainder of the day. 4. No alcohol today. 5. Ordinarily, you may resume regular diet and activity after exam unless otherwise specified by your physician. 6. Because of air put into your colon during exam, you may experience some abdominal distension, relieved by the passage of gas, for several hours. 7. Contact your physician if you have any of the following:  a. Excessive amount of bleeding - large amount when having a bowel movement. Occasional specks of blood with bowel movement would not be unusual.  b. Severe abdominal pain  c.  Fever or Chills 1995

## 2018-11-12 ENCOUNTER — APPOINTMENT (OUTPATIENT)
Dept: UROLOGY | Facility: CLINIC | Age: 58
End: 2018-11-12
Payer: COMMERCIAL

## 2018-11-12 PROCEDURE — 99213 OFFICE O/P EST LOW 20 MIN: CPT | Mod: 24

## 2019-01-25 ENCOUNTER — APPOINTMENT (OUTPATIENT)
Dept: SURGERY | Facility: CLINIC | Age: 59
End: 2019-01-25

## 2019-01-28 ENCOUNTER — RESULT REVIEW (OUTPATIENT)
Age: 59
End: 2019-01-28

## 2019-01-28 ENCOUNTER — OUTPATIENT (OUTPATIENT)
Dept: OUTPATIENT SERVICES | Facility: HOSPITAL | Age: 59
LOS: 1 days | End: 2019-01-28
Payer: COMMERCIAL

## 2019-01-28 DIAGNOSIS — Z12.11 ENCOUNTER FOR SCREENING FOR MALIGNANT NEOPLASM OF COLON: ICD-10-CM

## 2019-01-28 DIAGNOSIS — Z93.6 OTHER ARTIFICIAL OPENINGS OF URINARY TRACT STATUS: Chronic | ICD-10-CM

## 2019-01-28 DIAGNOSIS — Z95.828 PRESENCE OF OTHER VASCULAR IMPLANTS AND GRAFTS: Chronic | ICD-10-CM

## 2019-01-28 DIAGNOSIS — Z98.890 OTHER SPECIFIED POSTPROCEDURAL STATES: Chronic | ICD-10-CM

## 2019-01-28 DIAGNOSIS — K26.0 ACUTE DUODENAL ULCER WITH HEMORRHAGE: ICD-10-CM

## 2019-01-28 PROCEDURE — 45380 COLONOSCOPY AND BIOPSY: CPT | Mod: PT,XS

## 2019-01-28 PROCEDURE — 88313 SPECIAL STAINS GROUP 2: CPT

## 2019-01-28 PROCEDURE — 88305 TISSUE EXAM BY PATHOLOGIST: CPT

## 2019-01-28 PROCEDURE — C1889: CPT

## 2019-01-28 PROCEDURE — 45385 COLONOSCOPY W/LESION REMOVAL: CPT | Mod: PT

## 2019-01-28 PROCEDURE — 88312 SPECIAL STAINS GROUP 1: CPT

## 2019-01-28 PROCEDURE — 88305 TISSUE EXAM BY PATHOLOGIST: CPT | Mod: 26

## 2019-01-28 PROCEDURE — 43239 EGD BIOPSY SINGLE/MULTIPLE: CPT | Mod: XS

## 2019-01-28 PROCEDURE — 88312 SPECIAL STAINS GROUP 1: CPT | Mod: 26

## 2019-01-28 PROCEDURE — 88313 SPECIAL STAINS GROUP 2: CPT | Mod: 26

## 2019-01-28 PROCEDURE — 43251 EGD REMOVE LESION SNARE: CPT

## 2019-03-04 ENCOUNTER — APPOINTMENT (OUTPATIENT)
Dept: UROLOGY | Facility: CLINIC | Age: 59
End: 2019-03-04
Payer: COMMERCIAL

## 2019-03-04 ENCOUNTER — OUTPATIENT (OUTPATIENT)
Dept: OUTPATIENT SERVICES | Facility: HOSPITAL | Age: 59
LOS: 1 days | End: 2019-03-04
Payer: COMMERCIAL

## 2019-03-04 DIAGNOSIS — N13.8 BENIGN PROSTATIC HYPERPLASIA WITH LOWER URINARY TRACT SYMPMS: ICD-10-CM

## 2019-03-04 DIAGNOSIS — Z93.6 OTHER ARTIFICIAL OPENINGS OF URINARY TRACT STATUS: Chronic | ICD-10-CM

## 2019-03-04 DIAGNOSIS — N40.1 BENIGN PROSTATIC HYPERPLASIA WITH LOWER URINARY TRACT SYMPMS: ICD-10-CM

## 2019-03-04 DIAGNOSIS — Z98.890 OTHER SPECIFIED POSTPROCEDURAL STATES: Chronic | ICD-10-CM

## 2019-03-04 DIAGNOSIS — R35.0 FREQUENCY OF MICTURITION: ICD-10-CM

## 2019-03-04 DIAGNOSIS — Z95.828 PRESENCE OF OTHER VASCULAR IMPLANTS AND GRAFTS: Chronic | ICD-10-CM

## 2019-03-04 PROCEDURE — 99213 OFFICE O/P EST LOW 20 MIN: CPT | Mod: 25

## 2019-03-04 PROCEDURE — 76775 US EXAM ABDO BACK WALL LIM: CPT | Mod: 26

## 2019-03-04 PROCEDURE — 76775 US EXAM ABDO BACK WALL LIM: CPT

## 2019-03-04 NOTE — HISTORY OF PRESENT ILLNESS
[FreeTextEntry1] : 58 year old male presents for evaluation of test results.\par Pt is currently 398lbs and I've advised him to consider gastric bypass. \par He says he has lost 20 lbs recently. \par US today was unremarkable however, we should also consider that this was a difficult study due to his weight.\par LL from 01/23/19 shows consistently high Ox level, however a great improvement in his Na levels. \par He is drinking lemonade daily.\par Strongly advised him to lose weight.\par Otherwise, pt is doing well\par LL in 5 months and RTO in 6 months for results

## 2019-03-04 NOTE — ASSESSMENT
[FreeTextEntry1] : Strongly advised him to lose weight.\par Otherwise, pt is doing well\par LL in 5 months and RTO in 6 months for results

## 2019-03-04 NOTE — ADDENDUM
[FreeTextEntry1] :  I, Yamilex Taylor, acted solely as a scribe for Dr. James Coon. The documentation recorded by the scribe accurately reflects the service I personally performed and the decision by me.\par

## 2019-03-07 DIAGNOSIS — N40.1 BENIGN PROSTATIC HYPERPLASIA WITH LOWER URINARY TRACT SYMPTOMS: ICD-10-CM

## 2019-03-07 DIAGNOSIS — N20.0 CALCULUS OF KIDNEY: ICD-10-CM

## 2019-03-07 NOTE — ED PROVIDER NOTE - RESPIRATORY NEGATIVE STATEMENT, MLM
Mom is asking if the triage nurse can call Brianna. She states  told her to stop taking the phentermine for a week do to pain she was having in the sternum. Mom states it has been a week and she still has the pain.   no chest pain, no cough, and no shortness of breath.

## 2019-03-11 RX ORDER — SILDENAFIL 100 MG/1
100 TABLET, FILM COATED ORAL
Qty: 6 | Refills: 5 | Status: DISCONTINUED | COMMUNITY
Start: 2019-03-04 | End: 2019-03-11

## 2019-03-11 RX ORDER — SILDENAFIL 100 MG/1
100 TABLET, FILM COATED ORAL
Qty: 3 | Refills: 0 | Status: ACTIVE | COMMUNITY
Start: 2019-03-11 | End: 1900-01-01

## 2019-03-25 ENCOUNTER — OTHER (OUTPATIENT)
Age: 59
End: 2019-03-25

## 2019-03-28 ENCOUNTER — OTHER (OUTPATIENT)
Age: 59
End: 2019-03-28

## 2019-04-19 ENCOUNTER — OUTPATIENT (OUTPATIENT)
Dept: OUTPATIENT SERVICES | Facility: HOSPITAL | Age: 59
LOS: 1 days | End: 2019-04-19
Payer: COMMERCIAL

## 2019-04-19 ENCOUNTER — APPOINTMENT (OUTPATIENT)
Dept: UROLOGY | Facility: CLINIC | Age: 59
End: 2019-04-19
Payer: COMMERCIAL

## 2019-04-19 DIAGNOSIS — Z98.890 OTHER SPECIFIED POSTPROCEDURAL STATES: Chronic | ICD-10-CM

## 2019-04-19 DIAGNOSIS — Z95.828 PRESENCE OF OTHER VASCULAR IMPLANTS AND GRAFTS: Chronic | ICD-10-CM

## 2019-04-19 DIAGNOSIS — Z93.6 OTHER ARTIFICIAL OPENINGS OF URINARY TRACT STATUS: Chronic | ICD-10-CM

## 2019-04-19 DIAGNOSIS — R35.0 FREQUENCY OF MICTURITION: ICD-10-CM

## 2019-04-19 PROCEDURE — 76872 US TRANSRECTAL: CPT

## 2019-04-19 PROCEDURE — 76942 ECHO GUIDE FOR BIOPSY: CPT | Mod: 26,59

## 2019-04-19 PROCEDURE — 55700: CPT

## 2019-04-19 PROCEDURE — 76942 ECHO GUIDE FOR BIOPSY: CPT | Mod: 59

## 2019-04-19 PROCEDURE — 76872 US TRANSRECTAL: CPT | Mod: 26

## 2019-04-22 LAB — CORE LAB BIOPSY: NORMAL

## 2019-04-24 ENCOUNTER — APPOINTMENT (OUTPATIENT)
Dept: UROLOGY | Facility: CLINIC | Age: 59
End: 2019-04-24
Payer: COMMERCIAL

## 2019-04-24 DIAGNOSIS — R97.20 ELEVATED PROSTATE SPECIFIC ANTIGEN [PSA]: ICD-10-CM

## 2019-04-24 PROCEDURE — 99213 OFFICE O/P EST LOW 20 MIN: CPT

## 2019-05-13 NOTE — PROGRESS NOTE ADULT - PROBLEM SELECTOR PLAN 4
PRBC, stable
GI Bleed  eval  anemia  high risk for AC
left lower arm
GI Bleed  anemia  will follow up with GI as outpatient  Hgb stable  HD stable  PPI
amlodipine
DVT  IVC filter in place  vascular follow up
MEGHA  CPAP night time  keep sat > 88 pct  sleep hygiene  weight loss  extubated  caution with pain regimen, opioids, in the setting of pt with MEGHA  will follow
MEGHA  CPAP night time  keep sat > 88 pct  sleep hygiene discussion
amlodipine
obesity  morbid obesity  assist with ADL  supportive measures  pt wishes to go home, even though PT recs LORENA
renal following  supportive care  serial PE  serial LABS  I and O
sleep hygiene discussed  CPAP nightly  keep sat > 88 pct  oral and skin care  out of bed as tolerated
supportive measures  lifestyle modifications
PRBC, stable
amlodipine

## 2019-08-19 ENCOUNTER — APPOINTMENT (OUTPATIENT)
Dept: UROLOGY | Facility: CLINIC | Age: 59
End: 2019-08-19

## 2019-08-20 NOTE — CONSULT NOTE ADULT - PROVIDER SPECIALTY LIST ADULT
Critical Care
Gastroenterology
Infectious Disease
Nephrology
Urology
Vascular Surgery
Cardiology
Heme/Onc
30

## 2019-09-09 ENCOUNTER — APPOINTMENT (OUTPATIENT)
Dept: UROLOGY | Facility: CLINIC | Age: 59
End: 2019-09-09

## 2020-01-03 NOTE — HISTORY OF PRESENT ILLNESS
[FreeTextEntry1] : 58 year old male presents for evaluation of test results.\par Pt has lost 110 lbs so far.\par Repeat PSA has come down to 3.1 on 02/27/19, previously 15.4 on 02/19/19.\par NBP pathology results 04/19/19 were benign.\par We will watch his PSA from now on.\par RTO in 3 months for LL results. Repeat PSA then.\par \par edit 1/03/20: recent PSA was 5.5

## 2020-01-03 NOTE — ASSESSMENT
[FreeTextEntry1] : Repeat PSA has come down to 3.1 on 02/27/19, previously 15.4 on 02/19/19.\par NBP pathology results 04/19/19 were benign.\par We will watch his PSA from now on.\par RTO in 3 months for LL results. Repeat PSA then.

## 2020-01-08 NOTE — H&P PST ADULT - PROBLEM/PLAN-4
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 Patel for pain control and spasm relief (on Nucynta+ tramadol+marijuana + gabapentin). \par \par Foundation One studies showing MSI-low, no EGFR, no IDH-1, nor any PDGFR mutations, positive for NF1 and CDNK mutations). H3F3 was looked at and was NOT mutant.\par \par Variants of UNKNOWN SIGNIFICANCE include FGFR4 (for which there is a basket trial) and POLE (which is associated with mutliple mutaitons, although he does not have a high mutational burden at 1Mut/Mb)\par \par \par We saw pt for follow-up in the treatment room after his Avastin infusion. He was accompanied by his father. \par \par He resumed Avastin in September, but recently missed a few infusions due to UTI and GI upset. He has gleostine at home for cycle #3 and is planning to take it on Sunday. \par \par No new complaints.\par No headaches, no diplopia. \par Arms are strong. \par 
DISPLAY PLAN FREE TEXT

## 2020-03-10 NOTE — H&P PST ADULT - PROBLEM SELECTOR PROBLEM 3
Radha Jean was referred by Dr. Ryder for a hearing evaluation.  Radha reported that she has been experiencing a decrease in hearing in her left ear.     Pure tone air and bone conduction results indicated essentially normal hearing, bilaterally.  Speech reception thresholds were 5 dB HL in each ear.   Word recognition abilities were excellent, bilaterally.    Tympanograms were consistent with normal middle ear pressure, compliance and volume for both ears.    It is recommended that Radha continue care with Dr. Ryder.       Theodora Cordoba MA  [facility]  Audiologist   Morbid obesity Diabetes

## 2020-05-12 NOTE — PATIENT PROFILE ADULT. - BLOOD TRANSFUSION, PREVIOUS, PROFILE
no Graft Cartilage Fenestration Text: The cartilage was fenestrated with a 2mm punch biopsy to help facilitate graft survival and healing.

## 2020-06-16 NOTE — PROVIDER CONTACT NOTE (CRITICAL VALUE NOTIFICATION) - NS PROVIDER READ BACK TO LAB
POST-OP WEEK 1 EXAM S/P PPV/SILICONE OIL, OD: Doing well today. Retina attached. IOP within acceptable limits. CONTINUE eyedrops in the surgical eye as instructed. BEGIN  DUREZOL TAPER 3X/DAY FOR 1 WK, THEN 2X/DAY FOR 1 WK, THEN 1X/DAY UNTIL NEXT VISIT. Take tylenol or ibuprofen for any mild eye pain or pressure. Retinal detachment and endophthalmitis precautions reviewed. Instructed to call immediately for worsening vision, eye pain, or eye discharge. yes

## 2020-12-16 PROBLEM — Z87.440 HISTORY OF URINARY TRACT INFECTION: Status: RESOLVED | Noted: 2018-08-20 | Resolved: 2020-12-16

## 2021-05-03 NOTE — PROGRESS NOTE ADULT - PROBLEM SELECTOR PLAN 1
ppi bid   carafate qid  no AC very high risk for repeat GIB
Cpap HS  Needs PSG as outpt
MEGHA  CPAP  keep sat > 88 pct  sleep hygiene discussed
check cbc  cont proton pump inhibitor mansoor bid  diet as tolerated regular  carafate 1 gram q 6 hours  to follow  d/c planning  colonosocpy and  upper gastrointestinal endoscopy as outpt
endoscopy on 6/30, duodenal ulcer hemostasis, diet advanced as per GI
DVT  IVC filter  high risk candidate for AC
GI Bleed  GI following  supportive care and regimen
GI bleed  anemia  GI follow up  PPI  monitor sx  monitor Hgb
MEGHA  CPAP  emp ABX for Acute Infection  oral and skin care  assist with ADL  SPCU supportive care  serial labs  serial clinical exam  Renal follow up  am labs pending  will follow  out of bed as tolerated
MEGHA  HTN  cvs regimen  oral and skin care  I adrien  out of bed with assist  NIPPV night time for MEGHA  keep sat > 88 pct  sleep hygiene discussed  weight loss recs  icu supportive care and regimen  serial labs and serial PE  pain assessment  will follow
MEGHA  NIPPV  keep sat > 88 pct
PCN bilaterally  Nephro follow up  serial labs  assist with ADL  labs pending
PCN bilaterally  renal follow up noted  am labs pending  I and O  supportive care  assist with ADL
PPI  am Hgb pending  GI follow up  monitor HD and I and O
PPI  serial Hgb  monitor HD  supportive measures  GI following  am labs pending
check cbc  cointinue iron iv  ppi drip  check cbc  carafate 1 gram q 6 hours  to follow   diet as tolerated
check cbc  cointinue iron iv  ppi drip  check cbc  carafate 1 gram q 6 hours  to follow   extubate if awake start clears
check cbc  cont ppi  diet as tolerated  carafate 1 gram q 6 hours  to follow
check cbc  cont ppi  diet as tolerated  carafate 1 gram q 6 hours  to follow  d/c planning  colonosocpy and  upper gastrointestinal endoscopy as outpt
check cbc  cont proton pump inhibitor mansoor bid  diet as tolerated regular  carafate 1 gram q 6 hours  to follow  d/c planning  colonosocpy and  upper gastrointestinal endoscopy as outpt
endoscopy on 6/30, duodenal ulcer hemostasis, diet advanced as per GI
endoscopy on 6/30, duodenal ulcer hemostasis, diet advanced as per GI
npo except meds, PRBC, IVF, Gi f up, PPI, prbc, endoscopy on 6/30, duodenal ulcer hemostasis
ppi bid   carafate qid  no AC very high risk for repeat GIB
ppi bid   carafate qid  no AC very high risk for repeat GIB  IVC filter today  op egd and colon
ppi bid   carafate qid  no AC very high risk for repeat GIB  s/p ivc filter  dispo plan
endoscopy on 6/30, duodenal ulcer hemostasis, diet advanced as per GI
for ivc filter as high risk for bleeding ,so no AC
for ivc filter as high risk for bleeding ,so no AC
for ivc filter as high risk for bleeding ,so no AC,had IVC filter on 7/11
for ivc filter as high risk for bleeding ,so no AC,had IVC filter on 7/11
npo except meds, PRBC, IVF, Gi f up, PPI
npo except meds, PRBC, IVF, Gi f up, PPI, prbc, endosco[y
npo except meds, PRBC, IVF, Gi f up, PPI, prbc, endoscopy on 6/30, duodenal ulcer hemostasis
UTI  ABX  ID follow up  monitor vs and LABS
Wean after evaluated by Surgery and GI today.  Cpap HS  Needs PSG as outpt
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 S/P emergent urethral stent placement 4/29, downgraded from ICU on 4/30
for ivc filter as high risk for bleeding ,so no AC,had IVC filter on 7/11

## 2021-05-22 ENCOUNTER — EMERGENCY (EMERGENCY)
Facility: HOSPITAL | Age: 61
LOS: 1 days | Discharge: HOME | End: 2021-05-22
Attending: EMERGENCY MEDICINE
Payer: MEDICARE

## 2021-05-22 VITALS
SYSTOLIC BLOOD PRESSURE: 120 MMHG | TEMPERATURE: 98 F | OXYGEN SATURATION: 95 % | DIASTOLIC BLOOD PRESSURE: 74 MMHG | HEART RATE: 88 BPM | RESPIRATION RATE: 20 BRPM

## 2021-05-22 DIAGNOSIS — Z93.6 OTHER ARTIFICIAL OPENINGS OF URINARY TRACT STATUS: Chronic | ICD-10-CM

## 2021-05-22 DIAGNOSIS — Z95.828 PRESENCE OF OTHER VASCULAR IMPLANTS AND GRAFTS: Chronic | ICD-10-CM

## 2021-05-22 DIAGNOSIS — Z98.890 OTHER SPECIFIED POSTPROCEDURAL STATES: Chronic | ICD-10-CM

## 2021-05-22 LAB
ALBUMIN SERPL ELPH-MCNC: 3.1 G/DL — LOW (ref 3.3–5.2)
ALP SERPL-CCNC: 78 U/L — SIGNIFICANT CHANGE UP (ref 40–120)
ALT FLD-CCNC: 14 U/L — SIGNIFICANT CHANGE UP
ANION GAP SERPL CALC-SCNC: 13 MMOL/L — SIGNIFICANT CHANGE UP (ref 5–17)
APTT BLD: 32.5 SEC — SIGNIFICANT CHANGE UP (ref 27.5–35.5)
AST SERPL-CCNC: 12 U/L — SIGNIFICANT CHANGE UP
BASOPHILS # BLD AUTO: 0.03 K/UL — SIGNIFICANT CHANGE UP (ref 0–0.2)
BASOPHILS NFR BLD AUTO: 0.3 % — SIGNIFICANT CHANGE UP (ref 0–2)
BILIRUB SERPL-MCNC: 0.5 MG/DL — SIGNIFICANT CHANGE UP (ref 0.4–2)
BLD GP AB SCN SERPL QL: SIGNIFICANT CHANGE UP
BUN SERPL-MCNC: 61 MG/DL — HIGH (ref 8–20)
CALCIUM SERPL-MCNC: 9.2 MG/DL — SIGNIFICANT CHANGE UP (ref 8.6–10.2)
CHLORIDE SERPL-SCNC: 97 MMOL/L — LOW (ref 98–107)
CO2 SERPL-SCNC: 29 MMOL/L — SIGNIFICANT CHANGE UP (ref 22–29)
CREAT SERPL-MCNC: 1.8 MG/DL — HIGH (ref 0.5–1.3)
EOSINOPHIL # BLD AUTO: 0.37 K/UL — SIGNIFICANT CHANGE UP (ref 0–0.5)
EOSINOPHIL NFR BLD AUTO: 3.4 % — SIGNIFICANT CHANGE UP (ref 0–6)
GLUCOSE SERPL-MCNC: 119 MG/DL — HIGH (ref 70–99)
HCT VFR BLD CALC: 28.9 % — LOW (ref 39–50)
HGB BLD-MCNC: 8.5 G/DL — LOW (ref 13–17)
IMM GRANULOCYTES NFR BLD AUTO: 1.5 % — SIGNIFICANT CHANGE UP (ref 0–1.5)
INR BLD: 1.15 RATIO — SIGNIFICANT CHANGE UP (ref 0.88–1.16)
LYMPHOCYTES # BLD AUTO: 1.35 K/UL — SIGNIFICANT CHANGE UP (ref 1–3.3)
LYMPHOCYTES # BLD AUTO: 12.3 % — LOW (ref 13–44)
MCHC RBC-ENTMCNC: 28.2 PG — SIGNIFICANT CHANGE UP (ref 27–34)
MCHC RBC-ENTMCNC: 29.4 GM/DL — LOW (ref 32–36)
MCV RBC AUTO: 96 FL — SIGNIFICANT CHANGE UP (ref 80–100)
MONOCYTES # BLD AUTO: 0.88 K/UL — SIGNIFICANT CHANGE UP (ref 0–0.9)
MONOCYTES NFR BLD AUTO: 8 % — SIGNIFICANT CHANGE UP (ref 2–14)
NEUTROPHILS # BLD AUTO: 8.17 K/UL — HIGH (ref 1.8–7.4)
NEUTROPHILS NFR BLD AUTO: 74.5 % — SIGNIFICANT CHANGE UP (ref 43–77)
PLATELET # BLD AUTO: 270 K/UL — SIGNIFICANT CHANGE UP (ref 150–400)
POTASSIUM SERPL-MCNC: 3.9 MMOL/L — SIGNIFICANT CHANGE UP (ref 3.5–5.3)
POTASSIUM SERPL-SCNC: 3.9 MMOL/L — SIGNIFICANT CHANGE UP (ref 3.5–5.3)
PROT SERPL-MCNC: 6.8 G/DL — SIGNIFICANT CHANGE UP (ref 6.6–8.7)
PROTHROM AB SERPL-ACNC: 13.2 SEC — SIGNIFICANT CHANGE UP (ref 10.6–13.6)
RBC # BLD: 3.01 M/UL — LOW (ref 4.2–5.8)
RBC # FLD: 17.2 % — HIGH (ref 10.3–14.5)
SODIUM SERPL-SCNC: 139 MMOL/L — SIGNIFICANT CHANGE UP (ref 135–145)
WBC # BLD: 10.96 K/UL — HIGH (ref 3.8–10.5)
WBC # FLD AUTO: 10.96 K/UL — HIGH (ref 3.8–10.5)

## 2021-05-22 PROCEDURE — 93971 EXTREMITY STUDY: CPT | Mod: 26,RT

## 2021-05-22 PROCEDURE — 99285 EMERGENCY DEPT VISIT HI MDM: CPT

## 2021-05-22 PROCEDURE — 73522 X-RAY EXAM HIPS BI 3-4 VIEWS: CPT | Mod: 26

## 2021-05-22 PROCEDURE — 71045 X-RAY EXAM CHEST 1 VIEW: CPT | Mod: 26

## 2021-05-22 PROCEDURE — 72192 CT PELVIS W/O DYE: CPT | Mod: 26,MA

## 2021-05-22 PROCEDURE — 99284 EMERGENCY DEPT VISIT MOD MDM: CPT

## 2021-05-22 RX ORDER — MORPHINE SULFATE 50 MG/1
4 CAPSULE, EXTENDED RELEASE ORAL ONCE
Refills: 0 | Status: DISCONTINUED | OUTPATIENT
Start: 2021-05-22 | End: 2021-05-22

## 2021-05-22 RX ORDER — METOPROLOL TARTRATE 50 MG
50 TABLET ORAL ONCE
Refills: 0 | Status: COMPLETED | OUTPATIENT
Start: 2021-05-22 | End: 2021-05-22

## 2021-05-22 RX ADMIN — MORPHINE SULFATE 4 MILLIGRAM(S): 50 CAPSULE, EXTENDED RELEASE ORAL at 18:19

## 2021-05-22 NOTE — ED PROVIDER NOTE - NSFOLLOWUPINSTRUCTIONS_ED_ALL_ED_FT
See attached instructions. Return to the ED if you have worsening of your condition. Followup with orthopedic surgery at the office listed below.

## 2021-05-22 NOTE — ED PROVIDER NOTE - PROGRESS NOTE DETAILS
Mian PGY-2: xray of hip inconclusive for acute fx vs chronic process, will get ct bony parts Mian PGY-2: xray of hip inconclusive for acute fx vs chronic process, will get ct bony parts  pending ortho consult Jazmine CAST: Spoke w ortho PA. Recommending outpatient followup with Dr. Verma. Will set up ambulance and send the patient back to [facility].

## 2021-05-22 NOTE — ED PROVIDER NOTE - ATTENDING CONTRIBUTION TO CARE
61 yo male 59 yo male from [facility], being trated for sepsis, s/p fall at [facility] with [facility]  patient bed bound at baseline  no ac  awake    trach in place  cta  picc to RUE  abd soft  rue swelling  no deformity 61 yo male from [facility], being treated for sepsis, s/p fall at [facility] with right [facility] pain  patient bed bound at baseline  no ac  awake  morbid obesity  trach in place  cta  picc to RUE, rue swelling  abd soft  no rash  no deformity to hips, but unable to range b/l hips

## 2021-05-22 NOTE — ED ADULT TRIAGE NOTE - CHIEF COMPLAINT QUOTE
Pt BIBA from [facility] c/o roll out of bed. Pt is bedbound, chronic holman, chronic trach and PEG tube. Pt alert, able to answer yes and no questions. Denies hitting head or LOC. c/o right side hip pain. Pt has + blood cultures and on abx. VSS, in no acute distress.

## 2021-05-22 NOTE — ED PROVIDER NOTE - PHYSICAL EXAMINATION
General: well appearing, interactive, well nourished, NAD  HEENT: pupils equal and reactive, normal external ears bilaterally   Cardiac: RRR, no MRG appreciated  Resp: lungs clear to auscultation bilaterally, symmetric chest wall rise  Abd: soft, nontender, nondistended,   : no CVA tenderness  Neuro: Moving all extremities  Skin:  normal color for race  MSK: pain with passive ROM right hip

## 2021-05-22 NOTE — ED ADULT NURSE NOTE - OBJECTIVE STATEMENT
Assumed pt care at 1230.  Pt chronic trach from [facility], c/o r sided hip pain s/p fall, peg tube and holman in place, no acute s/s of respiratory distress noted or reported at this time, will continue to monitor

## 2021-05-22 NOTE — ED PROVIDER NOTE - PATIENT PORTAL LINK FT
You can access the FollowMyHealth Patient Portal offered by [facility] by registering at the following website: [email or web address]. By joining [employer]’s FollowMyHealth portal, you will also be able to view your health information using other applications (apps) compatible with our system.

## 2021-05-22 NOTE — ED ADULT NURSE NOTE - CHIEF COMPLAINT QUOTE
Pt BIBA from [facility] c/o roll out of bed. Pt is bedbound, chronic holman, chronic trach and PEG tube. Pt alert, able to answer yes and no questions. Denies hitting head or LOC. c/o right side hip pain. Pt has + blood cultures and on abx. VSS, in no acute distress.

## 2021-05-22 NOTE — ED ADULT NURSE REASSESSMENT NOTE - NS ED NURSE REASSESS COMMENT FT1
Pt remains at baseline with VSS, medicated for pain with positive effect, no acute s/s of respiratory distress noted or reported at this time, will continue to monitor

## 2021-05-22 NOTE — ED ADULT NURSE REASSESSMENT NOTE - NS ED NURSE REASSESS COMMENT FT1
PICC line verified for placement, before trying to get blood pt arm noted to be swollen, noted to be much larger than left arm, unable to flush or obtain blood from RUE PICC line, MR CAST made aware, MD to place new line, will continue to monitor

## 2021-05-22 NOTE — ED PROVIDER NOTE - CARE PROVIDER_API CALL
Denisa Verma)  Orthopedics  [address] [place] [address], [address]  [place] [address]  Phone: [phone]  Fax: [phone]  Follow Up Time: 7-10 Days

## 2021-05-22 NOTE — ED PROVIDER NOTE - NS ED ROS FT
CONSTITUTIONAL: No fevers, no chills  Eyes: No vision changes  Cardiovascular: No Chest pain  Respiratory: No SOB  Gastrointestinal: No n/v/c/d, no abd pain  Genitourinary: no dysuria, no hematuria  SKIN: no rashes.  MSK: +right hip pain  NEURO: no headache, no weakness, no numbness  PSYCHIATRIC: no SI/HI

## 2021-05-22 NOTE — ED PROVIDER NOTE - OBJECTIVE STATEMENT
Pt is a 61 y/o M c/o fall from bed.  Pt BIBEMS from [facility], found to have fallen out of bed.  Pt indicated he was having right hip pain to the staff and was sent in for evaluation.  Pt answers yes/no questions at baseline, also pt is bed bound.  Pt denies fever, headache, chest pain, shortness of breath, abdominal pain, endorses right hip pain.

## 2021-05-23 VITALS
DIASTOLIC BLOOD PRESSURE: 77 MMHG | RESPIRATION RATE: 20 BRPM | HEART RATE: 111 BPM | TEMPERATURE: 98 F | OXYGEN SATURATION: 95 % | SYSTOLIC BLOOD PRESSURE: 126 MMHG

## 2021-05-23 PROCEDURE — 86901 BLOOD TYPING SEROLOGIC RH(D): CPT

## 2021-05-23 PROCEDURE — 85730 THROMBOPLASTIN TIME PARTIAL: CPT

## 2021-05-23 PROCEDURE — 86850 RBC ANTIBODY SCREEN: CPT

## 2021-05-23 PROCEDURE — 80053 COMPREHEN METABOLIC PANEL: CPT

## 2021-05-23 PROCEDURE — 72192 CT PELVIS W/O DYE: CPT

## 2021-05-23 PROCEDURE — 87040 BLOOD CULTURE FOR BACTERIA: CPT

## 2021-05-23 PROCEDURE — 96374 THER/PROPH/DIAG INJ IV PUSH: CPT

## 2021-05-23 PROCEDURE — 85025 COMPLETE CBC W/AUTO DIFF WBC: CPT

## 2021-05-23 PROCEDURE — 73522 X-RAY EXAM HIPS BI 3-4 VIEWS: CPT

## 2021-05-23 PROCEDURE — 36415 COLL VENOUS BLD VENIPUNCTURE: CPT

## 2021-05-23 PROCEDURE — 82962 GLUCOSE BLOOD TEST: CPT

## 2021-05-23 PROCEDURE — 93971 EXTREMITY STUDY: CPT

## 2021-05-23 PROCEDURE — 99284 EMERGENCY DEPT VISIT MOD MDM: CPT | Mod: 25

## 2021-05-23 PROCEDURE — 85610 PROTHROMBIN TIME: CPT

## 2021-05-23 PROCEDURE — 71045 X-RAY EXAM CHEST 1 VIEW: CPT

## 2021-05-23 PROCEDURE — 86900 BLOOD TYPING SEROLOGIC ABO: CPT

## 2021-05-23 RX ADMIN — Medication 50 MILLIGRAM(S): at 00:08

## 2021-05-23 NOTE — CONSULT NOTE ADULT - SUBJECTIVE AND OBJECTIVE BOX
Pt Name: LEONA RODRIGUEZ  Late entry consult pt seen yesterday 5/22/2021    MRN: [id number]      Patient is a 60y Male presenting to the emergency department with a chief complaint of fall OOB.  Pt from [facility] brought to the emergency room by EMS c/o right hip pain.  Pt currently bedbound and trached after being hospitalized at [facility] for pneumonia.  Pt was intubated for weeks while there.  Had +blood cultures and was eventually discharge to [facility] with PICC line, holman, and trache.  Previous to his hospitalization Pt states he had had right hip pain for approx 4-5 years now.  He has been seen for this problem and was told he had osteoarthritis of the right hip and needed a hip replacement.  Pt was treated with injections to the hip which did not help him with the pain.  Pt was ambulating with a walker prior to his hospitalization at [facility].   History obtained from patients wife over the phone, pt able to answer yes/no questions     HEALTH ISSUES - PROBLEM Dx:  respiratory failure  lymphedema  PICC +BC  CKD    REVIEW OF SYSTEMS	    Musculoskeletal:	 +right hip pain	    PAST MEDICAL & SURGICAL HISTORY:  Respiratory failure  lymphedema  CKD    Tracheostomy dependent        Allergies: No Known Allergies      Medications: unknown    FAMILY HISTORY:  : non-contributory    Social History:     Ambulation: Walking independently [ ] With Cane [ ] With Walker [ ]  Bedbound [x ] Pt currently bedbound                           8.5    10.96 )-----------( 270      ( 22 May 2021 14:57 )             28.9     05-22    139  |  97<L>  |  61.0<H>  ----------------------------<  119<H>  3.9   |  29.0  |  1.80<H>    Ca    9.2      22 May 2021 14:57    TPro  6.8  /  Alb  3.1<L>  /  TBili  0.5  /  DBili  x   /  AST  12  /  ALT  14  /  AlkPhos  78  05-22      PHYSICAL EXAM:    Vital Signs Last 24 Hrs  T(C): 36.9 (23 May 2021 05:00), Max: 36.9 (23 May 2021 00:00)  T(F): 98.4 (23 May 2021 05:00), Max: 98.4 (23 May 2021 00:00)  HR: 111 (23 May 2021 05:00) (88 - 124)  BP: 126/77 (23 May 2021 05:00) (107/75 - 128/71)  BP(mean): --  RR: 20 (23 May 2021 05:00) (20 - 20)  SpO2: 95% (23 May 2021 05:00) (95% - 98%)  Daily     Daily         Imaging Studies:  EXAM:  XR HIPS BI WITH PELV 3-4V                          PROCEDURE DATE:  05/22/2021          INTERPRETATION:  AP pelvis and 2 views of the hips.    Clinical indications: Fall with pelvic pain.    IMPRESSION: There is chronic dislocation of the right hip with resorption of the femoral head and remodeling of the acetabulum. Numerous foci of mineralization and heterotopic ossification are present. There is mild left hip osteoarthrosis.       EXAM:  CT PELVIS BONY ONLY                          PROCEDURE DATE:  05/22/2021          INTERPRETATION:  CT PELVIS BONY dated 5/22/2021 5:53 PM    INDICATION: Status post fall with pain    COMPARISON: Hip radiographs dated 5/22/2021    TECHNIQUE:CT imaging of the pelvis was performed. The data was reformatted in the axial, coronal, and sagittal planes. Additionally, 3-D reformatted imaging was created.    FINDINGS:    OSSEOUS STRUCTURES: There is an abnormal appearance of the right hip whichis chronically degenerated. The right superior femoral head is collapsed suggesting chronic avascular necrosis with resorption. Marked cartilage loss throughout the right hip with spurring and irregularity of the subchondral surface of the mid and superior acetabulum. Sclerotic changes are present. No acute fracture is appreciated. Moderate narrowing the left hip joint. Productive changes at the sacroiliac joint. Severe degenerative disc disease at L5-S1  SYNOVIUM/ JOINT FLUID: Synovial hypertrophy of the right hip. No large joint effusion.  TENDONS: Chronic tearing of the right iliopsoas tendon. The remaining tendons appear intact.  MUSCLES: There is no intramuscular hematoma.  NEUROVASCULAR STRUCTURES: Moderate facet calcification.  INTRAPELVIC SOFT TISSUES: Colonic diverticulosis. Cholelithiasis. PEG tube in place. IVC filter in place. Moderate stool throughout the sigmoid colon. Holman catheter within a nondistended urinary bladder. Atrophic kidneys bilaterally. Nonobstructing calcifications bilaterally.  SUBCUTANEOUS SOFT TISSUES: Mild soft tissue swelling about the pelvis.    3-D reformatted imaging confirms these findings.    IMPRESSION:    1.  Degenerative changes of the right hip with collapse of the superior femoral head which can be the result of neuropathic joint, avascular necrosis, and infection. Correlate clinically. Aspiration can be performed for further evaluation.  2.  Tearing of the right iliopsoas tendon.            A/P:  Pt is a  60y Male with chronic right hip pain    PLAN:   Pt bedbound  There is no acute orthopedic intervention required at this time  Follow up with Ortho in office for discussion of surgical options  Discussed with Dr. Verma

## 2021-05-27 LAB
CULTURE RESULTS: SIGNIFICANT CHANGE UP
SPECIMEN SOURCE: SIGNIFICANT CHANGE UP

## 2021-06-16 NOTE — H&P PST ADULT - SYMPTOMS
Quality 431: Preventive Care And Screening: Unhealthy Alcohol Use - Screening: Patient screened for unhealthy alcohol use using a single question and scores less than 2 times per year none Quality 154 Part A: Falls: Risk Assessment (Should Be Reported With Measure 155.): Falls risk assessment completed and documented in the past 12 months. Quality 111:Pneumonia Vaccination Status For Older Adults: Pneumococcal Vaccination Previously Received Quality 130: Documentation Of Current Medications In The Medical Record: Current Medications Documented Quality 110: Preventive Care And Screening: Influenza Immunization: Influenza Immunization Administered during Influenza season Quality 226: Preventive Care And Screening: Tobacco Use: Screening And Cessation Intervention: Patient screened for tobacco use, is a smoker AND received Cessation Counseling Quality 155 (Denominator): Falls Plan Of Care: Plan of Care not Documented, Reason not Otherwise Specified Detail Level: Detailed Quality 402: Tobacco Use And Help With Quitting Among Adolescents: Patient screened for tobacco and is a smoker AND received Cessation Counseling Quality 154 Part B: Falls: Risk Screening (Should Be Reported With Measure 155.): Patient screened for future fall risk; documentation of no falls in the past year or only one fall without injury in the past year OBSERVATION

## 2021-08-05 NOTE — DISCHARGE NOTE ADULT - CARE PROVIDER_API CALL
179.[address] Thierry Teixeira), Urology  [address] [place] [address]  Phone: [phone]  Fax: [phone] James Coon), Urology  [address] [place] [address]  Phone: [phone]  Fax: [phone]

## 2021-10-26 NOTE — H&P PST ADULT - PAIN SCALE PREFERRED, PROFILE
Contacted [employer] to assist in reaching the patient about PCP information.  Name of : Tank [id number]    There was no answer or VM option.  Since unable to reach pt by telephone x 3, writer will attempt to contact the pt while onsite @ [employer], [place].   numerical 0-10

## 2022-04-05 NOTE — PHYSICAL THERAPY INITIAL EVALUATION ADULT - WEIGHT-BEARING RESTRICTIONS: SIT/STAND, REHAB EVAL
weight-bearing as tolerated Rhombic Flap Text: The defect edges were debeveled with a #15 scalpel blade.  Given the location of the defect and the proximity to free margins a rhombic flap was deemed most appropriate.  Using a sterile surgical marker, an appropriate rhombic flap was drawn incorporating the defect.    The area thus outlined was incised deep to adipose tissue with a #15 scalpel blade.  The skin margins were undermined to an appropriate distance in all directions utilizing iris scissors.

## 2022-04-24 NOTE — OCCUPATIONAL THERAPY INITIAL EVALUATION ADULT - ADL RETRAINING, OT EVAL
IBS (irritable bowel syndrome)
Patient will dress lower body with minimal assistance, AE as needed within 3-5 sessions.

## 2022-07-14 NOTE — ED PROVIDER NOTE - ENMT, MLM
Airway patent, Nasal mucosa clear. Mouth with normal mucosa. Throat has no vesicles, no oropharyngeal exudates and uvula is midline.
right sided effusion similar to deb

## 2022-08-01 NOTE — PROGRESS NOTE ADULT - SUBJECTIVE AND OBJECTIVE BOX
Pt is an 58yr old man with PMHx including HTN, sleep apnea, DM, morbid obesity, lymphedema of the BLE and renal calculus s/p bilateral nephrostomy. Pt presented 6/28 with chief complaint of blood diarrhea and was admitted for a lower GI bleed. His hospital course has been notable for multiple blood transfusions     EGD with Large Duodonal ulcer noted s/p Injection, Remains intubated for Airway protection, Episode of SVT tonight ~7sec resolved on own, 2 episodes of melena stools, 1 unit PRBC's s/p EGD with poor response, Additional unit given. Pt is an 58yr old man with PMHx including HTN, sleep apnea, DM, morbid obesity, lymphedema of the BLE and renal calculus s/p bilateral nephrostomy. Pt presented 6/28 with chief complaint of blood diarrhea and was admitted for a lower GI bleed. His hospital course has been notable for multiple blood transfusions, EGD on 6/30 with large duodonal ulcer after which pt remains intubated until 7/1. Chart reviews overall hemodynamic stability within the last 24hrs from writing.    Called to bedside to assess pt with new onset fever to 101 orally.    Upon assessment pt denies chest pain, headache, vomiting, diarrhea. Pt endorses some suprapubic "discomfort" with urination and productive ("phlegm") intermittent cough.   160/70, HR 98 sinus on tele, 92% on room air. Lungs diminished with good inspiratory effort upon request with limited assessment due to habitus but no overt adventitious breath sounds appreciated. s1,s2, profound BLE edema, +bs x4 without tenderness to palpation. PERRL.     Pt already received 650mg PO Tylenol.    Will get stat labs, UA, culture and chest xray, start Ceftriaxone empirically (suspicious for UTI) and continue to monitor closely. Pt is an 58yr old man with PMHx including HTN, sleep apnea, DM, morbid obesity, lymphedema of the BLE and renal calculus s/p bilateral nephrostomy. Pt presented 6/28 with chief complaint of blood diarrhea and was admitted for a lower GI bleed. His hospital course has been notable for multiple blood transfusions, EGD on 6/30 with large duodonal ulcer after which pt remains intubated until 7/1. Chart reviews overall hemodynamic stability within the last 24hrs from writing.    Called to bedside to assess pt with new onset fever to 101 orally.    Upon assessment pt denies chest pain, headache, vomiting, diarrhea. Pt endorses some suprapubic "discomfort" with urination and productive ("phlegm") intermittent cough.   160/70, HR 98 sinus on tele, 92% on room air. Lungs diminished with good inspiratory effort upon request with limited assessment due to habitus but no overt adventitious breath sounds appreciated. s1,s2, profound BLE edema, +bs x4 without tenderness to palpation, bilateral nephrostomies, both capped. PERRL.     Pt already received 650mg PO Tylenol.    Will get stat labs, UA, culture and chest xray, start Ceftriaxone empirically (suspicious for UTI) and continue to monitor closely.     Addendum: UA resulted, consistent with UTI with BUN/Cr increasing (32/195 --> 33/2.16) despite taking PO . Temp coming down (99.4). Will continue 1gram of Ceftriaxone every 24hrs for UTI with cultures pending and initiate gentle IVF of 50ml/hr of lactated ringers. Continue as tolerated. DC instructions

## 2023-06-02 NOTE — DISCHARGE NOTE ADULT - NURSING SECTION COMPLETE
Patient/Caregiver provided printed discharge information. Dapsone Pregnancy And Lactation Text: This medication is Pregnancy Category C and is not considered safe during pregnancy or breast feeding.

## 2023-11-29 NOTE — ED ADULT NURSE NOTE - MODE OF DISCHARGE
What Type Of Note Output Would You Prefer (Optional)?: Standard Output What Is The Reason For Today's Visit?: Full Body Skin Examination What Is The Reason For Today's Visit? (Being Monitored For X): concerning skin lesions on an annual basis How Severe Are Your Spot(S)?: moderate Stretcher

## 2023-12-27 NOTE — DISCHARGE NOTE ADULT - PROVIDER RX CONTACT NUMBER
Parent left  requesting a refill of Adderall XR 20 MG cap and Adderall 30 MG tab    The [place] ePDMP has been checked by me as your delegate and is consistent with our medication record in Epic.     Last Refill: 12/4/23 please send on 1/2/24    Last Medication Check: 10/25/23      Routed to provider for review.     Prescribed: 12/4/2023  Dispensed: 12/4/2023  Sold: 12/4/2023      [phone]

## 2024-01-22 PROBLEM — J96.90 RESPIRATORY FAILURE, UNSPECIFIED, UNSPECIFIED WHETHER WITH HYPOXIA OR HYPERCAPNIA: Chronic | Status: ACTIVE | Noted: 2021-05-22

## 2024-01-22 PROBLEM — Z93.0 TRACHEOSTOMY STATUS: Chronic | Status: ACTIVE | Noted: 2021-05-22

## 2024-03-13 NOTE — ED PROVIDER NOTE - ATTESTATION, MLM
Patient has met target of no readmission for (90) days post hospital discharge and is graduated from Transitional Care Management program at this time.   Kana Dale LPN     I have reviewed and confirmed nurses' notes for patient's medications, allergies, medical history, and surgical history.

## 2024-03-19 NOTE — ED ADULT TRIAGE NOTE - BP NONINVASIVE DIASTOLIC (MM HG)
If you are a smoker, it is important for your health to stop smoking. Please be aware that second hand smoke is also harmful.
60

## 2024-04-23 ENCOUNTER — APPOINTMENT (OUTPATIENT)
Dept: DERMATOLOGY | Facility: CLINIC | Age: 64
End: 2024-04-23
Payer: MEDICARE

## 2024-04-23 PROCEDURE — 99203 OFFICE O/P NEW LOW 30 MIN: CPT | Mod: 25

## 2024-04-23 PROCEDURE — 11102 TANGNTL BX SKIN SINGLE LES: CPT

## 2024-05-02 ENCOUNTER — APPOINTMENT (OUTPATIENT)
Dept: SURGICAL ONCOLOGY | Facility: CLINIC | Age: 64
End: 2024-05-02
Payer: MEDICARE

## 2024-05-02 VITALS
WEIGHT: 315 LBS | BODY MASS INDEX: 46.65 KG/M2 | TEMPERATURE: 97.4 F | SYSTOLIC BLOOD PRESSURE: 169 MMHG | HEIGHT: 69 IN | HEART RATE: 57 BPM | DIASTOLIC BLOOD PRESSURE: 79 MMHG | OXYGEN SATURATION: 92 %

## 2024-05-02 PROCEDURE — 99204 OFFICE O/P NEW MOD 45 MIN: CPT

## 2024-05-02 NOTE — ASSESSMENT
[FreeTextEntry1] : Mr. Croft is a 64yo M referred by Dr. Barnes presents for treatment of a left superior upper back malignant melanoma, pT1a, at least 0.7mm deep. Referred by Dr. Barnes.  Clinically, the patient has a 1 cm biopsy wound about the Left Superior Upper Back. The patient's abnormal lesion has been pathologically diagnosed as a malignant melanoma. An in-depth discussion was had regarding the patient's pathology report. Education surrounding the etiology of melanoma was provided, as well as standard of care treatment.  I discussed the management of her melanoma in detail. Given the reported depth of 0.7mm, I recommended a wide excision with 1 cm margins. We discussed the need for a wide excision to obtain negative margins and minimize the risk of local recurrence. The risks and benefits of this were discussed in detail and the magnitude of the excision was demonstrated. The risks include, but are not limited to, bleeding, infection, seroma, injury to surrounding structures including blood vessels and nerves which may lead to numbness or motor dysfunction, and wound breakdown.  We also discussed the role of a sentinel lymph node biopsy in melanoma and its prognostic significance. We discussed that while thickness is the dominant risk factor for verenice metastases, other clinicopathologic factors due contribute to the estimated risk. I have estimated the risk of having a positive SLN to be on the order of 8% using the Melanoma [place] of [place] calculator and 6% using the [facility] calculator. The risks of the SLN biopsy were discussed in detail, including but not limited to bleeding, infection, seroma, nerve injury, and lymphedema in 5-10%.  Given the patient's previous history of bilateral lower extremity lymphedema, I have informed that SLNB would require extra precaution and pre-operative discussion. The patient has expressed that despite the risks, he would like to pursue sentinel lymph node biopsy as he would act on that information with additional therapy if needed. All questions and concerns were addressed. Patient vocalized understanding and agreement to assessment and treatment plan.  Plan: 1) Plan for WLE with SLNB and plastics closure 2) Refer to PLASTICS 3) Send note to patient's PCP 4) Pre-surgical lymphedema management (Dr. Joaquin)  Today, I personally spent 63 minutes in total time including reviewing imaging and studies, discussing complex treatment regimens, direct face to face time with the patient, patient education, answering patient questions and counseling, excluding separately billable procedures and billing time.   This note was written by Kandy Estevez on 05/02/2024, acting solely as a scribe for Dr. Mehdi Remy MD. I have documented the information dictated during the patient encounter for the following sections: RFV, HPI, ROS, PE, ASSESSMENT/PLAN.  I personally performed the services described in the documentation, reviewed the documentation recorded by the scribe in my presence, and it accurately and completely records my words and actions.  Mehdi Remy MD  Assistant Professor of Surgery Juanpablo and Marisela [facility] [employer] [facility] Division of Surgical Oncology [facility] Phone: [phone] Fax: [phone]

## 2024-05-02 NOTE — REASON FOR VISIT
[Initial Consultation] : an initial consultation for [Melanoma] : melanoma [Referred By: ___] : Referred By: [unfilled] [Other: _____] : [unfilled]

## 2024-05-02 NOTE — RESULTS/DATA
[FreeTextEntry1] : ***SHAVE BIOPSY*** 04/23/24 Specimen 1)	Left Suprior Upper Back, Shave Biopsy FINAL DIAGNOSIS Malignant Melanoma w/o ulceration Breslow: 0.7mm T Classification: pT1a

## 2024-05-02 NOTE — PHYSICAL EXAM
[Normal] : no peripheral adenopathy appreciated [de-identified] : ambulating with assistive walker device; morbid obesity [de-identified] : RRR [de-identified] : easy WOB [de-identified] : Loose skin (cutis laxa) bilateral axillary  [de-identified] : Approximately 1 cm biopsy site is representative of full lesion. No evidence of in-transit or satellite metastases. No other skin changes. No appreciable groin or axillary lymph nodes.

## 2024-05-02 NOTE — CONSULT LETTER
[Dear  ___] : Dear  [unfilled], [Consult Letter:] : I had the pleasure of evaluating your patient, [unfilled]. [Consult Closing:] : Thank you very much for allowing me to participate in the care of this patient.  If you have any questions, please do not hesitate to contact me. [FreeTextEntry3] : Mehdi Remy MD Division of Surgical Oncology [facility] Phone: [phone] Fax: [phone]

## 2024-05-02 NOTE — HISTORY OF PRESENT ILLNESS
[de-identified] : Mr. Croft is a 62 y/o M referred by Dr. Barnes presents for treatment of a left superior upper back malignant melanoma, at least 0.7mm deep, pT1a.  Patient states that he was previously bed-ridden for 2 years, weighing approximately 520 lbs. Per report, patient was in a pneumonia induced coma for some time. He was in and out of rehab, and upon returning home from rehab his partner identified an abnormal lesion on the patient's back.  Approximately 1 month ago, the patient was evaluated by his PCP, and was advised to follow up with dermatology regarding the abnormal lesion about the back. About two weeks ago, the patient was evaluated by Dr. Barnes and underwent punch biopsy. Pathology results demonstrated malignant melanoma. Path showed a melanoma at least 0.7mm deep, pT1a, no evidence of ulceration or mitoses. Patient presents for evaluation of the lesion and additional treatment. He denies bleeding of the lesion. Unaware of changes from the initial lesion. Of note, the patient is ambulating with assistive walker device and is heavy/has lymphedema in his legs.  PMHx: DM2; DVT; ED; HTN; Nephrolithiasis; obesity; MEGHA FMHx: heart disease; grandfather - lung CA (smoker); father - prostate CA SHx: cystoscopy   SocHx: retired ; never a smoker; social alcohol use.

## 2024-05-04 ENCOUNTER — INPATIENT (INPATIENT)
Facility: HOSPITAL | Age: 64
LOS: 4 days | Discharge: HOME HEALTH | DRG: 872 | End: 2024-05-09
Attending: HOSPITALIST | Admitting: EMERGENCY MEDICINE
Payer: MEDICARE

## 2024-05-04 ENCOUNTER — EMERGENCY (EMERGENCY)
Facility: HOSPITAL | Age: 64
LOS: 1 days | Discharge: ANOTHER HOSPITAL | End: 2024-05-04
Attending: EMERGENCY MEDICINE | Admitting: EMERGENCY MEDICINE
Payer: MEDICARE

## 2024-05-04 VITALS
SYSTOLIC BLOOD PRESSURE: 93 MMHG | HEART RATE: 82 BPM | DIASTOLIC BLOOD PRESSURE: 65 MMHG | OXYGEN SATURATION: 98 % | RESPIRATION RATE: 20 BRPM

## 2024-05-04 VITALS
DIASTOLIC BLOOD PRESSURE: 57 MMHG | RESPIRATION RATE: 18 BRPM | OXYGEN SATURATION: 98 % | HEIGHT: 69 IN | SYSTOLIC BLOOD PRESSURE: 89 MMHG | TEMPERATURE: 98 F | WEIGHT: 315 LBS | HEART RATE: 57 BPM

## 2024-05-04 VITALS
RESPIRATION RATE: 18 BRPM | TEMPERATURE: 99 F | SYSTOLIC BLOOD PRESSURE: 154 MMHG | HEART RATE: 102 BPM | OXYGEN SATURATION: 94 % | HEIGHT: 69 IN | DIASTOLIC BLOOD PRESSURE: 71 MMHG | WEIGHT: 315 LBS

## 2024-05-04 DIAGNOSIS — Z93.6 OTHER ARTIFICIAL OPENINGS OF URINARY TRACT STATUS: Chronic | ICD-10-CM

## 2024-05-04 DIAGNOSIS — Z95.828 PRESENCE OF OTHER VASCULAR IMPLANTS AND GRAFTS: Chronic | ICD-10-CM

## 2024-05-04 DIAGNOSIS — Z98.890 OTHER SPECIFIED POSTPROCEDURAL STATES: Chronic | ICD-10-CM

## 2024-05-04 LAB
ALBUMIN SERPL ELPH-MCNC: 2.8 G/DL — LOW (ref 3.3–5)
ALBUMIN SERPL ELPH-MCNC: 3.2 G/DL — LOW (ref 3.3–5)
ALP SERPL-CCNC: 126 U/L — HIGH (ref 40–120)
ALP SERPL-CCNC: 72 U/L — SIGNIFICANT CHANGE UP (ref 40–120)
ALT FLD-CCNC: 11 U/L — SIGNIFICANT CHANGE UP (ref 10–45)
ALT FLD-CCNC: 14 U/L — SIGNIFICANT CHANGE UP (ref 10–45)
ANION GAP SERPL CALC-SCNC: 14 MMOL/L — SIGNIFICANT CHANGE UP (ref 5–17)
ANION GAP SERPL CALC-SCNC: 18 MMOL/L — HIGH (ref 5–17)
APTT BLD: 30.9 SEC — SIGNIFICANT CHANGE UP (ref 24.5–35.6)
APTT BLD: 31.2 SEC — SIGNIFICANT CHANGE UP (ref 24.5–35.6)
AST SERPL-CCNC: 19 U/L — SIGNIFICANT CHANGE UP (ref 10–40)
AST SERPL-CCNC: 29 U/L — SIGNIFICANT CHANGE UP (ref 10–40)
BASOPHILS # BLD AUTO: 0 K/UL — SIGNIFICANT CHANGE UP (ref 0–0.2)
BASOPHILS # BLD AUTO: 0.02 K/UL — SIGNIFICANT CHANGE UP (ref 0–0.2)
BASOPHILS NFR BLD AUTO: 0 % — SIGNIFICANT CHANGE UP (ref 0–2)
BASOPHILS NFR BLD AUTO: 0.3 % — SIGNIFICANT CHANGE UP (ref 0–2)
BILIRUB SERPL-MCNC: 1.2 MG/DL — SIGNIFICANT CHANGE UP (ref 0.2–1.2)
BILIRUB SERPL-MCNC: 1.6 MG/DL — HIGH (ref 0.2–1.2)
BUN SERPL-MCNC: 32 MG/DL — HIGH (ref 7–23)
BUN SERPL-MCNC: 33 MG/DL — HIGH (ref 7–23)
CALCIUM SERPL-MCNC: 7.6 MG/DL — LOW (ref 8.4–10.5)
CALCIUM SERPL-MCNC: 8.1 MG/DL — LOW (ref 8.4–10.5)
CHLORIDE SERPL-SCNC: 100 MMOL/L — SIGNIFICANT CHANGE UP (ref 96–108)
CHLORIDE SERPL-SCNC: 99 MMOL/L — SIGNIFICANT CHANGE UP (ref 96–108)
CO2 SERPL-SCNC: 19 MMOL/L — LOW (ref 22–31)
CO2 SERPL-SCNC: 21 MMOL/L — LOW (ref 22–31)
CREAT SERPL-MCNC: 2.5 MG/DL — HIGH (ref 0.5–1.3)
CREAT SERPL-MCNC: 2.74 MG/DL — HIGH (ref 0.5–1.3)
EGFR: 25 ML/MIN/1.73M2 — LOW
EGFR: 28 ML/MIN/1.73M2 — LOW
EOSINOPHIL # BLD AUTO: 0 K/UL — SIGNIFICANT CHANGE UP (ref 0–0.5)
EOSINOPHIL # BLD AUTO: 0 K/UL — SIGNIFICANT CHANGE UP (ref 0–0.5)
EOSINOPHIL NFR BLD AUTO: 0 % — SIGNIFICANT CHANGE UP (ref 0–6)
EOSINOPHIL NFR BLD AUTO: 0 % — SIGNIFICANT CHANGE UP (ref 0–6)
FLUAV AG NPH QL: SIGNIFICANT CHANGE UP
FLUBV AG NPH QL: SIGNIFICANT CHANGE UP
GAS PNL BLDV: SIGNIFICANT CHANGE UP
GIANT PLATELETS BLD QL SMEAR: PRESENT — SIGNIFICANT CHANGE UP
GLUCOSE BLDC GLUCOMTR-MCNC: 141 MG/DL — HIGH (ref 70–99)
GLUCOSE SERPL-MCNC: 114 MG/DL — HIGH (ref 70–99)
GLUCOSE SERPL-MCNC: 136 MG/DL — HIGH (ref 70–99)
HCT VFR BLD CALC: 45.5 % — SIGNIFICANT CHANGE UP (ref 39–50)
HCT VFR BLD CALC: 51.4 % — HIGH (ref 39–50)
HGB BLD-MCNC: 14.3 G/DL — SIGNIFICANT CHANGE UP (ref 13–17)
HGB BLD-MCNC: 16.4 G/DL — SIGNIFICANT CHANGE UP (ref 13–17)
IMM GRANULOCYTES NFR BLD AUTO: 0.4 % — SIGNIFICANT CHANGE UP (ref 0–0.9)
INR BLD: 1.45 RATIO — HIGH (ref 0.85–1.18)
INR BLD: 1.63 RATIO — HIGH (ref 0.85–1.18)
LACTATE SERPL-SCNC: 4 MMOL/L — CRITICAL HIGH (ref 0.7–2)
LACTATE SERPL-SCNC: 5.1 MMOL/L — CRITICAL HIGH (ref 0.7–2)
LYMPHOCYTES # BLD AUTO: 0.31 K/UL — LOW (ref 1–3.3)
LYMPHOCYTES # BLD AUTO: 0.42 K/UL — LOW (ref 1–3.3)
LYMPHOCYTES # BLD AUTO: 1.9 % — LOW (ref 13–44)
LYMPHOCYTES # BLD AUTO: 4.5 % — LOW (ref 13–44)
MANUAL SMEAR VERIFICATION: SIGNIFICANT CHANGE UP
MCHC RBC-ENTMCNC: 28.3 PG — SIGNIFICANT CHANGE UP (ref 27–34)
MCHC RBC-ENTMCNC: 28.9 PG — SIGNIFICANT CHANGE UP (ref 27–34)
MCHC RBC-ENTMCNC: 31.4 GM/DL — LOW (ref 32–36)
MCHC RBC-ENTMCNC: 31.9 GM/DL — LOW (ref 32–36)
MCV RBC AUTO: 90.1 FL — SIGNIFICANT CHANGE UP (ref 80–100)
MCV RBC AUTO: 90.5 FL — SIGNIFICANT CHANGE UP (ref 80–100)
METAMYELOCYTES # FLD: 3.8 % — HIGH (ref 0–0)
MONOCYTES # BLD AUTO: 0 K/UL — SIGNIFICANT CHANGE UP (ref 0–0.9)
MONOCYTES # BLD AUTO: 0.07 K/UL — SIGNIFICANT CHANGE UP (ref 0–0.9)
MONOCYTES NFR BLD AUTO: 0 % — LOW (ref 2–14)
MONOCYTES NFR BLD AUTO: 1 % — LOW (ref 2–14)
NEUTROPHILS # BLD AUTO: 20.68 K/UL — HIGH (ref 1.8–7.4)
NEUTROPHILS # BLD AUTO: 6.4 K/UL — SIGNIFICANT CHANGE UP (ref 1.8–7.4)
NEUTROPHILS NFR BLD AUTO: 90.5 % — HIGH (ref 43–77)
NEUTROPHILS NFR BLD AUTO: 93.8 % — HIGH (ref 43–77)
NEUTS BAND # BLD: 3.8 % — SIGNIFICANT CHANGE UP (ref 0–8)
NRBC # BLD: 0 /100 WBCS — SIGNIFICANT CHANGE UP (ref 0–0)
NT-PROBNP SERPL-SCNC: 3875 PG/ML — HIGH (ref 0–300)
PLAT MORPH BLD: ABNORMAL
PLATELET # BLD AUTO: 133 K/UL — LOW (ref 150–400)
PLATELET # BLD AUTO: 205 K/UL — SIGNIFICANT CHANGE UP (ref 150–400)
POTASSIUM SERPL-MCNC: 3.6 MMOL/L — SIGNIFICANT CHANGE UP (ref 3.5–5.3)
POTASSIUM SERPL-MCNC: 4.1 MMOL/L — SIGNIFICANT CHANGE UP (ref 3.5–5.3)
POTASSIUM SERPL-SCNC: 3.6 MMOL/L — SIGNIFICANT CHANGE UP (ref 3.5–5.3)
POTASSIUM SERPL-SCNC: 4.1 MMOL/L — SIGNIFICANT CHANGE UP (ref 3.5–5.3)
PROT SERPL-MCNC: 6 G/DL — SIGNIFICANT CHANGE UP (ref 6–8.3)
PROT SERPL-MCNC: 6.9 G/DL — SIGNIFICANT CHANGE UP (ref 6–8.3)
PROTHROM AB SERPL-ACNC: 16.3 SEC — HIGH (ref 9.5–13)
PROTHROM AB SERPL-ACNC: 16.9 SEC — HIGH (ref 9.5–13)
RBC # BLD: 5.05 M/UL — SIGNIFICANT CHANGE UP (ref 4.2–5.8)
RBC # BLD: 5.68 M/UL — SIGNIFICANT CHANGE UP (ref 4.2–5.8)
RBC # FLD: 14.1 % — SIGNIFICANT CHANGE UP (ref 10.3–14.5)
RBC # FLD: 14.2 % — SIGNIFICANT CHANGE UP (ref 10.3–14.5)
RBC BLD AUTO: NORMAL — SIGNIFICANT CHANGE UP
RSV RNA NPH QL NAA+NON-PROBE: SIGNIFICANT CHANGE UP
SARS-COV-2 RNA SPEC QL NAA+PROBE: SIGNIFICANT CHANGE UP
SODIUM SERPL-SCNC: 134 MMOL/L — LOW (ref 135–145)
SODIUM SERPL-SCNC: 137 MMOL/L — SIGNIFICANT CHANGE UP (ref 135–145)
TROPONIN I, HIGH SENSITIVITY RESULT: 24.5 NG/L — SIGNIFICANT CHANGE UP
WBC # BLD: 21.93 K/UL — HIGH (ref 3.8–10.5)
WBC # BLD: 6.83 K/UL — SIGNIFICANT CHANGE UP (ref 3.8–10.5)
WBC # FLD AUTO: 21.93 K/UL — HIGH (ref 3.8–10.5)
WBC # FLD AUTO: 6.83 K/UL — SIGNIFICANT CHANGE UP (ref 3.8–10.5)

## 2024-05-04 PROCEDURE — 87637 SARSCOV2&INF A&B&RSV AMP PRB: CPT

## 2024-05-04 PROCEDURE — 87040 BLOOD CULTURE FOR BACTERIA: CPT

## 2024-05-04 PROCEDURE — 84484 ASSAY OF TROPONIN QUANT: CPT

## 2024-05-04 PROCEDURE — 87150 DNA/RNA AMPLIFIED PROBE: CPT

## 2024-05-04 PROCEDURE — 71045 X-RAY EXAM CHEST 1 VIEW: CPT | Mod: 26

## 2024-05-04 PROCEDURE — 82962 GLUCOSE BLOOD TEST: CPT

## 2024-05-04 PROCEDURE — 99291 CRITICAL CARE FIRST HOUR: CPT

## 2024-05-04 PROCEDURE — 71250 CT THORAX DX C-: CPT | Mod: MC

## 2024-05-04 PROCEDURE — 99285 EMERGENCY DEPT VISIT HI MDM: CPT

## 2024-05-04 PROCEDURE — 74176 CT ABD & PELVIS W/O CONTRAST: CPT | Mod: 26,MC

## 2024-05-04 PROCEDURE — 93005 ELECTROCARDIOGRAM TRACING: CPT

## 2024-05-04 PROCEDURE — 71250 CT THORAX DX C-: CPT | Mod: 26,MC

## 2024-05-04 PROCEDURE — 87186 SC STD MICRODIL/AGAR DIL: CPT

## 2024-05-04 PROCEDURE — 93010 ELECTROCARDIOGRAM REPORT: CPT

## 2024-05-04 PROCEDURE — 83880 ASSAY OF NATRIURETIC PEPTIDE: CPT

## 2024-05-04 PROCEDURE — 83605 ASSAY OF LACTIC ACID: CPT

## 2024-05-04 PROCEDURE — 96368 THER/DIAG CONCURRENT INF: CPT

## 2024-05-04 PROCEDURE — 87077 CULTURE AEROBIC IDENTIFY: CPT

## 2024-05-04 PROCEDURE — 80053 COMPREHEN METABOLIC PANEL: CPT

## 2024-05-04 PROCEDURE — 36415 COLL VENOUS BLD VENIPUNCTURE: CPT

## 2024-05-04 PROCEDURE — 99285 EMERGENCY DEPT VISIT HI MDM: CPT | Mod: 25

## 2024-05-04 PROCEDURE — 96375 TX/PRO/DX INJ NEW DRUG ADDON: CPT

## 2024-05-04 PROCEDURE — 85610 PROTHROMBIN TIME: CPT

## 2024-05-04 PROCEDURE — 85025 COMPLETE CBC W/AUTO DIFF WBC: CPT

## 2024-05-04 PROCEDURE — 74176 CT ABD & PELVIS W/O CONTRAST: CPT | Mod: MC

## 2024-05-04 PROCEDURE — 96365 THER/PROPH/DIAG IV INF INIT: CPT

## 2024-05-04 PROCEDURE — 71045 X-RAY EXAM CHEST 1 VIEW: CPT | Mod: 26,77

## 2024-05-04 PROCEDURE — 85730 THROMBOPLASTIN TIME PARTIAL: CPT

## 2024-05-04 PROCEDURE — 71045 X-RAY EXAM CHEST 1 VIEW: CPT

## 2024-05-04 RX ORDER — PIPERACILLIN AND TAZOBACTAM 4; .5 G/20ML; G/20ML
3.38 INJECTION, POWDER, LYOPHILIZED, FOR SOLUTION INTRAVENOUS ONCE
Refills: 0 | Status: COMPLETED | OUTPATIENT
Start: 2024-05-04 | End: 2024-05-04

## 2024-05-04 RX ORDER — NOREPINEPHRINE BITARTRATE/D5W 8 MG/250ML
0.05 PLASTIC BAG, INJECTION (ML) INTRAVENOUS
Qty: 8 | Refills: 0 | Status: DISCONTINUED | OUTPATIENT
Start: 2024-05-04 | End: 2024-05-05

## 2024-05-04 RX ORDER — ACETAMINOPHEN 500 MG
1000 TABLET ORAL ONCE
Refills: 0 | Status: COMPLETED | OUTPATIENT
Start: 2024-05-04 | End: 2024-05-04

## 2024-05-04 RX ORDER — SODIUM CHLORIDE 9 MG/ML
2200 INJECTION INTRAMUSCULAR; INTRAVENOUS; SUBCUTANEOUS ONCE
Refills: 0 | Status: COMPLETED | OUTPATIENT
Start: 2024-05-04 | End: 2024-05-04

## 2024-05-04 RX ORDER — SODIUM CHLORIDE 9 MG/ML
500 INJECTION INTRAMUSCULAR; INTRAVENOUS; SUBCUTANEOUS ONCE
Refills: 0 | Status: COMPLETED | OUTPATIENT
Start: 2024-05-04 | End: 2024-05-04

## 2024-05-04 RX ORDER — AZITHROMYCIN 500 MG/1
500 TABLET, FILM COATED ORAL ONCE
Refills: 0 | Status: COMPLETED | OUTPATIENT
Start: 2024-05-04 | End: 2024-05-04

## 2024-05-04 RX ORDER — NOREPINEPHRINE BITARTRATE/D5W 8 MG/250ML
0.05 PLASTIC BAG, INJECTION (ML) INTRAVENOUS
Qty: 8 | Refills: 0 | Status: DISCONTINUED | OUTPATIENT
Start: 2024-05-04 | End: 2024-05-08

## 2024-05-04 RX ORDER — VANCOMYCIN HCL 1 G
1000 VIAL (EA) INTRAVENOUS ONCE
Refills: 0 | Status: COMPLETED | OUTPATIENT
Start: 2024-05-04 | End: 2024-05-04

## 2024-05-04 RX ADMIN — SODIUM CHLORIDE 500 MILLILITER(S): 9 INJECTION INTRAMUSCULAR; INTRAVENOUS; SUBCUTANEOUS at 20:15

## 2024-05-04 RX ADMIN — AZITHROMYCIN 255 MILLIGRAM(S): 500 TABLET, FILM COATED ORAL at 23:10

## 2024-05-04 RX ADMIN — Medication 400 MILLIGRAM(S): at 17:07

## 2024-05-04 RX ADMIN — SODIUM CHLORIDE 2200 MILLILITER(S): 9 INJECTION INTRAMUSCULAR; INTRAVENOUS; SUBCUTANEOUS at 18:06

## 2024-05-04 RX ADMIN — PIPERACILLIN AND TAZOBACTAM 200 GRAM(S): 4; .5 INJECTION, POWDER, LYOPHILIZED, FOR SOLUTION INTRAVENOUS at 17:07

## 2024-05-04 RX ADMIN — Medication 14.5 MICROGRAM(S)/KG/MIN: at 21:31

## 2024-05-04 RX ADMIN — SODIUM CHLORIDE 1000 MILLILITER(S): 9 INJECTION INTRAMUSCULAR; INTRAVENOUS; SUBCUTANEOUS at 19:45

## 2024-05-04 RX ADMIN — PIPERACILLIN AND TAZOBACTAM 3.38 GRAM(S): 4; .5 INJECTION, POWDER, LYOPHILIZED, FOR SOLUTION INTRAVENOUS at 17:37

## 2024-05-04 RX ADMIN — Medication 14.5 MICROGRAM(S)/KG/MIN: at 19:29

## 2024-05-04 RX ADMIN — Medication 1000 MILLIGRAM(S): at 17:23

## 2024-05-04 RX ADMIN — SODIUM CHLORIDE 2200 MILLILITER(S): 9 INJECTION INTRAMUSCULAR; INTRAVENOUS; SUBCUTANEOUS at 17:06

## 2024-05-04 RX ADMIN — Medication 250 MILLIGRAM(S): at 21:31

## 2024-05-04 NOTE — ED PROVIDER NOTE - PHYSICAL EXAMINATION
Gen: NAD  Patient ambulated because it probably hurts from the insertion  Head: normal appearing  HEENT: normal conjunctiva  Lung: no respiratory distress, speaking in full sentences, ctab      CV: regular rate and rhythm, no murmurs  Abd: soft, non distended, non tender   MSK: no visible deformities  Neuro: alert and grossly oriented, no gross motor deficits  Skin: No gokul rashes

## 2024-05-04 NOTE — CHART NOTE - NSCHARTNOTEFT_GEN_A_CORE
Spoke to Dr. Benoit about this patient. He would like the patient monitored in ICU and given current improvement, would prefer holding off on nephrostomy tubes for now given eliquis dose taken this AM. He does not want to try stent placement given the stone size. I agree with his plan to observe patient as long as he is improving as nephrostomy tube on eliquis has high bleeding risk but if the patient were to show any signs of worsening sepsis, our team with place nephrostomy tube emergently. Please call IR if any changes in patient condition to suggest clinical worsening.      Keep NPO for now. Ideally if patient is improved would like eliquis held for at least 48 hours before intervention.

## 2024-05-04 NOTE — ED PROVIDER NOTE - ATTENDING CONTRIBUTION TO CARE
I was the supervising attending. I have independently seen face-to-face and examined the patient. I have reviewed the history and physical and discussed the MDM with the resident, fellow, CHRIS and/or student. I agree with the assessment and plan as presented unless otherwise documented as follows:    63M hx obesity, DM, HTN, prior kidney stones, prior DVT on Eliquis (last took this AM), transferred from  for septic kidney stone. Initially presented there with several days of generalized weakness and fever. Found to be febrile w/ TMax 105F, initially hemodynamically stable than became hypotensive s/p 2.7L IVF and Zosyn. Workup at [place] notable for initial lactate of 5, Cr 2.50, CTAP with obstructing 1.7cm R UPJ stone w/ hydronephrosis, transferred to [facility] for urology/IR evaluation. On initial evaluation in the ED, patient is AOx3, appears mildly fatigued but no acute distress. Was briefly on Levophed prior to EMS arrival, then was turned off just prior to transport as BP stabilized. Now hypotensive to 60-80s systolic, no tachycardia. Levophed restarted. Will draw repeat labs, broaden antibiotic coverage, consult urology. -April Corley MD (Attending) I was the supervising attending. I have independently seen face-to-face and examined the patient. I have reviewed the history and physical and discussed the MDM with the resident, fellow, CHRIS and/or student. I agree with the assessment and plan as presented unless otherwise documented as follows:    63M hx obesity, DM, HTN, prior kidney stones, prior DVT on Eliquis (last took this AM), transferred from  for septic kidney stone. Initially presented there with several days of generalized weakness and fever. Found to be febrile w/ TMax 105F, initially hemodynamically stable than became hypotensive s/p 2.7L IVF and Zosyn. Workup at [place] notable for initial lactate of 5, Cr 2.50, CTAP with obstructing 1.7cm R UPJ stone w/ hydronephrosis, transferred to [facility] for urology/IR evaluation. Of note, patient transferred to [facility] prior to obtaining UA/UCx. On initial evaluation in the ED, patient is AOx3, appears mildly fatigued but no acute distress. Was briefly on Levophed prior to EMS arrival, then was turned off just prior to transport as BP stabilized. Now hypotensive to 60-80s systolic, no tachycardia. Levophed restarted. Will draw repeat labs, obtain UA/UCx, broaden antibiotic coverage, consult urology. -April Corley MD (Attending)

## 2024-05-04 NOTE — ED PROVIDER NOTE - CLINICAL SUMMARY MEDICAL DECISION MAKING FREE TEXT BOX
Tesfaye Curry MD (PGY-3) adult male presenting for fever, hypotension at outside institution, transferred for concern of septic stone.  Received a total of 2.7 cc of fluid at outside facility, normotensive until the was in the ambulance at which point he started dropping his pressures.  Protecting airway.  Exam nonfocal.  No urine was obtained at the outside facility, there is a question of whether this truly represents a septic stone.  He has evidence of pneumonia on his chest x-ray from [place], will broaden to vancomycin and azithromycin, already received Zosyn at the outside institution.  Workup for fever of unspecified origin.  Pressor therapy with MAP goal of 65.  Urology notified, to evaluate the patient.  Question of SICU versus MICU disposition given pressor requirement.

## 2024-05-04 NOTE — ED PROVIDER NOTE - NSICDXPASTSURGICALHX_GEN_ALL_CORE_FT
PAST SURGICAL HISTORY:  H/O nephrostomy bilateral  May 2018 clamped  JUNE and had ureteral stent    Hx of tonsillectomy at 6 years old    Presence of IVC filter 7/18    S/P endoscopy     Ureteral stents placement, bilateral

## 2024-05-04 NOTE — ED ADULT NURSE NOTE - OBJECTIVE STATEMENT
64 y/o Male BIBEMS transferred from [place] presents to ED c/o with a right sided renal stone. Pt was transferred for the septic stone and possible urosepsis. PMHx morbid obesity,  HTN, T2DM, renal stones in the past, multiple VTE's on Eliquis, bilateral lower extremity lymph edema. Pt endorses fever and general weakness. Pt denies headache, n/v/d, chest pain, SOB, abdominal pain.   Pt is AxOx4. Pt demonstrates incontinence, is nonambulatory. Pt states NKDA. Stretcher locked and in lowest position, appropriate side rails up. Pt instructed to notify RN if assistance is needed.

## 2024-05-04 NOTE — ED PROVIDER NOTE - CLINICAL SUMMARY MEDICAL DECISION MAKING FREE TEXT BOX
63 year old male with fever, chills, labs reviewed- + lactate, received zosyn, elevated renal fxn noted, CT showed multiple obstructing kidney stones, spoke with  on Call , , patient needs nephrostomy, will transfer to [facility](PAtient is known to /felix) 63 year old male with fever, chills, labs reviewed- + lactate, received zosyn, on IV bolus, elevated renal fxn noted, CT showed multiple obstructing kidney stones, spoke with  on Call , , patient needs nephrostomy, will transfer to [facility](PAtient is known to /felix) for nephrostomy 63 year old male with fever, chills, labs reviewed- + lactate, received zosyn, on IV bolus, elevated renal fxn noted, CT showed multiple obstructing kidney stones, spoke with  on Call , /ICU, patient needs nephrostomy tube, will transfer to [facility](Patient is known to /felix) for nephrostomy

## 2024-05-04 NOTE — CONSULT NOTE ADULT - ASSESSMENT
Assessment: 63y Male h/o nephrolithiasis requiring prior intervention transferred from OSH with obstructing R UPJ stone and sepsis. Of note, patient on Eliquis, which was last taken this morning. patient on low dose pressor support.     Plan: IR to defer emergent PCN insertion at this time, given patient on home Eliquis and overall with low pressor requirement  - ideally would hold Eliquis 48h prior to PCN insertion  - agree with admission to ICU  - if patient clinically worsens despite conservative management with ICU admission, antibiotics, and fluid administration, IR remains available for more emergent PCN insertion  - plan reviewed between IR attending Dr. Alaniz and urology attending Dr. Benoit and discussed with ER  - IR to continue to follow  - discussed with primary team    Sumit Huerta MD  PGY-VI, Interventional Radiology Integrated Resident    -Available on [employer] TEAMS  -Emergent issues: [facility]-p.[phone]; [facility]-p.[phone] ([phone])  -Non-emergent consults: Please place a [place] order "IR Consult" with an appropriate callback number  -Scheduling questions: [facility]: [phone]; [facility]: [phone]  -Clinic/Outpatient booking: [facility]: [phone]; [facility]: [phone] Assessment: 63y Male h/o nephrolithiasis requiring prior intervention transferred from OSH with obstructing R UPJ stone and sepsis. Of note, patient on Eliquis, which was last taken this morning. patient on low dose pressor support.     Plan: IR to defer emergent PCN insertion at this time, given patient on home Eliquis and overall with low pressor requirement  - ideally would hold Eliquis 48h prior to PCN insertion  - agree with admission to ICU  - maintain NPO and c/w hold A/C  - if patient clinically worsens despite conservative management with ICU admission, antibiotics, and fluid administration, IR remains available for more emergent PCN insertion  - plan reviewed between IR attending Dr. Alaniz and urology attending Dr. Benoit and discussed with ER  - IR to continue to follow  - discussed with primary team    Sumit Huerta MD  PGY-VI, Interventional Radiology Integrated Resident    -Available on [employer] TEAMS  -Emergent issues: [facility]-p.[phone]; [facility]-p.[phone] ([phone])  -Non-emergent consults: Please place a [place] order "IR Consult" with an appropriate callback number  -Scheduling questions: [facility]: [phone]; [facility]: [phone]  -Clinic/Outpatient booking: [facility]: [phone]; [facility]: [phone] Assessment: 63y Male h/o nephrolithiasis requiring prior intervention transferred from OSH with obstructing R UPJ stone and sepsis. Of note, patient on Eliquis, which was last taken this morning. patient on low dose pressor support.     Plan:   - attendings spoke, observation in ICU for now and if any clinically worsening will emergently place PCN. Patient took eliquis this AM which makes PCN bleeding risk higher, urology deferring stent placement which is lower bleeding risk due to size of stone.  - ideally would hold Eliquis 48h prior to PCN insertion  - agree with admission to ICU  - maintain NPO and c/w hold A/C  - if patient clinically worsens despite conservative management with ICU admission, antibiotics, and fluid administration, IR remains available for more emergent PCN insertion  - plan reviewed between IR attending Dr. Alaniz and urology attending Dr. Benoit and discussed with ER  - IR to continue to follow  - discussed with primary team    Sumit Huerta MD  PGY-VI, Interventional Radiology Integrated Resident    -Available on [employer] TEAMS  -Emergent issues: [facility]-p.[phone]; [facility]-p.[phone] ([phone])  -Non-emergent consults: Please place a [place] order "IR Consult" with an appropriate callback number  -Scheduling questions: [facility]: [phone]; [facility]: [phone]  -Clinic/Outpatient booking: [facility]: [phone]; [facility]: [phone]

## 2024-05-04 NOTE — ED ADULT NURSE NOTE - NSICDXPASTMEDICALHX_GEN_ALL_CORE_FT
PAST MEDICAL HISTORY:  Acquired lymphedema of leg     Anemia s/p 10 units of blood [facility] 7/18, on iron now   due to GI bleeding stable now    Calculus of kidney and ureter     Diabetes mellitus type II     Duodenal ulcer     DVT (deep venous thrombosis) 7/18 ivc filter inserted    Gastritis     GI bleed recently transfused 10 units    H/O sleep apnea no CPAP used, to follow up ASAP    had sleep study done no pending result    HTN (hypertension)     Hx of peripheral neuropathy     Morbid obesity     OA (osteoarthritis) right hip    Respiratory failure     Respiratory failure, post-operative post endoscopy [facility] 7/18    Tracheostomy dependent     Ventricular arrhythmia post endoscopy  echo done EF 60%

## 2024-05-04 NOTE — ED PROVIDER NOTE - NSICDXPASTMEDICALHX_GEN_ALL_CORE_FT
PAST MEDICAL HISTORY:  Acquired lymphedema of leg     Anemia s/p 10 units of blood [facility] 7/18, on iron now   due to GI bleeding stable now    Calculus of kidney and ureter     Diabetes mellitus type II     Duodenal ulcer     DVT (deep venous thrombosis) 7/18 ivc filter inserted    Gastritis     GI bleed recently transfused 10 units    H/O sleep apnea no CPAP used, to follow up ASAP    had sleep study done no pending result    HTN (hypertension)     Hx of peripheral neuropathy     Morbid obesity     OA (osteoarthritis) right hip    Respiratory failure     Respiratory failure, post-operative post endoscopy [facility] 7/18    Tracheostomy dependent     Ventricular arrhythmia post endoscopy  echo done EF 60%

## 2024-05-04 NOTE — CONSULT NOTE ADULT - SUBJECTIVE AND OBJECTIVE BOX
HPI: 63M w/ multiple medical problems including b/l PCNL in 2018 with Dr. Teixeira, DVT on Eliquis 2.5mg BID p/w fevers, rigors and R back pain. Fevers of 101F, 102.9F happened this AM at home while fever of 105F (rectally obtained at [facility] per family). Started on levophed at [facility], tachy to 140's which self resolved. Transported to [facility] for septic stone on CT with 1.7cm R UPJ stone with moderate R hydro.   Upon evaluation in the ED, patient is AOX 3, feeling fatigued and dehydrated. On physical no b/l CVAT.  On zithromax and vancomycin in the ED.  Random bladder scan shows 170cc.     PAST MEDICAL & SURGICAL HISTORY:  HTN (hypertension)      Diabetes mellitus type II      Calculus of kidney and ureter      Acquired lymphedema of leg      H/O sleep apnea  no CPAP used, to follow up ASAP    had sleep study done no pending result      Morbid obesity      Hx of peripheral neuropathy      DVT (deep venous thrombosis)  7/18 ivc filter inserted      GI bleed  recently transfused 10 units      Duodenal ulcer      Gastritis      Respiratory failure, post-operative  post endoscopy [facility] 7/18      Ventricular arrhythmia  post endoscopy  echo done EF 60%      Anemia  s/p 10 units of blood [facility] 7/18, on iron now   due to GI bleeding stable now      OA (osteoarthritis)  right hip      Respiratory failure      Tracheostomy dependent      Hx of tonsillectomy  at 6 years old      Ureteral stents placement, bilateral      Presence of IVC filter  7/18      S/P endoscopy      H/O nephrostomy  bilateral  May 2018 clamped  JUNE and had ureteral stent        FAMILY HISTORY:  Family history of prostate cancer in father    Family history of coronary artery disease     No known  malignancy   SOCIAL HISTORY: Retired   Tobacco hx: smoker/nonsmoker   MEDICATIONS  (STANDING):  azithromycin  IVPB 500 milliGRAM(s) IV Intermittent once  norepinephrine Infusion 0.05 MICROgram(s)/kG/Min (14.5 mL/Hr) IV Continuous <Continuous>  norepinephrine Infusion 0.05 MICROgram(s)/kG/Min (14.5 mL/Hr) IV Continuous <Continuous>    MEDICATIONS  (PRN):    Allergies    No Known Allergies    Intolerances        REVIEW OF SYSTEMS: Pertinent positives and negatives as stated in HPI, otherwise negative    Vital signs  T(C): 36.8 (05-04-24 @ 21:08), Max: 36.8 (05-04-24 @ 21:08)  HR: 84 (05-04-24 @ 21:45)  BP: 119/62 (05-04-24 @ 21:45)  SpO2: 93% (05-04-24 @ 21:45)  Wt(kg): --    Physical Exam  Gen: NAD, dehydrated, fatigue   HEENT: normocephalic, atraumatic, no scleral icterus  Pulm: CTA b/l, No respiratory distress  Abd: Soft, NT  MSK:  No CVAT,  NEURO: A&Ox3, no focal neurological deficits, CN 2-12 grossly intact  SKIN: warm, dry, no rash.    LABS:  labs pending collection   Urine Cx:   Blood Cx:    Radiology:     HPI: 63M w/ multiple medical problems including b/l PCNL in 2018 with Dr. Teixeira, DVT on Eliquis 2.5mg BID p/w fevers, rigors and R back pain. Fevers of 101F, 102.9F happened this AM at home while fever of 105F (rectally obtained at [facility] per family). Started on levophed at [facility], tachy to 140's which self resolved. Transported to [facility] for septic stone on CT with 1.7cm R UPJ stone with moderate R hydro.   Upon evaluation in the ED, patient is AOX 3, feeling fatigued and dehydrated. On physical no b/l CVAT.  On zithromax and vancomycin in the ED.  Random bladder scan shows 170cc.     PAST MEDICAL & SURGICAL HISTORY:  HTN (hypertension)      Diabetes mellitus type II      Calculus of kidney and ureter      Acquired lymphedema of leg      H/O sleep apnea  no CPAP used, to follow up ASAP    had sleep study done no pending result      Morbid obesity      Hx of peripheral neuropathy      DVT (deep venous thrombosis)  7/18 ivc filter inserted      GI bleed  recently transfused 10 units      Duodenal ulcer      Gastritis      Respiratory failure, post-operative  post endoscopy [facility] 7/18      Ventricular arrhythmia  post endoscopy  echo done EF 60%      Anemia  s/p 10 units of blood [facility] 7/18, on iron now   due to GI bleeding stable now      OA (osteoarthritis)  right hip      Respiratory failure      Tracheostomy dependent      Hx of tonsillectomy  at 6 years old      Ureteral stents placement, bilateral      Presence of IVC filter  7/18      S/P endoscopy      H/O nephrostomy  bilateral  May 2018 clamped  JUNE and had ureteral stent        FAMILY HISTORY:  Family history of prostate cancer in father    Family history of coronary artery disease     No known  malignancy   SOCIAL HISTORY: Retired   Tobacco hx: smoker/nonsmoker   MEDICATIONS  (STANDING):  azithromycin  IVPB 500 milliGRAM(s) IV Intermittent once  norepinephrine Infusion 0.05 MICROgram(s)/kG/Min (14.5 mL/Hr) IV Continuous <Continuous>  norepinephrine Infusion 0.05 MICROgram(s)/kG/Min (14.5 mL/Hr) IV Continuous <Continuous>    MEDICATIONS  (PRN):    Allergies    No Known Allergies    Intolerances        REVIEW OF SYSTEMS: Pertinent positives and negatives as stated in HPI, otherwise negative    Vital signs  T(C): 36.8 (05-04-24 @ 21:08), Max: 36.8 (05-04-24 @ 21:08)  HR: 84 (05-04-24 @ 21:45)  BP: 119/62 (05-04-24 @ 21:45)  SpO2: 93% (05-04-24 @ 21:45)  Wt(kg): --    Physical Exam  Gen: NAD, dehydrated, fatigue   HEENT: normocephalic, atraumatic, no scleral icterus  Pulm: CTA b/l, No respiratory distress  Abd: Soft, NT  MSK:  No CVAT,  NEURO: A&Ox3, no focal neurological deficits, CN 2-12 grossly intact  SKIN: warm, dry, no rash.    LABS:  labs pending collection   Urine Cx:   Blood Cx:    Radiology:  PROCEDURE DATE:  05/04/2024          INTERPRETATION:  CLINICAL INFORMATION: Fever. Evaluate for renal   calculus/obstruction uropathy.    COMPARISON: None.    CONTRAST/COMPLICATIONS:  IV Contrast: NONE  Oral Contrast: NONE  Complications: None reported at time of study completion    PROCEDURE:  CT of the Chest, Abdomen and Pelvis was performed.  Sagittal and coronal reformats were performed.    FINDINGS:  CHEST:  LUNGS AND LARGE AIRWAYS: Central bronchial anatomy appears patent.   Opacities identified within the inferior aspect of the bilateral lower   lobes, likely related to atelectatic change; underlying parenchymal   infiltrate cannot be excluded.  PLEURA: Small right pleural effusion. No left pleural effusion.  VESSELS: Coronary arterial calcifications.  HEART: Heart size appears enlarged. No pericardial effusion.  MEDIASTINUM AND AMANDA: No lymphadenopathy.  CHEST WALL AND LOWER NECK: Within normal limits.    ABDOMEN AND PELVIS:  LIVER: The liver is normal in size. No focal hepatic masses are   identified.  BILE DUCTS: Normal caliber.  GALLBLADDER: Multiple gallstones. No gallbladder wall thickening.  SPLEEN: Within normal limits.  PANCREAS: Within normal limits.  ADRENALS: Within normal limits.  KIDNEYS/URETERS: There is moderate right-sided hydronephrosis. Multiple   right renal calculi are identified measuring up to 2.2 cm in size. There   is dilatation of the right renal pelvis with a 1.7 cm right UPJ calculus   identified. Left renal calculi are identified measuring up to 1.9 cm.   There is no evidence for left-sided hydronephrosis.    BLADDER: Within normal limits.  REPRODUCTIVE ORGANS: The prostate is enlarged. Foci of prostatic   calcifications noted.    BOWEL: Fecal material scattered throughout the colon. There is no   evidence for mechanical bowel obstruction. Wall thickening within the   distal sigmoid and rectum suggested. Colonic diverticulosis. The appendix   is not visualized.  PERITONEUM: No free intraperitoneal air or fluid identified.  VESSELS: Atherosclerotic changes.  RETROPERITONEUM/LYMPH NODES: Upper abdominal peripancreatic/maddie hepatis   lymphadenopathy is identified measuring up to 2.0 x 1.5 cm.  ABDOMINAL WALL: Fat and fluid-containing periumbilical hernia identified.  BONES: Marked remodeling deformity of the right hip region identified,   with associated degenerative change. Degenerative changes of the spine   noted.    IMPRESSION:  1. Small right pleural effusion. Bilateral lower lobe atelectatic   changes. No definite regions of infiltrate or consolidation.  2. There is moderate right-sided hydronephrosis. Multiple right renal   calculi are identified measuring up to 2.2 cm in size. There is   dilatation of the right renal pelvis with a 1.7 cm right UPJ calculus   identified. Left renal calculi are identified measuring up to 1.9 cm.   There is no evidence for left-sided hydronephrosis.  3. Upper abdominal peripancreatic/maddie hepatis lymphadenopathy is   identified measuring up to 2.0 x 1.5 cm.  4. Wall thickening within the distal sigmoid and rectum suggested.

## 2024-05-04 NOTE — ED ADULT NURSE REASSESSMENT NOTE - NS ED NURSE REASSESS COMMENT FT1
Bae catheter inserted using sterile technique, draining by gravity, secured with StatLock. Second RN present to confirm sterility. Pt tolerated well. Approximately 175cc urine drained. UA and culture drawn and sent to lab. Awaiting dispo. 74

## 2024-05-04 NOTE — ED ADULT NURSE NOTE - OBJECTIVE STATEMENT
Patient came from home with complaint of weakness and fever since this morning. Denies any nausea or vomit.

## 2024-05-04 NOTE — ED PROVIDER NOTE - NS ED ROS FT
GENERAL: + fever  EYES: no eye pain  HEENT: no neck pain  CARDIAC: no chest pain  PULMONARY: no SOB  GI: no abdominal pain  : no dysuria  SKIN: no rashes  NEURO: no headache  MSK: no new joint pain

## 2024-05-04 NOTE — CONSULT NOTE ADULT - SUBJECTIVE AND OBJECTIVE BOX
Vascular & Interventional Radiology Consult Note    Evaluate for Procedure: R PCN    HPI: 63y Male with h/o nephrolithiasis requiring prior intervention transferred from OSH with obstructing R UPJ stone and sepsis. Of note, patient on Eliquis, which was last taken this morning. patient on low dose pressor support.     Allergies: No Known Allergies    Medications (Abx/Cardiac/Anticoagulation/Blood Products)  azithromycin  IVPB: 255 mL/Hr IV Intermittent (05-04 @ 23:10)  norepinephrine Infusion: 14.5 mL/Hr IV Continuous (05-04 @ 21:22)  vancomycin  IVPB.: 250 mL/Hr IV Intermittent (05-04 @ 21:31)    Data:  175.3  154.2  T(C): 37.2  HR: 87  BP: 104/54  RR: 20  SpO2: 95%    -WBC 21.93 / HgB 14.3 / Hct 45.5 / Plt 205  -Na 137 / Cl 100 / BUN 32 / Glucose 114  -K 3.6 / CO2 19 / Cr 2.74  -ALT 11 / Alk Phos 72 / T.Bili 1.2  -INR 1.63 / PTT 30.9      Imaging: CT A/P reviewed

## 2024-05-04 NOTE — ED PROVIDER NOTE - OBJECTIVE STATEMENT
63-year-old male, history of morbid obesity, multiple VTE's currently on Eliquis, hypertension, type 2 diabetes, multiple renal stones in the past, presenting for fever.  He was seen initially at [facility], found to have an obstructing right-sided renal stone, 1.9 cm at the UPJ with associated moderate hydronephrosis.  He was then transferred here out of concern for septic stone.  Of note a urine sample was not obtained over there.  He only endorses fever and generalized fatigue, his review of systems otherwise negative.  He has no known allergies to medications.

## 2024-05-04 NOTE — ED ADULT NURSE REASSESSMENT NOTE - NS ED NURSE REASSESS COMMENT FT1
Patient aware of plan to be transferred to [facility]. Report given to ED nurse at [facility]. Awaiting transport to [facility].

## 2024-05-04 NOTE — ED PROVIDER NOTE - NSICDXFAMILYHX_GEN_ALL_CORE_FT
FAMILY HISTORY:  Family history of coronary artery disease  Family history of prostate cancer in father

## 2024-05-04 NOTE — ED PROVIDER NOTE - CONSTITUTIONAL, MLM
acute on chronic ill appearing, awake, alert, oriented to person, place, time/situation and in moderate distress due to fever normal...

## 2024-05-04 NOTE — ED PROVIDER NOTE - CHILD ABUSE FACILITY
[facility]
Breathing spontaneous and unlabored. Breath sounds clear and equal bilaterally with regular rhythm.

## 2024-05-04 NOTE — ED ADULT TRIAGE NOTE - WEIGHT METHOD
Care Due:                  Date            Visit Type   Department     Provider  --------------------------------------------------------------------------------                                EP -                              PRIMARY      [facility]  Last Visit: 01-      CARE ([facility])   PRIMARY [facility] -                              PRIMARY      [facility]  Next Visit: 04-      CARE ([facility])   PRIMARY [facility]                                                            Last  Test          Frequency    Reason                     Performed    Due Date  --------------------------------------------------------------------------------    CMP.........  12 months..  DULoxetine, losartan,      01- 01-                             potassium................    Powered by [employer]. Reference number: [id number].   2/28/2022 10:11:14 AM CST   stated

## 2024-05-04 NOTE — CONSULT NOTE ADULT - ASSESSMENT
3M w/ multiple medical problems including b/l PCNL in 2018 with Dr. Teixeira, DVT on Eliquis 2.5mg BID p/w fevers, rigors and R back pain. Fevers of 101F, 102.9F happened this AM at home while fever of 105F (rectally obtained at [facility] per family). Started on levophed at [facility], tachy to 140's which self resolved. Transported to [facility] for septic stone.  Currently patient is not endorsing any flank pain.   here patient is afebrile, HR wnl, hypotensive on arrival started on levo- currently normal. NO urine collected. Labs from [facility] with wbc: 6.83, Cr: 2.5 Lactate: 4.0(5.1).  CTAP shows moderate right hydro. Dilatation of the right renal pelvis with 1.7cm R UPJ calculus identified. no left hydronephrosis.     In the setting of large obstructing stone, high likelihood of ureteral stent failure. Will recommend IR consult for R PCN placement. Would recommend placing holman catheter to collect UA and monitor I/O. Consult MICU.      3M w/ multiple medical problems including b/l PCNL in 2018 with Dr. Texieira, DVT on Eliquis 2.5mg BID p/w fevers, rigors and R back pain. Fevers of 101F, 102.9F happened this AM at home while fever of 105F (rectally obtained at [facility] per family). Started on levophed at [facility], tachy to 140's which self resolved. Transported to [facility] for septic stone.  Currently patient is not endorsing any flank pain.   here patient is afebrile, HR wnl, hypotensive on arrival started on levo- currently normal. NO urine collected. Labs from [facility] with wbc: 6.83, Cr: 2.5 Lactate: 4.0(5.1).  CTAP shows moderate right hydro. Dilatation of the right renal pelvis with 1.7cm R UPJ calculus identified. no left hydronephrosis.     In the setting of large obstructing stone, high likelihood of ureteral stent failure. Will recommend IR consult for R PCN placement. Would recommend placing holman catheter to collect UA and monitor I/O. Consult MICU.     UPDATE:  At 11 PM Dr. Benoit from urology and Dr. Alaniz from IR had a direct conversation.   Given the fact that patient had 2.5mg of eliquis this AM, Dr. Alaniz plans for IR R NT placement tomorrow morning after eliquis has been held for 24 hours given significantly higher bleeding risk  Dr. Benoit agrees with the plan. They both agreed on ICU admission, fluids, antibiotics, and close monitoring until IR NT placement in AM  No plan for urology stent placement or surgical procedure with general anesthesia.  If patient becomes more unstable overnight in the interim, IR agrees to place R NT sooner in an emergent fashion overnight.  Dr. Benoit aware and is in agreement with plan discussed directly with Dr. Alaniz from IR.   Discussed with Dr. Benoit   Update written by Noel Cartagena MD at 11:15 PM on 5/4/24    [facility]  [address] [place] [address] [phone]     3M w/ multiple medical problems including b/l PCNL in 2018 with Dr. Teixeira, DVT on Eliquis 2.5mg BID p/w fevers, rigors and R back pain. Fevers of 101F, 102.9F happened this AM at home while fever of 105F (rectally obtained at [facility] per family). Started on levophed at [facility], tachy to 140's which self resolved. Transported to [facility] for septic stone.  Currently patient is not endorsing any flank pain.   here patient is afebrile, HR wnl, hypotensive on arrival started on levo- currently normal. NO urine collected. Labs from [facility] with wbc: 6.83, Cr: 2.5 Lactate: 4.0(5.1).  CTAP shows moderate right hydro. Dilatation of the right renal pelvis with 1.7cm R UPJ calculus identified. no left hydronephrosis.     In the setting of large obstructing stone, high likelihood of ureteral stent failure. Will recommend IR consult for R PCN placement. Would recommend placing holman catheter to collect UA and monitor I/O. Consult MICU.     UPDATE:  At 11 PM Dr. Benoit from urology and Dr. Alaniz from IR had a direct conversation.   Given the fact that patient had 2.5mg of eliquis this AM, Dr. Alaniz plans for IR R NT placement tomorrow morning after eliquis has been held for 48 hours given significantly higher bleeding risk  Dr. Benoit agrees with the plan. They both agreed on ICU admission, fluids, antibiotics, and close monitoring until IR NT placement in AM  No plan for urology stent placement or surgical procedure with general anesthesia.  If patient becomes more unstable overnight in the interim, IR agrees to place R NT sooner in an emergent fashion overnight.  Dr. Benoit aware and is in agreement with plan discussed directly with Dr. Alaniz from IR.   Discussed with Dr. Benoit   Update written by Noel Cartagena MD at 11:15 PM on 5/4/24    [facility]  [address] [place] [address] [phone]     3M w/ multiple medical problems including b/l PCNL in 2018 with Dr. Teixeira, DVT on Eliquis 2.5mg BID p/w fevers, rigors and R back pain. Fevers of 101F, 102.9F happened this AM at home while fever of 105F (rectally obtained at [facility] per family). Started on levophed at [facility], tachy to 140's which self resolved. Transported to [facility] for septic stone.  Currently patient is not endorsing any flank pain.   here patient is afebrile, HR wnl, hypotensive on arrival started on levo- currently normal. NO urine collected. Labs from [facility] with wbc: 6.83, Cr: 2.5 Lactate: 4.0(5.1).  CTAP shows moderate right hydro. Dilatation of the right renal pelvis with 1.7cm R UPJ calculus identified. no left hydronephrosis.     In the setting of large obstructing stone, high likelihood of ureteral stent failure. Will recommend IR consult for R PCN placement. Would recommend placing holman catheter to collect UA and monitor I/O. Consult MICU.     UPDATE:  At 11 PM Dr. Benoit from urology and Dr. Alaniz from IR had a direct conversation.   Given the fact that patient had 2.5mg of eliquis this AM, Dr. Alaniz plans for IR R NT placement after eliquis has been held for 48 hours given significantly higher bleeding risk  Dr. Benoit agrees with the plan. They both agreed on ICU admission, fluids, antibiotics, and close monitoring until IR NT placement  No plan for urology stent placement or surgical procedure with general anesthesia.  If patient becomes more unstable overnight in the interim, IR agrees to place R NT sooner in an emergent fashion overnight.  Dr. Benoit aware and is in agreement with plan discussed directly with Dr. Alaniz from IR.   Discussed with Dr. Benoit   Update written by Noel Cartagena MD at 11:15 PM on 5/4/24    [facility]  [address] [place] [phone]

## 2024-05-04 NOTE — ED PROCEDURE NOTE - PROCEDURE ADDITIONAL DETAILS
Peripheral IV access in the Emergency Department obtained under dynamic ultrasound guidance with dark nonpulsatile blood return.  Catheter was flushed afterwards without any resistance or resulting extravasation.  IV catheter confirmed in compressible vein after insertion. 20 gauge catheter placed in a peripheral vein in the left upper extremity.

## 2024-05-04 NOTE — ED ADULT NURSE NOTE - NSFALLHARMRISKINTERV_ED_ALL_ED

## 2024-05-04 NOTE — ED ADULT NURSE NOTE - NSFALLRISKINTERV_ED_ALL_ED
Assistance OOB with selected safe patient handling equipment if applicable/Assistance with ambulation/Communicate fall risk and risk factors to all staff, patient, and family/Monitor gait and stability/Provide visual cue: yellow wristband, yellow gown, etc/Reinforce activity limits and safety measures with patient and family/Call bell, personal items and telephone in reach/Instruct patient to call for assistance before getting out of bed/chair/stretcher/Non-slip footwear applied when patient is off stretcher/[place] to call system/Physically safe environment - no spills, clutter or unnecessary equipment/Purposeful Proactive Rounding/Room/bathroom lighting operational, light cord in reach

## 2024-05-04 NOTE — ED ADULT NURSE REASSESSMENT NOTE - NS ED NURSE REASSESS COMMENT FT1
Patient noted to get elevated  while on Levophed drip. MD Thibodeaux made aware. Order to stop Levophed drip, 500cc NS bolus given. Report given to EMS. Patient HR 82 prior to transfer. Denies any chest pain or nausea.

## 2024-05-04 NOTE — ED PROVIDER NOTE - OBJECTIVE STATEMENT
63 year old male with fever, chills, feeling weak since yesterday. Denies VD, SOB, chest/abdominal/back/neck pain, HA, focal weakness/numbness. Denies any other symptoms. On arrival, patient was tachycardic, febrile, sepsis protocol was initiated.     PMH: HTN, DM, MEGHA, kidney stone s/p bilateral nephrostomy tube s/p bl PCNL, lower extremity lymphedema

## 2024-05-05 DIAGNOSIS — A41.9 SEPSIS, UNSPECIFIED ORGANISM: ICD-10-CM

## 2024-05-05 LAB
ALBUMIN SERPL ELPH-MCNC: 3.1 G/DL — LOW (ref 3.3–5)
ALP SERPL-CCNC: 74 U/L — SIGNIFICANT CHANGE UP (ref 40–120)
ALT FLD-CCNC: 13 U/L — SIGNIFICANT CHANGE UP (ref 10–45)
ANION GAP SERPL CALC-SCNC: 16 MMOL/L — SIGNIFICANT CHANGE UP (ref 5–17)
APPEARANCE UR: ABNORMAL
AST SERPL-CCNC: 18 U/L — SIGNIFICANT CHANGE UP (ref 10–40)
BACTERIA # UR AUTO: ABNORMAL /HPF
BASE EXCESS BLDV CALC-SCNC: -5.4 MMOL/L — LOW (ref -2–3)
BASOPHILS # BLD AUTO: 0 K/UL — SIGNIFICANT CHANGE UP (ref 0–0.2)
BASOPHILS # BLD AUTO: 0.03 K/UL — SIGNIFICANT CHANGE UP (ref 0–0.2)
BASOPHILS # BLD AUTO: 0.07 K/UL — SIGNIFICANT CHANGE UP (ref 0–0.2)
BASOPHILS NFR BLD AUTO: 0 % — SIGNIFICANT CHANGE UP (ref 0–2)
BASOPHILS NFR BLD AUTO: 0.2 % — SIGNIFICANT CHANGE UP (ref 0–2)
BASOPHILS NFR BLD AUTO: 0.3 % — SIGNIFICANT CHANGE UP (ref 0–2)
BILIRUB SERPL-MCNC: 1.2 MG/DL — SIGNIFICANT CHANGE UP (ref 0.2–1.2)
BILIRUB UR-MCNC: NEGATIVE — SIGNIFICANT CHANGE UP
BUN SERPL-MCNC: 33 MG/DL — HIGH (ref 7–23)
CA-I SERPL-SCNC: 1.04 MMOL/L — LOW (ref 1.15–1.33)
CALCIUM SERPL-MCNC: 7.4 MG/DL — LOW (ref 8.4–10.5)
CAST: 2 /LPF — SIGNIFICANT CHANGE UP (ref 0–4)
CHLORIDE BLDV-SCNC: 98 MMOL/L — SIGNIFICANT CHANGE UP (ref 96–108)
CHLORIDE SERPL-SCNC: 102 MMOL/L — SIGNIFICANT CHANGE UP (ref 96–108)
CO2 BLDV-SCNC: 22 MMOL/L — SIGNIFICANT CHANGE UP (ref 22–26)
CO2 SERPL-SCNC: 18 MMOL/L — LOW (ref 22–31)
COLOR SPEC: YELLOW — SIGNIFICANT CHANGE UP
CREAT SERPL-MCNC: 2.84 MG/DL — HIGH (ref 0.5–1.3)
DIFF PNL FLD: ABNORMAL
EGFR: 24 ML/MIN/1.73M2 — LOW
EOSINOPHIL # BLD AUTO: 0 K/UL — SIGNIFICANT CHANGE UP (ref 0–0.5)
EOSINOPHIL # BLD AUTO: 0.05 K/UL — SIGNIFICANT CHANGE UP (ref 0–0.5)
EOSINOPHIL # BLD AUTO: 0.05 K/UL — SIGNIFICANT CHANGE UP (ref 0–0.5)
EOSINOPHIL NFR BLD AUTO: 0 % — SIGNIFICANT CHANGE UP (ref 0–6)
EOSINOPHIL NFR BLD AUTO: 0.2 % — SIGNIFICANT CHANGE UP (ref 0–6)
EOSINOPHIL NFR BLD AUTO: 0.4 % — SIGNIFICANT CHANGE UP (ref 0–6)
GAS PNL BLDA: SIGNIFICANT CHANGE UP
GAS PNL BLDA: SIGNIFICANT CHANGE UP
GAS PNL BLDV: 130 MMOL/L — LOW (ref 136–145)
GAS PNL BLDV: SIGNIFICANT CHANGE UP
GAS PNL BLDV: SIGNIFICANT CHANGE UP
GLUCOSE BLDC GLUCOMTR-MCNC: 100 MG/DL — HIGH (ref 70–99)
GLUCOSE BLDC GLUCOMTR-MCNC: 104 MG/DL — HIGH (ref 70–99)
GLUCOSE BLDC GLUCOMTR-MCNC: 113 MG/DL — HIGH (ref 70–99)
GLUCOSE BLDC GLUCOMTR-MCNC: 117 MG/DL — HIGH (ref 70–99)
GLUCOSE BLDC GLUCOMTR-MCNC: 128 MG/DL — HIGH (ref 70–99)
GLUCOSE BLDV-MCNC: 156 MG/DL — HIGH (ref 70–99)
GLUCOSE SERPL-MCNC: 163 MG/DL — HIGH (ref 70–99)
GLUCOSE UR QL: NEGATIVE MG/DL — SIGNIFICANT CHANGE UP
GRAM STN FLD: ABNORMAL
HCO3 BLDV-SCNC: 21 MMOL/L — LOW (ref 22–29)
HCT VFR BLD CALC: 38.1 % — LOW (ref 39–50)
HCT VFR BLD CALC: 38.7 % — LOW (ref 39–50)
HCT VFR BLD CALC: 40.6 % — SIGNIFICANT CHANGE UP (ref 39–50)
HCT VFR BLDA CALC: 44 % — SIGNIFICANT CHANGE UP (ref 39–51)
HGB BLD CALC-MCNC: 14.7 G/DL — SIGNIFICANT CHANGE UP (ref 12.6–17.4)
HGB BLD-MCNC: 12.6 G/DL — LOW (ref 13–17)
HGB BLD-MCNC: 13.3 G/DL — SIGNIFICANT CHANGE UP (ref 13–17)
HGB BLD-MCNC: 13.7 G/DL — SIGNIFICANT CHANGE UP (ref 13–17)
HOROWITZ INDEX BLDV+IHG-RTO: SIGNIFICANT CHANGE UP
IMM GRANULOCYTES NFR BLD AUTO: 0.6 % — SIGNIFICANT CHANGE UP (ref 0–0.9)
IMM GRANULOCYTES NFR BLD AUTO: 2 % — HIGH (ref 0–0.9)
KETONES UR-MCNC: NEGATIVE MG/DL — SIGNIFICANT CHANGE UP
LACTATE BLDV-MCNC: 3.7 MMOL/L — HIGH (ref 0.5–2)
LACTATE SERPL-SCNC: 3.2 MMOL/L — HIGH (ref 0.5–2)
LACTATE SERPL-SCNC: 4.3 MMOL/L — CRITICAL HIGH (ref 0.5–2)
LEUKOCYTE ESTERASE UR-ACNC: ABNORMAL
LYMPHOCYTES # BLD AUTO: 0.4 K/UL — LOW (ref 1–3.3)
LYMPHOCYTES # BLD AUTO: 0.58 K/UL — LOW (ref 1–3.3)
LYMPHOCYTES # BLD AUTO: 0.69 K/UL — LOW (ref 1–3.3)
LYMPHOCYTES # BLD AUTO: 2.5 % — LOW (ref 13–44)
LYMPHOCYTES # BLD AUTO: 3.2 % — LOW (ref 13–44)
LYMPHOCYTES # BLD AUTO: 4.4 % — LOW (ref 13–44)
MAGNESIUM SERPL-MCNC: 1.4 MG/DL — LOW (ref 1.6–2.6)
MANUAL SMEAR VERIFICATION: SIGNIFICANT CHANGE UP
MCHC RBC-ENTMCNC: 28.6 PG — SIGNIFICANT CHANGE UP (ref 27–34)
MCHC RBC-ENTMCNC: 29.2 PG — SIGNIFICANT CHANGE UP (ref 27–34)
MCHC RBC-ENTMCNC: 29.2 PG — SIGNIFICANT CHANGE UP (ref 27–34)
MCHC RBC-ENTMCNC: 33.1 GM/DL — SIGNIFICANT CHANGE UP (ref 32–36)
MCHC RBC-ENTMCNC: 33.7 GM/DL — SIGNIFICANT CHANGE UP (ref 32–36)
MCHC RBC-ENTMCNC: 34.4 GM/DL — SIGNIFICANT CHANGE UP (ref 32–36)
MCV RBC AUTO: 85.1 FL — SIGNIFICANT CHANGE UP (ref 80–100)
MCV RBC AUTO: 86.6 FL — SIGNIFICANT CHANGE UP (ref 80–100)
MCV RBC AUTO: 86.6 FL — SIGNIFICANT CHANGE UP (ref 80–100)
METAMYELOCYTES # FLD: 4.4 % — HIGH (ref 0–0)
METHOD TYPE: SIGNIFICANT CHANGE UP
MONOCYTES # BLD AUTO: 0.47 K/UL — SIGNIFICANT CHANGE UP (ref 0–0.9)
MONOCYTES # BLD AUTO: 0.48 K/UL — SIGNIFICANT CHANGE UP (ref 0–0.9)
MONOCYTES # BLD AUTO: 1.36 K/UL — HIGH (ref 0–0.9)
MONOCYTES NFR BLD AUTO: 3.6 % — SIGNIFICANT CHANGE UP (ref 2–14)
MONOCYTES NFR BLD AUTO: 3.9 % — SIGNIFICANT CHANGE UP (ref 2–14)
MONOCYTES NFR BLD AUTO: 5 % — SIGNIFICANT CHANGE UP (ref 2–14)
MRSA PCR RESULT.: SIGNIFICANT CHANGE UP
MYELOCYTES NFR BLD: 3.5 % — HIGH (ref 0–0)
NEUTROPHILS # BLD AUTO: 11.03 K/UL — HIGH (ref 1.8–7.4)
NEUTROPHILS # BLD AUTO: 11.31 K/UL — HIGH (ref 1.8–7.4)
NEUTROPHILS # BLD AUTO: 24.39 K/UL — HIGH (ref 1.8–7.4)
NEUTROPHILS NFR BLD AUTO: 73.5 % — SIGNIFICANT CHANGE UP (ref 43–77)
NEUTROPHILS NFR BLD AUTO: 90 % — HIGH (ref 43–77)
NEUTROPHILS NFR BLD AUTO: 91.7 % — HIGH (ref 43–77)
NEUTS BAND # BLD: 10.6 % — HIGH (ref 0–8)
NITRITE UR-MCNC: NEGATIVE — SIGNIFICANT CHANGE UP
NRBC # BLD: 0 /100 WBCS — SIGNIFICANT CHANGE UP (ref 0–0)
NRBC # BLD: 0 /100 WBCS — SIGNIFICANT CHANGE UP (ref 0–0)
PCO2 BLDV: 41 MMHG — LOW (ref 42–55)
PH BLDV: 7.31 — LOW (ref 7.32–7.43)
PH UR: 8.5 (ref 5–8)
PHOSPHATE SERPL-MCNC: 3.2 MG/DL — SIGNIFICANT CHANGE UP (ref 2.5–4.5)
PLAT MORPH BLD: NORMAL — SIGNIFICANT CHANGE UP
PLATELET # BLD AUTO: 102 K/UL — LOW (ref 150–400)
PLATELET # BLD AUTO: 108 K/UL — LOW (ref 150–400)
PLATELET # BLD AUTO: 169 K/UL — SIGNIFICANT CHANGE UP (ref 150–400)
PO2 BLDV: 35 MMHG — SIGNIFICANT CHANGE UP (ref 25–45)
POTASSIUM BLDV-SCNC: 3.1 MMOL/L — LOW (ref 3.5–5.1)
POTASSIUM SERPL-MCNC: 3.4 MMOL/L — LOW (ref 3.5–5.3)
POTASSIUM SERPL-SCNC: 3.4 MMOL/L — LOW (ref 3.5–5.3)
PROT SERPL-MCNC: 5.9 G/DL — LOW (ref 6–8.3)
PROT UR-MCNC: 100 MG/DL
PROTEUS SP DNA BLD POS QL NAA+NON-PROBE: SIGNIFICANT CHANGE UP
RBC # BLD: 4.4 M/UL — SIGNIFICANT CHANGE UP (ref 4.2–5.8)
RBC # BLD: 4.55 M/UL — SIGNIFICANT CHANGE UP (ref 4.2–5.8)
RBC # BLD: 4.69 M/UL — SIGNIFICANT CHANGE UP (ref 4.2–5.8)
RBC # FLD: 14.3 % — SIGNIFICANT CHANGE UP (ref 10.3–14.5)
RBC # FLD: 14.4 % — SIGNIFICANT CHANGE UP (ref 10.3–14.5)
RBC # FLD: 14.4 % — SIGNIFICANT CHANGE UP (ref 10.3–14.5)
RBC BLD AUTO: SIGNIFICANT CHANGE UP
RBC CASTS # UR COMP ASSIST: 135 /HPF — HIGH (ref 0–4)
S AUREUS DNA NOSE QL NAA+PROBE: SIGNIFICANT CHANGE UP
SAO2 % BLDV: 61.5 % — LOW (ref 67–88)
SODIUM SERPL-SCNC: 136 MMOL/L — SIGNIFICANT CHANGE UP (ref 135–145)
SP GR SPEC: 1.01 — SIGNIFICANT CHANGE UP (ref 1–1.03)
SPECIMEN SOURCE: SIGNIFICANT CHANGE UP
SQUAMOUS # UR AUTO: 2 /HPF — SIGNIFICANT CHANGE UP (ref 0–5)
UROBILINOGEN FLD QL: 1 MG/DL — SIGNIFICANT CHANGE UP (ref 0.2–1)
WBC # BLD: 12.35 K/UL — HIGH (ref 3.8–10.5)
WBC # BLD: 13.12 K/UL — HIGH (ref 3.8–10.5)
WBC # BLD: 27.09 K/UL — HIGH (ref 3.8–10.5)
WBC # FLD AUTO: 12.35 K/UL — HIGH (ref 3.8–10.5)
WBC # FLD AUTO: 13.12 K/UL — HIGH (ref 3.8–10.5)
WBC # FLD AUTO: 27.09 K/UL — HIGH (ref 3.8–10.5)
WBC UR QL: 70 /HPF — HIGH (ref 0–5)

## 2024-05-05 PROCEDURE — 50432 PLMT NEPHROSTOMY CATHETER: CPT | Mod: RT

## 2024-05-05 PROCEDURE — 99292 CRITICAL CARE ADDL 30 MIN: CPT

## 2024-05-05 PROCEDURE — 99291 CRITICAL CARE FIRST HOUR: CPT

## 2024-05-05 PROCEDURE — 99291 CRITICAL CARE FIRST HOUR: CPT | Mod: 25

## 2024-05-05 RX ORDER — DEXTROSE 50 % IN WATER 50 %
15 SYRINGE (ML) INTRAVENOUS ONCE
Refills: 0 | Status: DISCONTINUED | OUTPATIENT
Start: 2024-05-05 | End: 2024-05-09

## 2024-05-05 RX ORDER — GLUCAGON INJECTION, SOLUTION 0.5 MG/.1ML
1 INJECTION, SOLUTION SUBCUTANEOUS ONCE
Refills: 0 | Status: DISCONTINUED | OUTPATIENT
Start: 2024-05-05 | End: 2024-05-09

## 2024-05-05 RX ORDER — ACETAMINOPHEN 500 MG
1000 TABLET ORAL ONCE
Refills: 0 | Status: COMPLETED | OUTPATIENT
Start: 2024-05-05 | End: 2024-05-05

## 2024-05-05 RX ORDER — PROTHROMBIN COMPLEX CONCENTRATE (HUMAN) 25.5; 16.5; 24; 22; 22; 26 [IU]/ML; [IU]/ML; [IU]/ML; [IU]/ML; [IU]/ML; [IU]/ML
2000 POWDER, FOR SOLUTION INTRAVENOUS ONCE
Refills: 0 | Status: COMPLETED | OUTPATIENT
Start: 2024-05-05 | End: 2024-05-05

## 2024-05-05 RX ORDER — PIPERACILLIN AND TAZOBACTAM 4; .5 G/20ML; G/20ML
3.38 INJECTION, POWDER, LYOPHILIZED, FOR SOLUTION INTRAVENOUS ONCE
Refills: 0 | Status: COMPLETED | OUTPATIENT
Start: 2024-05-05 | End: 2024-05-05

## 2024-05-05 RX ORDER — DEXTROSE 50 % IN WATER 50 %
12.5 SYRINGE (ML) INTRAVENOUS ONCE
Refills: 0 | Status: DISCONTINUED | OUTPATIENT
Start: 2024-05-05 | End: 2024-05-09

## 2024-05-05 RX ORDER — FAMOTIDINE 10 MG/ML
20 INJECTION INTRAVENOUS
Refills: 0 | Status: DISCONTINUED | OUTPATIENT
Start: 2024-05-05 | End: 2024-05-09

## 2024-05-05 RX ORDER — PIPERACILLIN AND TAZOBACTAM 4; .5 G/20ML; G/20ML
3.38 INJECTION, POWDER, LYOPHILIZED, FOR SOLUTION INTRAVENOUS EVERY 8 HOURS
Refills: 0 | Status: DISCONTINUED | OUTPATIENT
Start: 2024-05-05 | End: 2024-05-07

## 2024-05-05 RX ORDER — INSULIN LISPRO 100/ML
VIAL (ML) SUBCUTANEOUS AT BEDTIME
Refills: 0 | Status: DISCONTINUED | OUTPATIENT
Start: 2024-05-05 | End: 2024-05-09

## 2024-05-05 RX ORDER — NOREPINEPHRINE BITARTRATE/D5W 8 MG/250ML
0.04 PLASTIC BAG, INJECTION (ML) INTRAVENOUS
Qty: 8 | Refills: 0 | Status: DISCONTINUED | OUTPATIENT
Start: 2024-05-05 | End: 2024-05-06

## 2024-05-05 RX ORDER — INSULIN LISPRO 100/ML
VIAL (ML) SUBCUTANEOUS
Refills: 0 | Status: DISCONTINUED | OUTPATIENT
Start: 2024-05-05 | End: 2024-05-09

## 2024-05-05 RX ORDER — SODIUM CHLORIDE 9 MG/ML
500 INJECTION, SOLUTION INTRAVENOUS ONCE
Refills: 0 | Status: COMPLETED | OUTPATIENT
Start: 2024-05-05 | End: 2024-05-05

## 2024-05-05 RX ORDER — BACITRACIN ZINC 500 UNIT/G
0 OINTMENT IN PACKET (EA) TOPICAL
Qty: 0 | Refills: 0 | DISCHARGE

## 2024-05-05 RX ORDER — MAGNESIUM SULFATE 500 MG/ML
2 VIAL (ML) INJECTION ONCE
Refills: 0 | Status: COMPLETED | OUTPATIENT
Start: 2024-05-05 | End: 2024-05-05

## 2024-05-05 RX ORDER — POTASSIUM CHLORIDE 20 MEQ
40 PACKET (EA) ORAL ONCE
Refills: 0 | Status: COMPLETED | OUTPATIENT
Start: 2024-05-05 | End: 2024-05-05

## 2024-05-05 RX ORDER — DEXTROSE 50 % IN WATER 50 %
25 SYRINGE (ML) INTRAVENOUS ONCE
Refills: 0 | Status: DISCONTINUED | OUTPATIENT
Start: 2024-05-05 | End: 2024-05-09

## 2024-05-05 RX ORDER — PIPERACILLIN AND TAZOBACTAM 4; .5 G/20ML; G/20ML
3.38 INJECTION, POWDER, LYOPHILIZED, FOR SOLUTION INTRAVENOUS EVERY 8 HOURS
Refills: 0 | Status: DISCONTINUED | OUTPATIENT
Start: 2024-05-05 | End: 2024-05-05

## 2024-05-05 RX ORDER — PROTHROMBIN COMPLEX CONCENTRATE (HUMAN) 25.5; 16.5; 24; 22; 22; 26 [IU]/ML; [IU]/ML; [IU]/ML; [IU]/ML; [IU]/ML; [IU]/ML
7500 POWDER, FOR SOLUTION INTRAVENOUS ONCE
Refills: 0 | Status: DISCONTINUED | OUTPATIENT
Start: 2024-05-05 | End: 2024-05-05

## 2024-05-05 RX ORDER — SODIUM CHLORIDE 9 MG/ML
1000 INJECTION, SOLUTION INTRAVENOUS
Refills: 0 | Status: DISCONTINUED | OUTPATIENT
Start: 2024-05-05 | End: 2024-05-05

## 2024-05-05 RX ADMIN — PIPERACILLIN AND TAZOBACTAM 25 GRAM(S): 4; .5 INJECTION, POWDER, LYOPHILIZED, FOR SOLUTION INTRAVENOUS at 21:52

## 2024-05-05 RX ADMIN — Medication 400 MILLIGRAM(S): at 19:58

## 2024-05-05 RX ADMIN — Medication 1000 MILLIGRAM(S): at 10:14

## 2024-05-05 RX ADMIN — Medication 1000 MILLIGRAM(S): at 20:28

## 2024-05-05 RX ADMIN — PIPERACILLIN AND TAZOBACTAM 25 GRAM(S): 4; .5 INJECTION, POWDER, LYOPHILIZED, FOR SOLUTION INTRAVENOUS at 15:31

## 2024-05-05 RX ADMIN — Medication 400 MILLIGRAM(S): at 09:44

## 2024-05-05 RX ADMIN — Medication 11.6 MICROGRAM(S)/KG/MIN: at 04:29

## 2024-05-05 RX ADMIN — SODIUM CHLORIDE 250 MILLILITER(S): 9 INJECTION, SOLUTION INTRAVENOUS at 04:29

## 2024-05-05 RX ADMIN — PROTHROMBIN COMPLEX CONCENTRATE (HUMAN) 400 INTERNATIONAL UNIT(S): 25.5; 16.5; 24; 22; 22; 26 POWDER, FOR SOLUTION INTRAVENOUS at 01:21

## 2024-05-05 RX ADMIN — PIPERACILLIN AND TAZOBACTAM 25 GRAM(S): 4; .5 INJECTION, POWDER, LYOPHILIZED, FOR SOLUTION INTRAVENOUS at 09:14

## 2024-05-05 RX ADMIN — FAMOTIDINE 20 MILLIGRAM(S): 10 INJECTION INTRAVENOUS at 07:22

## 2024-05-05 RX ADMIN — Medication 40 MILLIEQUIVALENT(S): at 05:42

## 2024-05-05 RX ADMIN — PIPERACILLIN AND TAZOBACTAM 200 GRAM(S): 4; .5 INJECTION, POWDER, LYOPHILIZED, FOR SOLUTION INTRAVENOUS at 04:28

## 2024-05-05 RX ADMIN — Medication 25 GRAM(S): at 05:42

## 2024-05-05 RX ADMIN — FAMOTIDINE 20 MILLIGRAM(S): 10 INJECTION INTRAVENOUS at 17:30

## 2024-05-05 RX ADMIN — SODIUM CHLORIDE 500 MILLILITER(S): 9 INJECTION, SOLUTION INTRAVENOUS at 00:47

## 2024-05-05 NOTE — PATIENT PROFILE ADULT - TRANSPORTATION - OTHER DETAILS
Medicare providing transportation- if patient needs an doctors appointment quick, transportation not available.

## 2024-05-05 NOTE — ED ADULT NURSE REASSESSMENT NOTE - NS ED NURSE REASSESS COMMENT FT1
Multiple attempts to obtain blood samples on patient via butterfly needle failed. MD Curry made aware. MD to attempt needle stick for labs VBG, and type and screen. Pt awaiting KCENTRA administration. Pt to have procedure done in IR to decompress renal calculi. Stretcher locked and in lowest position, appropriate side rails up. Pt instructed to notify RN if assistance is needed.

## 2024-05-05 NOTE — H&P ADULT - NSHPLABSRESULTS_GEN_ALL_CORE
LABS:                           14.3   21.93 )-----------( 205      ( 04 May 2024 22:34 )             45.5         137  |  100  |  32<H>  ----------------------------<  114<H>  3.6   |  19<L>  |  2.74<H>    Ca    7.6<L>      04 May 2024 22:34    TPro  6.0  /  Alb  3.2<L>  /  TBili  1.2  /  DBili  x   /  AST  19  /  ALT  11  /  AlkPhos  72          PT/INR - ( 04 May 2024 22:34 )   PT: 16.9 sec;   INR: 1.63 ratio         PTT - ( 04 May 2024 22:34 )  PTT:30.9 sec      Urinalysis Basic - ( 04 May 2024 23:15 )    Color: Yellow / Appearance: Cloudy / S.015 / pH: x  Gluc: x / Ketone: Negative mg/dL  / Bili: Negative / Urobili: 1.0 mg/dL   Blood: x / Protein: 100 mg/dL / Nitrite: Negative   Leuk Esterase: Large / RBC: 135 /HPF / WBC 70 /HPF   Sq Epi: x / Non Sq Epi: 2 /HPF / Bacteria: Moderate /HPF        LIVER FUNCTIONS - ( 04 May 2024 22:34 )  Alb: 3.2 g/dL / Pro: 6.0 g/dL / ALK PHOS: 72 U/L / ALT: 11 U/L / AST: 19 U/L / GGT: x             Blood Gas Profile w/Lytes - Venous: Performed In Lab (24 @ 01:17)  Blood Gas Profile w/Lytes - Venous: Performed In Lab (24 @ 22:40)    Blood Gas Profile w/Lytes - Venous: Performed In Lab (24 @ 01:17)  Blood Gas Profile w/Lytes - Venous: Performed In Lab (24 @ 22:40)    I&O's Summary         /     CAPILLARY BLOOD GLUCOSE                MICRO:

## 2024-05-05 NOTE — PATIENT PROFILE ADULT - FALL HARM RISK - HARM RISK INTERVENTIONS

## 2024-05-05 NOTE — CHART NOTE - NSCHARTNOTEFT_GEN_A_CORE
: Cristiane SIDDIQUI NP     INDICATION:    PROCEDURE:  [X ] LIMITED   [ ] LIMITED CHEST  [ ] LIMITED RETROPERITONEAL  [ ] LIMITED ABDOMINAL  [ ] LIMITED DVT  [ ] NEEDLE GUIDANCE VASCULAR  [ ] NEEDLE GUIDANCE THORACENTESIS  [ ] NEEDLE GUIDANCE PARACENTESIS  [ ] NEEDLE GUIDANCE PERICARDIOCENTESIS  [ ] OTHER    FINDINGS:    Echo   Difficult windows due to body habitus   RV smaller than LV   LV with appears to have normal size and function   IVC 1.5 with >50% respiratory variation     Chest  B/L anterior chest wall with A line predominance  Right Pleural base with no consolidation or effusion   Left pleural base also with no consolidation or effusion        INTERPRETATION:  Normal cardiac size and function / Lungs appear clear  IVC 1.5 with respiratory variation

## 2024-05-05 NOTE — H&P ADULT - NSHPPHYSICALEXAM_GEN_ALL_CORE
VITALS:   T(C): 37.8 (05-05-24 @ 03:00), Max: 37.8 (05-05-24 @ 03:00)  HR: 78 (05-05-24 @ 04:00) (57 - 98)  BP: 119/60 (05-05-24 @ 03:45) (63/30 - 123/56)  RR: 21 (05-05-24 @ 03:45) (18 - 35)  SpO2: 99% (05-05-24 @ 04:00) (93% - 99%)    GENERAL: NAD, lying in bed comfortably  HEAD:  Atraumatic, normocephalic  EYES: EOMI, PERRLA, conjunctiva and sclera clear  ENT: Moist mucous membranes  NECK: Supple, no JVD  HEART: Regular rate and rhythm, no murmurs, rubs, or gallops  LUNGS: Unlabored respirations.  Clear to auscultation bilaterally, no crackles, wheezing, or rhonchi  ABDOMEN: Soft, nontender, nondistended, +BS  EXTREMITIES: 2+ peripheral pulses bilaterally. No clubbing, cyanosis, or edema  NERVOUS SYSTEM:  A&Ox3, no focal deficits   SKIN: No rashes or lesions

## 2024-05-05 NOTE — H&P ADULT - HISTORY OF PRESENT ILLNESS
63-year-old male, history of morbid obesity, multiple VTE's currently on Eliquis, hypertension, type 2 diabetes, multiple renal stones in the past, presenting for fever.  He was seen initially at [place] and was found to have a UPJ stone on CT and was transferred to [facility] due to concern for septic stone. On exam, _. The patient was taken for nephrostomy tube place with IR O/N.     [place] course: Tmax 105, , bp 80s sys. CT c/a/p significant mod rt hydronephrosis, multiple rt renal calculi with rt renal pelvis dilation with 1.7cm rt UPJ calculus. U/A was not collected. s/p zosyn, 2.7L NS bolus     [facility] ED course: BP 60s systolic s/p 500cc LR      63-year-old male, history of morbid obesity, multiple VTE's on Eliquis, hypertension, type 2 diabetes, multiple renal stones in the past, presenting for fever.  He was seen initially at [place] and was found to have a UPJ stone on CT and was transferred to [facility] due to concern for septic stone. On exam, _. The patient was taken for nephrostomy tube place with IR O/N.     [place] course: Tmax 105, , bp 80s sys. CT c/a/p significant mod rt hydronephrosis, multiple rt renal calculi with rt renal pelvis dilation with 1.7cm rt UPJ calculus. U/A was not collected. s/p zosyn, 2.7L NS bolus     [facility] ED course: BP 60s systolic s/p 500cc LR      63-year-old male, history of morbid obesity, multiple VTE's on Eliquis, hypertension, type 2 diabetes, multiple renal stones in the past, presenting for fever. He was seen initially at [place] and was found to have a UPJ stone on CT and was transferred to [facility] due to concern for septic stone. On exam, _.     [place] course: Tmax 105, , bp 80s sys. CT c/a/p significant mod rt hydronephrosis, multiple rt renal calculi with rt renal pelvis dilation with 1.7cm rt UPJ calculus. U/A was not collected. s/p zosyn, 2.7L NS bolus     [facility] ED course: BP 60s systolic s/p 500cc LR. Uro c/s in the ED and deferred placing a stent and rec IR to place PCN which was later placed 5/5.      63-year-old male, history of morbid obesity, multiple VTE's on Eliquis, hypertension, type 2 diabetes, multiple renal stones in the past, presenting for fever. He was seen initially at [place] and was found to have a UPJ stone on CT and was transferred to [facility] due to concern for septic stone. On exam, the c/o of R back pain similar to the pain he felt when he had kidney stones previously but currently has no complaints. Denies fever, chills, HA, CP, SOB, N/V, constipation, diarrhea.     [place] course: Tmax 105, , bp 80s sys. CT c/a/p significant mod rt hydronephrosis, multiple rt renal calculi with rt renal pelvis dilation with 1.7cm rt UPJ calculus. U/A was not collected. s/p zosyn, 2.7L NS bolus     [facility] ED course: BP 60s systolic s/p 500cc LR. Uro c/s in the ED and deferred placing a stent and rec IR to place PCN which was later placed 5/5.

## 2024-05-05 NOTE — PRE-ANESTHESIA EVALUATION ADULT - NSANTHPMHFT_GEN_ALL_CORE
64 y/o male; for nephrostomy tube with right UPJ stone and moderate hydronephrosis; fever, rigors, right back pain with tachycardia to 140's on levophed at [facility]. pt transferred to [facility]. vanco/zithromax given; hydrated; creat 2.4 up from 1.8;   prior PCNL 2018 with dr rudolph; DVT on eliquis and IVC filter 2018; DM; HTN; prior kidney/ureteral stones; MEGHA no CPAP used; BMI 50; recent GI bleed with 10 units PRBC transfusion( duodenal ulcer and gastritis); ventricular arrythmia post EGD with TTE EF60%; anemia; OA right hip; resp failure post EGD 2018 with trach; b/o ureteral stents in the past; prior nephrostomy tube 2018 64 y/o male; for nephrostomy tube with right UPJ stone and moderate hydronephrosis; fever, rigors, right back pain with tachycardia to 140's on levophed at [facility]. pt transferred to [facility]. vanco/zithromax given; hydrated; creat 2.4 up from 1.8; Kcentra to be started in ER  prior PCNL 2018 with dr rudolph; DVT on eliquis and IVC filter 2018; DM; HTN; prior kidney/ureteral stones; MEGHA no CPAP used; BMI 50; recent GI bleed with 10 units PRBC transfusion( duodenal ulcer and gastritis); ventricular arrythmia post EGD with TTE EF60%; anemia; OA right hip; resp failure post EGD 2018 with trach; b/o ureteral stents in the past; prior nephrostomy tube 2018

## 2024-05-05 NOTE — H&P ADULT - ATTENDING COMMENTS
63-year-old male, history of kidney stones s/p interventions 2018, multiple DVTs on Eliquis last dose this AM, hypertension, type 2 diabetes. Per wife and pt had R sided back pain yesterday. Today fevers Tmax 105, tired. He presented to [place] where he underwent CT- confirmed rt UPJ stone transferred to [facility] for septic stone for urology eval. Urology determined pt unable to be stented therefore IR consulted. Due to worsening labs, clinical status given Kcentra and taken for emergent perc neph.  Admitted to MICU.     # septicshock  #hypotension  #UTI  #infectedkidneystone  #SMITH     Pt with hx of proteus in urine sensitive to Zosyn - will continue at this time   Levo started in the ED with goal MAP 65   K-centra x1 given in the ED to reverse Eliquis   IR  emergently taken for perc nephrostomy tube placement 5/5  Follow blood and urine cultures     Pt to be monitored in MICU s/p perc neph as pt has high likelihood of bleeding post procedure. Pt also currently on Levophed to maintain BP.     Ethics: Full code   Hold all anticoagulation as pt high risk for bleeding s/p procedure

## 2024-05-05 NOTE — CHART NOTE - NSCHARTNOTEFT_GEN_A_CORE
63 year old male with a history of nephrolithiasis s/p multiple interventions (2018), DVT's on Eliquis, HTN, DM admitted as a transfer from  with urosepsis in the setting of obstructing 1.7 cm stone s/p PCN (5/5/24)    Lines  A- line 5/5  Bae 5/5    N: No active issues  - Delirium precautions  CV: Septic shock  DVT history (7 years ago)  - MAP >65  - Off vasopressor support this am  - Holding home antihypertensives  - On Eliquis, s/p K Centra  - Additional IVF as guided by exam  P: MEGHA  - Home CPAP settings  R: Urosepsis iso infected renal stone s/p PCN  SMITH likely in the setting of sepsis (baseline  - Continue to trend Hgb   - Trend output from nephrostomy  - Close monitoring UOP  - Trend creatinine, lytes   GI:   - Continue home antacid  - Regular diet  Heme: Leukocytosis iso sepsis  DVT on Eliquis s/p reversal with K Centra  - Trend CBC q 8 hours for today  ID: Urosepsis  - Zosyn  - Narrow based on sensi  - Urology input regarding stone  - Follow up cultures  Endo: DM  - Avoid hypoglycemia, BG <210      Full Code  35 minutes cc time spent

## 2024-05-05 NOTE — H&P ADULT - NSICDXPASTSURGICALHX_GEN_ALL_CORE_FT
PAST SURGICAL HISTORY:  H/O nephrostomy bilateral  May 2018 clamped  JUNE and had ureteral stent    Hx of tonsillectomy at 6 years old    Presence of IVC filter 7/18    S/P endoscopy     Ureteral stents placement, bilateral      Implemented All Fall Risk Interventions:  [place] to call system. Call bell, personal items and telephone within reach. Instruct patient to call for assistance. Room bathroom lighting operational. Non-slip footwear when patient is off stretcher. Physically safe environment: no spills, clutter or unnecessary equipment. Stretcher in lowest position, wheels locked, appropriate side rails in place. Provide visual cue, wrist band, yellow gown, etc. Monitor gait and stability. Monitor for mental status changes and reorient to person, place, and time. Review medications for side effects contributing to fall risk. Reinforce activity limits and safety measures with patient and family.

## 2024-05-05 NOTE — PROGRESS NOTE ADULT - ASSESSMENT
3M w/ multiple medical problems including b/l PCNL in 2018 with Dr. Teixeira, DVT on Eliquis 2.5mg BID p/w fevers, rigors and R back pain. Fevers of 101F, 102.9F happened this AM at home while fever of 105F (rectally obtained at [facility] per family). Started on levophed at [facility], tachy to 140's which self resolved. Transported to [facility] for septic stone.  Currently patient is not endorsing any flank pain.   here patient is afebrile, HR wnl, hypotensive on arrival started on levo- currently normal. NO urine collected. Labs from [facility] with wbc: 6.83, Cr: 2.5 Lactate: 4.0(5.1).  CTAP shows moderate right hydro. Dilatation of the right renal pelvis with 1.7cm R UPJ calculus identified. no left hydronephrosis.     Plan:  - Continue R NT to drainage  - Fu cultures  - Continue abx  - Bae plan per primary  - Eliquis plan per IR   - Trend WBC and cr   - Monitor hemodynamics, wean pressors as tolerated  - Patient will fu as an outpatient for definitive stone treatment     Discussed with Dr. Kaycee Coon [facility]  [address] [place] [phone]

## 2024-05-05 NOTE — H&P ADULT - NSICDXPASTMEDICALHX_GEN_ALL_CORE_FT
PAST MEDICAL HISTORY:  Acquired lymphedema of leg     Anemia s/p 10 units of blood [facility] 7/18, on iron now   due to GI bleeding stable now    Calculus of kidney and ureter     Diabetes mellitus type II     Duodenal ulcer     DVT (deep venous thrombosis) 7/18 ivc filter inserted    Gastritis     GI bleed recently transfused 10 units    H/O sleep apnea no CPAP used, to follow up ASAP    had sleep study done no pending result    HTN (hypertension)     Hx of peripheral neuropathy     Morbid obesity     OA (osteoarthritis) right hip    Respiratory failure     Respiratory failure, post-operative post endoscopy [facility] 7/18    Tracheostomy dependent     Ventricular arrhythmia post endoscopy  echo done EF 60%

## 2024-05-05 NOTE — PATIENT PROFILE ADULT - NSPROIMPLANTSMEDDEV_GEN_A_NUR
No pallor, no cervical/supraclavicular/inguinal adenopathy.  No splenomegaly Automatic Implantable Cardioverter Defibrillator None

## 2024-05-05 NOTE — H&P ADULT - ASSESSMENT
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        ===NEURO===  - AOx3  - monitor with serial mental status exams    ===CARDIAC===  #Hypotension   - BP 80s sys s/p 2.7L NS bolus at [place]  - BP 60s sys s/p 500mL LR bolus at [facility]  - levo started in the ED   - titrate to a MAP >65    #Hypertension  - hold home htn meds iso septic shock       ===PULM===  - no active issues       ===RENAL===  #Nephrolithiasis   - CT c/a/p at [place]: significant mod rt hydronephrosis, multiple rt renal calculi with rt renal pelvis dilation with 1.7cm rt UPJ calculus  - urology following: high likelihood of ureteral stent failure due to stone size, recommend IR consult for R PCN placement  - IR following: emergently taken nephrostomy tube placement 5/5  - k-centra x1 given in the ED to reverse eliquis pending possible surgical intervention   - monitor drainage from nephrostomy tube     #SMITH  - SCr 2.50 -> 2.78  - SCr 1.22 in 2022  - likely multifactoral pre-renal 2/2 sepsis vs intra-renal from CT confirmeed R UPJ stone  - s/p 2.7L NS bolus at  and 500ml LR bolus at [facility]   - monitor with serial BMPs        ===GI===  - no active issues, ctm     ===HEME/ONC===  #Leukocytosis  - 21.93 WBC on admission to [facility]  - likely 2/2 septic stone  - see abx plan below    #DVT   - 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  - hold AC iso of possible procedure with IR  - k-centra x1 given in the ED to reverse eliquis pending possible surgical intervention       ===ID===  #Septic Stone  - s/p zosyn in [place] and azithro and vanc at [facility]   - plan to treat for 7-10 days with antibiotics  - c/w zosyn and narrow based on sensitivities  - f/u blood and urine cx       ===ENDO===  #T2DM  - home meds:  - f/u AM a1c        Lines  Ethics: full code  63-year-old male, history of morbid obesity, 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        ===NEURO===  - AOx3  - monitor with serial mental status exams    ===CARDIAC===  #Hypotension   - BP 80s sys s/p 2.7L NS bolus at [place]  - BP 60s sys s/p 500mL LR bolus at [facility]  - levo started in the ED   - titrate to a MAP >65    #Hypertension  - hold home htn meds iso septic shock       ===PULM===  - no active issues       ===RENAL===  #Nephrolithiasis   - CT c/a/p at [place]: significant mod rt hydronephrosis, multiple rt renal calculi with rt renal pelvis dilation with 1.7cm rt UPJ calculus  - urology following: high likelihood of ureteral stent failure due to stone size, recommend IR consult for R PCN placement  - IR following: emergently taken for nephrostomy tube placement from the ED 5/5  - monitor drainage from nephrostomy tube -> currently bloody   - f/u culture  - per IR, okay to restart eliquis in 48-72 hrs    #SMITH  - SCr 2.50 -> 2.78  - SCr 1.22 in 2022  - likely multifactoral pre-renal 2/2 sepsis vs intra-renal from CT confirmeed R UPJ stone  - s/p 2.7L NS bolus at  and 500ml LR bolus at [facility]   - monitor with serial BMPs        ===GI===  - no active issues, ctm     ===HEME/ONC===  #Leukocytosis  - 21.93 WBC on admission to [facility]  - likely 2/2 septic stone  - see abx plan below    #DVT   - 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  - hold AC iso of possible procedure with IR  - k-centra x1 given in the ED to reverse eliquis pending possible surgical intervention       ===ID===  #Septic Stone  - s/p zosyn in [place] and azithro and vanc at [facility]   - plan to treat for 7-10 days with antibiotics  - c/w zosyn and narrow based on sensitivities  - f/u blood and urine cx   - f/u nephrostomy urine cx      ===ENDO===  #T2DM  - home meds:  - f/u AM a1c        Lines  Ethics: full code  63-year-old male, history of morbid obesity, 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       ===NEURO===  - AOx3  - monitor with serial mental status exams    ===CARDIAC===  #Hypotension   - BP 80s sys s/p 2.7L NS bolus at [place]  - BP 60s sys s/p 500mL LR bolus at [facility]  - start LR @ 250ml for 4 hours   - levo started in the ED   - titrate to a MAP >65    #Hypertension  - hold home htn meds iso septic shock       ===PULM===  - c/w bipap qhs      ===RENAL===  #Nephrolithiasis   - CT c/a/p at [place]: significant mod rt hydronephrosis, multiple rt renal calculi with rt renal pelvis dilation with 1.7cm rt UPJ calculus  - urology following: high likelihood of ureteral stent failure due to stone size, recommend IR consult for R PCN placement  - IR following: emergently taken for nephrostomy tube placement from the ED 5/5  - monitor drainage from nephrostomy tube -> currently bloody   - f/u culture  - per IR, okay to restart eliquis in 48-72 hrs  - holman placed in the ED     #SMITH  - SCr 2.50 -> 2.78  - SCr 1.22 in 2022  - likely multifactoral pre-renal 2/2 sepsis vs intra-renal from CT confirmeed R UPJ stone  - s/p 2.7L NS bolus at  and 500ml LR bolus at [facility]   - monitor with serial BMPs        ===GI===  - no active issues, ctm       ===HEME/ONC===  #Leukocytosis  - 21.93 WBC on admission to [facility]  - likely 2/2 septic stone  - see abx plan below    #DVT   - 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  - hold AC iso of possible procedure with IR  - k-centra x1 given in the ED to reverse eliquis pending possible surgical intervention       ===ID===  #Septic Stone  - s/p zosyn in [place] and renaldo and jodi at [facility]   - plan to treat for 7-10 days with antibiotics  - c/w zosyn and narrow based on sensitivities  - f/u blood and urine cx   - f/u nephrostomy urine cx      ===ENDO===  #T2DM  - home meds:  - f/u AM a1c        Ethics: full code  63-year-old male, history of morbid obesity, 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       ===NEURO===  - AOx3  - monitor with serial mental status exams    ===CARDIAC===  #Hypotension   - BP 80s sys s/p 2.7L NS bolus at [place]  - BP 60s sys s/p 500mL LR bolus at [facility]  - start LR @ 250ml for 4 hours   - levo started in the ED   - titrate to a MAP >65    #Hypertension  - hold home htn meds iso septic shock       ===PULM===  - c/w bipap qhs      ===RENAL===  #Nephrolithiasis   - CT c/a/p at [place]: significant mod rt hydronephrosis, multiple rt renal calculi with rt renal pelvis dilation with 1.7cm rt UPJ calculus  - urology following: high likelihood of ureteral stent failure due to stone size, recommend IR consult for R PCN placement  - IR following: emergently taken for nephrostomy tube placement from the ED 5/5  - monitor drainage from nephrostomy tube -> currently bloody   - f/u culture  - per IR, okay to restart eliquis in 48-72 hrs  - holman placed in the ED     #SMITH  - SCr 2.50 -> 2.78  - SCr 1.22 in 2022  - likely multifactoral pre-renal 2/2 sepsis vs intra-renal from CT confirmeed R UPJ stone  - s/p 2.7L NS bolus at  and 500ml LR bolus at [facility]   - monitor with serial BMPs        ===GI===  - c/w home famotidine bid       ===HEME/ONC===  #Leukocytosis  - 21.93 WBC on admission to [facility]  - likely 2/2 septic stone  - see abx plan below    #DVT   - 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  - hold AC iso of possible procedure with IR  - k-centra x1 given in the ED to reverse eliquis pending possible surgical intervention       ===ID===  #Septic Stone  - s/p zosyn in [place] and renaldo and jodi at [facility]   - plan to treat for 7-10 days with antibiotics  - c/w zosyn and narrow based on sensitivities  - f/u blood and urine cx   - f/u nephrostomy urine cx      ===ENDO===  #T2DM  - home meds:  - f/u AM a1c        Ethics: full code  63-year-old male, history of morbid obesity, 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       ===NEURO===  - AOx3  - monitor with serial mental status exams    ===CARDIAC===  #Hypotension   - BP 80s sys s/p 2.7L NS bolus at [place]  - BP 60s sys s/p 500mL LR bolus at [facility]  - start LR @ 250ml for 4 hours   - levo started in the ED   - titrate to a MAP >65    #Hypertension  - hold home htn meds iso septic shock       ===PULM===  - c/w bipap qhs      ===RENAL===  #Nephrolithiasis   - CT c/a/p at [place]: significant mod rt hydronephrosis, multiple rt renal calculi with rt renal pelvis dilation with 1.7cm rt UPJ calculus  - urology following: high likelihood of ureteral stent failure due to stone size, recommend IR consult for R PCN placement  - IR following: emergently taken for nephrostomy tube placement from the ED 5/5  - monitor drainage from nephrostomy tube -> currently bloody   - f/u culture  - per IR, okay to restart eliquis in 48-72 hrs  - holman placed in the ED     #SMITH  - SCr 2.50 -> 2.78  - SCr 1.22 in 2022  - likely multifactoral pre-renal 2/2 sepsis vs intra-renal from CT confirmeed R UPJ stone  - s/p 2.7L NS bolus at  and 500ml LR bolus at [facility]   - monitor with serial BMPs        ===GI===  - c/w home famotidine bid       ===HEME/ONC===  #Leukocytosis  - 21.93 WBC on admission to [facility]  - likely 2/2 septic stone  - see abx plan below    #DVT   - 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  - hold AC iso of possible procedure with IR  - k-centra x1 given in the ED to reverse eliquis pending possible surgical intervention       ===ID===  #Septic Stone  - s/p zosyn in [place] and renaldo and jodi at [facility]   - plan to treat for 7-10 days with antibiotics  - c/w zosyn and narrow based on sensitivities  - f/u blood and urine cx   - f/u nephrostomy urine cx      ===ENDO===  #T2DM  - home meds: trulicity   - f/u AM a1c    - ISS      Ethics: full code  63-year-old male, history of morbid obesity, 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       ===NEURO===  - AOx3  - monitor with serial mental status exams    ===CARDIAC===  #Hypotension   - BP 80s sys s/p 2.7L NS bolus at [place]  - BP 60s sys s/p 500mL LR bolus at [facility]  - start LR @ 250ml for 4 hours   - levo started in the ED   - titrate to a MAP >65    #Hypertension  - hold home htn meds iso septic shock       ===PULM===  - per pt, uses bipap at night for MEGHA  - c/w bipap qhs       ===RENAL===  #Nephrolithiasis   - CT c/a/p at [place]: significant mod rt hydronephrosis, multiple rt renal calculi with rt renal pelvis dilation with 1.7cm rt UPJ calculus  - urology following: high likelihood of ureteral stent failure due to stone size, recommend IR consult for R PCN placement  - IR following: emergently taken for nephrostomy tube placement from the ED 5/5  - monitor drainage from nephrostomy tube -> currently bloody   - f/u culture  - per IR, okay to restart eliquis in 48-72 hrs  - holman placed in the ED     #SMITH  - SCr 2.50 -> 2.78  - SCr 1.22 in 2022  - likely multifactoral pre-renal 2/2 sepsis vs intra-renal from CT confirmeed R UPJ stone  - s/p 2.7L NS bolus at  and 500ml LR bolus at [facility]   - monitor with serial BMPs        ===GI===  - c/w home famotidine bid       ===HEME/ONC===  #Leukocytosis  - 21.93 WBC on admission to [facility]  - likely 2/2 septic stone  - see abx plan below    #DVT   - h/o of DVT dx years ago  - hold AC iso of possible procedure with IR  - k-centra x1 given in the ED to reverse eliquis pending possible surgical intervention       ===ID===  #Septic Stone  - s/p zosyn in [place] and renaldo and jodi at [facility]   - plan to treat for 7-10 days with antibiotics  - c/w zosyn and narrow based on sensitivities  - f/u blood and urine cx   - f/u nephrostomy urine cx      ===ENDO===  #T2DM  - home meds: trulicity   - f/u AM a1c    - ISS      Ethics: full code

## 2024-05-06 ENCOUNTER — TRANSCRIPTION ENCOUNTER (OUTPATIENT)
Age: 64
End: 2024-05-06

## 2024-05-06 DIAGNOSIS — G47.33 OBSTRUCTIVE SLEEP APNEA (ADULT) (PEDIATRIC): ICD-10-CM

## 2024-05-06 DIAGNOSIS — Z29.9 ENCOUNTER FOR PROPHYLACTIC MEASURES, UNSPECIFIED: ICD-10-CM

## 2024-05-06 DIAGNOSIS — I48.20 CHRONIC ATRIAL FIBRILLATION, UNSPECIFIED: ICD-10-CM

## 2024-05-06 DIAGNOSIS — A41.9 SEPSIS, UNSPECIFIED ORGANISM: ICD-10-CM

## 2024-05-06 DIAGNOSIS — N20.0 CALCULUS OF KIDNEY: ICD-10-CM

## 2024-05-06 DIAGNOSIS — E78.5 HYPERLIPIDEMIA, UNSPECIFIED: ICD-10-CM

## 2024-05-06 DIAGNOSIS — E11.9 TYPE 2 DIABETES MELLITUS WITHOUT COMPLICATIONS: ICD-10-CM

## 2024-05-06 DIAGNOSIS — Z86.718 PERSONAL HISTORY OF OTHER VENOUS THROMBOSIS AND EMBOLISM: ICD-10-CM

## 2024-05-06 DIAGNOSIS — I10 ESSENTIAL (PRIMARY) HYPERTENSION: ICD-10-CM

## 2024-05-06 DIAGNOSIS — N17.9 ACUTE KIDNEY FAILURE, UNSPECIFIED: ICD-10-CM

## 2024-05-06 LAB
-  ENTEROBACTERALES: SIGNIFICANT CHANGE UP
-  STAPHYLOCOCCUS EPIDERMIDIS: SIGNIFICANT CHANGE UP
ALBUMIN SERPL ELPH-MCNC: 2.9 G/DL — LOW (ref 3.3–5)
ALP SERPL-CCNC: 67 U/L — SIGNIFICANT CHANGE UP (ref 40–120)
ALT FLD-CCNC: 10 U/L — SIGNIFICANT CHANGE UP (ref 10–45)
ANION GAP SERPL CALC-SCNC: 13 MMOL/L — SIGNIFICANT CHANGE UP (ref 5–17)
APTT BLD: 30.8 SEC — SIGNIFICANT CHANGE UP (ref 24.5–35.6)
AST SERPL-CCNC: 10 U/L — SIGNIFICANT CHANGE UP (ref 10–40)
BASOPHILS # BLD AUTO: 0.04 K/UL — SIGNIFICANT CHANGE UP (ref 0–0.2)
BASOPHILS NFR BLD AUTO: 0.4 % — SIGNIFICANT CHANGE UP (ref 0–2)
BILIRUB SERPL-MCNC: 0.7 MG/DL — SIGNIFICANT CHANGE UP (ref 0.2–1.2)
BLD GP AB SCN SERPL QL: NEGATIVE — SIGNIFICANT CHANGE UP
BUN SERPL-MCNC: 38 MG/DL — HIGH (ref 7–23)
CALCIUM SERPL-MCNC: 7.7 MG/DL — LOW (ref 8.4–10.5)
CHLORIDE SERPL-SCNC: 102 MMOL/L — SIGNIFICANT CHANGE UP (ref 96–108)
CO2 SERPL-SCNC: 20 MMOL/L — LOW (ref 22–31)
CREAT SERPL-MCNC: 2.7 MG/DL — HIGH (ref 0.5–1.3)
EGFR: 26 ML/MIN/1.73M2 — LOW
EOSINOPHIL # BLD AUTO: 0.05 K/UL — SIGNIFICANT CHANGE UP (ref 0–0.5)
EOSINOPHIL NFR BLD AUTO: 0.5 % — SIGNIFICANT CHANGE UP (ref 0–6)
GAS PNL BLDA: SIGNIFICANT CHANGE UP
GLUCOSE BLDC GLUCOMTR-MCNC: 106 MG/DL — HIGH (ref 70–99)
GLUCOSE SERPL-MCNC: 116 MG/DL — HIGH (ref 70–99)
GRAM STN FLD: ABNORMAL
HCT VFR BLD CALC: 37.5 % — LOW (ref 39–50)
HGB BLD-MCNC: 12.1 G/DL — LOW (ref 13–17)
IMM GRANULOCYTES NFR BLD AUTO: 0.8 % — SIGNIFICANT CHANGE UP (ref 0–0.9)
INR BLD: 1.28 RATIO — HIGH (ref 0.85–1.18)
LYMPHOCYTES # BLD AUTO: 0.46 K/UL — LOW (ref 1–3.3)
LYMPHOCYTES # BLD AUTO: 4.3 % — LOW (ref 13–44)
MAGNESIUM SERPL-MCNC: 1.9 MG/DL — SIGNIFICANT CHANGE UP (ref 1.6–2.6)
MCHC RBC-ENTMCNC: 27.9 PG — SIGNIFICANT CHANGE UP (ref 27–34)
MCHC RBC-ENTMCNC: 32.3 GM/DL — SIGNIFICANT CHANGE UP (ref 32–36)
MCV RBC AUTO: 86.6 FL — SIGNIFICANT CHANGE UP (ref 80–100)
METHOD TYPE: SIGNIFICANT CHANGE UP
MONOCYTES # BLD AUTO: 0.46 K/UL — SIGNIFICANT CHANGE UP (ref 0–0.9)
MONOCYTES NFR BLD AUTO: 4.3 % — SIGNIFICANT CHANGE UP (ref 2–14)
NEUTROPHILS # BLD AUTO: 9.55 K/UL — HIGH (ref 1.8–7.4)
NEUTROPHILS NFR BLD AUTO: 89.7 % — HIGH (ref 43–77)
NRBC # BLD: 0 /100 WBCS — SIGNIFICANT CHANGE UP (ref 0–0)
PHOSPHATE SERPL-MCNC: 1.9 MG/DL — LOW (ref 2.5–4.5)
PLATELET # BLD AUTO: 93 K/UL — LOW (ref 150–400)
POTASSIUM SERPL-MCNC: 3.3 MMOL/L — LOW (ref 3.5–5.3)
POTASSIUM SERPL-SCNC: 3.3 MMOL/L — LOW (ref 3.5–5.3)
PROT SERPL-MCNC: 5.5 G/DL — LOW (ref 6–8.3)
PROTHROM AB SERPL-ACNC: 14 SEC — HIGH (ref 9.5–13)
RBC # BLD: 4.33 M/UL — SIGNIFICANT CHANGE UP (ref 4.2–5.8)
RBC # FLD: 14.6 % — HIGH (ref 10.3–14.5)
RH IG SCN BLD-IMP: POSITIVE — SIGNIFICANT CHANGE UP
SODIUM SERPL-SCNC: 135 MMOL/L — SIGNIFICANT CHANGE UP (ref 135–145)
SPECIMEN SOURCE: SIGNIFICANT CHANGE UP
WBC # BLD: 10.65 K/UL — HIGH (ref 3.8–10.5)
WBC # FLD AUTO: 10.65 K/UL — HIGH (ref 3.8–10.5)

## 2024-05-06 PROCEDURE — 99232 SBSQ HOSP IP/OBS MODERATE 35: CPT | Mod: GC

## 2024-05-06 PROCEDURE — 99232 SBSQ HOSP IP/OBS MODERATE 35: CPT

## 2024-05-06 PROCEDURE — 99231 SBSQ HOSP IP/OBS SF/LOW 25: CPT

## 2024-05-06 RX ORDER — SODIUM,POTASSIUM PHOSPHATES 278-250MG
1 POWDER IN PACKET (EA) ORAL ONCE
Refills: 0 | Status: COMPLETED | OUTPATIENT
Start: 2024-05-06 | End: 2024-05-06

## 2024-05-06 RX ORDER — POTASSIUM CHLORIDE 20 MEQ
40 PACKET (EA) ORAL ONCE
Refills: 0 | Status: COMPLETED | OUTPATIENT
Start: 2024-05-06 | End: 2024-05-06

## 2024-05-06 RX ADMIN — PIPERACILLIN AND TAZOBACTAM 25 GRAM(S): 4; .5 INJECTION, POWDER, LYOPHILIZED, FOR SOLUTION INTRAVENOUS at 15:05

## 2024-05-06 RX ADMIN — Medication 40 MILLIEQUIVALENT(S): at 01:25

## 2024-05-06 RX ADMIN — PIPERACILLIN AND TAZOBACTAM 25 GRAM(S): 4; .5 INJECTION, POWDER, LYOPHILIZED, FOR SOLUTION INTRAVENOUS at 22:20

## 2024-05-06 RX ADMIN — FAMOTIDINE 20 MILLIGRAM(S): 10 INJECTION INTRAVENOUS at 05:02

## 2024-05-06 RX ADMIN — Medication 1 PACKET(S): at 01:24

## 2024-05-06 RX ADMIN — FAMOTIDINE 20 MILLIGRAM(S): 10 INJECTION INTRAVENOUS at 17:26

## 2024-05-06 RX ADMIN — PIPERACILLIN AND TAZOBACTAM 25 GRAM(S): 4; .5 INJECTION, POWDER, LYOPHILIZED, FOR SOLUTION INTRAVENOUS at 05:02

## 2024-05-06 NOTE — PROGRESS NOTE ADULT - ATTENDING COMMENTS
64yo M pmh nephrolithiasis s/p multiple interventions (2018), DVT's (years ago) on Eliquis, HTN, DM2 admitted 5/4 as a transfer from  with urosepsis, septic shock, and bacteremia in the setting of obstructing 1.7 cm stone s/p PCN (5/5/24), requiring MICU for pressors weaned off norepinephrine 5/5am and Downgraded to general medicine floor 5/6, course c/b SMITH suspect ATN.    1. urosepsis/septic shock due to nephrolithiasis/obstructing stone s/p PCN 5/5 by IR.   Presented with R 1.7cm UPJ stone, likely source of sepsis  Source control obtained with percutaneous nephrostomy on 5/5 early am  Septic shock - required vasopressors for 1 day, now weaned off since 5/5  Blood cultures growing Enterobacter & Staph epi (contaminant), sensitivity pending from 5/4  - 10 day course antibiotics starting 5/5 after source control  - zosyn until sensitivities result.  -IR 3 month follow up required - patient to call IR booking office at [phone] for appt.  PCN Care: Cleanse wound with saline, dress with transparent film and dry gauze, every 3 days PRN; Irrigate with 10CC NS every 24 hours.  -needs urology f/u for stone evaluation    2. SMITH: Likely combination prerenal from septic shock with contribution of obstructing stone  Baseline ~ 1.2  CTM SCr.    3. Obstructive sleep apnea: Patient has home bipap machine, continue home settings.    4. HTN: holding home meds amlo 10, lopressor 50 bid due to septic shock  Resume when able.    5. DM2: On Trulicity at home  ISS, FS meals + bedtime.    6. History of DVT in adulthood.   Multiple prior DVTs years ago  Eliquis on hold until 5/7, 72hr after PCN placement.  also reports h/o IVC filter placement in setting of gi bleed while on AC    7. melanoma: outpatient surg onc f/u for resection/sentinal node biopsy planning    8. Dispo: PT recommends outpatient PT, owns necessary DME.    contact: TEAMS

## 2024-05-06 NOTE — OCCUPATIONAL THERAPY INITIAL EVALUATION ADULT - LIVES WITH, PROFILE
Yes Pt lives with son and wife in private home; 3 steps to enter; pt ambulates independently with quad cane; owns RW, rollator, hospital bed, wheelchair; has HHA 7days/week for 8hours; aide assists with showering.

## 2024-05-06 NOTE — PHYSICAL THERAPY INITIAL EVALUATION ADULT - PERTINENT HX OF CURRENT PROBLEM, REHAB EVAL
62y/o M, history of morbid obesity, presenting for fever. He was seen initially at [place] and was found to have a UPJ stone on CT and was transferred to [facility] due to concern for septic stone. On exam, the c/o of R back pain similar to the pain he felt when he had kidney stones previously but currently has no complaints. Denies fever, chills, HA, CP, SOB, N/V, constipation, diarrhea. [place] course: Tmax 105, , bp 80s sys. CT c/a/p significant mod rt hydronephrosis, multiple rt renal calculi with rt renal pelvis dilation with 1.7cm rt UPJ calculus. U/A was not collected. s/p zosyn, 2.7L NS bolus In the ED, the pt's blood pressure was in the 60s systolic and he was given a 500cc bolus of LR and started on zosyn. Urology was consulted and they deferred a stent placement due to the size of the renal calculus and instead recommended consulting IR for a percutaneous nephrostomy tube placement. IR was consulted and the patient went back from the ED for a nephrostomy tube placement on 5/5 after receiving a dose of k-centra in the ED to reverse eliquis taken that AM for his hx of multiple VTEs. After IR, the patient was taken up to MICU for medical management of his urosepsis iso a 1.7 obstructing cm stone. In the MICU, the patient was started and weaned off of levo in the same day. Blood cultures from admission grew Enterobacterales 3/4 bottles and S. epiderm 1/4; zosyn monotherapy was continued as S. epiderm was regarded as a contaminant. Output from the nephrostomy tube was initially bloody but has dissipated to a serosanguinous color which was also reassured by multiple stable hemoglobins to 12-13. Additionally, the patient's hospital course was complicated by an SMITH that was attributed to the renal calculus and that has been trending down. The patient has been stable off vasopressors and is ready to be downgraded to medicine floors.

## 2024-05-06 NOTE — DISCHARGE NOTE PROVIDER - NSDCFUADDAPPT_GEN_ALL_CORE_FT
Please schedule an appointment with interventional radiology in 3 months to exchange your nephrostomy tube (or sooner if instructed by urologist and or if issues with drain arise) You may call the IR booking office @ [phone] to schedule. Please feel free to contact us at [phone] if any problems arise. After 6PM, Monday through Friday, on weekends and on holidays, please call [phone] and ask for the radiology resident on call to be paged.  Please schedule an appointment with interventional radiology in 3 months to exchange your nephrostomy tube (or sooner if instructed by urologist and or if issues with drain arise) You may call the IR booking office @ [phone] to schedule. Please feel free to contact us at [phone] if any problems arise. After 6PM, Monday through Friday, on weekends and on holidays, please call [phone] and ask for the radiology resident on call to be paged.     Please follow up with your urologist within 2 weeks of discharge.    APPTS ARE READY TO BE MADE: [X] YES    Best Family or Patient Contact (if needed):    Additional Information about above appointments (if needed):    1: Urology within 2 weeks of discharge   2: IR 3 months after discharge  3:     Other comments or requests:

## 2024-05-06 NOTE — DISCHARGE NOTE PROVIDER - HOSPITAL COURSE
HPI:    63-year-old male, history of morbid obesity, multiple VTE's on Eliquis, hypertension, type 2 diabetes, recently diagnosed malignant melanoma, multiple renal stones in the past, presenting for fever. He was seen initially at [place] and was found to have a UPJ stone on CT and was transferred to [facility] due to concern for septic stone. On exam, the c/o of R back pain similar to the pain he felt when he had kidney stones previously but currently has no complaints. Denies fever, chills, HA, CP, SOB, N/V, constipation, diarrhea.     [place] course: Tmax 105, , bp 80s sys. CT c/a/p significant mod rt hydronephrosis, multiple rt renal calculi with rt renal pelvis dilation with 1.7cm rt UPJ calculus. U/A was not collected. s/p zosyn, 2.7L NS bolus     INTERVAL HPI/OVERNIGHT EVENTS:  In the ED, the patient's blood pressure was in the 60s systolic and he was given a 500cc bolus of LR and started on zosyn. Urology was consulted and they deferred a stent placement due to the size of the renal calculus and instead recommended consulting IR for a percutaneous nephrostomy tube placement. IR was consulted and the patient went back from the ED for a nephrostomy tube placement on 5/5 after receiving a dose of k-centra in the ED to reverse eliquis taken that AM for his hx of multiple VTEs. After IR, the patient was taken up to MICU for medical management of his urosepsis iso a 1.7 obstructing cm stone. In the MICU, the patient was started and weaned off of levo in the same day. Blood cultures from admission grew Enterobacterales 3/4 bottles and S. epiderm 1/4; zosyn monotherapy was continued as S. epiderm was regarded as a contaminant. Output from the nephrostomy tube was initially bloody but has dissipated to a serosanguinous color which was also reassured by multiple stable hemoglobins to 12-13. Additionally, the patient's hospital course was complicated by an SMITH that was attributed to the renal calculus and that has been trending down. The patient has been stable off vasopressors and is ready to be downgraded to medicine floors.     On floors, was downgraded from zosyn to ceftriaxone based on cultures growing proteus & providencia in urine and blood. Melanoma inspected - patient had recently underwent shave biopsy, site clean, dry, nonerythematous, noninfected. ID was consulted, recommended repeat blood cultures, which grew negative at 24 hours, then discharge on total 14 day course antibiotics with cefpodoxime.    The patient is afebrile, hemodynamically stable and medically optimized for discharge to home with follow up with PCP, urology, IR, and surgical oncology. On day of discharge, patient is clinically stable with no new exam findings or acute symptoms compared to prior. The patient was seen by the attending physician on the date of discharge and deemed stable and acceptable for discharge. The patient's chronic medical conditions were treated accordingly per the patient's home medication regimen. The patient's medication reconciliation (with changes made to chronic medications), follow up appointments, discharge orders, instructions, and significant lab and diagnostic studies are as noted.    Important Medication Changes and Reason:  cefpodoxime 200mg q12 through 5/18/24    Active or Pending Issues Requiring Follow-up:  L renal stone - urology & IR follow up  melanoma - surgical oncology    Advanced Directives:   [X] Full code  [ ] DNR  [ ] Hospice

## 2024-05-06 NOTE — DISCHARGE NOTE PROVIDER - NSDCFUSCHEDAPPT_GEN_ALL_CORE_FT
[facility]  [facility]  [place] [address]  Scheduled Appointment: 05/24/2024     [facility]  [facility]  Scheduled Appointment: 05/16/2024    [facility]  [facility]  S[facility]GON  [address]  Scheduled Appointment: 05/24/2024    [facility]  [facility]  Scheduled Appointment: 05/24/2024

## 2024-05-06 NOTE — PHYSICAL THERAPY INITIAL EVALUATION ADULT - LEVEL OF INDEPENDENCE: SCOOT/BRIDGE, REHAB EVAL
Problem: GASTROINTESTINAL - ADULT  Goal: Minimal or absence of nausea and vomiting  Description: INTERVENTIONS:  - Maintain adequate hydration with IV or PO as ordered and tolerated  - Nasogastric tube to low intermittent suction as ordered  - Evaluate e independent

## 2024-05-06 NOTE — CHART NOTE - NSCHARTNOTEFT_GEN_A_CORE
MAR Accept Note  Transfer to: Medicine Service  Accepting Attending Physician: Dinorah Taylor  Assigned Room: [address]    Labs and data reviewed.   No findings precluding transfer of service.       HPI/MICU COURSE:   Please refer to MICU transfer note for full details. 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 In MICU, patient was started and weaned off of levo in the same day. Blood cultures from admission grew Enterobacteriaceae 3/4 bottles and S. epiderm 1/4; zosyn monotherapy was continued as S. epiderm was regarded as a contaminant. Output from the nephrostomy tube was initially bloody lessened to a serosanguinous color but patient stable hemoglobin measurements ranging 12-13. Course complicated by an SMITH attributed to renal calculus and has been improving. Patient has been stable off vasopressors and is ready to be downgraded to medicine floors.     FOR FOLLOW-UP:  To Dos:  [ ] f/u IR recs  [ ] f/u uro recs  [ ] c/w abx  [ ] resume Eliquis 48-72hrs from PCN placement (5/4)    Maciel Schaffer MD  Internal Medicine PGY3/MAR  [facility]: [id number]

## 2024-05-06 NOTE — PROGRESS NOTE ADULT - ASSESSMENT
63 year old male with a history of nephrolithiasis s/p multiple interventions (2018), DVT's (years ago) on Eliquis, HTN, DM admitted as a transfer from  with urosepsis in the setting of obstructing 1.7 cm stone s/p PCN (5/5/24). In MICU for 1 day for septic shock, weaned off norepinephrine 5/5am. Downgraded to general medicine floor.

## 2024-05-06 NOTE — DISCHARGE NOTE PROVIDER - CARE PROVIDER_API CALL
Michele Alaniz  [address] [place] [address] [place] [address]  Phone: [phone]  Fax: (   )    -  Follow Up Time: Routine   Michele Alaniz  [address] [place] [address] [place] [address]  Phone: [phone]  Fax: (   )    -  Follow Up Time: Routine    Thierry Teixeira  Urology  [address] [place] [id number]  Phone: [phone]  Fax: [phone]  Established Patient  Follow Up Time:

## 2024-05-06 NOTE — CONSULT NOTE ADULT - ASSESSMENT
A/P:63-year-old male, history of morbid obesity, multiple VTE's on Eliquis, hypertension, type 2 diabetes, multiple renal stones in the past, presenting for fever. He was seen initially at [place] and was found to have a UPJ stone on CT and was transferred to [facility] due to concern for septic stone. On exam, the c/o of R back pain similar to the pain he felt when he had kidney stones previously but currently has no complaints. Denies fever, chills, HA, CP, SOB, N/V, constipation, diarrhea.     Wound Consult requested to assist w/ management of  BL buttocks and posterior DTPI    recommendations  Continue turning and positioning w/ offloading assistive devices as per protocol  Buttocks/ Sacrum Edgardo/  BID /Cavilon daily and prn soiling        Continue w/ attends under pads and Pericare  as per protocol  BLE elevation & Compression  Consider MARII/PVR  Moisturize intact skin w/ SWEEN cream BID  Nutrition Consult for optimization in pt w/  Increased nutritional needs            encourage high quality protein, geovani/ prosource, MVI & Vit C to promote wound healing    Waffle Cushion to chair when oob to chair  Continue w/ low air loss pressure redistribution bed surface   Pt will need Group 2 mattress on hospital bed and ROHO cushion for wheel chair upon discharge home  Care as per medicine, will follow w/ you/ remain available as requested  Upon discharge f/u as outpatient at [facility] [address] [place] [address] [phone]  Seen w/  RN and D/w team   Thank you for this consult,  GRACE Hernandez-BC, [facility]  [phone]  Nights/ Weekends/ Holidays please call:  General Surgery Consult pager ([phone]) for emergencies  Wound PT for multilayer leg wrapping or VAC issues ([phone])

## 2024-05-06 NOTE — PROGRESS NOTE ADULT - ASSESSMENT
63-year-old male, history of morbid obesity, multiple VTE's currently on Eliquis, hypertension, type 2 diabetes, h/o multiple renal stones presented to [place] for fever 2/2 CT- confirmed rt UPJ stone transferred to [facility] s/p nephrostomy tube with IR 5/5 and admitted to MICU for IV vasopressor support for septic stone.       ===NEURO===  - AOx3  - monitor with serial mental status exams    ===CARDIAC===  #Hypotension   - BP 80s sys s/p 2.7L NS bolus at [place]  - BP 60s sys s/p 500mL LR bolus at [facility]  - weaned off levo this Am  - maintain MAP >65    #Hypertension  - hold home htn meds iso septic shock       ===PULM===  - per pt, uses bipap at night for MEGHA  - c/w bipap qhs       ===RENAL===  #Nephrolithiasis   - CT c/a/p at [facility]: significant mod rt hydronephrosis, multiple rt renal calculi with rt renal pelvis dilation with 1.7cm rt UPJ calculus  - urology following: high likelihood of ureteral stent failure due to stone size, recommend IR consult for R PCN placement  - IR following: emergently taken for nephrostomy tube placement from the ED 5/5  - monitor drainage from nephrostomy tube -> currently bloody, trend cbc q8hrs  - f/u culture  - per IR, okay to restart eliquis in 48-72 hrs  - holman placed in the ED     #SMITH  - SCr 2.50 -> 2.78  - SCr 1.22 in 2022  - likely multifactoral pre-renal 2/2 sepsis vs intra-renal from CT confirmeed R UPJ stone  - s/p 2.7L NS bolus at  and 500ml LR bolus at [facility]   - monitor with serial BMPs        ===GI===  - c/w home famotidine bid       ===HEME/ONC===  #Leukocytosis  - 21.93 WBC on admission to [facility]  - likely 2/2 septic stone  - see abx plan below    #DVT   - h/o of DVT dx years ago  - hold AC iso of possible procedure with IR  - k-centra x1 given in the ED to reverse eliquis pending possible surgical intervention       ===ID===  #Septic Stone  - s/p zosyn in [place] and renaldo and jodi at [facility]   - plan to treat for 7-10 days with antibiotics  - c/w zosyn and narrow based on sensitivities  - f/u blood and urine cx   - f/u nephrostomy urine cx      ===ENDO===  #T2DM  - home meds: trulicity   - avoid hypoglycemia  - f/u AM a1c    - ISS      Ethics: full code

## 2024-05-06 NOTE — DISCHARGE NOTE PROVIDER - NSDCMRMEDTOKEN_GEN_ALL_CORE_FT
amLODIPine 10 mg oral tablet: 1 tab(s) orally once a day  Eliquis 2.5 mg oral tablet: 1 tab(s) orally 2 times a day  famotidine 20 mg oral tablet: 1 tab(s) orally 2 times a day  gabapentin 100 mg oral tablet: 1 tab(s) orally 2 times a day  Lasix 40 mg oral tablet: 1 tab(s) orally once a day  Lopressor 50 mg oral tablet: 1 tab(s) orally 2 times a day  sildenafil 100 mg oral tablet: 1 tab(s) orally once a day PRN meds before sexual activity  Trulicity Pen 1.5 mg/0.5 mL subcutaneous solution: 1.5 milligram(s) subcutaneously once a week   amLODIPine 10 mg oral tablet: 1 tab(s) orally once a day  cefpodoxime 200 mg oral tablet: 1 tab(s) orally every 12 hours Take first dose 5/9 pm, then take every 12 hours until complete  Eliquis 2.5 mg oral tablet: 1 tab(s) orally 2 times a day  famotidine 20 mg oral tablet: 1 tab(s) orally 2 times a day  gabapentin 100 mg oral tablet: 1 tab(s) orally 2 times a day  Lasix 40 mg oral tablet: 1 tab(s) orally once a day  Lopressor 50 mg oral tablet: 1 tab(s) orally 2 times a day  sildenafil 100 mg oral tablet: 1 tab(s) orally once a day PRN meds before sexual activity  Trulicity Pen 1.5 mg/0.5 mL subcutaneous solution: 1.5 milligram(s) subcutaneously once a week   amLODIPine 10 mg oral tablet: 1 tab(s) orally once a day  cefpodoxime 200 mg oral tablet: 1 tab(s) orally every 12 hours Take first dose 5/9 pm, then take every 12 hours until complete  Eliquis 2.5 mg oral tablet: 1 tab(s) orally 2 times a day  famotidine 20 mg oral tablet: 1 tab(s) orally 2 times a day  gabapentin 100 mg oral tablet: 1 tab(s) orally 2 times a day  Lasix 40 mg oral tablet: 1 tab(s) orally once a day  Lopressor 50 mg oral tablet: 1 tab(s) orally 2 times a day  Outpatient Occupational Therapy: Outpatient Occupational Therapy for Deconditioning  Outpatient Physical Therapy: Outpatient Physical Therapy for Deconditioning  sildenafil 100 mg oral tablet: 1 tab(s) orally once a day PRN meds before sexual activity  Trulicity Pen 1.5 mg/0.5 mL subcutaneous solution: 1.5 milligram(s) subcutaneously once a week

## 2024-05-06 NOTE — PHYSICAL THERAPY INITIAL EVALUATION ADULT - ACTIVE RANGE OF MOTION EXAMINATION, REHAB EVAL
decreased due to BLE lymphedema and body habitus/bilateral upper extremity Active ROM was WFL (within functional limits)/bilateral  lower extremity Active ROM was WFL (within functional limits)

## 2024-05-06 NOTE — DISCHARGE NOTE PROVIDER - NSDCCPTREATMENT_GEN_ALL_CORE_FT
PRINCIPAL PROCEDURE  Procedure: Percutaneous right nephrostomy  Findings and Treatment: You had a right nephrostomy tube placed by Dr. Alaniz on  in [facility] Radiology (IR).   Monitor site for any sign or symptoms of infection (painful red skin, green/ yellow foul smelling discharge from the insertion site, fever, chills, leakage around drain site).   Flush drain with 10mL NS daily. If you meet resistance upon flushing, STOP and contact IR.   Empty drainage bag daily and record output. If there is a sudden decrease in output please contact IR to schedule  an appointment.  Drain care:  -Disconnect tubing (tube attached to bag/ bulb)  from the catheter (catheter going into skin)  -Clean catheter with alcohol swab  -Twist on the flush syringe to the catheter (be sure to push the air out of syringe)  -Flush catheter with 10mL NS (DO NOT pull back on syringe). If you meet resistance upon flushing, STOP and contact IR.   -Disconnect syringe from catheter and reconnect drainage bag.  Dressing care:  -Wash hands thoroughly with water and soap for at least 20 seconds.   -take off old dressing and clean around drain site with gauze soaked with warm water  -After cleaning the site, dry and replace dressing with a new gauze and tegaderm.   -Place one piece of gauze underneath the drain and another piece of gauze on top of drain.   -Apply tegaderm (clear dressing) on top.  -Change dressing every 3 days or when soiled

## 2024-05-06 NOTE — DISCHARGE NOTE PROVIDER - CARE PROVIDERS DIRECT ADDRESSES
,DirectAddress_Unknown ,DirectAddress_Unknown,[email or web address]

## 2024-05-06 NOTE — CONSULT NOTE ADULT - SUBJECTIVE AND OBJECTIVE BOX
Wound SURGERY CONSULT NOTE    HPI:  63-year-old male, history of morbid obesity, multiple VTE's on Eliquis, hypertension, type 2 diabetes, multiple renal stones in the past, presenting for fever. He was seen initially at [facility] and was found to have a UPJ stone on CT and was transferred to [facility] due to concern for septic stone. On exam, the c/o of R back pain similar to the pain he felt when he had kidney stones previously but currently has no complaints. Denies fever, chills, HA, CP, SOB, N/V, constipation, diarrhea.     [facility] course: Tmax 105, , bp 80s sys. CT c/a/p significant mod rt hydronephrosis, multiple rt renal calculi with rt renal pelvis dilation with 1.7cm rt UPJ calculus. U/A was not collected. s/p zosyn, 2.7L NS bolus     [facility] ED course: BP 60s systolic s/p 500cc LR. Uro c/s in the ED and deferred placing a stent and rec IR to place PCN which was later placed 5/5.      (05 May 2024 01:54)      Wound consult requested by team to assist w/ management of  posterior thighs pressure injury.   Pt w/o  c/o pain, drainage, odor, color change,  or worsening swelling. Offloading and pericare initiated upon admission as pt Increasingly sedentary 2/2 to illness. Pt is continent of urine & stool.   Appetite good: BMI 50.9.Patient with dx of lymphedema and is compliant with compression stockings. All questions asked and answered to pt's expressed understanding and satisfaction.    Current Diet: Diet, DASH/TLC:   Sodium & Cholesterol Restricted  Consistent Carbohydrate No Snacks (CSTCHO) (05-05-24 @ 05:17)      PAST MEDICAL & SURGICAL HISTORY:  HTN (hypertension)      Diabetes mellitus type II      Calculus of kidney and ureter      Acquired lymphedema of leg      H/O sleep apnea  no CPAP used, to follow up ASAP    had sleep study done no pending result      Morbid obesity      Hx of peripheral neuropathy      DVT (deep venous thrombosis)  7/18 ivc filter inserted      GI bleed  recently transfused 10 units      Duodenal ulcer      Gastritis      Respiratory failure, post-operative  post endoscopy [facility] 7/18      Ventricular arrhythmia  post endoscopy  echo done EF 60%      Anemia  s/p 10 units of blood [facility] 7/18, on iron now   due to GI bleeding stable now      OA (osteoarthritis)  right hip      Respiratory failure      Tracheostomy dependent      Hx of tonsillectomy  at 6 years old      Ureteral stents placement, bilateral      Presence of IVC filter  7/18      S/P endoscopy      H/O nephrostomy  bilateral  May 2018 clamped  JUNE and had ureteral stent          REVIEW OF SYSTEMS:  General/ Breast/ Skin/Vasc/ Neuro/ MSK: see HPI  All other systems negative    MEDICATIONS  (STANDING):  dextrose 50% Injectable 12.5 Gram(s) IV Push once  dextrose 50% Injectable 25 Gram(s) IV Push once  dextrose 50% Injectable 25 Gram(s) IV Push once  dextrose Oral Gel 15 Gram(s) Oral once  famotidine    Tablet 20 milliGRAM(s) Oral two times a day  glucagon  Injectable 1 milliGRAM(s) IntraMuscular once  insulin lispro (ADMELOG) corrective regimen sliding scale   SubCutaneous three times a day before meals  insulin lispro (ADMELOG) corrective regimen sliding scale   SubCutaneous at bedtime  piperacillin/tazobactam IVPB.. 3.375 Gram(s) IV Intermittent every 8 hours    MEDICATIONS  (PRN):      Allergies    No Known Allergies    Intolerances        SOCIAL HISTORY:   Denies smoking, ETOH, drugs    FAMILY HISTORY:  Family history of prostate cancer in father    Family history of coronary artery disease      PHYSICAL EXAM:  Vital Signs Last 24 Hrs  T(C): 36.2 (06 May 2024 10:24), Max: 38.5 (05 May 2024 20:00)  T(F): 97.1 (06 May 2024 10:24), Max: 101.3 (05 May 2024 20:00)  HR: 80 (06 May 2024 10:24) (72 - 101)  BP: 114/66 (06 May 2024 10:24) (102/57 - 125/60)  BP(mean): 86 (06 May 2024 08:00) (74 - 86)  RR: 18 (06 May 2024 10:24) (15 - 29)  SpO2: 98% (06 May 2024 10:24) (93% - 100%)    Parameters below as of 06 May 2024 10:24  Patient On (Oxygen Delivery Method): room air        NAD/  A&Ox3/ MO   Versa Care P500    HEENT:   sclera clear, mucosa moist, throat clear, trachea midline, neck supple  Respiratory: nonlabored w/ equal chest rise  Gastrointestinal: soft NT/ND  Neurology:  verbal, follows commands  Psych: calm/ appropriate  Musculoskeletal:  no deformities/ contractures  Vascular: BLE equally warm,  no cyanosis, clubbing, nor acute ischemia                BLE edema equal                BLE DP pulses not palpable  Skin:  moist w/ good turgor  Left posterior buttocks/ thigh 17.0cm x 15.0cm x 0.0cm   Right posterior buttocks/thigh 20.0cm x 18.0cm x 0.0cm        Area of persistent dark red/maroon skin there is no blistering  or drainage          No odor, increased warmth, tenderness, induration, fluctuance, nor crepitus    LABS/ CULTURES/ RADIOLOGY:                        12.1   10.65 )-----------( 93       ( 06 May 2024 00:37 )             37.5       135  |  102  |  38  ----------------------------<  116      [05-06-24 @ 00:39]  3.3   |  20  |  2.70        Ca     7.7     [05-06-24 @ 00:39]      Mg     1.9     [05-06-24 @ 00:39]      Phos  1.9     [05-06-24 @ 00:39]    TPro  5.5  /  Alb  2.9  /  TBili  0.7  /  DBili  x   /  AST  10  /  ALT  10  /  AlkPhos  67  [05-06-24 @ 00:39]    PT/INR: PT 14.0 , INR 1.28       [05-06-24 @ 00:39]  PTT: 30.8       [05-06-24 @ 00:39]              Culture - Blood (collected 05-04-24 @ 22:15)  Source: .Blood Blood-Peripheral  Gram Stain (05-06-24 @ 02:50):    Growth in aerobic bottle: Gram Negative Rods and Gram Positive Cocci in    Clusters    Growth in anaerobic bottle: Gram Negative Rods  Preliminary Report (05-06-24 @ 02:51):    Growth in aerobic bottle: Gram Negative Rods and Gram Positive Cocci in    Clusters    Direct identification is available within approximately 3-5    hours either by Blood Panel Multiplexed PCR or Direct    MALDI-TOF. Details: [email or web address]    Growth in anaerobic bottle: Gram Negative Rods  Organism: Blood Culture PCR (05-06-24 @ 02:13)  Organism: Blood Culture PCR (05-06-24 @ 02:13)      Method Type: PCR      -  Enterobacterales: Detec      -  Staphylococcus epidermidis: Detec    Culture - Blood (collected 05-04-24 @ 22:00)  Source: .Blood Blood-Peripheral  Gram Stain (05-06-24 @ 06:21):    Growth in aerobic bottle: Gram Negative Rods    Growth in anaerobic bottle: Gram Negative Rods  Preliminary Report (05-06-24 @ 06:21):    Growth in aerobic bottle: Gram Negative Rods    Growth in anaerobic bottle: Gram Negative Rods

## 2024-05-06 NOTE — OCCUPATIONAL THERAPY INITIAL EVALUATION ADULT - PERTINENT HX OF CURRENT PROBLEM, REHAB EVAL
62y/o M, history of morbid obesity, presenting for fever. He was seen initially at [place] and was found to have a UPJ stone on CT and was transferred to [facility] due to concern for septic stone. On exam, the c/o of R back pain similar to the pain he felt when he had kidney stones previously but currently has no complaints. Denies fever, chills, HA, CP, SOB, N/V, constipation, diarrhea. [place] course: Tmax 105, , bp 80s sys. CT c/a/p significant mod rt hydronephrosis, multiple rt renal calculi with rt renal pelvis dilation with 1.7cm rt UPJ calculus. U/A was not collected. s/p zosyn, 2.7L NS bolus In the ED, the pt's blood pressure was in the 60s systolic and he was given a 500cc bolus of LR and started on zosyn. Urology was consulted and they deferred a stent placement due to the size of the renal calculus and instead recommended consulting IR for a percutaneous nephrostomy tube placement. IR was consulted and the patient went back from the ED for a nephrostomy tube placement on 5/5 after receiving a dose of k-centra in the ED to reverse eliquis taken that AM for his hx of multiple VTEs. After IR, the patient was taken up to MICU for medical management of his urosepsis iso a 1.7 obstructing cm stone. In the MICU, the patient was started and weaned off of levo in the same day. Blood cultures from admission grew Enterobacterales 3/4 bottles and S. epiderm 1/4; zosyn monotherapy was continued as S. epiderm was regarded as a contaminant. Output from the nephrostomy tube was initially bloody but has dissipated to a serosanguinous color which was also reassured by multiple stable hemoglobins to 12-13. Additionally, the patient's hospital course was complicated by an SMITH that was attributed to the renal calculus and that has been trending down. The patient has been stable off vasopressors and is ready to be downgraded to medicine floors.

## 2024-05-06 NOTE — DISCHARGE NOTE PROVIDER - NSDCCPCAREPLAN_GEN_ALL_CORE_FT
PRINCIPAL DISCHARGE DIAGNOSIS  Diagnosis: Sepsis secondary to UTI  Assessment and Plan of Treatment: You had a severe urinary tract infection caused by a large, obstructing kidney stone. A tube was placed into your kidney to drain the infected urine, and you were started on IV antibiotics. You were briefly in the ICU to support your blood pressure because of the severity of the infection. You will be transitioned to oral antibiotics for discharge after the bacteria causing the infection, as well as appropriate oral antibiotics were identified based on your blood and urine cultures. Please take the antibiotic cefpodoxime 200mg every 12 hours, starting the evening of 5/9/24 and continuing through 5/18/24. Please follow up with your urologist within 2 weeks, and an interventional radiologist in 3 months or sooner based on your urologist's recommendation.

## 2024-05-06 NOTE — PHYSICAL THERAPY INITIAL EVALUATION ADULT - ADDITIONAL COMMENTS
Pt lives with son and wife in private home; 3 steps to enter; pt ambulates independently with quad cane; owns RW, rollator, hospital bed, wheelchair; has [facility]HA 7days/week for 8hours; aide assists with showering.

## 2024-05-06 NOTE — DISCHARGE NOTE PROVIDER - PROVIDER TOKENS
FREE:[LAST:[Corbin],FIRST:[Michele],PHONE:[phone]],FAX:[(   )    -],ADDRESS:[[address] [place] [address] [place] [address]],FOLLOWUP:[Routine]] FREE:[LAST:[Corbin],FIRST:[Michele],PHONE:[phone]],FAX:[(   )    -],ADDRESS:[[address] [place] [address] [place] [address]],FOLLOWUP:[Routine]],PROVIDER:[TOKEN:[[id number]],ESTABLISHEDPATIENT:[T]]

## 2024-05-07 LAB
-  AMOXICILLIN/CLAVULANIC ACID: SIGNIFICANT CHANGE UP
-  AMPICILLIN/SULBACTAM: SIGNIFICANT CHANGE UP
-  AMPICILLIN: SIGNIFICANT CHANGE UP
-  AZTREONAM: SIGNIFICANT CHANGE UP
-  CEFAZOLIN: SIGNIFICANT CHANGE UP
-  CEFEPIME: SIGNIFICANT CHANGE UP
-  CEFOXITIN: SIGNIFICANT CHANGE UP
-  CEFTRIAXONE: SIGNIFICANT CHANGE UP
-  CEFUROXIME: SIGNIFICANT CHANGE UP
-  CEFUROXIME: SIGNIFICANT CHANGE UP
-  CIPROFLOXACIN: SIGNIFICANT CHANGE UP
-  ERTAPENEM: SIGNIFICANT CHANGE UP
-  GENTAMICIN: SIGNIFICANT CHANGE UP
-  LEVOFLOXACIN: SIGNIFICANT CHANGE UP
-  MEROPENEM: SIGNIFICANT CHANGE UP
-  NITROFURANTOIN: SIGNIFICANT CHANGE UP
-  PIPERACILLIN/TAZOBACTAM: SIGNIFICANT CHANGE UP
-  TOBRAMYCIN: SIGNIFICANT CHANGE UP
-  TRIMETHOPRIM/SULFAMETHOXAZOLE: SIGNIFICANT CHANGE UP
ALBUMIN SERPL ELPH-MCNC: 3.2 G/DL — LOW (ref 3.3–5)
ALP SERPL-CCNC: 101 U/L — SIGNIFICANT CHANGE UP (ref 40–120)
ALT FLD-CCNC: 10 U/L — SIGNIFICANT CHANGE UP (ref 10–45)
ANION GAP SERPL CALC-SCNC: 13 MMOL/L — SIGNIFICANT CHANGE UP (ref 5–17)
APTT BLD: 27.5 SEC — SIGNIFICANT CHANGE UP (ref 24.5–35.6)
AST SERPL-CCNC: 10 U/L — SIGNIFICANT CHANGE UP (ref 10–40)
BILIRUB SERPL-MCNC: 0.9 MG/DL — SIGNIFICANT CHANGE UP (ref 0.2–1.2)
BLD GP AB SCN SERPL QL: NEGATIVE — SIGNIFICANT CHANGE UP
BUN SERPL-MCNC: 34 MG/DL — HIGH (ref 7–23)
CALCIUM SERPL-MCNC: 8.7 MG/DL — SIGNIFICANT CHANGE UP (ref 8.4–10.5)
CHLORIDE SERPL-SCNC: 103 MMOL/L — SIGNIFICANT CHANGE UP (ref 96–108)
CO2 SERPL-SCNC: 20 MMOL/L — LOW (ref 22–31)
CREAT SERPL-MCNC: 2.03 MG/DL — HIGH (ref 0.5–1.3)
CULTURE RESULTS: ABNORMAL
EGFR: 36 ML/MIN/1.73M2 — LOW
GLUCOSE BLDC GLUCOMTR-MCNC: 101 MG/DL — HIGH (ref 70–99)
GLUCOSE BLDC GLUCOMTR-MCNC: 89 MG/DL — SIGNIFICANT CHANGE UP (ref 70–99)
GLUCOSE BLDC GLUCOMTR-MCNC: 93 MG/DL — SIGNIFICANT CHANGE UP (ref 70–99)
GLUCOSE BLDC GLUCOMTR-MCNC: 96 MG/DL — SIGNIFICANT CHANGE UP (ref 70–99)
GLUCOSE SERPL-MCNC: 93 MG/DL — SIGNIFICANT CHANGE UP (ref 70–99)
GRAM STN FLD: ABNORMAL
HCT VFR BLD CALC: 38.4 % — LOW (ref 39–50)
HGB BLD-MCNC: 12.8 G/DL — LOW (ref 13–17)
INR BLD: 1.05 RATIO — SIGNIFICANT CHANGE UP (ref 0.85–1.18)
MAGNESIUM SERPL-MCNC: 2.2 MG/DL — SIGNIFICANT CHANGE UP (ref 1.6–2.6)
MCHC RBC-ENTMCNC: 28.6 PG — SIGNIFICANT CHANGE UP (ref 27–34)
MCHC RBC-ENTMCNC: 33.3 GM/DL — SIGNIFICANT CHANGE UP (ref 32–36)
MCV RBC AUTO: 85.9 FL — SIGNIFICANT CHANGE UP (ref 80–100)
METHOD TYPE: SIGNIFICANT CHANGE UP
MRSA PCR RESULT.: SIGNIFICANT CHANGE UP
NRBC # BLD: 0 /100 WBCS — SIGNIFICANT CHANGE UP (ref 0–0)
ORGANISM # SPEC MICROSCOPIC CNT: ABNORMAL
ORGANISM # SPEC MICROSCOPIC CNT: SIGNIFICANT CHANGE UP
PHOSPHATE SERPL-MCNC: 2.3 MG/DL — LOW (ref 2.5–4.5)
PLATELET # BLD AUTO: 112 K/UL — LOW (ref 150–400)
POTASSIUM SERPL-MCNC: 4.1 MMOL/L — SIGNIFICANT CHANGE UP (ref 3.5–5.3)
POTASSIUM SERPL-SCNC: 4.1 MMOL/L — SIGNIFICANT CHANGE UP (ref 3.5–5.3)
PROT SERPL-MCNC: 6.4 G/DL — SIGNIFICANT CHANGE UP (ref 6–8.3)
PROTHROM AB SERPL-ACNC: 11.5 SEC — SIGNIFICANT CHANGE UP (ref 9.5–13)
RBC # BLD: 4.47 M/UL — SIGNIFICANT CHANGE UP (ref 4.2–5.8)
RBC # FLD: 14.6 % — HIGH (ref 10.3–14.5)
RH IG SCN BLD-IMP: POSITIVE — SIGNIFICANT CHANGE UP
S AUREUS DNA NOSE QL NAA+PROBE: SIGNIFICANT CHANGE UP
SODIUM SERPL-SCNC: 136 MMOL/L — SIGNIFICANT CHANGE UP (ref 135–145)
SPECIMEN SOURCE: SIGNIFICANT CHANGE UP
WBC # BLD: 7.22 K/UL — SIGNIFICANT CHANGE UP (ref 3.8–10.5)
WBC # FLD AUTO: 7.22 K/UL — SIGNIFICANT CHANGE UP (ref 3.8–10.5)

## 2024-05-07 PROCEDURE — 99231 SBSQ HOSP IP/OBS SF/LOW 25: CPT

## 2024-05-07 PROCEDURE — 99232 SBSQ HOSP IP/OBS MODERATE 35: CPT | Mod: GC

## 2024-05-07 PROCEDURE — 99222 1ST HOSP IP/OBS MODERATE 55: CPT

## 2024-05-07 RX ORDER — SODIUM,POTASSIUM PHOSPHATES 278-250MG
1 POWDER IN PACKET (EA) ORAL EVERY 6 HOURS
Refills: 0 | Status: COMPLETED | OUTPATIENT
Start: 2024-05-07 | End: 2024-05-07

## 2024-05-07 RX ORDER — CHLORHEXIDINE GLUCONATE 213 G/1000ML
1 SOLUTION TOPICAL DAILY
Refills: 0 | Status: DISCONTINUED | OUTPATIENT
Start: 2024-05-07 | End: 2024-05-09

## 2024-05-07 RX ORDER — CEFTRIAXONE 500 MG/1
2000 INJECTION, POWDER, FOR SOLUTION INTRAMUSCULAR; INTRAVENOUS EVERY 24 HOURS
Refills: 0 | Status: DISCONTINUED | OUTPATIENT
Start: 2024-05-07 | End: 2024-05-09

## 2024-05-07 RX ADMIN — Medication 1 PACKET(S): at 23:28

## 2024-05-07 RX ADMIN — FAMOTIDINE 20 MILLIGRAM(S): 10 INJECTION INTRAVENOUS at 06:22

## 2024-05-07 RX ADMIN — CHLORHEXIDINE GLUCONATE 1 APPLICATION(S): 213 SOLUTION TOPICAL at 13:19

## 2024-05-07 RX ADMIN — Medication 1 PACKET(S): at 18:41

## 2024-05-07 RX ADMIN — FAMOTIDINE 20 MILLIGRAM(S): 10 INJECTION INTRAVENOUS at 18:41

## 2024-05-07 RX ADMIN — CEFTRIAXONE 100 MILLIGRAM(S): 500 INJECTION, POWDER, FOR SOLUTION INTRAMUSCULAR; INTRAVENOUS at 21:37

## 2024-05-07 RX ADMIN — PIPERACILLIN AND TAZOBACTAM 25 GRAM(S): 4; .5 INJECTION, POWDER, LYOPHILIZED, FOR SOLUTION INTRAVENOUS at 13:19

## 2024-05-07 RX ADMIN — PIPERACILLIN AND TAZOBACTAM 25 GRAM(S): 4; .5 INJECTION, POWDER, LYOPHILIZED, FOR SOLUTION INTRAVENOUS at 06:19

## 2024-05-07 NOTE — CONSULT NOTE ADULT - ASSESSMENT
63-year-old male, history of morbid obesity, multiple VTE's on Eliquis, hypertension, type 2 diabetes, multiple renal stones in the past initially at [place]  with fevers as high as 105 hypotensive ct sig R hydro and was found to have multiple right renal calculi inclusive of a UPJ stone-thought to be septic placed on zosyn and transferred to [facility]. On arival to the ED -hypotensive 6os with urology who deferred stent placement and is now S/p nephrostomy tube placed on 5/5 and transferred to MICU requiring pressors. Course there c/b Blood cultures from admission grew Enterobacterales 3/4 bottles and S. epiderm 1/4; zosyn monotherapy was continued as S. epiderm was regarded as a contaminant. Hospital course further complicated by an SMITH that was attributed to the renal calculus- now resolving. ID consulted for abx mgnt.    4/24/2024 [place] back bx -malignant melanoma  5/4 Nephrostomy -Proteus Mirab./providencia stuarti  5/4 clean catch proteus mirab  5/4 Provedencia stuarti bacteremia 4/4  proteus x2 -2018  101- 5/5, 100- 5/6  Leukocytosis 21-->5/4, 27-->5/5, today 7  Vanc x1 5/4, zosyn--> 5/5    Plan to be d/w attending                       63-year-old male, history of morbid obesity, multiple VTE's on Eliquis, hypertension, type 2 diabetes, multiple renal stones in the past initially at [place]  with fevers as high as 105 hypotensive ct sig R hydro and was found to have multiple right renal calculi inclusive of a UPJ stone-thought to be septic placed on zosyn and transferred to [facility]. On arival to the ED -hypotensive 6os with urology who deferred stent placement and is now S/p nephrostomy tube placed on 5/5 and transferred to MICU requiring pressors. Course there c/b Blood cultures from admission grew Provedencia and PCR Enterobacterales 4/4 bottles and S. epiderm, zosyn monotherapy was continued as S. epiderm was regarded as a contaminant. Hospital course further complicated by an SMITH that was attributed to the renal calculus- now resolving. ID consulted for abx mgnt.    4/24/2024 [place]upper back bx -malignant melanoma  5/4 Nephrostomy -Proteus Mirab./providencia stuarti  5/4 clean catch proteus mirab  5/4 Provedencia stuarti bacteremia 4/4  proteus x2 -2018  101- 5/5, 100- 5/6  Leukocytosis 21-->5/4, 27-->5/5, today 7  Vanc x1 5/4, zosyn--> 5/5    Plan  Nontoxic appearing with bacteremia from renal stone  f/u Urology re plans for further source control  Repeat blood cultures today or tomorrow morning  Ceftriaxone 2 g q 24hrs  D/c zosyn  Trend creatinine to normal  Monitor for fever  Continue to monitor WBC  Plan d/w Dr. Menendez and floor provider

## 2024-05-07 NOTE — PROGRESS NOTE ADULT - ATTENDING COMMENTS
62yo M pmh nephrolithiasis s/p multiple interventions (2018), DVT's (years ago) on Eliquis, HTN, DM2 admitted 5/4 as a transfer from  with urosepsis, septic shock, and bacteremia in the setting of obstructing 1.7 cm stone s/p PCN (5/5/24), requiring MICU for pressors weaned off norepinephrine 5/5am and Downgraded to general medicine floor 5/6, course c/b SMITH suspect ATN overall improving, pending ID c/s to recommend discharge abx plan.    1. urosepsis/septic shock due to nephrolithiasis/obstructing stone s/p PCN 5/5 by IR.   Presented with R 1.7cm UPJ stone, likely source of sepsis  Source control obtained with percutaneous nephrostomy on 5/5 early am  Septic shock - required vasopressors for 1 day, now weaned off since 5/5  Blood cultures growing Enterobacter & Staph epi (contaminant), sensitivity pending from 5/4  - 10 day course antibiotics starting 5/5 after source control  - zosyn until sensitivities result.  -IR 3 month follow up required - patient to call IR booking office at [phone] for appt.  PCN Care: Cleanse wound with saline, dress with transparent film and dry gauze, every 3 days PRN; Irrigate with 10CC NS every 24 hours.  -needs urology f/u for stone evaluation  -ID c/s for antibiotic d/c plan    2. SMITH: Likely combination prerenal from septic shock with contribution of obstructing stone and ATN from hypotension  Baseline ~ 1.2  CTM SCr.  downtrending  avoid nephrotoxic meds    3. Obstructive sleep apnea: Patient has home bipap machine, continue home settings.    4. HTN: holding home meds amlo 10, lopressor 50 bid due to septic shock  anticipate resuming lopressor tomorrow    5. DM2: On Trulicity at home  ISS, FS meals + bedtime.    6. History of DVT in adulthood.   Multiple prior DVTs years ago  Eliquis on hold until 5/7, 72hr after PCN placement.  Can resume tomorrow  also reports h/o IVC filter placement in setting of gi bleed while on AC    7. melanoma: outpatient surg onc f/u for resection/sentinal node biopsy planning    8. Dispo: PT recommends outpatient PT, owns necessary DME.    contact: TEAMS .

## 2024-05-07 NOTE — PROGRESS NOTE ADULT - ASSESSMENT
63 year old male with a history of nephrolithiasis s/p multiple interventions (2018), DVT's (years ago) on Eliquis, HTN, DM admitted as a transfer from  with urosepsis in the setting of obstructing 1.7 cm stone s/p PCN (5/5/24). In MICU for 1 day for septic shock, weaned off norepinephrine 5/5am. Downgraded to general medicine floor. Growing gram negative rods in blood and urine, pending sensitivities.

## 2024-05-07 NOTE — CONSULT NOTE ADULT - NS ATTEND AMEND GEN_ALL_CORE FT
63-year-old male, history of morbid obesity, multiple VTE's on Eliquis, hypertension, type 2 diabetes, multiple renal stones in the past, presenting for fever  Fever, leukocytosis  Renal stones, flank pain, high fevers  Recent diagnosis of malignant melanoma as outpatient  CT with hydronephrosis/renal stones  UCX Providencia/Proteus  Polymicrobial bacteremia Proteus/Providencia  S/p nephrostomy 5/4  Overall, UTI, Bacteremia, renal stone  - Ceftriaxone 2g q 24  - DC Zosyn  - Repeat BCXs to clear  - F/U urology, if any further role for source control in setting stone  - Trend SMITH/Cr to normal    Jey Menendez MD  Contact on TEAMS messaging from 9am - 5pm  From 5pm-9am, on weekends, or if no response call [phone]    I was physically present for the key portions of the evaluation and management service provided. I saw and examined the patient. I agree with the above history, physical, and plan except for any discrepancies which I have documented in “Attending Statements.” Please refer to “Attending Statements” for final plan.

## 2024-05-07 NOTE — CONSULT NOTE ADULT - SUBJECTIVE AND OBJECTIVE BOX
Patient is a 63y old  Male who presents with a chief complaint of fever (07 May 2024 07:52)    HPI:  63-year-old male, history of morbid obesity, multiple VTE's on Eliquis, hypertension, type 2 diabetes, multiple renal stones in the past, presenting for fever. He was seen initially at [facility] and was found to have a UPJ stone on CT and was transferred to [facility] due to concern for septic stone. On exam, the c/o of R back pain similar to the pain he felt when he had kidney stones previously but currently has no complaints. Denies fever, chills, HA, CP, SOB, N/V, constipation, diarrhea.     [facility] course: Tmax 105, , bp 80s sys. CT c/a/p significant mod rt hydronephrosis, multiple rt renal calculi with rt renal pelvis dilation with 1.7cm rt UPJ calculus. U/A was not collected. s/p zosyn, 2.7L NS bolus     [facility] ED course: BP 60s systolic s/p 500cc LR. Uro c/s in the ED and deferred placing a stent and rec IR to place PCN which was later placed 5/5.      (05 May 2024 01:54)     REVIEW OF SYSTEMS  [  ] ROS unobtainable because:    [ x ] All other systems negative except as noted below  Constitutional:  [ ] fever [ ] chills  [ ] weight loss  [ ]night sweat  [ ]poor appetite/PO intake [ ]fatigue   Skin:  [ ] rash [ ] phlebitis	  Eyes: [ ] icterus [ ] pain  [ ] discharge	  ENMT: [ ] sore throat  [ ] thrush [ ] ulcers [ ] exudates [ ]anosmia  Respiratory: [ ] dyspnea [ ] hemoptysis [ ] cough [ ] sputum	  Cardiovascular:  [ ] chest pain [ ] palpitations [ ] edema	  Gastrointestinal:  [ ] nausea [ ] vomiting [ ] diarrhea [ ] constipation [ ] pain	  Genitourinary:  [ ] dysuria [ ] frequency [ ] hematuria [ ] discharge [ ] flank pain  [ ] incontinence  Musculoskeletal:  [ ] myalgias [ ] arthralgias [ ] arthritis  [ ] back pain  Neurological:  [ ] headache [ ] weakness [ ] seizures  [ ] confusion/altered mental status  prior hospital charts reviewed [V]  primary team notes reviewed [V]  other consultant notes reviewed [V]    PAST MEDICAL & SURGICAL HISTORY:  HTN (hypertension)      Diabetes mellitus type II      Calculus of kidney and ureter      Acquired lymphedema of leg      H/O sleep apnea  no CPAP used, to follow up ASAP    had sleep study done no pending result      Morbid obesity      Hx of peripheral neuropathy      DVT (deep venous thrombosis)  7/18 ivc filter inserted      GI bleed  recently transfused 10 units      Duodenal ulcer      Gastritis      Respiratory failure, post-operative  post endoscopy [facility] 7/18      Ventricular arrhythmia  post endoscopy  echo done EF 60%      Anemia  s/p 10 units of blood [facility] 7/18, on iron now   due to GI bleeding stable now      OA (osteoarthritis)  right hip      Respiratory failure      Tracheostomy dependent      Hx of tonsillectomy  at 6 years old      Ureteral stents placement, bilateral      Presence of IVC filter  7/18      S/P endoscopy      H/O nephrostomy  bilateral  May 2018 clamped  JUNE and had ureteral stent          SOCIAL HISTORY:  - Denied smoking/vaping/alcohol/recreational drug use    FAMILY HISTORY:  Family history of prostate cancer in father    Family history of coronary artery disease        Allergies  No Known Allergies        ANTIMICROBIALS:  piperacillin/tazobactam IVPB.. 3.375 every 8 hours      ANTIMICROBIALS (past 90 days):  MEDICATIONS  (STANDING):  azithromycin  IVPB   255 mL/Hr IV Intermittent (05-04-24 @ 23:10)    piperacillin/tazobactam IVPB.   200 mL/Hr IV Intermittent (05-05-24 @ 04:28)    piperacillin/tazobactam IVPB.-   25 mL/Hr IV Intermittent (05-05-24 @ 09:14)    piperacillin/tazobactam IVPB..   25 mL/Hr IV Intermittent (05-07-24 @ 13:19)   25 mL/Hr IV Intermittent (05-07-24 @ 06:19)   25 mL/Hr IV Intermittent (05-06-24 @ 22:20)   25 mL/Hr IV Intermittent (05-06-24 @ 15:05)   25 mL/Hr IV Intermittent (05-06-24 @ 05:02)   25 mL/Hr IV Intermittent (05-05-24 @ 21:52)   25 mL/Hr IV Intermittent (05-05-24 @ 15:31)    vancomycin  IVPB.   250 mL/Hr IV Intermittent (05-04-24 @ 21:31)        OTHER MEDS:   MEDICATIONS  (STANDING):  dextrose 50% Injectable 12.5 once  dextrose 50% Injectable 25 once  dextrose 50% Injectable 25 once  dextrose Oral Gel 15 once  famotidine    Tablet 20 two times a day  glucagon  Injectable 1 once  insulin lispro (ADMELOG) corrective regimen sliding scale  three times a day before meals  insulin lispro (ADMELOG) corrective regimen sliding scale  at bedtime      VITALS:  Vital Signs Last 24 Hrs  T(F): 98.2 (05-07-24 @ 13:50), Max: 105 (05-04-24 @ 16:15)    Vital Signs Last 24 Hrs  HR: 69 (05-07-24 @ 13:50) (69 - 86)  BP: 130/73 (05-07-24 @ 13:50) (125/73 - 148/76)  RR: 18 (05-07-24 @ 13:50)  SpO2: 95% (05-07-24 @ 13:50) (94% - 97%)  Wt(kg): --    EXAM:    GA: NAD, AOx3  HEENT: oral cavity no lesion  CV: nl S1/S2, no RMG  Lungs: CTAB, No distress  Abd: BS+, soft, nontender, no rebounding pain  Ext: no edema  Neuro: No focal deficits  Skin: Intact  IV: no phlebitis  Labs:                        12.8   7.22  )-----------( 112      ( 07 May 2024 07:23 )             38.4     05-07    136  |  103  |  34<H>  ----------------------------<  93  4.1   |  20<L>  |  2.03<H>    Ca    8.7      07 May 2024 07:24  Phos  2.3     05-07  Mg     2.2     05-07    TPro  6.4  /  Alb  3.2<L>  /  TBili  0.9  /  DBili  x   /  AST  10  /  ALT  10  /  AlkPhos  101  05-07    WBC Trend:  WBC Count: 7.22 (05-07-24 @ 07:23)  WBC Count: 10.65 (05-06-24 @ 00:37)  WBC Count: 12.35 (05-05-24 @ 19:40)  WBC Count: 13.12 (05-05-24 @ 12:16)    Auto Neutrophil #: 9.55 K/uL (05-06-24 @ 00:37)  Auto Neutrophil #: 11.31 K/uL (05-05-24 @ 19:40)  Auto Neutrophil #: 11.03 K/uL (05-05-24 @ 12:16)  Band Neutrophils %: 10.6 % (05-05-24 @ 12:16)  Auto Neutrophil #: 24.39 K/uL (05-05-24 @ 04:36)    Creatine Trend:  Creatinine: 2.03 (05-07)  Creatinine: 2.70 (05-06)  Creatinine: 2.84 (05-05)  Creatinine: 2.74 (05-04)    Liver Biochemical Testing Trend:  Alanine Aminotransferase (ALT/SGPT): 10 (05-07)  Alanine Aminotransferase (ALT/SGPT): 10 (05-06)  Alanine Aminotransferase (ALT/SGPT): 13 (05-05)  Alanine Aminotransferase (ALT/SGPT): 11 (05-04)  Alanine Aminotransferase (ALT/SGPT): 14 (05-04)  Aspartate Aminotransferase (AST/SGOT): 10 (05-07-24 @ 07:24)  Aspartate Aminotransferase (AST/SGOT): 10 (05-06-24 @ 00:39)  Aspartate Aminotransferase (AST/SGOT): 18 (05-05-24 @ 04:36)  Aspartate Aminotransferase (AST/SGOT): 19 (05-04-24 @ 22:34)  Aspartate Aminotransferase (AST/SGOT): 29 (05-04-24 @ 16:30)  Bilirubin Total: 0.9 (05-07)  Bilirubin Total: 0.7 (05-06)  Bilirubin Total: 1.2 (05-05)  Bilirubin Total: 1.2 (05-04)  Bilirubin Total: 1.6 (05-04)    Trend LDH    Auto Eosinophil %: 0.5 % (05-06-24 @ 00:37)  Auto Eosinophil %: 0.4 % (05-05-24 @ 19:40)  Auto Eosinophil %: 0.0 % (05-05-24 @ 12:16)  Auto Eosinophil %: 0.2 % (05-05-24 @ 04:36)  Auto Eosinophil %: 0.0 % (05-04-24 @ 22:34)  Auto Eosinophil %: 0.0 % (05-04-24 @ 16:30)    Urinalysis Basic - ( 07 May 2024 07:24 )    Color: x / Appearance: x / SG: x / pH: x  Gluc: 93 mg/dL / Ketone: x  / Bili: x / Urobili: x   Blood: x / Protein: x / Nitrite: x   Leuk Esterase: x / RBC: x / WBC x   Sq Epi: x / Non Sq Epi: x / Bacteria: x      MICROBIOLOGY:    MRSA PCR Result.: Vianca (05-05-24 @ 05:59)      Culture - Urine (collected 05 May 2024 03:07)  Source: .Urine Nephrostomy - Right  Preliminary Report:    >100,000 CFU/ml Proteus mirabilis    >100,000 CFU/ml Providencia stuartii    Culture - Urine (collected 04 May 2024 23:15)  Source: Clean Catch Clean Catch (Midstream)  Preliminary Report:    10,000 - 49,000 CFU/mL Proteus mirabilis    Culture - Blood (collected 04 May 2024 22:15)  Source: .Blood Blood-Peripheral  Preliminary Report:    Growth in aerobic and anaerobic bottles: Providencia stuartii    Growth in aerobic bottle: Gram Positive Cocci in Clusters    Direct identification is available within approximately 3-5    hours either by Blood Panel Multiplexed PCR or Direct    MALDI-TOF.Details: [email or web address]  Organism: Blood Culture PCR  Organism: Blood Culture PCR    Sensitivities:      Method Type: PCR      -  Enterobacterales: Detec      -  Staphylococcus epidermidis: Detec    Culture - Blood (collected 04 May 2024 22:00)  Source: .Blood Blood-Peripheral  Preliminary Report:    Growth in aerobic and anaerobic bottles: Providencia stuartii    See previous culture [id number]    Culture - Blood (collected 04 May 2024 16:55)  Source: .Blood Blood-Peripheral  Preliminary Report:    Growth in aerobic and anaerobic bottles: Providencia stuartii    Growth in anaerobic bottle: Proteus mirabilis    See previous culture [id number]    Culture - Blood (collected 04 May 2024 16:55)  Source: .Blood Blood-Peripheral  Preliminary Report:    Growth in aerobic and anaerobic bottles: Providencia stuartii    Growth in aerobic bottle: Proteus mirabilis    Direct identification is available within approximately 3-5    hours either by Blood Panel Multiplexed PCR or Direct    MALDI-TOF. Details: [email or web address]  Organism: Blood Culture PCR  Organism: Blood Culture PCR    Sensitivities:      Method Type: PCR      -  Proteus species: Detec    Blood Gas Arterial, Lactate: 0.9 (05-06 @ 00:19)  Blood Gas Arterial, Lactate: 1.2 (05-05 @ 11:56)  Lactate, Blood: 3.2 (05-05 @ 04:38)  Blood Gas Arterial, Lactate: 2.9 (05-05 @ 04:19)      CSF:    RADIOLOGY:  < from: CT Chest No Cont (05.04.24 @ 17:29) >  IMPRESSION:  1. Small right pleural effusion. Bilateral lower lobe atelectatic   changes. No definite regions of infiltrate or consolidation.  2. There is moderate right-sided hydronephrosis. Multiple right renal   calculi are identified measuring up to 2.2 cm in size. There is   dilatation of the right renal pelvis with a 1.7 cm right UPJ calculus   identified. Left renal calculi are identified measuring up to 1.9 cm.   There is no evidence for left-sided hydronephrosis.  3. Upper abdominal peripancreatic/maddie hepatis lymphadenopathy is   identified measuring up to 2.0 x 1.5 cm.  4. Wall thickening within the distal sigmoid and rectum suggested.    < end of copied text >   Patient is a 63y old  Male who presents with a chief complaint of fever (07 May 2024 07:52)    HPI:  63-year-old male, history of morbid obesity, multiple VTE's on Eliquis, hypertension, type 2 diabetes, multiple renal stones in the past, presenting for fever. He was seen initially at [facility] [place] and was found to have a UPJ stone on CT and was transferred to [facility] due to concern for septic stone. On exam, the c/o of R back pain similar to the pain he felt when he had kidney stones previously but currently has no complaints. Denies fever, chills, HA, CP, SOB, N/V, constipation, diarrhea.     [facility] course: Tmax 105, , bp 80s sys. CT c/a/p significant mod rt hydronephrosis, multiple rt renal calculi with rt renal pelvis dilation with 1.7cm rt UPJ calculus. U/A was not collected. s/p zosyn, 2.7L NS bolus     [facility] ED course: BP 60s systolic s/p 500cc LR. Uro c/s in the ED and deferred placing a stent and rec IR to place PCN which was later placed 5/5.      (05 May 2024 01:54)     REVIEW OF SYSTEMS  [  ] ROS unobtainable because:    [ x ] All other systems negative except as noted below  Constitutional:  [x ] fever [ ] chills  [ ] weight loss  [ ]night sweat  [ ]poor appetite/PO intake [ ]fatigue   Skin:  [ ] rash [ ] phlebitis	  Eyes: [ ] icterus [ ] pain  [ ] discharge	  ENMT: [ ] sore throat  [ ] thrush [ ] ulcers [ ] exudates [ ]anosmia  Respiratory: [ ] dyspnea [ ] hemoptysis [ ] cough [ ] sputum	  Cardiovascular:  [ ] chest pain [ ] palpitations [ ] edema	  Gastrointestinal:  [ ] nausea [ ] vomiting [ ] diarrhea [ ] constipation [ ] pain	  Genitourinary:  [ ] dysuria [ ] frequency [ ] hematuria [ ] discharge [ ] flank pain  [ ] incontinence  Musculoskeletal:  [ ] myalgias [ ] arthralgias [ ] arthritis  [ ] back pain  Neurological:  [ ] headache [ ] weakness [ ] seizures  [x ] confusion/altered mental status  prior hospital charts reviewed [V]  primary team notes reviewed [V]  other consultant notes reviewed [V]    PAST MEDICAL & SURGICAL HISTORY:  HTN (hypertension)      Diabetes mellitus type II      Calculus of kidney and ureter      Acquired lymphedema of leg      H/O sleep apnea  no CPAP used, to follow up ASAP    had sleep study done no pending result      Morbid obesity      Hx of peripheral neuropathy      DVT (deep venous thrombosis)  7/18 ivc filter inserted      GI bleed  recently transfused 10 units      Duodenal ulcer      Gastritis      Respiratory failure, post-operative  post endoscopy [facility] 7/18      Ventricular arrhythmia  post endoscopy  echo done EF 60%      Anemia  s/p 10 units of blood [facility] 7/18, on iron now   due to GI bleeding stable now      OA (osteoarthritis)  right hip      Respiratory failure      Tracheostomy dependent      Hx of tonsillectomy  at 6 years old      Ureteral stents placement, bilateral      Presence of IVC filter  7/18      S/P endoscopy      H/O nephrostomy  bilateral  May 2018 clamped  JUNE and had ureteral stent          SOCIAL HISTORY:  - Denied smoking/vaping/alcohol/recreational drug use    FAMILY HISTORY:  Family history of prostate cancer in father    Family history of coronary artery disease        Allergies  No Known Allergies        ANTIMICROBIALS:  piperacillin/tazobactam IVPB.. 3.375 every 8 hours      ANTIMICROBIALS (past 90 days):  MEDICATIONS  (STANDING):  azithromycin  IVPB   255 mL/Hr IV Intermittent (05-04-24 @ 23:10)    piperacillin/tazobactam IVPB.   200 mL/Hr IV Intermittent (05-05-24 @ 04:28)    piperacillin/tazobactam IVPB.-   25 mL/Hr IV Intermittent (05-05-24 @ 09:14)    piperacillin/tazobactam IVPB..   25 mL/Hr IV Intermittent (05-07-24 @ 13:19)   25 mL/Hr IV Intermittent (05-07-24 @ 06:19)   25 mL/Hr IV Intermittent (05-06-24 @ 22:20)   25 mL/Hr IV Intermittent (05-06-24 @ 15:05)   25 mL/Hr IV Intermittent (05-06-24 @ 05:02)   25 mL/Hr IV Intermittent (05-05-24 @ 21:52)   25 mL/Hr IV Intermittent (05-05-24 @ 15:31)    vancomycin  IVPB.   250 mL/Hr IV Intermittent (05-04-24 @ 21:31)        OTHER MEDS:   MEDICATIONS  (STANDING):  dextrose 50% Injectable 12.5 once  dextrose 50% Injectable 25 once  dextrose 50% Injectable 25 once  dextrose Oral Gel 15 once  famotidine    Tablet 20 two times a day  glucagon  Injectable 1 once  insulin lispro (ADMELOG) corrective regimen sliding scale  three times a day before meals  insulin lispro (ADMELOG) corrective regimen sliding scale  at bedtime      VITALS:  Vital Signs Last 24 Hrs  T(F): 98.2 (05-07-24 @ 13:50), Max: 105 (05-04-24 @ 16:15)    Vital Signs Last 24 Hrs  HR: 69 (05-07-24 @ 13:50) (69 - 86)  BP: 130/73 (05-07-24 @ 13:50) (125/73 - 148/76)  RR: 18 (05-07-24 @ 13:50)  SpO2: 95% (05-07-24 @ 13:50) (94% - 97%)  Wt(kg): --    EXAM:    GA: NAD, AOx3  HEENT: oral cavity no lesion  CV: nl S1/S2, no RMG  Lungs: CTAB, No distress  Abd: BS+, soft, nontender, no rebounding pain  Ext: B/L LE+Lymphaedema  Neuro: No focal deficits  Skin: Intact R percutaneous nephrostomy tube  IV: no phlebitis  Labs:                        12.8   7.22  )-----------( 112      ( 07 May 2024 07:23 )             38.4     05-07    136  |  103  |  34<H>  ----------------------------<  93  4.1   |  20<L>  |  2.03<H>    Ca    8.7      07 May 2024 07:24  Phos  2.3     05-07  Mg     2.2     05-07    TPro  6.4  /  Alb  3.2<L>  /  TBili  0.9  /  DBili  x   /  AST  10  /  ALT  10  /  AlkPhos  101  05-07    WBC Trend:  WBC Count: 7.22 (05-07-24 @ 07:23)  WBC Count: 10.65 (05-06-24 @ 00:37)  WBC Count: 12.35 (05-05-24 @ 19:40)  WBC Count: 13.12 (05-05-24 @ 12:16)    Auto Neutrophil #: 9.55 K/uL (05-06-24 @ 00:37)  Auto Neutrophil #: 11.31 K/uL (05-05-24 @ 19:40)  Auto Neutrophil #: 11.03 K/uL (05-05-24 @ 12:16)  Band Neutrophils %: 10.6 % (05-05-24 @ 12:16)  Auto Neutrophil #: 24.39 K/uL (05-05-24 @ 04:36)    Creatine Trend:  Creatinine: 2.03 (05-07)  Creatinine: 2.70 (05-06)  Creatinine: 2.84 (05-05)  Creatinine: 2.74 (05-04)    Liver Biochemical Testing Trend:  Alanine Aminotransferase (ALT/SGPT): 10 (05-07)  Alanine Aminotransferase (ALT/SGPT): 10 (05-06)  Alanine Aminotransferase (ALT/SGPT): 13 (05-05)  Alanine Aminotransferase (ALT/SGPT): 11 (05-04)  Alanine Aminotransferase (ALT/SGPT): 14 (05-04)  Aspartate Aminotransferase (AST/SGOT): 10 (05-07-24 @ 07:24)  Aspartate Aminotransferase (AST/SGOT): 10 (05-06-24 @ 00:39)  Aspartate Aminotransferase (AST/SGOT): 18 (05-05-24 @ 04:36)  Aspartate Aminotransferase (AST/SGOT): 19 (05-04-24 @ 22:34)  Aspartate Aminotransferase (AST/SGOT): 29 (05-04-24 @ 16:30)  Bilirubin Total: 0.9 (05-07)  Bilirubin Total: 0.7 (05-06)  Bilirubin Total: 1.2 (05-05)  Bilirubin Total: 1.2 (05-04)  Bilirubin Total: 1.6 (05-04)    Trend LDH    Auto Eosinophil %: 0.5 % (05-06-24 @ 00:37)  Auto Eosinophil %: 0.4 % (05-05-24 @ 19:40)  Auto Eosinophil %: 0.0 % (05-05-24 @ 12:16)  Auto Eosinophil %: 0.2 % (05-05-24 @ 04:36)  Auto Eosinophil %: 0.0 % (05-04-24 @ 22:34)  Auto Eosinophil %: 0.0 % (05-04-24 @ 16:30)    Urinalysis Basic - ( 07 May 2024 07:24 )    Color: x / Appearance: x / SG: x / pH: x  Gluc: 93 mg/dL / Ketone: x  / Bili: x / Urobili: x   Blood: x / Protein: x / Nitrite: x   Leuk Esterase: x / RBC: x / WBC x   Sq Epi: x / Non Sq Epi: x / Bacteria: x      MICROBIOLOGY:    MRSA PCR Result.: NotDetec (05-05-24 @ 05:59)      Culture - Urine (collected 05 May 2024 03:07)  Source: .Urine Nephrostomy - Right  Preliminary Report:    >100,000 CFU/ml Proteus mirabilis    >100,000 CFU/ml Providencia stuartii    Culture - Urine (collected 04 May 2024 23:15)  Source: Clean Catch Clean Catch (Midstream)  Preliminary Report:    10,000 - 49,000 CFU/mL Proteus mirabilis    Culture - Blood (collected 04 May 2024 22:15)  Source: .Blood Blood-Peripheral  Preliminary Report:    Growth in aerobic and anaerobic bottles: Providencia stuartii    Growth in aerobic bottle: Gram Positive Cocci in Clusters    Direct identification is available within approximately 3-5    hours either by Blood Panel Multiplexed PCR or Direct    MALDI-TOF.Details: [email or web address]  Organism: Blood Culture PCR  Organism: Blood Culture PCR    Sensitivities:      Method Type: PCR      -  Enterobacterales: Detec      -  Staphylococcus epidermidis: Detec    Culture - Blood (collected 04 May 2024 22:00)  Source: .Blood Blood-Peripheral  Preliminary Report:    Growth in aerobic and anaerobic bottles: Providencia stuartii    See previous culture [id number]    Culture - Blood (collected 04 May 2024 16:55)  Source: .Blood Blood-Peripheral  Preliminary Report:    Growth in aerobic and anaerobic bottles: Providencia stuartii    Growth in anaerobic bottle: Proteus mirabilis    See previous culture [id number]    Culture - Blood (collected 04 May 2024 16:55)  Source: .Blood Blood-Peripheral  Preliminary Report:    Growth in aerobic and anaerobic bottles: Providencia stuartii    Growth in aerobic bottle: Proteus mirabilis    Direct identification is available within approximately 3-5    hours either by Blood Panel Multiplexed PCR or Direct    MALDI-TOF. Details: [email or web address]  Organism: Blood Culture PCR  Organism: Blood Culture PCR    Sensitivities:      Method Type: PCR      -  Proteus species: Detec    Blood Gas Arterial, Lactate: 0.9 (05-06 @ 00:19)  Blood Gas Arterial, Lactate: 1.2 (05-05 @ 11:56)  Lactate, Blood: 3.2 (05-05 @ 04:38)  Blood Gas Arterial, Lactate: 2.9 (05-05 @ 04:19)      CSF:    RADIOLOGY:  < from: CT Chest No Cont (05.04.24 @ 17:29) >  IMPRESSION:  1. Small right pleural effusion. Bilateral lower lobe atelectatic   changes. No definite regions of infiltrate or consolidation.  2. There is moderate right-sided hydronephrosis. Multiple right renal   calculi are identified measuring up to 2.2 cm in size. There is   dilatation of the right renal pelvis with a 1.7 cm right UPJ calculus   identified. Left renal calculi are identified measuring up to 1.9 cm.   There is no evidence for left-sided hydronephrosis.  3. Upper abdominal peripancreatic/maddie hepatis lymphadenopathy is   identified measuring up to 2.0 x 1.5 cm.  4. Wall thickening within the distal sigmoid and rectum suggested.    < end of copied text >

## 2024-05-08 LAB
ANION GAP SERPL CALC-SCNC: 12 MMOL/L — SIGNIFICANT CHANGE UP (ref 5–17)
BUN SERPL-MCNC: 29 MG/DL — HIGH (ref 7–23)
CALCIUM SERPL-MCNC: 9 MG/DL — SIGNIFICANT CHANGE UP (ref 8.4–10.5)
CHLORIDE SERPL-SCNC: 103 MMOL/L — SIGNIFICANT CHANGE UP (ref 96–108)
CO2 SERPL-SCNC: 22 MMOL/L — SIGNIFICANT CHANGE UP (ref 22–31)
CREAT SERPL-MCNC: 1.56 MG/DL — HIGH (ref 0.5–1.3)
CULTURE RESULTS: ABNORMAL
EGFR: 50 ML/MIN/1.73M2 — LOW
GLUCOSE BLDC GLUCOMTR-MCNC: 116 MG/DL — HIGH (ref 70–99)
GLUCOSE BLDC GLUCOMTR-MCNC: 90 MG/DL — SIGNIFICANT CHANGE UP (ref 70–99)
GLUCOSE BLDC GLUCOMTR-MCNC: 91 MG/DL — SIGNIFICANT CHANGE UP (ref 70–99)
GLUCOSE BLDC GLUCOMTR-MCNC: 99 MG/DL — SIGNIFICANT CHANGE UP (ref 70–99)
GLUCOSE SERPL-MCNC: 116 MG/DL — HIGH (ref 70–99)
HCT VFR BLD CALC: 41.6 % — SIGNIFICANT CHANGE UP (ref 39–50)
HGB BLD-MCNC: 13.6 G/DL — SIGNIFICANT CHANGE UP (ref 13–17)
MAGNESIUM SERPL-MCNC: 2.1 MG/DL — SIGNIFICANT CHANGE UP (ref 1.6–2.6)
MCHC RBC-ENTMCNC: 28.5 PG — SIGNIFICANT CHANGE UP (ref 27–34)
MCHC RBC-ENTMCNC: 32.7 GM/DL — SIGNIFICANT CHANGE UP (ref 32–36)
MCV RBC AUTO: 87.2 FL — SIGNIFICANT CHANGE UP (ref 80–100)
NRBC # BLD: 0 /100 WBCS — SIGNIFICANT CHANGE UP (ref 0–0)
ORGANISM # SPEC MICROSCOPIC CNT: ABNORMAL
PHOSPHATE SERPL-MCNC: 2.9 MG/DL — SIGNIFICANT CHANGE UP (ref 2.5–4.5)
PLATELET # BLD AUTO: 133 K/UL — LOW (ref 150–400)
POTASSIUM SERPL-MCNC: 4 MMOL/L — SIGNIFICANT CHANGE UP (ref 3.5–5.3)
POTASSIUM SERPL-SCNC: 4 MMOL/L — SIGNIFICANT CHANGE UP (ref 3.5–5.3)
RBC # BLD: 4.77 M/UL — SIGNIFICANT CHANGE UP (ref 4.2–5.8)
RBC # FLD: 14.8 % — HIGH (ref 10.3–14.5)
SODIUM SERPL-SCNC: 137 MMOL/L — SIGNIFICANT CHANGE UP (ref 135–145)
SPECIMEN SOURCE: SIGNIFICANT CHANGE UP
WBC # BLD: 6.26 K/UL — SIGNIFICANT CHANGE UP (ref 3.8–10.5)
WBC # FLD AUTO: 6.26 K/UL — SIGNIFICANT CHANGE UP (ref 3.8–10.5)

## 2024-05-08 PROCEDURE — 99232 SBSQ HOSP IP/OBS MODERATE 35: CPT

## 2024-05-08 PROCEDURE — 99232 SBSQ HOSP IP/OBS MODERATE 35: CPT | Mod: FS

## 2024-05-08 PROCEDURE — 99232 SBSQ HOSP IP/OBS MODERATE 35: CPT | Mod: GC

## 2024-05-08 RX ORDER — APIXABAN 2.5 MG/1
2.5 TABLET, FILM COATED ORAL
Refills: 0 | Status: DISCONTINUED | OUTPATIENT
Start: 2024-05-08 | End: 2024-05-09

## 2024-05-08 RX ORDER — METOPROLOL TARTRATE 50 MG
50 TABLET ORAL
Refills: 0 | Status: DISCONTINUED | OUTPATIENT
Start: 2024-05-08 | End: 2024-05-09

## 2024-05-08 RX ADMIN — CEFTRIAXONE 100 MILLIGRAM(S): 500 INJECTION, POWDER, FOR SOLUTION INTRAMUSCULAR; INTRAVENOUS at 22:02

## 2024-05-08 RX ADMIN — FAMOTIDINE 20 MILLIGRAM(S): 10 INJECTION INTRAVENOUS at 05:36

## 2024-05-08 RX ADMIN — FAMOTIDINE 20 MILLIGRAM(S): 10 INJECTION INTRAVENOUS at 18:12

## 2024-05-08 RX ADMIN — CHLORHEXIDINE GLUCONATE 1 APPLICATION(S): 213 SOLUTION TOPICAL at 12:41

## 2024-05-08 RX ADMIN — APIXABAN 2.5 MILLIGRAM(S): 2.5 TABLET, FILM COATED ORAL at 18:12

## 2024-05-08 RX ADMIN — Medication 50 MILLIGRAM(S): at 18:12

## 2024-05-08 NOTE — PROGRESS NOTE ADULT - ASSESSMENT
63 year old male with a history of nephrolithiasis s/p multiple interventions (2018), DVT's (years ago) on Eliquis, HTN, DM admitted as a transfer from  with urosepsis in the setting of obstructing 1.7 cm stone s/p PCN (5/5/24). In MICU for 1 day for septic shock, weaned off norepinephrine 5/5am. Downgraded to general medicine floor. Growing proteus & providencia in blood and urine

## 2024-05-08 NOTE — PROGRESS NOTE ADULT - ASSESSMENT
63-year-old male, history of morbid obesity, multiple VTE's on Eliquis, hypertension, type 2 diabetes, multiple renal stones in the past, presenting for fever  Fever, leukocytosis  Renal stones, flank pain, high fevers  Recent diagnosis of malignant melanoma as outpatient  CT with hydronephrosis/renal stones  UCX Providencia/Proteus  Polymicrobial bacteremia Proteus/Providencia  S/p nephrostomy 5/4  Overall, UTI, Bacteremia, renal stone  - Ceftriaxone 2g q 24  - Repeat BCXs to clear  - F/U urology, if any further role for source control in setting stone  - Trend SMITH/Cr to normal  - Anticipate when DC planning will select PO options to complete course    Jey Menendez MD  Contact on TEAMS messaging from 9am - 5pm  From 5pm-9am, on weekends, or if no response call [phone]

## 2024-05-08 NOTE — PROGRESS NOTE ADULT - ATTENDING COMMENTS
64yo M pmh nephrolithiasis s/p multiple interventions (2018), DVT's (years ago) on Eliquis, HTN, DM2 admitted 5/4 as a transfer from  with urosepsis, septic shock, and bacteremia in the setting of obstructing 1.7 cm stone s/p PCN (5/5/24), requiring MICU for pressors weaned off norepinephrine 5/5am and Downgraded to general medicine floor 5/6, course c/b SMITH suspect ATN overall improving, pending ID c/s to recommend discharge abx plan.    1. urosepsis/septic shock due to nephrolithiasis/obstructing stone s/p PCN 5/5 by IR.   Presented with R 1.7cm UPJ stone, likely source of sepsis  Source control obtained with percutaneous nephrostomy on 5/5 early am  Septic shock - required vasopressors for 1 day, now weaned off since 5/5 5/4 Nephrostomy -Proteus Mirab./providencia stuarti  5/4 clean catch proteus mirab  5/4 Provedencia stuarti bacteremia  proteus x2 -2018  -appreciate ID c/s: changed zosyn to ceftriaxone  -IR 3 month follow up required - patient to call IR booking office at [phone] for appt.  PCN Care: Cleanse wound with saline, dress with transparent film and dry gauze, every 3 days PRN; Irrigate with 10CC NS every 24 hours.  -needs urology f/u for stone evaluation    2. SMITH: Likely combination prerenal from septic shock with contribution of obstructing stone and ATN from hypotension  Baseline ~ 1.2  CTM SCr.  downtrending. Labs pending today  avoid nephrotoxic meds    3. Obstructive sleep apnea: Patient has home bipap machine, continue home settings.    4. HTN: holding home meds amlo 10, lopressor 50 bid due to septic shock  resume lopressor    5. DM2: On Trulicity at home  ISS, FS meals + bedtime.    6. History of DVT in adulthood.   Multiple prior DVTs years ago  Eliquis on hold 72hr after PCN placement.  Can resume today per IR  also reports h/o IVC filter placement in setting of gi bleed while on AC    7. melanoma: outpatient surg onc f/u for resection/sentinal node biopsy planning    8. Dispo: PT recommends outpatient PT, owns necessary DME.  possible d/c in 24 hours depending on abx plan    contact: TEAMS .

## 2024-05-09 ENCOUNTER — TRANSCRIPTION ENCOUNTER (OUTPATIENT)
Age: 64
End: 2024-05-09

## 2024-05-09 VITALS
TEMPERATURE: 97 F | DIASTOLIC BLOOD PRESSURE: 73 MMHG | SYSTOLIC BLOOD PRESSURE: 143 MMHG | OXYGEN SATURATION: 97 % | RESPIRATION RATE: 18 BRPM | HEART RATE: 57 BPM

## 2024-05-09 LAB
ANION GAP SERPL CALC-SCNC: 16 MMOL/L — SIGNIFICANT CHANGE UP (ref 5–17)
BASOPHILS # BLD AUTO: 0.07 K/UL — SIGNIFICANT CHANGE UP (ref 0–0.2)
BASOPHILS NFR BLD AUTO: 0.9 % — SIGNIFICANT CHANGE UP (ref 0–2)
BUN SERPL-MCNC: 30 MG/DL — HIGH (ref 7–23)
CALCIUM SERPL-MCNC: 8.4 MG/DL — SIGNIFICANT CHANGE UP (ref 8.4–10.5)
CHLORIDE SERPL-SCNC: 104 MMOL/L — SIGNIFICANT CHANGE UP (ref 96–108)
CO2 SERPL-SCNC: 19 MMOL/L — LOW (ref 22–31)
CREAT SERPL-MCNC: 1.51 MG/DL — HIGH (ref 0.5–1.3)
EGFR: 52 ML/MIN/1.73M2 — LOW
EOSINOPHIL # BLD AUTO: 0.17 K/UL — SIGNIFICANT CHANGE UP (ref 0–0.5)
EOSINOPHIL NFR BLD AUTO: 2.1 % — SIGNIFICANT CHANGE UP (ref 0–6)
GLUCOSE BLDC GLUCOMTR-MCNC: 103 MG/DL — HIGH (ref 70–99)
GLUCOSE BLDC GLUCOMTR-MCNC: 112 MG/DL — HIGH (ref 70–99)
GLUCOSE SERPL-MCNC: 84 MG/DL — SIGNIFICANT CHANGE UP (ref 70–99)
HCT VFR BLD CALC: 41.7 % — SIGNIFICANT CHANGE UP (ref 39–50)
HGB BLD-MCNC: 13.5 G/DL — SIGNIFICANT CHANGE UP (ref 13–17)
IMM GRANULOCYTES NFR BLD AUTO: 3 % — HIGH (ref 0–0.9)
LYMPHOCYTES # BLD AUTO: 1.54 K/UL — SIGNIFICANT CHANGE UP (ref 1–3.3)
LYMPHOCYTES # BLD AUTO: 18.7 % — SIGNIFICANT CHANGE UP (ref 13–44)
MAGNESIUM SERPL-MCNC: 2 MG/DL — SIGNIFICANT CHANGE UP (ref 1.6–2.6)
MCHC RBC-ENTMCNC: 28.4 PG — SIGNIFICANT CHANGE UP (ref 27–34)
MCHC RBC-ENTMCNC: 32.4 GM/DL — SIGNIFICANT CHANGE UP (ref 32–36)
MCV RBC AUTO: 87.6 FL — SIGNIFICANT CHANGE UP (ref 80–100)
MONOCYTES # BLD AUTO: 1.03 K/UL — HIGH (ref 0–0.9)
MONOCYTES NFR BLD AUTO: 12.5 % — SIGNIFICANT CHANGE UP (ref 2–14)
NEUTROPHILS # BLD AUTO: 5.16 K/UL — SIGNIFICANT CHANGE UP (ref 1.8–7.4)
NEUTROPHILS NFR BLD AUTO: 62.8 % — SIGNIFICANT CHANGE UP (ref 43–77)
NRBC # BLD: 0 /100 WBCS — SIGNIFICANT CHANGE UP (ref 0–0)
PHOSPHATE SERPL-MCNC: 3.3 MG/DL — SIGNIFICANT CHANGE UP (ref 2.5–4.5)
PLATELET # BLD AUTO: 152 K/UL — SIGNIFICANT CHANGE UP (ref 150–400)
POTASSIUM SERPL-MCNC: 4.2 MMOL/L — SIGNIFICANT CHANGE UP (ref 3.5–5.3)
POTASSIUM SERPL-SCNC: 4.2 MMOL/L — SIGNIFICANT CHANGE UP (ref 3.5–5.3)
RBC # BLD: 4.76 M/UL — SIGNIFICANT CHANGE UP (ref 4.2–5.8)
RBC # FLD: 14.8 % — HIGH (ref 10.3–14.5)
SODIUM SERPL-SCNC: 139 MMOL/L — SIGNIFICANT CHANGE UP (ref 135–145)
WBC # BLD: 8.22 K/UL — SIGNIFICANT CHANGE UP (ref 3.8–10.5)
WBC # FLD AUTO: 8.22 K/UL — SIGNIFICANT CHANGE UP (ref 3.8–10.5)

## 2024-05-09 PROCEDURE — 87077 CULTURE AEROBIC IDENTIFY: CPT

## 2024-05-09 PROCEDURE — 87640 STAPH A DNA AMP PROBE: CPT

## 2024-05-09 PROCEDURE — 36415 COLL VENOUS BLD VENIPUNCTURE: CPT

## 2024-05-09 PROCEDURE — 87150 DNA/RNA AMPLIFIED PROBE: CPT

## 2024-05-09 PROCEDURE — 99231 SBSQ HOSP IP/OBS SF/LOW 25: CPT

## 2024-05-09 PROCEDURE — 85014 HEMATOCRIT: CPT

## 2024-05-09 PROCEDURE — 87186 SC STD MICRODIL/AGAR DIL: CPT

## 2024-05-09 PROCEDURE — 87040 BLOOD CULTURE FOR BACTERIA: CPT

## 2024-05-09 PROCEDURE — 82803 BLOOD GASES ANY COMBINATION: CPT

## 2024-05-09 PROCEDURE — 94660 CPAP INITIATION&MGMT: CPT

## 2024-05-09 PROCEDURE — 86850 RBC ANTIBODY SCREEN: CPT

## 2024-05-09 PROCEDURE — 96374 THER/PROPH/DIAG INJ IV PUSH: CPT

## 2024-05-09 PROCEDURE — C1769: CPT

## 2024-05-09 PROCEDURE — C1894: CPT

## 2024-05-09 PROCEDURE — 50432 PLMT NEPHROSTOMY CATHETER: CPT

## 2024-05-09 PROCEDURE — C1729: CPT

## 2024-05-09 PROCEDURE — 85027 COMPLETE CBC AUTOMATED: CPT

## 2024-05-09 PROCEDURE — 96375 TX/PRO/DX INJ NEW DRUG ADDON: CPT

## 2024-05-09 PROCEDURE — 86901 BLOOD TYPING SEROLOGIC RH(D): CPT

## 2024-05-09 PROCEDURE — 87641 MR-STAPH DNA AMP PROBE: CPT

## 2024-05-09 PROCEDURE — 84295 ASSAY OF SERUM SODIUM: CPT

## 2024-05-09 PROCEDURE — 82962 GLUCOSE BLOOD TEST: CPT

## 2024-05-09 PROCEDURE — 83605 ASSAY OF LACTIC ACID: CPT

## 2024-05-09 PROCEDURE — 85018 HEMOGLOBIN: CPT

## 2024-05-09 PROCEDURE — 83735 ASSAY OF MAGNESIUM: CPT

## 2024-05-09 PROCEDURE — 84100 ASSAY OF PHOSPHORUS: CPT

## 2024-05-09 PROCEDURE — 99232 SBSQ HOSP IP/OBS MODERATE 35: CPT

## 2024-05-09 PROCEDURE — 81001 URINALYSIS AUTO W/SCOPE: CPT

## 2024-05-09 PROCEDURE — 99291 CRITICAL CARE FIRST HOUR: CPT

## 2024-05-09 PROCEDURE — 82947 ASSAY GLUCOSE BLOOD QUANT: CPT

## 2024-05-09 PROCEDURE — 82330 ASSAY OF CALCIUM: CPT

## 2024-05-09 PROCEDURE — 85025 COMPLETE CBC W/AUTO DIFF WBC: CPT

## 2024-05-09 PROCEDURE — 87086 URINE CULTURE/COLONY COUNT: CPT

## 2024-05-09 PROCEDURE — 85730 THROMBOPLASTIN TIME PARTIAL: CPT

## 2024-05-09 PROCEDURE — 85610 PROTHROMBIN TIME: CPT

## 2024-05-09 PROCEDURE — 97165 OT EVAL LOW COMPLEX 30 MIN: CPT

## 2024-05-09 PROCEDURE — 97116 GAIT TRAINING THERAPY: CPT

## 2024-05-09 PROCEDURE — 99239 HOSP IP/OBS DSCHRG MGMT >30: CPT

## 2024-05-09 PROCEDURE — 80048 BASIC METABOLIC PNL TOTAL CA: CPT

## 2024-05-09 PROCEDURE — 86900 BLOOD TYPING SEROLOGIC ABO: CPT

## 2024-05-09 PROCEDURE — 97530 THERAPEUTIC ACTIVITIES: CPT

## 2024-05-09 PROCEDURE — 84132 ASSAY OF SERUM POTASSIUM: CPT

## 2024-05-09 PROCEDURE — 71045 X-RAY EXAM CHEST 1 VIEW: CPT

## 2024-05-09 PROCEDURE — 97162 PT EVAL MOD COMPLEX 30 MIN: CPT

## 2024-05-09 PROCEDURE — 97602 WOUND(S) CARE NON-SELECTIVE: CPT

## 2024-05-09 PROCEDURE — 80053 COMPREHEN METABOLIC PANEL: CPT

## 2024-05-09 PROCEDURE — 82435 ASSAY OF BLOOD CHLORIDE: CPT

## 2024-05-09 RX ORDER — CEFPODOXIME PROXETIL 100 MG
1 TABLET ORAL
Qty: 19 | Refills: 0
Start: 2024-05-09

## 2024-05-09 RX ADMIN — FAMOTIDINE 20 MILLIGRAM(S): 10 INJECTION INTRAVENOUS at 06:14

## 2024-05-09 RX ADMIN — FAMOTIDINE 20 MILLIGRAM(S): 10 INJECTION INTRAVENOUS at 17:17

## 2024-05-09 RX ADMIN — Medication 50 MILLIGRAM(S): at 17:17

## 2024-05-09 RX ADMIN — APIXABAN 2.5 MILLIGRAM(S): 2.5 TABLET, FILM COATED ORAL at 06:14

## 2024-05-09 RX ADMIN — Medication 50 MILLIGRAM(S): at 06:15

## 2024-05-09 RX ADMIN — APIXABAN 2.5 MILLIGRAM(S): 2.5 TABLET, FILM COATED ORAL at 17:17

## 2024-05-09 RX ADMIN — CHLORHEXIDINE GLUCONATE 1 APPLICATION(S): 213 SOLUTION TOPICAL at 11:22

## 2024-05-09 NOTE — PROGRESS NOTE ADULT - PROBLEM SELECTOR PLAN 8
DVT: Eliquis  Diet: DASH, CC  Dispo: Home w/ home PT, owns necessary DME

## 2024-05-09 NOTE — PROGRESS NOTE ADULT - PROBLEM SELECTOR PLAN 3
Likely combination prerenal from septic shock with contribution of obstructing stone  Baseline ~ 1.2  CTM SCr

## 2024-05-09 NOTE — DISCHARGE NOTE NURSING/CASE MANAGEMENT/SOCIAL WORK - NSDCFUADDAPPT_GEN_ALL_CORE_FT
Please schedule an appointment with interventional radiology in 3 months to exchange your nephrostomy tube (or sooner if instructed by urologist and or if issues with drain arise) You may call the IR booking office @ [phone] to schedule. Please feel free to contact us at [phone] if any problems arise. After 6PM, Monday through Friday, on weekends and on holidays, please call [phone] and ask for the radiology resident on call to be paged.     Please follow up with your urologist within 2 weeks of discharge.    APPTS ARE READY TO BE MADE: [X] YES    Best Family or Patient Contact (if needed):    Additional Information about above appointments (if needed):    1: Urology within 2 weeks of discharge   2: IR 3 months after discharge  3:     Other comments or requests:

## 2024-05-09 NOTE — DISCHARGE NOTE NURSING/CASE MANAGEMENT/SOCIAL WORK - NSDCPEFALRISK_GEN_ALL_CORE
For information on Fall & Injury Prevention, visit: [email or web address] OR  [email or web address] OR  https://www.cdc.gov/steadi/patient.html

## 2024-05-09 NOTE — PROGRESS NOTE ADULT - PROBLEM SELECTOR PLAN 7
Multiple prior DVTs years ago  Eliquis on hold until 5/7, 72hr after PCN placement
Multiple prior DVTs years ago  Eliquis on hold until 5/8 72hr after PCN placement

## 2024-05-09 NOTE — PROGRESS NOTE ADULT - PROBLEM SELECTOR PLAN 2
1.7cm, per uro stent is likely to fail due to size  R PCN placed, will remain through discharge  Uro f/u outpatient  IR 3 month follow up required - patient to call IR booking office at [phone] for appt.    PCN Care:  Cleanse wound with saline, dress with transparent film and dry gauze, every 3 days PRN  Irrigate with 10CC NS every 24 hours
1.7cm, per uro stent is likely to fail due to size  R PCN placed, will remain through discharge  Uro f/u outpatient  IR 3 month follow up required - patient to call IR booking office at [phone] for appt.    PCN Care:  Cleanse wound with saline, dress with transparent film and dry gauze, every 3 days PRN  Irrigate with 10CC NS every 24 hours
1.7cm, per uro stent is likely to fail due to size  R PCN placed, will remain through discharge  IR 3 month follow up required - patient to call IR booking office at [phone] for appt.    PCN Care:  Cleanse wound with saline, dress with transparent film and dry gauze, every 3 days PRN  Irrigate with 10CC NS every 24 hours
1.7cm, per uro stent is likely to fail due to size  R PCN placed, will remain through discharge  Uro f/u outpatient  IR 3 month follow up required - patient to call IR booking office at [phone] for appt.    PCN Care:  Cleanse wound with saline, dress with transparent film and dry gauze, every 3 days PRN  Irrigate with 10CC NS every 24 hours

## 2024-05-09 NOTE — PROGRESS NOTE ADULT - SUBJECTIVE AND OBJECTIVE BOX
CC: F/U for Bacteremia    Saw/spoke to patient. Unchanged. Generally well.    Allergies  No Known Allergies    ANTIMICROBIALS:  cefTRIAXone   IVPB 2000 every 24 hours    PE:    Vital Signs Last 24 Hrs  T(C): 36.4 (08 May 2024 12:00), Max: 37.7 (07 May 2024 20:02)  T(F): 97.5 (08 May 2024 12:00), Max: 99.9 (07 May 2024 20:02)  HR: 73 (08 May 2024 12:00) (66 - 80)  BP: 132/85 (08 May 2024 12:00) (132/85 - 161/87)  RR: 18 (08 May 2024 12:00) (18 - 18)  SpO2: 95% (08 May 2024 12:00) (95% - 98%)    Gen: AOx3, NAD, non-toxic  CV: Nontachycardic  Resp: Breathing comfortably, RA  Abd: Soft, nontender  IV/Skin: No thrombophlebitis    LABS:                        13.6   6.26  )-----------( 133      ( 08 May 2024 13:58 )             41.6     05-08    137  |  103  |  29<H>  ----------------------------<  116<H>  4.0   |  22  |  1.56<H>    Ca    9.0      08 May 2024 13:58  Phos  2.9     05-08  Mg     2.1     05-08    TPro  6.4  /  Alb  3.2<L>  /  TBili  0.9  /  DBili  x   /  AST  10  /  ALT  10  /  AlkPhos  101  05-07    Urinalysis Basic - ( 08 May 2024 13:58 )    Color: x / Appearance: x / SG: x / pH: x  Gluc: 116 mg/dL / Ketone: x  / Bili: x / Urobili: x   Blood: x / Protein: x / Nitrite: x   Leuk Esterase: x / RBC: x / WBC x   Sq Epi: x / Non Sq Epi: x / Bacteria: x    MICROBIOLOGY:    .Urine Nephrostomy - Right  05-05-24   >100,000 CFU/ml Proteus mirabilis  >100,000 CFU/ml Providencia stuartii  --  Proteus mirabilis  Providencia stuartii    Clean Catch Clean Catch (Midstream)  05-04-24   10,000 - 49,000 CFU/mL Proteus mirabilis  <10,000 CFU/ml Normal Urogenital kan present  --  Proteus mirabilis    .Blood Blood-Peripheral  05-04-24   Growth in aerobic and anaerobic bottles: Providencia stuartii  Growth in aerobic bottle: Gram Positive Cocci in Clusters  Direct identification is available within approximately 3-5  hours either by Blood Panel Multiplexed PCR or Direct  MALDI-TOF.Details: [email or web address]  --  Blood Culture PCR  Providencia stuartii    .Blood Blood-Peripheral  05-04-24   Growth in aerobic and anaerobic bottles: Providencia stuartii  See previous culture [id number]  --    Growth in aerobic bottle: Gram Negative Rods  Growth in anaerobic bottle: Gram Negative Rods    .Blood Blood-Peripheral  05-04-24   Growth in aerobic and anaerobic bottles: Providencia stuartii  Growth in aerobic bottle: Proteus mirabilis  Direct identification is available within approximately 3-5  hours either by Blood Panel Multiplexed PCR or Direct  MALDI-TOF. Details: [email or web address]  --  Blood Culture PCR  Providencia stuartii  Proteus mirabilis    RADIOLOGY:    5/4 CT    IMPRESSION:  1. Small right pleural effusion. Bilateral lower lobe atelectatic   changes. No definite regions of infiltrate or consolidation.  2. There is moderate right-sided hydronephrosis. Multiple right renal   calculi are identified measuring up to 2.2 cm in size. There is   dilatation of the right renal pelvis with a 1.7 cm right UPJ calculus   identified. Left renal calculi are identified measuring up to 1.9 cm.   There is no evidence for left-sided hydronephrosis.  3. Upper abdominal peripancreatic/maddie hepatis lymphadenopathy is   identified measuring up to 2.0 x 1.5 cm.  4. Wall thickening within the distal sigmoid and rectum suggested.  
CC: F/U for Bacteremia    Saw/spoke to patient. Unchanged. No new complaints.    Allergies  No Known Allergies    ANTIMICROBIALS:  cefTRIAXone   IVPB 2000 every 24 hours    PE:    Vital Signs Last 24 Hrs  T(C): 36.3 (09 May 2024 12:00), Max: 37 (08 May 2024 20:32)  T(F): 97.4 (09 May 2024 12:00), Max: 98.6 (08 May 2024 20:32)  HR: 57 (09 May 2024 12:00) (57 - 67)  BP: 143/73 (09 May 2024 12:00) (131/75 - 162/85)  RR: 18 (09 May 2024 12:00) (18 - 19)  SpO2: 97% (09 May 2024 12:00) (95% - 100%)    Gen: AOx3, NAD, non-toxic  CV: Nontachycardic  Resp: Breathing comfortably, RA  Abd: Soft, nontender  IV/Skin: No thrombophlebitis    LABS:                        13.5   8.22  )-----------( 152      ( 09 May 2024 07:03 )             41.7     05-09    139  |  104  |  30<H>  ----------------------------<  84  4.2   |  19<L>  |  1.51<H>    Ca    8.4      09 May 2024 07:02  Phos  3.3     05-09  Mg     2.0     05-09    Urinalysis Basic - ( 09 May 2024 07:02 )    Color: x / Appearance: x / SG: x / pH: x  Gluc: 84 mg/dL / Ketone: x  / Bili: x / Urobili: x   Blood: x / Protein: x / Nitrite: x   Leuk Esterase: x / RBC: x / WBC x   Sq Epi: x / Non Sq Epi: x / Bacteria: x    MICROBIOLOGY:    .Blood Blood-Peripheral  05-08-24   No growth at 24 hours  --  --    .Blood Blood-Peripheral  05-08-24   No growth at 24 hours  --  --    .Urine Nephrostomy - Right  05-05-24   >100,000 CFU/ml Proteus mirabilis  >100,000 CFU/ml Providencia stuartii  --  Proteus mirabilis  Providencia stuartii    Clean Catch Clean Catch (Midstream)  05-04-24   10,000 - 49,000 CFU/mL Proteus mirabilis  <10,000 CFU/ml Normal Urogenital kan present  --  Proteus mirabilis    .Blood Blood-Peripheral  05-04-24   Growth in aerobic and anaerobic bottles: Providencia stuartii  Growth in aerobic bottle: Staphylococcus epidermidis "Susceptibilities  not performed"  Direct identification is available within approximately 3-5  hours either by Blood Panel Multiplexed PCR or Direct  MALDI-TOF. Details: [email or web address]  --  Blood Culture PCR  Providencia stuartii    .Blood Blood-Peripheral  05-04-24   Growth in aerobic and anaerobic bottles: Providencia stuartii  See previous culture [id number]  --    Growth in aerobic bottle: Gram Negative Rods  Growth in anaerobic bottle: Gram Negative Rods    .Blood Blood-Peripheral  05-04-24   Growth in aerobic and anaerobic bottles: Providencia stuartii  Growth in aerobic bottle: Proteus mirabilis  Direct identification is available within approximately 3-5  hours either by Blood Panel Multiplexed PCR or Direct  MALDI-TOF. Details: [email or web address]  --  Blood Culture PCR  Providencia stuartii  Proteus mirabilis    RADIOLOGY:    5/4 CT    IMPRESSION:  1. Small right pleural effusion. Bilateral lower lobe atelectatic   changes. No definite regions of infiltrate or consolidation.  2. There is moderate right-sided hydronephrosis. Multiple right renal   calculi are identified measuring up to 2.2 cm in size. There is   dilatation of the right renal pelvis with a 1.7 cm right UPJ calculus   identified. Left renal calculi are identified measuring up to 1.9 cm.   There is no evidence for left-sided hydronephrosis.  3. Upper abdominal peripancreatic/maddie hepatis lymphadenopathy is   identified measuring up to 2.0 x 1.5 cm.  4. Wall thickening within the distal sigmoid and rectum suggested.
Interventional Radiology Follow-Up Note    This is a 63y Male s/p right percutaneous nephrostomy tube placement on 5/4 in Interventional Radiology with Dr. Alaniz.     S: Patient seen and examined @ bedside. requesting legs be wrapped as he suffers from lymphedema. Otherwise offers no further complaints. Patient downgraded to floor.     Medication:     azithromycin  IVPB: (05-04)  norepinephrine Infusion: (05-05)  norepinephrine Infusion: (05-04)  piperacillin/tazobactam IVPB.: (05-05)  piperacillin/tazobactam IVPB.-: (05-05)  piperacillin/tazobactam IVPB..: (05-06)  prothrombin complex concentrate IVPB (KCENTRA): (05-05)  vancomycin  IVPB.: (05-04)    Vitals:  T(F): 97.1, Max: 101.3 (20:00)  HR: 80  BP: 114/66  RR: 18  SpO2: 98%    Physical Exam:  General: Nontoxic, in NAD, A&O x3.  Abdomen: soft, NTND, no peritoneal signs. mild ttp over drain insertion site.   Drain Device: Drain intact attached to bag with slightly cloudy mark anthony colored urine. Dressing clean, dry, intact.     LABS:  WBC -- / Hgb -- / Hct -- / Plt --  Na 135 / K 3.3 / CO2 20 / Cl 102 / BUN 38 / Cr 2.70 / Glucose 116  ALT 10 / AST 10 / Alk Phos 67 / Tbili 0.7  Ptt 30.8 / Pt 14.0 / INR 1.28      24hr Drain output: 1720cc from PCN and 1386cc from holman.       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 Teixeira who presented with fever. He was seen initially at [place] and was found to have a UPJ stone on CT and was transferred to [facility] due to concern for septic stone. Patient is currently s/p right percutaneous nephrostomy tube placement on 5/4 in Interventional Radiology with Dr. Alaniz.       -h/h stable  -febrile tmax 101.3 overnight- cont to monitor  -WBC downtrending 12.3-->10.65: cont to trend.   -Cr stable  -PCN cx pending- currently on zosyn  -For high risk procedure: Eliquis may be restarted in 72hrs from procedure.   -trend vs/labs  -flush drain with 10cc NS daily forward only; DO NOT aspirate  -change dressing q3 days or when dressing is saturated  -Stone management per urology  -regarding outpatient follow up with IR, if the patient is d/c home with drainage catheter they can make an appointment with IR by calling the IR booking office at [phone]; recommend IR follow in 3 months for tube evaluation/ exchange or sooner per urology recs.   -Compression stocking/ wrap legs- d/w primary team will place order.   -they will benefit from VNS service to help with drainage catheter care; they should continue same drainage catheter care as an outpatient.  -continue global management per primary team     -Please call IR at extension [phone] with any questions, concerns, or issues regarding above.      Verenice Agosto PA-C  Available on teams    
Subjective  Patient seen and examined at bedside. Feels well overall.     Objective    Vital signs  T(F): , Max: 100 (05-05-24 @ 03:00)  HR: 75 (05-05-24 @ 10:15)  BP: 129/61 (05-05-24 @ 04:30)  SpO2: 99% (05-05-24 @ 10:15)  Wt(kg): --    Output     OUT:    Indwelling Catheter - Urethral (mL): 605 mL    Nephrostomy Tube (mL): 400 mL  Total OUT: 1005 mL    Total NET: -1005 mL      OUT:    Indwelling Catheter - Urethral (mL): 270 mL  Total OUT: 270 mL    Total NET: -270 mL          Gen: NAD  Abd: soft, nontender, nondistended  : holman secured in place, draining CYU; right NT in place draining clear pink urine     Labs      05-05 @ 04:36    WBC 27.09 / Hct 40.6  / SCr 2.84     05-04 @ 22:34    WBC 21.93 / Hct 45.5  / SCr 2.74       
  INTERVAL HPI/OVERNIGHT EVENTS:    SUBJECTIVE: Patient seen and examined at bedside.     ROS: All negative except as listed above.    VITAL SIGNS:  ICU Vital Signs Last 24 Hrs  T(C): 37.4 (06 May 2024 04:00), Max: 38.5 (05 May 2024 20:00)  T(F): 99.3 (06 May 2024 04:00), Max: 101.3 (05 May 2024 20:00)  HR: 80 (06 May 2024 07:00) (72 - 101)  BP: 123/56 (06 May 2024 07:00) (102/57 - 124/59)  BP(mean): 81 (06 May 2024 07:00) (74 - 84)  ABP: 122/63 (06 May 2024 04:13) (96/48 - 176/69)  ABP(mean): 82 (06 May 2024 04:13) (60 - 98)  RR: 22 (06 May 2024 07:00) (10 - 39)  SpO2: 98% (06 May 2024 07:00) (93% - 100%)    O2 Parameters below as of 06 May 2024 06:00  Patient On (Oxygen Delivery Method): room air            Plateau pressure:   P/F ratio:     05-05 @ 07:01  -  05-06 @ 07:00  --------------------------------------------------------  IN: 1777.9 mL / OUT: 3106 mL / NET: -1328.1 mL      CAPILLARY BLOOD GLUCOSE      POCT Blood Glucose.: 117 mg/dL (05 May 2024 21:49)      ECG: reviewed.    PHYSICAL EXAM:    GENERAL: NAD, lying in bed comfortably  HEAD:  Atraumatic, normocephalic  EYES: EOMI, PERRLA, conjunctiva and sclera clear  NECK: Supple, trachea midline, no JVD  HEART: Regular rate and rhythm, no murmurs, rubs, or gallops  LUNGS: Unlabored respirations.  Clear to auscultation bilaterally, no crackles, wheezing, or rhonchi  ABDOMEN: Soft, nontender, nondistended, +BS  EXTREMITIES: 2+ peripheral pulses bilaterally, cap refill<2 secs. No clubbing, cyanosis, or edema  NERVOUS SYSTEM:  A&Ox3, following commands, moving all extremities, no focal deficits   SKIN: No rashes or lesions    MEDICATIONS:  MEDICATIONS  (STANDING):  dextrose 50% Injectable 12.5 Gram(s) IV Push once  dextrose 50% Injectable 25 Gram(s) IV Push once  dextrose 50% Injectable 25 Gram(s) IV Push once  dextrose Oral Gel 15 Gram(s) Oral once  famotidine    Tablet 20 milliGRAM(s) Oral two times a day  glucagon  Injectable 1 milliGRAM(s) IntraMuscular once  insulin lispro (ADMELOG) corrective regimen sliding scale   SubCutaneous three times a day before meals  insulin lispro (ADMELOG) corrective regimen sliding scale   SubCutaneous at bedtime  norepinephrine Infusion 0.04 MICROgram(s)/kG/Min (11.6 mL/Hr) IV Continuous <Continuous>  piperacillin/tazobactam IVPB.. 3.375 Gram(s) IV Intermittent every 8 hours    MEDICATIONS  (PRN):      ALLERGIES:  Allergies    No Known Allergies    Intolerances        LABS:                        12.1   10.65 )-----------( 93       ( 06 May 2024 00:37 )             37.5     05-06    135  |  102  |  38<H>  ----------------------------<  116<H>  3.3<L>   |  20<L>  |  2.70<H>    Ca    7.7<L>      06 May 2024 00:39  Phos  1.9     05-06  Mg     1.9     05-06    TPro  5.5<L>  /  Alb  2.9<L>  /  TBili  0.7  /  DBili  x   /  AST  10  /  ALT  10  /  AlkPhos  67  05-06    PT/INR - ( 06 May 2024 00:39 )   PT: 14.0 sec;   INR: 1.28 ratio         PTT - ( 06 May 2024 00:39 )  PTT:30.8 sec  Urinalysis Basic - ( 06 May 2024 00:39 )    Color: x / Appearance: x / SG: x / pH: x  Gluc: 116 mg/dL / Ketone: x  / Bili: x / Urobili: x   Blood: x / Protein: x / Nitrite: x   Leuk Esterase: x / RBC: x / WBC x   Sq Epi: x / Non Sq Epi: x / Bacteria: x      ABG:  pH, Arterial: 7.39 (05-06-24 @ 00:19)  pCO2, Arterial: 35 mmHg (05-06-24 @ 00:19)  pO2, Arterial: 94 mmHg (05-06-24 @ 00:19)  pH, Arterial: 7.39 (05-05-24 @ 11:56)  pCO2, Arterial: 36 mmHg (05-05-24 @ 11:56)  pO2, Arterial: 87 mmHg (05-05-24 @ 11:56)      vBG:    Micro:    Culture - Blood (collected 05-04-24 @ 22:15)  Source: .Blood Blood-Peripheral  Gram Stain (05-06-24 @ 02:50):    Growth in aerobic bottle: Gram Negative Rods and Gram Positive Cocci in    Clusters    Growth in anaerobic bottle: Gram Negative Rods  Preliminary Report (05-06-24 @ 02:51):    Growth in aerobic bottle: Gram Negative Rods and Gram Positive Cocci in    Clusters    Direct identification is available within approximately 3-5    hours either by Blood Panel Multiplexed PCR or Direct    MALDI-TOF. Details: [email or web address]    Growth in anaerobic bottle: Gram Negative Rods  Organism: Blood Culture PCR (05-06-24 @ 02:13)  Organism: Blood Culture PCR (05-06-24 @ 02:13)      Method Type: PCR      -  Enterobacterales: Detec      -  Staphylococcus epidermidis: Detec    Culture - Blood (collected 05-04-24 @ 22:00)  Source: .Blood Blood-Peripheral  Gram Stain (05-06-24 @ 06:21):    Growth in aerobic bottle: Gram Negative Rods    Growth in anaerobic bottle: Gram Negative Rods  Preliminary Report (05-06-24 @ 06:21):    Growth in aerobic bottle: Gram Negative Rods    Growth in anaerobic bottle: Gram Negative Rods          RADIOLOGY & ADDITIONAL TESTS: Reviewed.
63-year-old male, history of morbid obesity, multiple VTE's currently on Eliquis, hypertension, type 2 diabetes, h/o multiple renal stones presented to [place] for fever 2/2 CT- confirmed rt UPJ stone transferred to [facility] s/p nephrostomy tube with IR 5/5 and admitted to MICU for IV vasopressor support for septic stone.       SUBJECTIVE: Patient seen and examined at bedside.     ROS: All negative except as listed above.    VITAL SIGNS:  ICU Vital Signs Last 24 Hrs  T(C): 36.8 (05 May 2024 12:00), Max: 37.8 (05 May 2024 03:00)  T(F): 98.2 (05 May 2024 12:00), Max: 100 (05 May 2024 03:00)  HR: 80 (05 May 2024 13:00) (57 - 98)  BP: 129/61 (05 May 2024 04:30) (63/30 - 129/61)  BP(mean): 88 (05 May 2024 04:30) (33 - 88)  ABP: 106/48 (05 May 2024 13:00) (96/48 - 135/59)  ABP(mean): 65 (05 May 2024 13:00) (60 - 115)  RR: 18 (05 May 2024 13:00) (14 - 39)  SpO2: 100% (05 May 2024 13:00) (93% - 100%)    O2 Parameters below as of 05 May 2024 12:00  Patient On (Oxygen Delivery Method): room air            Plateau pressure:   P/F ratio:     05-04 @ 07:01  -  05-05 @ 07:00  --------------------------------------------------------  IN: 832.6 mL / OUT: 1005 mL / NET: -172.4 mL    05-05 @ 07:01  -  05-05 @ 13:52  --------------------------------------------------------  IN: 957.9 mL / OUT: 840 mL / NET: 117.9 mL      CAPILLARY BLOOD GLUCOSE      POCT Blood Glucose.: 113 mg/dL (05 May 2024 11:55)      ECG: reviewed.    PHYSICAL EXAM:    GENERAL: NAD, lying in bed comfortably  HEAD:  Atraumatic, normocephalic  EYES: EOMI, PERRLA, conjunctiva and sclera clear  NECK: Supple, trachea midline, no JVD  HEART: Regular rate and rhythm, no murmurs, rubs, or gallops  LUNGS: Unlabored respirations.  Clear to auscultation bilaterally, no crackles, wheezing, or rhonchi  ABDOMEN: Soft, nontender, nondistended, +BS  EXTREMITIES: 2+ peripheral pulses bilaterally, cap refill<2 secs. No clubbing, cyanosis, or edema  NERVOUS SYSTEM:  A&Ox3, following commands, moving all extremities, no focal deficits   SKIN: No rashes or lesions    MEDICATIONS:  MEDICATIONS  (STANDING):  dextrose 50% Injectable 12.5 Gram(s) IV Push once  dextrose 50% Injectable 25 Gram(s) IV Push once  dextrose 50% Injectable 25 Gram(s) IV Push once  dextrose Oral Gel 15 Gram(s) Oral once  famotidine    Tablet 20 milliGRAM(s) Oral two times a day  glucagon  Injectable 1 milliGRAM(s) IntraMuscular once  insulin lispro (ADMELOG) corrective regimen sliding scale   SubCutaneous three times a day before meals  insulin lispro (ADMELOG) corrective regimen sliding scale   SubCutaneous at bedtime  norepinephrine Infusion 0.04 MICROgram(s)/kG/Min (11.6 mL/Hr) IV Continuous <Continuous>  piperacillin/tazobactam IVPB.. 3.375 Gram(s) IV Intermittent every 8 hours    MEDICATIONS  (PRN):      ALLERGIES:  Allergies    No Known Allergies    Intolerances        LABS:                        12.6   13.12 )-----------( 108      ( 05 May 2024 12:16 )             38.1     05-05    136  |  102  |  33<H>  ----------------------------<  163<H>  3.4<L>   |  18<L>  |  2.84<H>    Ca    7.4<L>      05 May 2024 04:36  Phos  3.2     05-05  Mg     1.4     05-05    TPro  5.9<L>  /  Alb  3.1<L>  /  TBili  1.2  /  DBili  x   /  AST  18  /  ALT  13  /  AlkPhos  74  05-05    PT/INR - ( 04 May 2024 22:34 )   PT: 16.9 sec;   INR: 1.63 ratio         PTT - ( 04 May 2024 22:34 )  PTT:30.9 sec  Urinalysis Basic - ( 05 May 2024 04:36 )    Color: x / Appearance: x / SG: x / pH: x  Gluc: 163 mg/dL / Ketone: x  / Bili: x / Urobili: x   Blood: x / Protein: x / Nitrite: x   Leuk Esterase: x / RBC: x / WBC x   Sq Epi: x / Non Sq Epi: x / Bacteria: x      ABG:  pH, Arterial: 7.39 (05-05-24 @ 11:56)  pCO2, Arterial: 36 mmHg (05-05-24 @ 11:56)  pO2, Arterial: 87 mmHg (05-05-24 @ 11:56)  pH, Arterial: 7.31 (05-05-24 @ 04:19)  pCO2, Arterial: 38 mmHg (05-05-24 @ 04:19)  pO2, Arterial: 183 mmHg (05-05-24 @ 04:19)      vBG:  pH, Venous: 7.31 (05-05-24 @ 01:17)  pCO2, Venous: 41 mmHg (05-05-24 @ 01:17)  pO2, Venous: 35 mmHg (05-05-24 @ 01:17)  HCO3, Venous: 21 mmol/L (05-05-24 @ 01:17)    Micro:        RADIOLOGY & ADDITIONAL TESTS: Reviewed.
Interventional Radiology Follow-Up Note    This is a 63y Male s/p right percutaneous nephrostomy tube placement on 5/4 in Interventional Radiology with Dr. Alaniz.     S: Patient seen and examined @ bedside earlier this am. Pt does not offer any complaints from nephrostomy tube at this time.    Medication:  piperacillin/tazobactam IVPB..: (05-07)    Vitals:   T(F): 98.2, Max: 99.3 (22:05)  HR: 69  BP: 130/73  RR: 18  SpO2: 95%    Physical Exam:  General: Nontoxic, in NAD, A&O x3.  Abdomen: soft, NTND, no peritoneal signs.   Drain Device: Drain intact attached to bag with clear yellow fluid. Dressing clean, dry, intact.   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 Teixeira who presented with fever. He was seen initially at [place] and was found to have a UPJ stone on CT and was transferred to [facility] due to concern for septic stone. Patient is currently s/p right percutaneous nephrostomy tube placement on 5/4 in Interventional Radiology with Dr. Alaniz.       -h/h stable  -WBC downtrending 10.65-->7.2. Labs pending  -Cr  downtrending 2.70 --> 2.03  -For high risk procedure: Eliquis may be restarted in 72hrs from procedure.   -trend vs/labs  -flush drain with 10cc NS daily forward only; DO NOT aspirate  -change dressing q3 days or when dressing is saturated  -Stone management per urology  -regarding outpatient follow up with IR, if the patient is d/c home with drainage catheter they can make an appointment with IR by calling the IR booking office at [phone]; recommend IR follow in 3 months for tube evaluation/ exchange or sooner per urology recs.   -they will benefit from VNS service to help with drainage catheter care; they should continue same drainage catheter care as an outpatient.  -continue global management per primary team 
Interventional Radiology Follow-Up Note    This is a 63y Male s/p right percutaneous nephrostomy tube placement on 5/4 in Interventional Radiology with Dr. Alaniz.     S: Patient seen and examined @ bedside. No complaints offered. Pt does not offer any complaints from nephrostomy tube at this time.    Medication:     apixaban: (05-09)  cefTRIAXone   IVPB: (05-08)  metoprolol tartrate: (05-09)    Vitals:   T(F): 97.4, Max: 98.6 (20:32)  HR: 57  BP: 143/73  RR: 18  SpO2: 97%    Physical Exam:  General: Nontoxic, in NAD, A&O x3.  Abdomen: soft, NTND, no peritoneal signs.   Drain Device: Drain intact attached to bag with clear yellow fluid. Drain hubbed to skin, drain flushed w 5cc NS w/o difficulty. No pericatheter leakage noted, +fluid return noted in tubing. Dressings clean, dry, intact.  Dressing clean, dry, intact.     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 Teixeira who presented with fever. He was seen initially at [place] and was found to have a UPJ stone on CT and was transferred to [facility] due to concern for septic stone. Patient is currently s/p right percutaneous nephrostomy tube placement on 5/4 in Interventional Radiology with Dr. Alaniz.     -h/h stable  -WBC downtrending 10.65-->8.22. Labs pending  -Cr downtrending 2.70 --> 1.51  -For high risk procedure: Eliquis may be restarted in 72hrs from procedure.   -trend vs/labs  -flush drain with 10cc NS daily forward only; DO NOT aspirate  -change dressing q3 days or when dressing is saturated  -Stone management per urology  -regarding outpatient follow up with IR, if the patient is d/c home with drainage catheter they can make an appointment with IR by calling the IR booking office at [phone]; recommend IR follow in 3 months for tube evaluation/ exchange or sooner per urology recs.   -they will benefit from VNS service to help with drainage catheter care; they should continue same drainage catheter care as an outpatient.  -continue global management per primary team      --  Deni Weber NP  Interventional Radiology  Available on [employer] TEAMS / [employer]    For EMERGENT inquiries/questions:  IR Pager ([facility]): [phone]    For non-emergent consults/questions:   Please place a sunrise order "Consult- Interventional Radiology" with an appropriate callback number    For questions about scheduling during appropriate work hours, call IR :  [facility]: [phone]    For outpatient IR booking:  [facility]: [phone]    
Interventional Radiology Follow-Up Note    This is a 63y Male s/p right percutaneous nephrostomy tube placement on 5/4 in Interventional Radiology with Dr. Alaniz.     S: Patient seen and examined @ bedside earlier this am. Offers no complaints at this time.   Medication:     piperacillin/tazobactam IVPB..: (05-07)    Vitals:   T(F): 98.2, Max: 99.3 (22:05)  HR: 69  BP: 130/73  RR: 18  SpO2: 95%    Physical Exam:  General: Nontoxic, in NAD, A&O x3.  Abdomen: soft, NTND, no peritoneal signs.   Drain Device: Drain intact attached to bag with cleay yellow with debris. Dressing clean, dry, intact.   LE: wrapped with ace wrap     Culture - Urine (05.05.24 @ 03:07)    Specimen Source: .Urine Nephrostomy - Right   Culture Results: >100,000 CFU/ml Proteus mirabilis  >100,000 CFU/ml Providencia stuartii        LABS:  WBC -- / Hgb -- / Hct -- / Plt --  Na 136 / K 4.1 / CO2 20 / Cl 103 / BUN 34 / Cr 2.03 / Glucose 93  ALT 10 / AST 10 / Alk Phos 101 / Tbili 0.9  Ptt 27.5 / Pt 11.5 / INR 1.05      24hr Drain output: 1675cc    Assessment/Plan:  63-year-old male, history of morbid obesity, multiple VTE's on Eliquis, hypertension, type 2 diabetes, multiple renal stones in the past reports having lithotripsy with Dr. Teixeira who presented with fever. He was seen initially at [place] and was found to have a UPJ stone on CT and was transferred to [facility] due to concern for septic stone. Patient is currently s/p right percutaneous nephrostomy tube placement on 5/4 in Interventional Radiology with Dr. Alaniz.       -h/h stable  -WBC downtrending 12.3-->10.65-->7.2: cont to trend.   -Cr stable  -PCN cx +- currently on zosyn  -For high risk procedure: Eliquis may be restarted in 72hrs from procedure.   -trend vs/labs  -flush drain with 10cc NS daily forward only; DO NOT aspirate  -change dressing q3 days or when dressing is saturated  -Stone management per urology  -regarding outpatient follow up with IR, if the patient is d/c home with drainage catheter they can make an appointment with IR by calling the IR booking office at [phone]; recommend IR follow in 3 months for tube evaluation/ exchange or sooner per urology recs.   -they will benefit from VNS service to help with drainage catheter care; they should continue same drainage catheter care as an outpatient.  -continue global management per primary team     Verenice Agosto PA-C  Available on teams    
Kvng Jonas, PGY-1  Please contact on Teams    LEONA RODRIGUEZ  63y  Male    Reason for Admission:     SUBJECTIVE/INTERVAL EVENTS: No acute events. Pt seen and examined at bedside.    Physical Exam:   GENERAL: NAD, well-groomed, well-developed  HEAD:  Atraumatic, Normocephalic  EYES: EOMI, PERRLA, conjunctiva and sclera clear  ENMT:  Moist mucous membranes  NECK: Supple, No JVD,  CHEST/LUNG: Clear to auscultation bilaterally; No rales, rhonchi, wheezing, or rubs  HEART: Regular rate and rhythm; No murmurs, rubs, or gallops  ABDOMEN: Soft, Nontender, Nondistended; Bowel sounds present. R nephrostomy tube clean, dry, and intact with serosanguinous drainage.   NEURO: Alert & Oriented X3  EXTREMITIES: No LE edema, no calf tenderness  LYMPH: No lymphadenopathy noted  SKIN: No rashes or lesions      Vital Signs Last 24 Hrs  T(C): 37.3 (07 May 2024 05:04), Max: 37.8 (06 May 2024 08:00)  T(F): 99.1 (07 May 2024 05:04), Max: 100 (06 May 2024 08:00)  HR: 76 (07 May 2024 05:04) (74 - 87)  BP: 125/73 (07 May 2024 05:04) (114/66 - 148/76)  BP(mean): 86 (06 May 2024 08:00) (86 - 86)  RR: 19 (07 May 2024 05:04) (18 - 27)  SpO2: 96% (07 May 2024 05:04) (94% - 99%)    Parameters below as of 07 May 2024 05:04  Patient On (Oxygen Delivery Method): BiPAP/CPAP          Consultant(s) Notes Reviewed:  [x] YES  [ ] NO  Care Discussed with Consultants/Other Providers [x] YES  [ ] NO    LABS:                        12.1   10.65 )-----------( 93       ( 06 May 2024 00:37 )             37.5                         13.3   12.35 )-----------( 102      ( 05 May 2024 19:40 )             38.7                         12.6   13.12 )-----------( 108      ( 05 May 2024 12:16 )             38.1         Urinalysis Basic - ( 06 May 2024 00:39 )    Color: x / Appearance: x / SG: x / pH: x  Gluc: 116 mg/dL / Ketone: x  / Bili: x / Urobili: x   Blood: x / Protein: x / Nitrite: x   Leuk Esterase: x / RBC: x / WBC x   Sq Epi: x / Non Sq Epi: x / Bacteria: x        RADIOLOGY & ADDITIONAL TESTS:    Imaging Personally Reviewed:  [x] YES  [ ] NO    MEDICATIONS  (STANDING):  dextrose 50% Injectable 25 Gram(s) IV Push once  dextrose 50% Injectable 25 Gram(s) IV Push once  dextrose 50% Injectable 12.5 Gram(s) IV Push once  dextrose Oral Gel 15 Gram(s) Oral once  famotidine    Tablet 20 milliGRAM(s) Oral two times a day  glucagon  Injectable 1 milliGRAM(s) IntraMuscular once  insulin lispro (ADMELOG) corrective regimen sliding scale   SubCutaneous three times a day before meals  insulin lispro (ADMELOG) corrective regimen sliding scale   SubCutaneous at bedtime  piperacillin/tazobactam IVPB.. 3.375 Gram(s) IV Intermittent every 8 hours    MEDICATIONS  (PRN):      HEALTH ISSUES - PROBLEM Dx:  Sepsis secondary to UTI    Renal calculus    SMITH (acute kidney injury)    Obstructive sleep apnea    Hypertension    Type 2 diabetes mellitus    History of DVT in adulthood    Prophylactic measure    HLD (hyperlipidemia)    Chronic atrial fibrillation            
Kvng Jonas, PGY-1  Please contact on Teams    LEONA RODRIGUEZ  63y  Male    Reason for Admission:     SUBJECTIVE/INTERVAL EVENTS: No acute events. Pt seen and examined at bedside.    Physical Exam:   GENERAL: NAD,   EYES: EOMI, PERRLA, conjunctiva and sclera clear  ENMT:  Moist mucous membranes  NECK: Supple, No JVD,  CHEST/LUNG: Clear to auscultation bilaterally; No rales, rhonchi, wheezing, or rubs  HEART: Regular rate and rhythm; No murmurs, rubs, or gallops  ABDOMEN: Soft, Nontender, Nondistended; Bowel sounds present. R nephrostomy tube clean, dry, and intact with serosanguinous drainage.   NEURO: Alert & Oriented X3  EXTREMITIES: significant LE edema, no calf tenderness  LYMPH: No lymphadenopathy noted  SKIN: No rashes or lesions    Vital Signs Last 24 Hrs  T(C): 37 (09 May 2024 04:32), Max: 37 (08 May 2024 20:32)  T(F): 98.6 (09 May 2024 04:32), Max: 98.6 (08 May 2024 20:32)  HR: 67 (09 May 2024 06:13) (63 - 73)  BP: 131/75 (09 May 2024 06:13) (131/75 - 162/85)  BP(mean): --  RR: 19 (09 May 2024 04:32) (18 - 19)  SpO2: 97% (09 May 2024 04:32) (95% - 100%)    Parameters below as of 09 May 2024 04:32  Patient On (Oxygen Delivery Method): BiPAP/CPAP          Consultant(s) Notes Reviewed:  [x] YES  [ ] NO  Care Discussed with Consultants/Other Providers [x] YES  [ ] NO    LABS:                        13.5   8.22  )-----------( 152      ( 09 May 2024 07:03 )             41.7                         13.6   6.26  )-----------( 133      ( 08 May 2024 13:58 )             41.6                         12.8   7.22  )-----------( 112      ( 07 May 2024 07:23 )             38.4                               139    |  104    |  30                  Calcium: 8.4   / iCa: x      (05-09 @ 07:02)    ----------------------------<  84        Magnesium: 2.0                              4.2     |  19     |  1.51             Phosphorous: 3.3        Urinalysis Basic - ( 09 May 2024 07:02 )    Color: x / Appearance: x / SG: x / pH: x  Gluc: 84 mg/dL / Ketone: x  / Bili: x / Urobili: x   Blood: x / Protein: x / Nitrite: x   Leuk Esterase: x / RBC: x / WBC x   Sq Epi: x / Non Sq Epi: x / Bacteria: x        RADIOLOGY & ADDITIONAL TESTS:    Imaging Personally Reviewed:  [x] YES  [ ] NO    MEDICATIONS  (STANDING):  apixaban 2.5 milliGRAM(s) Oral two times a day  cefTRIAXone   IVPB 2000 milliGRAM(s) IV Intermittent every 24 hours  chlorhexidine 4% Liquid 1 Application(s) Topical daily  dextrose 50% Injectable 12.5 Gram(s) IV Push once  dextrose 50% Injectable 25 Gram(s) IV Push once  dextrose 50% Injectable 25 Gram(s) IV Push once  dextrose Oral Gel 15 Gram(s) Oral once  famotidine    Tablet 20 milliGRAM(s) Oral two times a day  glucagon  Injectable 1 milliGRAM(s) IntraMuscular once  insulin lispro (ADMELOG) corrective regimen sliding scale   SubCutaneous three times a day before meals  insulin lispro (ADMELOG) corrective regimen sliding scale   SubCutaneous at bedtime  metoprolol tartrate 50 milliGRAM(s) Oral two times a day    MEDICATIONS  (PRN):      HEALTH ISSUES - PROBLEM Dx:  Sepsis secondary to UTI    Renal calculus    SMITH (acute kidney injury)    Obstructive sleep apnea    Hypertension    Type 2 diabetes mellitus    History of DVT in adulthood    Prophylactic measure    HLD (hyperlipidemia)    Chronic atrial fibrillation            
Kvng Jonas, PGY-1  Please contact on Teams    LEONA RODRIGUEZ  63y  Male    Reason for Admission:     SUBJECTIVE/INTERVAL EVENTS: No acute events. Pt seen and examined at bedside.    Physical Exam:   GENERAL: NAD,   EYES: EOMI, PERRLA, conjunctiva and sclera clear  ENMT:  Moist mucous membranes  NECK: Supple, No JVD,  CHEST/LUNG: Clear to auscultation bilaterally; No rales, rhonchi, wheezing, or rubs  HEART: Regular rate and rhythm; No murmurs, rubs, or gallops  ABDOMEN: Soft, Nontender, Nondistended; Bowel sounds present. R nephrostomy tube clean, dry, and intact with serosanguinous drainage.   NEURO: Alert & Oriented X3  EXTREMITIES: significant LE edema, no calf tenderness  LYMPH: No lymphadenopathy noted  SKIN: No rashes or lesions      Vital Signs Last 24 Hrs  T(C): 36.7 (08 May 2024 04:56), Max: 37.7 (07 May 2024 20:02)  T(F): 98.1 (08 May 2024 04:56), Max: 99.9 (07 May 2024 20:02)  HR: 66 (08 May 2024 04:56) (66 - 80)  BP: 161/87 (08 May 2024 04:56) (130/73 - 161/87)  BP(mean): --  RR: 18 (08 May 2024 04:56) (18 - 18)  SpO2: 95% (08 May 2024 04:56) (95% - 97%)    Parameters below as of 08 May 2024 04:56  Patient On (Oxygen Delivery Method): BiPAP/CPAP          Consultant(s) Notes Reviewed:  [x] YES  [ ] NO  Care Discussed with Consultants/Other Providers [x] YES  [ ] NO    LABS:                        12.8   7.22  )-----------( 112      ( 07 May 2024 07:23 )             38.4                         12.1   10.65 )-----------( 93       ( 06 May 2024 00:37 )             37.5                         13.3   12.35 )-----------( 102      ( 05 May 2024 19:40 )             38.7         Urinalysis Basic - ( 07 May 2024 07:24 )    Color: x / Appearance: x / SG: x / pH: x  Gluc: 93 mg/dL / Ketone: x  / Bili: x / Urobili: x   Blood: x / Protein: x / Nitrite: x   Leuk Esterase: x / RBC: x / WBC x   Sq Epi: x / Non Sq Epi: x / Bacteria: x        RADIOLOGY & ADDITIONAL TESTS:    Imaging Personally Reviewed:  [x] YES  [ ] NO    MEDICATIONS  (STANDING):  cefTRIAXone   IVPB 2000 milliGRAM(s) IV Intermittent every 24 hours  chlorhexidine 4% Liquid 1 Application(s) Topical daily  dextrose 50% Injectable 25 Gram(s) IV Push once  dextrose 50% Injectable 25 Gram(s) IV Push once  dextrose 50% Injectable 12.5 Gram(s) IV Push once  dextrose Oral Gel 15 Gram(s) Oral once  famotidine    Tablet 20 milliGRAM(s) Oral two times a day  glucagon  Injectable 1 milliGRAM(s) IntraMuscular once  insulin lispro (ADMELOG) corrective regimen sliding scale   SubCutaneous three times a day before meals  insulin lispro (ADMELOG) corrective regimen sliding scale   SubCutaneous at bedtime    MEDICATIONS  (PRN):      HEALTH ISSUES - PROBLEM Dx:  Sepsis secondary to UTI    Renal calculus    SMITH (acute kidney injury)    Obstructive sleep apnea    Hypertension    Type 2 diabetes mellitus    History of DVT in adulthood    Prophylactic measure    HLD (hyperlipidemia)    Chronic atrial fibrillation            
Kvng Jonas, PGY-1  Please contact on Teams    LEONA RODRIGUEZ  63y  Male    Reason for Admission:     SUBJECTIVE/INTERVAL EVENTS: No acute events. Downgraded from MICU, accepted to HS 6. Pt seen and examined at bedside.    Physical Exam:   GENERAL: NAD, well-groomed, well-developed  HEAD:  Atraumatic, Normocephalic  EYES: EOMI, PERRLA, conjunctiva and sclera clear  ENMT:  Moist mucous membranes  NECK: Supple, No JVD,  CHEST/LUNG: Clear to auscultation bilaterally; No rales, rhonchi, wheezing, or rubs  HEART: Regular rate and rhythm; No murmurs, rubs, or gallops  ABDOMEN: Soft, Nontender, Nondistended; Bowel sounds present. R nephrostomy tube clean, dry, and intact with serosanguinous drainage.   NEURO: Alert & Oriented X3  EXTREMITIES: No LE edema, no calf tenderness  LYMPH: No lymphadenopathy noted  SKIN: No rashes or lesions      Vital Signs Last 24 Hrs  T(C): 36.2 (06 May 2024 10:24), Max: 38.5 (05 May 2024 20:00)  T(F): 97.1 (06 May 2024 10:24), Max: 101.3 (05 May 2024 20:00)  HR: 80 (06 May 2024 10:24) (72 - 101)  BP: 114/66 (06 May 2024 10:24) (102/57 - 125/60)  BP(mean): 86 (06 May 2024 08:00) (74 - 86)  RR: 18 (06 May 2024 10:24) (10 - 29)  SpO2: 98% (06 May 2024 10:24) (93% - 100%)    Parameters below as of 06 May 2024 10:24  Patient On (Oxygen Delivery Method): room air          Consultant(s) Notes Reviewed:  [x] YES  [ ] NO  Care Discussed with Consultants/Other Providers [x] YES  [ ] NO    LABS:                        12.1   10.65 )-----------( 93       ( 06 May 2024 00:37 )             37.5                         13.3   12.35 )-----------( 102      ( 05 May 2024 19:40 )             38.7                         12.6   13.12 )-----------( 108      ( 05 May 2024 12:16 )             38.1                               135    |  102    |  38                  Calcium: 7.7   / iCa: x      (05-06 @ 00:39)    ----------------------------<  116       Magnesium: 1.9                              3.3     |  20     |  2.70             Phosphorous: 1.9      TPro  5.5    /  Alb  2.9    /  TBili  0.7    /  DBili  x      /  AST  10     /  ALT  10     /  AlkPhos  67     06 May 2024 00:39    Urinalysis Basic - ( 06 May 2024 00:39 )    Color: x / Appearance: x / SG: x / pH: x  Gluc: 116 mg/dL / Ketone: x  / Bili: x / Urobili: x   Blood: x / Protein: x / Nitrite: x   Leuk Esterase: x / RBC: x / WBC x   Sq Epi: x / Non Sq Epi: x / Bacteria: x        RADIOLOGY & ADDITIONAL TESTS:    Imaging Personally Reviewed:  [x] YES  [ ] NO    MEDICATIONS  (STANDING):  dextrose 50% Injectable 12.5 Gram(s) IV Push once  dextrose 50% Injectable 25 Gram(s) IV Push once  dextrose 50% Injectable 25 Gram(s) IV Push once  dextrose Oral Gel 15 Gram(s) Oral once  famotidine    Tablet 20 milliGRAM(s) Oral two times a day  glucagon  Injectable 1 milliGRAM(s) IntraMuscular once  insulin lispro (ADMELOG) corrective regimen sliding scale   SubCutaneous three times a day before meals  insulin lispro (ADMELOG) corrective regimen sliding scale   SubCutaneous at bedtime  piperacillin/tazobactam IVPB.. 3.375 Gram(s) IV Intermittent every 8 hours    MEDICATIONS  (PRN):      HEALTH ISSUES - PROBLEM Dx:

## 2024-05-09 NOTE — PROGRESS NOTE ADULT - PROVIDER SPECIALTY LIST ADULT
Internal Medicine
Intervent Radiology
MICU
Infectious Disease
Intervent Radiology
Urology
Infectious Disease
Intervent Radiology
MICU
Intervent Radiology
Internal Medicine

## 2024-05-09 NOTE — PROGRESS NOTE ADULT - PROBLEM SELECTOR PLAN 4
Patient has home bipap machine, continue home settings

## 2024-05-09 NOTE — DISCHARGE NOTE NURSING/CASE MANAGEMENT/SOCIAL WORK - PATIENT PORTAL LINK FT
You can access the FollowMyHealth Patient Portal offered by [facility] by registering at the following website: [email or web address]. By joining [employer]’s FollowMyHealth portal, you will also be able to view your health information using other applications (apps) compatible with our system.

## 2024-05-09 NOTE — DISCHARGE NOTE NURSING/CASE MANAGEMENT/SOCIAL WORK - CASE MANAGER'S NAME
Render Risk Assessment In Note?: no
Detail Level: Simple
Comment: FBSE - benign appearing exam today.
Comment: Biopsy proven 8/2019. Discussed no further treatment necessary at this time. Will monitor for changes.
Maurisio Maxwell, RN

## 2024-05-09 NOTE — PROGRESS NOTE ADULT - PROBLEM SELECTOR PROBLEM 3
SMITH (acute kidney injury)

## 2024-05-09 NOTE — PROGRESS NOTE ADULT - ATTENDING COMMENTS
62yo M pmh nephrolithiasis s/p multiple interventions (2018), DVT's (years ago) on Eliquis, HTN, DM2 admitted 5/4 as a transfer from  with urosepsis, septic shock, and bacteremia in the setting of obstructing 1.7 cm stone s/p PCN (5/5/24), requiring MICU for pressors weaned off norepinephrine 5/5am and Downgraded to general medicine floor 5/6, course c/b SMITH suspect ATN overall improving, for discharge on total 14 days abx with cefpodoxime and close outpatient urology f/u.    1. urosepsis/septic shock due to nephrolithiasis/obstructing stone s/p PCN 5/5 by IR.   Presented with R 1.7cm UPJ stone, likely source of sepsis  Source control obtained with percutaneous nephrostomy on 5/5 early am  Septic shock - required vasopressors for 1 day, now weaned off since 5/5 5/4 Nephrostomy -Proteus Mirab./providencia stuarti  5/4 clean catch proteus mirab  5/4 Provedencia stuarti bacteremia  proteus x2 -2018  -appreciate ID c/s: changed zosyn to ceftriaxone.  Can d/c on cefpodoxime for total of 14 days from negative culture  -IR 3 month follow up required - patient to call IR booking office at [phone] for appt.  PCN Care: Cleanse wound with saline, dress with transparent film and dry gauze, every 3 days PRN; Irrigate with 10CC NS every 24 hours.  -needs urology f/u for stone evaluation    2. SMITH: Likely combination prerenal from septic shock with contribution of obstructing stone and ATN from hypotension  Baseline ~ 1.2  CTM SCr.  downtrending and now close to baseline  avoid nephrotoxic meds    3. Obstructive sleep apnea: Patient has home bipap machine, continue home settings.    4. HTN: holding home meds amlo 10, lopressor 50 bid due to septic shock  resume lopressor    5. DM2: On Trulicity at home  ISS, FS meals + bedtime.    6. History of DVT in adulthood.   Multiple prior DVTs years ago  Resumed home eliquis  also reports h/o IVC filter placement in setting of gi bleed while on AC    7. melanoma: outpatient surg onc f/u for resection/sentinal node biopsy planning    8. Dispo: PT recommends outpatient PT, owns necessary DME.  d/c today.  REpeat blood culture from 5/8 no growth to date.  reviewed follow up with patient and residents at bedside.    Discharge time 35min (1) Other Medications/None

## 2024-05-09 NOTE — DISCHARGE NOTE NURSING/CASE MANAGEMENT/SOCIAL WORK - NSDCVIVACCINE_GEN_ALL_CORE_FT
influenza, injectable, quadrivalent, preservative free; 04-Oct-2018 18:35; Dolly Caraballo (RN); [employer]; [id number] (Exp. Date: 30-Jun-2019); IntraMuscular; Deltoid Left.; 0.5 milliLiter(s); VIS (VIS Published: 07-Aug-2015, VIS Presented: 04-Oct-2018);

## 2024-05-09 NOTE — PROGRESS NOTE ADULT - PROBLEM SELECTOR PLAN 6
On Trulicity at home  ISS, FS meals + bedtime

## 2024-05-09 NOTE — PROGRESS NOTE ADULT - ASSESSMENT
63-year-old male, history of morbid obesity, multiple VTE's on Eliquis, hypertension, type 2 diabetes, multiple renal stones in the past, presenting for fever  Fever, leukocytosis  Renal stones, flank pain, high fevers  Recent diagnosis of malignant melanoma as outpatient  CT with hydronephrosis/renal stones  UCX Providencia/Proteus  Polymicrobial bacteremia Proteus/Providencia  S/p nephrostomy 5/4  Overall, UTI, Bacteremia, renal stone  - Ceftriaxone 2g q 24, when DC planning send out on Vantin 200mg q 12 to complete 14 days from negative BCX  - F/U BCX  - F/U urology, defer any further role source control to them    Jey Menendez MD  Contact on TEAMS messaging from 9am - 5pm  From 5pm-9am, on weekends, or if no response call [phone]

## 2024-05-09 NOTE — PROGRESS NOTE ADULT - PROBLEM SELECTOR PLAN 5
amlo 10, lopressor 50 bid at home, on hold due to septic shock  Resume when able

## 2024-05-09 NOTE — PROGRESS NOTE ADULT - PROBLEM SELECTOR PLAN 1
Presented with R 1.7cm UPJ stone, likely source of sepsis  Source control obtained with percutaneous nephrostomy on 5/5 early am  Septic shock - required vasopressors for 1 day, now weaned off since 5/5  Blood/urine cultures growing proteus, providencia & Staph epi (contaminant), sensitivity pending    - 10 day course antibiotics starting 5/5 after source control  - zosyn until sensitivities result
Presented with R 1.7cm UPJ stone, likely source of sepsis  Source control obtained with percutaneous nephrostomy on 5/5 early am  Septic shock - required vasopressors for 1 day, now weaned off since 5/5  Blood cultures growing Enterobacter & Staph epi (contaminant), sensitivity pending    - 10 day course antibiotics starting 5/5 after source control  - zosyn until sensitivities result
Presented with R 1.7cm UPJ stone, likely source of sepsis  Source control obtained with percutaneous nephrostomy on 5/5 early am  Septic shock - required vasopressors for 1 day, now weaned off since 5/5  Blood/urine cultures growing proteus, providencia & Staph epi (contaminant), sensitivity pending    - 10 day course antibiotics starting 5/5 after source control  - zosyn --> CTX 2g q24  - repeated cultures per ID
Presented with R 1.7cm UPJ stone, likely source of sepsis  Source control obtained with percutaneous nephrostomy on 5/5 early am  Septic shock - required vasopressors for 1 day, now weaned off since 5/5  Blood/urine cultures growing proteus, providencia & Staph epi (contaminant), sensitivity pending    - 10 day course antibiotics starting 5/5 after source control  - zosyn --> CTX 2g q24  - repeated cultures per ID

## 2024-05-09 NOTE — PROGRESS NOTE ADULT - ASSESSMENT
63 year old male with a history of nephrolithiasis s/p multiple interventions (2018), DVT's (years ago) on Eliquis, HTN, DM admitted as a transfer from  with urosepsis in the setting of obstructing 1.7 cm stone s/p PCN (5/5/24). In MICU for 1 day for septic shock, weaned off norepinephrine 5/5am. Downgraded to general medicine floor. Growing proteus & providencia in blood and urine.

## 2024-05-10 NOTE — CHART NOTE - NSCHARTNOTESELECT_GEN_ALL_CORE
MICU Attending addendum
POCUS/Event Note
D/C appt/Event Note
MAR Accept Note/Transfer Note
MICU Transfer Note
Off Service Note

## 2024-05-13 LAB
CULTURE RESULTS: SIGNIFICANT CHANGE UP
CULTURE RESULTS: SIGNIFICANT CHANGE UP
SPECIMEN SOURCE: SIGNIFICANT CHANGE UP
SPECIMEN SOURCE: SIGNIFICANT CHANGE UP

## 2024-05-16 ENCOUNTER — OUTPATIENT (OUTPATIENT)
Dept: OUTPATIENT SERVICES | Facility: HOSPITAL | Age: 64
LOS: 1 days | End: 2024-05-16
Payer: MEDICARE

## 2024-05-16 VITALS
HEIGHT: 64 IN | SYSTOLIC BLOOD PRESSURE: 143 MMHG | WEIGHT: 315 LBS | DIASTOLIC BLOOD PRESSURE: 84 MMHG | TEMPERATURE: 98 F | OXYGEN SATURATION: 97 % | RESPIRATION RATE: 24 BRPM | HEART RATE: 54 BPM

## 2024-05-16 DIAGNOSIS — I10 ESSENTIAL (PRIMARY) HYPERTENSION: ICD-10-CM

## 2024-05-16 DIAGNOSIS — Z95.828 PRESENCE OF OTHER VASCULAR IMPLANTS AND GRAFTS: Chronic | ICD-10-CM

## 2024-05-16 DIAGNOSIS — C43.9 MALIGNANT MELANOMA OF SKIN, UNSPECIFIED: ICD-10-CM

## 2024-05-16 DIAGNOSIS — Z98.890 OTHER SPECIFIED POSTPROCEDURAL STATES: Chronic | ICD-10-CM

## 2024-05-16 DIAGNOSIS — E11.9 TYPE 2 DIABETES MELLITUS WITHOUT COMPLICATIONS: ICD-10-CM

## 2024-05-16 DIAGNOSIS — G47.33 OBSTRUCTIVE SLEEP APNEA (ADULT) (PEDIATRIC): ICD-10-CM

## 2024-05-16 DIAGNOSIS — I82.409 ACUTE EMBOLISM AND THROMBOSIS OF UNSPECIFIED DEEP VEINS OF UNSPECIFIED LOWER EXTREMITY: ICD-10-CM

## 2024-05-16 DIAGNOSIS — Z93.6 OTHER ARTIFICIAL OPENINGS OF URINARY TRACT STATUS: Chronic | ICD-10-CM

## 2024-05-16 DIAGNOSIS — Z29.9 ENCOUNTER FOR PROPHYLACTIC MEASURES, UNSPECIFIED: ICD-10-CM

## 2024-05-16 DIAGNOSIS — Z01.818 ENCOUNTER FOR OTHER PREPROCEDURAL EXAMINATION: ICD-10-CM

## 2024-05-16 DIAGNOSIS — I89.0 LYMPHEDEMA, NOT ELSEWHERE CLASSIFIED: ICD-10-CM

## 2024-05-16 LAB
A1C WITH ESTIMATED AVERAGE GLUCOSE RESULT: 5.6 % — SIGNIFICANT CHANGE UP (ref 4–5.6)
ALBUMIN SERPL ELPH-MCNC: 3.7 G/DL — SIGNIFICANT CHANGE UP (ref 3.3–5.2)
ALP SERPL-CCNC: 74 U/L — SIGNIFICANT CHANGE UP (ref 40–120)
ALT FLD-CCNC: 12 U/L — SIGNIFICANT CHANGE UP
ANION GAP SERPL CALC-SCNC: 14 MMOL/L — SIGNIFICANT CHANGE UP (ref 5–17)
APTT BLD: 34.6 SEC — SIGNIFICANT CHANGE UP (ref 24.5–35.6)
AST SERPL-CCNC: 20 U/L — SIGNIFICANT CHANGE UP
BASOPHILS # BLD AUTO: 0.07 K/UL — SIGNIFICANT CHANGE UP (ref 0–0.2)
BASOPHILS NFR BLD AUTO: 0.7 % — SIGNIFICANT CHANGE UP (ref 0–2)
BILIRUB SERPL-MCNC: 0.5 MG/DL — SIGNIFICANT CHANGE UP (ref 0.4–2)
BLD GP AB SCN SERPL QL: SIGNIFICANT CHANGE UP
BUN SERPL-MCNC: 33 MG/DL — HIGH (ref 8–20)
CALCIUM SERPL-MCNC: 8.6 MG/DL — SIGNIFICANT CHANGE UP (ref 8.4–10.5)
CHLORIDE SERPL-SCNC: 99 MMOL/L — SIGNIFICANT CHANGE UP (ref 96–108)
CO2 SERPL-SCNC: 21 MMOL/L — LOW (ref 22–29)
CREAT SERPL-MCNC: 1.44 MG/DL — HIGH (ref 0.5–1.3)
EGFR: 55 ML/MIN/1.73M2 — LOW
EOSINOPHIL # BLD AUTO: 0.12 K/UL — SIGNIFICANT CHANGE UP (ref 0–0.5)
EOSINOPHIL NFR BLD AUTO: 1.2 % — SIGNIFICANT CHANGE UP (ref 0–6)
ESTIMATED AVERAGE GLUCOSE: 114 MG/DL — SIGNIFICANT CHANGE UP (ref 68–114)
GLUCOSE SERPL-MCNC: 94 MG/DL — SIGNIFICANT CHANGE UP (ref 70–99)
HCT VFR BLD CALC: 47.2 % — SIGNIFICANT CHANGE UP (ref 39–50)
HGB BLD-MCNC: 15.3 G/DL — SIGNIFICANT CHANGE UP (ref 13–17)
IMM GRANULOCYTES NFR BLD AUTO: 0.6 % — SIGNIFICANT CHANGE UP (ref 0–0.9)
INR BLD: 1.12 RATIO — SIGNIFICANT CHANGE UP (ref 0.85–1.18)
LYMPHOCYTES # BLD AUTO: 2.24 K/UL — SIGNIFICANT CHANGE UP (ref 1–3.3)
LYMPHOCYTES # BLD AUTO: 21.8 % — SIGNIFICANT CHANGE UP (ref 13–44)
MCHC RBC-ENTMCNC: 28.5 PG — SIGNIFICANT CHANGE UP (ref 27–34)
MCHC RBC-ENTMCNC: 32.4 GM/DL — SIGNIFICANT CHANGE UP (ref 32–36)
MCV RBC AUTO: 88.1 FL — SIGNIFICANT CHANGE UP (ref 80–100)
MONOCYTES # BLD AUTO: 0.66 K/UL — SIGNIFICANT CHANGE UP (ref 0–0.9)
MONOCYTES NFR BLD AUTO: 6.4 % — SIGNIFICANT CHANGE UP (ref 2–14)
NEUTROPHILS # BLD AUTO: 7.11 K/UL — SIGNIFICANT CHANGE UP (ref 1.8–7.4)
NEUTROPHILS NFR BLD AUTO: 69.3 % — SIGNIFICANT CHANGE UP (ref 43–77)
PLATELET # BLD AUTO: 338 K/UL — SIGNIFICANT CHANGE UP (ref 150–400)
POTASSIUM SERPL-MCNC: 4.8 MMOL/L — SIGNIFICANT CHANGE UP (ref 3.5–5.3)
POTASSIUM SERPL-SCNC: 4.8 MMOL/L — SIGNIFICANT CHANGE UP (ref 3.5–5.3)
PROT SERPL-MCNC: 7.3 G/DL — SIGNIFICANT CHANGE UP (ref 6.6–8.7)
PROTHROM AB SERPL-ACNC: 12.4 SEC — SIGNIFICANT CHANGE UP (ref 9.5–13)
RBC # BLD: 5.36 M/UL — SIGNIFICANT CHANGE UP (ref 4.2–5.8)
RBC # FLD: 14.1 % — SIGNIFICANT CHANGE UP (ref 10.3–14.5)
SODIUM SERPL-SCNC: 134 MMOL/L — LOW (ref 135–145)
WBC # BLD: 10.26 K/UL — SIGNIFICANT CHANGE UP (ref 3.8–10.5)
WBC # FLD AUTO: 10.26 K/UL — SIGNIFICANT CHANGE UP (ref 3.8–10.5)

## 2024-05-16 PROCEDURE — 85025 COMPLETE CBC W/AUTO DIFF WBC: CPT

## 2024-05-16 PROCEDURE — 85610 PROTHROMBIN TIME: CPT

## 2024-05-16 PROCEDURE — 83036 HEMOGLOBIN GLYCOSYLATED A1C: CPT

## 2024-05-16 PROCEDURE — 86850 RBC ANTIBODY SCREEN: CPT

## 2024-05-16 PROCEDURE — 86900 BLOOD TYPING SEROLOGIC ABO: CPT

## 2024-05-16 PROCEDURE — G0463: CPT

## 2024-05-16 PROCEDURE — 93010 ELECTROCARDIOGRAM REPORT: CPT

## 2024-05-16 PROCEDURE — 85730 THROMBOPLASTIN TIME PARTIAL: CPT

## 2024-05-16 PROCEDURE — 36415 COLL VENOUS BLD VENIPUNCTURE: CPT

## 2024-05-16 PROCEDURE — 86901 BLOOD TYPING SEROLOGIC RH(D): CPT

## 2024-05-16 PROCEDURE — 80053 COMPREHEN METABOLIC PANEL: CPT

## 2024-05-16 PROCEDURE — 93005 ELECTROCARDIOGRAM TRACING: CPT

## 2024-05-16 NOTE — H&P PST ADULT - PROBLEM SELECTOR PLAN 3
BP checked today.  ECG reviewed.  continue Metoprolol, amlodipine, Furosemide as usual.  Pending medical clearance.   Pending cardiology clearance. BP checked today.  ECG reviewed- abnormal.  continue Metoprolol, amlodipine, Furosemide as usual.  Pending medical clearance.   Pending cardiology clearance.

## 2024-05-16 NOTE — H&P PST ADULT - SKIN COMMENTS
left upper back biopsy site healed without s/s infection. scar mid trach, healed s/p trach placement. right nephrostomy tube intact, draining nml urine. left upper back biopsy site healed without s/s infection. scar mid trach, healed s/p trach placement. right nephrostomy tube intact, draining nml urine. BLE lymphedema present.

## 2024-05-16 NOTE — H&P PST ADULT - ASSESSMENT
CAPRINI SCORE    AGE RELATED RISK FACTORS                                                             [ ] Age 41-60 years                                            (1 Point)  [x ] Age: 61-74 years                                           (2 Points)                 [ ] Age= 75 years                                                (3 Points)             DISEASE RELATED RISK FACTORS                                                       [x ] Edema in the lower extremities                 (1 Point)                     [ ] Varicose veins                                               (1 Point)                                 [x ] BMI > 25 Kg/m2                                            (1 Point)                                  [ ] Serious infection (ie PNA)                            (1 Point)                     [x ] Lung disease ( COPD, Emphysema)            (1 Point)                                                                          [ ] Acute myocardial infarction                         (1 Point)                  [ ] Congestive heart failure (in the previous month)  (1 Point)         [ ] Inflammatory bowel disease                            (1 Point)                  [ ] Central venous access, PICC or Port               (2 points)       (within the last month)                                                                [ ] Stroke (in the previous month)                        (5 Points)    [x ] Previous or present malignancy                       (2 points)                                                                                                                                                         HEMATOLOGY RELATED FACTORS                                                         [ x] Prior episodes of VTE                                     (3 Points)                     [ ] Positive family history for VTE                      (3 Points)                  [ ] Prothrombin 43845 A                                     (3 Points)                     [ ] Factor V Leiden                                                (3 Points)                        [ ] Lupus anticoagulants                                      (3 Points)                                                           [ ] Anticardiolipin antibodies                              (3 Points)                                                       [ ] High homocysteine in the blood                   (3 Points)                                             [ ] Other congenital or acquired thrombophilia      (3 Points)                                                [ ] Heparin induced thrombocytopenia                  (3 Points)                                        MOBILITY RELATED FACTORS  [ ] Bed rest                                                         (1 Point)  [ ] Plaster cast                                                    (2 points)  [ ] Bed bound for more than 72 hours           (2 Points)    GENDER SPECIFIC FACTORS  [ ] Pregnancy or had a baby within the last month   (1 Point)  [ ] Post-partum < 6 weeks                                   (1 Point)  [ ] Hormonal therapy  or oral contraception   (1 Point)  [ ] History of pregnancy complications              (1 point)  [ ] Unexplained or recurrent              (1 Point)    OTHER RISK FACTORS                                           (1 Point)  [x ] BMI >40, smoking, diabetes requiring insulin, chemotherapy  blood transfusions and length of surgery over 2 hours    SURGERY RELATED RISK FACTORS  [ ]  Section within the last month     (1 Point)  [ ] Minor surgery                                                  (1 Point)  [ ] Arthroscopic surgery                                       (2 Points)  [ x] Planned major surgery lasting more            (2 Points)      than 45 minutes     [ ] Elective hip or knee joint replacement       (5 points)       surgery                                                TRAUMA RELATED RISK FACTORS  [ ] Fracture of the hip, pelvis, or leg                       (5 Points)  [ ] Spinal cord injury resulting in paralysis             (5 points)       (in the previous month)    [ ] Paralysis  (less than 1 month)                             (5 Points)  [ ] Multiple Trauma within 1 month                        (5 Points)    Total Score [    13    ]    Caprini Score 0-2: Low Risk, NO VTE prophylaxis required for most patients, encourage ambulation  Caprini Score 3-6: Moderate Risk , pharmacologic VTE prophylaxis is indicated for most patients (in the absence of contraindications)  Caprini Score Greater than or =7: High risk, pharmocologic VTE prophylaxis indicated for most patients (in the absence of contraindications)      OPIOID RISK TOOL    DESMOND EACH BOX THAT APPLIES AND ADD TOTALS AT THE END    FAMILY HISTORY OF SUBSTANCE ABUSE                 FEMALE         MALE                                                Alcohol                             [  ]1 pt          [  ]3pts                                               Illegal Durgs                     [  ]2 pts        [  ]3pts                                               Rx Drugs                           [  ]4 pts        [  ]4 pts    PERSONAL HISTORY OF SUBSTANCE ABUSE                                                                                          Alcohol                             [  ]3 pts       [  ]3 pts                                               Illegal Drugs                     [  ]4 pts        [  ]4 pts                                               Rx Drugs                           [  ]5 pts        [  ]5 pts    AGE BETWEEN 16-45 YEARS                                      [  ]1 pt         [  ]1 pt    HISTORY OF PREADOLESCENT   SEXUAL ABUSE                                                             [  ]3 pts        [  ]0pts    PSYCHOLOGICAL DISEASE                     ADD, OCD, Bipolar, Schizophrenia        [  ]2 pts         [  ]2 pts                      Depression                                               [  ]1 pt           [  ]1 pt           SCORING TOTAL   (add numbers and type here)              (0)                                     A score of 3 or lower indicated LOW risk for future opioid abuse  A score of 4 to 7 indicated moderate risk for future opioid abuse  A score of 8 or higher indicates a high risk for opioid abuse     64yo M patient of DR. Remy, referred by Dr. Barnes for malignant melanoma, PMHx Htn/ DM2/ DVT on Eliquis/ Nephrolithiasis/ MEGHA/ BPH/ lymphedema BLE/ malignant melanoma, left superior upper back, at least 0.7mm deep, pT1a, presents for PST. patient reports hx bed-ridden for 2 years, weighing approximately 520 lbs, was in a pneumonia induced coma, was in and out of rehab, and upon returning home from rehab his partner identified an abnormal lesion on his back. patient followed up with PCP, was advised to follow up with dermatology regarding the abnormal lesion, he was evaluated by Dr. Barnes and underwent punch biopsy, revealing malignant melanoma, as per DR. Remy reports- pathology showed a melanoma at least 0.7mm deep, pT1a, no evidence of ulceration or mitoses, for which he followed up with DR. Remy. As per Dr. Remy- patient with malignant melanoma, referred by Dr. Barnes presents for treatment of a left superior upper back malignant melanoma, pT1a, at least 0.7mm deep, 1 cm biopsy wound about the Left Superior Upper Back pathologically diagnosed as a malignant melanoma, discussed the management of her melanoma in detail, recommended a wide excision with 1 cm margins, discussed the role of a sentinel lymph node biopsy in melanoma and its prognostic significance,? given the patient's previous history of bilateral lower extremity lymphedema, informed that SLNB would require extra precaution and pre-operative discussion, despite the risks, patient would like to pursue sentinel lymph node biopsy, plan for WLE with SLNB and plastics closure, Refer to PLASTICS, Pre-surgical lymphedema management (Dr. Joaquin). patient is scheduled 2024 for wide local excision of left upper back melanoma, sentinel lymph node biopsy with DR. Remy. patient ambulates with walker, BLE lymphedema, patient reports o/w feeling well today with no other acute concerns. of note- patient was treated at [facility] this month for nephrolithiasis, right nephrostomy tube in place, intact, urology DR. Wang, he is taking Cefpodoxime, completing therapy prior to surgery.             CAPRINI SCORE    AGE RELATED RISK FACTORS                                                             [ ] Age 41-60 years                                            (1 Point)  [x ] Age: 61-74 years                                           (2 Points)                 [ ] Age= 75 years                                                (3 Points)             DISEASE RELATED RISK FACTORS                                                       [x ] Edema in the lower extremities                 (1 Point)                     [ ] Varicose veins                                               (1 Point)                                 [x ] BMI > 25 Kg/m2                                            (1 Point)                                  [ ] Serious infection (ie PNA)                            (1 Point)                     [x ] Lung disease ( COPD, Emphysema)            (1 Point)                                                                          [ ] Acute myocardial infarction                         (1 Point)                  [ ] Congestive heart failure (in the previous month)  (1 Point)         [ ] Inflammatory bowel disease                            (1 Point)                  [ ] Central venous access, PICC or Port               (2 points)       (within the last month)                                                                [ ] Stroke (in the previous month)                        (5 Points)    [x ] Previous or present malignancy                       (2 points)                                                                                                                                                         HEMATOLOGY RELATED FACTORS                                                         [ x] Prior episodes of VTE                                     (3 Points)                     [ ] Positive family history for VTE                      (3 Points)                  [ ] Prothrombin 98376 A                                     (3 Points)                     [ ] Factor V Leiden                                                (3 Points)                        [ ] Lupus anticoagulants                                      (3 Points)                                                           [ ] Anticardiolipin antibodies                              (3 Points)                                                       [ ] High homocysteine in the blood                   (3 Points)                                             [ ] Other congenital or acquired thrombophilia      (3 Points)                                                [ ] Heparin induced thrombocytopenia                  (3 Points)                                        MOBILITY RELATED FACTORS  [ ] Bed rest                                                         (1 Point)  [ ] Plaster cast                                                    (2 points)  [ ] Bed bound for more than 72 hours           (2 Points)    GENDER SPECIFIC FACTORS  [ ] Pregnancy or had a baby within the last month   (1 Point)  [ ] Post-partum < 6 weeks                                   (1 Point)  [ ] Hormonal therapy  or oral contraception   (1 Point)  [ ] History of pregnancy complications              (1 point)  [ ] Unexplained or recurrent              (1 Point)    OTHER RISK FACTORS                                           (1 Point)  [x ] BMI >40, smoking, diabetes requiring insulin, chemotherapy  blood transfusions and length of surgery over 2 hours    SURGERY RELATED RISK FACTORS  [ ]  Section within the last month     (1 Point)  [ ] Minor surgery                                                  (1 Point)  [ ] Arthroscopic surgery                                       (2 Points)  [ x] Planned major surgery lasting more            (2 Points)      than 45 minutes     [ ] Elective hip or knee joint replacement       (5 points)       surgery                                                TRAUMA RELATED RISK FACTORS  [ ] Fracture of the hip, pelvis, or leg                       (5 Points)  [ ] Spinal cord injury resulting in paralysis             (5 points)       (in the previous month)    [ ] Paralysis  (less than 1 month)                             (5 Points)  [ ] Multiple Trauma within 1 month                        (5 Points)    Total Score [    13    ]    Caprini Score 0-2: Low Risk, NO VTE prophylaxis required for most patients, encourage ambulation  Caprini Score 3-6: Moderate Risk , pharmacologic VTE prophylaxis is indicated for most patients (in the absence of contraindications)  Caprini Score Greater than or =7: High risk, pharmocologic VTE prophylaxis indicated for most patients (in the absence of contraindications)      OPIOID RISK TOOL    DESMOND EACH BOX THAT APPLIES AND ADD TOTALS AT THE END    FAMILY HISTORY OF SUBSTANCE ABUSE                 FEMALE         MALE                                                Alcohol                             [  ]1 pt          [  ]3pts                                               Illegal Durgs                     [  ]2 pts        [  ]3pts                                               Rx Drugs                           [  ]4 pts        [  ]4 pts    PERSONAL HISTORY OF SUBSTANCE ABUSE                                                                                          Alcohol                             [  ]3 pts       [  ]3 pts                                               Illegal Drugs                     [  ]4 pts        [  ]4 pts                                               Rx Drugs                           [  ]5 pts        [  ]5 pts    AGE BETWEEN 16-45 YEARS                                      [  ]1 pt         [  ]1 pt    HISTORY OF PREADOLESCENT   SEXUAL ABUSE                                                             [  ]3 pts        [  ]0pts    PSYCHOLOGICAL DISEASE                     ADD, OCD, Bipolar, Schizophrenia        [  ]2 pts         [  ]2 pts                      Depression                                               [  ]1 pt           [  ]1 pt           SCORING TOTAL   (add numbers and type here)              (0)                                     A score of 3 or lower indicated LOW risk for future opioid abuse  A score of 4 to 7 indicated moderate risk for future opioid abuse  A score of 8 or higher indicates a high risk for opioid abuse

## 2024-05-16 NOTE — H&P PST ADULT - NSICDXPASTSURGICALHX_GEN_ALL_CORE_FT
PAST SURGICAL HISTORY:  H/O nephrostomy bilateral  May 2018 clamped  JUNE and had ureteral stent    Hx of tonsillectomy at 6 years old    Presence of IVC filter 7/18    S/P cystoscopy with ureteral stent placement     S/P endoscopy     Ureteral stents placement, bilateral      PAST SURGICAL HISTORY:  H/O nephrostomy bilateral  May 2018 clamped  JUNE and had ureteral stent    Hx of tonsillectomy at 6 years old    Presence of IVC filter 7/18    S/P cystoscopy with ureteral stent placement     S/P endoscopy     S/P evacuation of hematoma     Ureteral stents placement, bilateral

## 2024-05-16 NOTE — H&P PST ADULT - NEGATIVE GASTROINTESTINAL SYMPTOMS
no nausea/no vomiting/no diarrhea/no constipation/no change in bowel habits/no flatulence/no abdominal pain/no melena/no hematochezia/no jaundice

## 2024-05-16 NOTE — H&P PST ADULT - NSICDXFAMILYHX_GEN_ALL_CORE_FT
FAMILY HISTORY:  Family history of coronary artery disease  Family history of prostate cancer in father  FH: lung cancer     FAMILY HISTORY:  Family history of coronary artery disease  Family history of prostate cancer in father  FH: lung cancer    Grandparent  Still living? Unknown  FH: stroke, Age at diagnosis: Age Unknown

## 2024-05-16 NOTE — H&P PST ADULT - ATTENDING COMMENTS
Planning wide local excision of left back melanoma, sentinel lymph node biopsy. R/B/A explained, including but not limited to pain, infection, bleeding requiring reintervention, damage to surrounding structures including nerves, muscles, blood vessels, numbness, cardiac events, stroke, PE and death. We discussed the possibility of positive margins requiring additional treatment or surgery, and discussed the possibility of lymphedema in 5-10 % of patients. Patient understands these risks, all questions answered, agrees to surgery today. Planning wide local excision of left back melanoma, sentinel lymph node biopsy. We discussed doing a SLNB in the office because his melanoma, although currently is T1a, is AT LEAST 0.7mm deep because it was transected, so I believe it will be deeper on excision and a SLNB would be needed if any deeper than 0.7mm R/B/A explained, including but not limited to pain, infection, bleeding requiring reintervention, damage to surrounding structures including nerves, muscles, blood vessels, numbness, cardiac events, stroke, PE and death. We discussed the possibility of positive margins requiring additional treatment or surgery, and discussed the possibility of lymphedema in 5-10 % of patients. Patient understands these risks, all questions answered, agrees to surgery today.

## 2024-05-16 NOTE — H&P PST ADULT - PROBLEM SELECTOR PLAN 7
cap 13  High risk, SCDs ordered for day of procedure.  Surgical team to assess need for  pharmacological and mechanical measures for VTE prophylaxis

## 2024-05-16 NOTE — H&P PST ADULT - MUSCULOSKELETAL COMMENTS
BLE lymphedema, ambulates with walker ambulates with use of walker BLE lymphedema, ambulates with walker. LE feel heavy from lymphedema, uses compression stockings and leg pump, under mgmt Dr. Joaquin ambulates with use of walker 2/2 heavy LE (lymphedema)

## 2024-05-16 NOTE — H&P PST ADULT - HISTORY OF PRESENT ILLNESS
64yo M patient of DR. Remy, referred by Dr. Barnes for malignant melanoma, PMHx Htn/ DM2/ DVT on Eliquis/ Nephrolithiasis/ MEGHA/ BPH/ lymphedema BLE/ malignant melanoma, left superior upper back, at least 0.7mm deep, pT1a, presents for PST. patient reports hx bed-ridden for 2 years, weighing approximately 520 lbs, was in a pneumonia induced coma, was in and out of rehab, and upon returning home from rehab his partner identified an abnormal lesion on his back. patient followedup with PCP, was advised to follow up with dermatology regarding the abnormal lesion, he was evaluated by Dr. Barnes and underwent punch biopsy, revealing malignant melanoma, as per DR. Remy reports- pathology showed a melanoma at least 0.7mm deep, pT1a, no evidence of ulceration or mitoses, for which he followed up with DR. Remy. As per Dr. Remy- patient with malignant melanoma, referred by Dr. Barnes presents for treatment of a left superior upper back malignant melanoma, pT1a, at least 0.7mm deep, 1 cm biopsy wound about the Left Superior Upper Back pathologically diagnosed as a malignant melanoma, discussed the management of her melanoma in detail, recommended a wide excision with 1 cm margins, discussed the role of a sentinel lymph node biopsy in melanoma and its prognostic significance,? given the patient's previous history of bilateral lower extremity lymphedema, informed that SLNB would require extra precaution and pre-operative discussion, despite the risks, patient would like to pursue sentinel lymph node biopsy, plan for WLE with SLNB and plastics closure, Refer to PLASTICS, Pre-surgical lymphedema management (Dr. Joaquin). patient is scheduled 05/24/2024 for wide local excision of left upper back melanoma, sentinel lymph node biopsy with DR. Remy. patient ambulates with walker, BLE lymphedema, patient reports o/w feeling well today with no other acute concerns. He denies bleeding or changes to the lesion.     Current Meds  Allopurinol 300 MG Oral Tablet; TAKE 1 TABLET DAILY  Ciprofloxacin HCl - 500 MG Oral Tablet; take one tablet twice a day starting 1 day before  prostate biopsy  Fleet Enema 7-19 GM/118ML Rectal Enema; USE AS DIRECTED THE MORNING OF  PROCEDURE  levoFLOXacin 250 MG Oral Tablet; TAKE 1 TABLET DAILY AS DIRECTED  Potassium Citrate ER 10 MEQ (1080 MG) Oral Tablet Extended Release; Take 2 tablets  twice daily  Potassium Citrate ER 10 MEQ (1080 MG) Oral Tablet Extended Release; TAKE 2  TABLETS TWICE DAILY  Urocit-K 15 15 MEQ (1620 MG) Oral Tablet Extended Release; TAKE 2 TABLETS TWICE  DAILY   62yo M patient of DR. Remy, referred by Dr. Barnes for malignant melanoma, PMHx Htn/ DM2/ DVT on Eliquis/ Nephrolithiasis/ MEGHA/ BPH/ lymphedema BLE/ malignant melanoma, left superior upper back, at least 0.7mm deep, pT1a, presents for PST. patient reports hx bed-ridden for 2 years, weighing approximately 520 lbs, was in a pneumonia induced coma, was in and out of rehab, and upon returning home from rehab his partner identified an abnormal lesion on his back. patient followed up with PCP, was advised to follow up with dermatology regarding the abnormal lesion, he was evaluated by Dr. Barnes and underwent punch biopsy, revealing malignant melanoma, as per DR. Remy reports- pathology showed a melanoma at least 0.7mm deep, pT1a, no evidence of ulceration or mitoses, for which he followed up with DR. Remy. As per Dr. Remy- patient with malignant melanoma, referred by Dr. Barnes presents for treatment of a left superior upper back malignant melanoma, pT1a, at least 0.7mm deep, 1 cm biopsy wound about the Left Superior Upper Back pathologically diagnosed as a malignant melanoma, discussed the management of her melanoma in detail, recommended a wide excision with 1 cm margins, discussed the role of a sentinel lymph node biopsy in melanoma and its prognostic significance,? given the patient's previous history of bilateral lower extremity lymphedema, informed that SLNB would require extra precaution and pre-operative discussion, despite the risks, patient would like to pursue sentinel lymph node biopsy, plan for WLE with SLNB and plastics closure, Refer to PLASTICS, Pre-surgical lymphedema management (Dr. Joaquin). patient is scheduled 05/24/2024 for wide local excision of left upper back melanoma, sentinel lymph node biopsy with DR. Remy. patient ambulates with walker, BLE lymphedema, patient reports o/w feeling well today with no other acute concerns. of note- patient was treated at [facility] this month for nephrolithiasis, right nephrostomy tube in place, intact, urology DR. Wang, he is taking Cefpodoxime, completing therapy prior to surgery.

## 2024-05-16 NOTE — H&P PST ADULT - NSICDXPASTMEDICALHX_GEN_ALL_CORE_FT
PAST MEDICAL HISTORY:  Acquired lymphedema of leg     Anemia s/p 10 units of blood [facility] 7/18, on iron now   due to GI bleeding stable now    Calculus of kidney and ureter     Diabetes mellitus type II     Duodenal ulcer     DVT (deep venous thrombosis) 7/18 ivc filter inserted    Gastritis     GI bleed recently transfused 10 units    H/O sleep apnea no CPAP used, to follow up ASAP    had sleep study done no pending result    History of BPH     HTN (hypertension)     Hx of peripheral neuropathy     Malignant melanoma of skin     Morbid obesity     OA (osteoarthritis) right hip    MEGHA on CPAP     Respiratory failure     Respiratory failure, post-operative post endoscopy [facility] 7/18    Tracheostomy dependent     Ventricular arrhythmia post endoscopy  echo done EF 60%

## 2024-05-16 NOTE — H&P PST ADULT - SKIN/BREAST COMMENTS
healing biopsy wound of Left sup. back s/p Shave Biopsy, Dx Malignant Melanoma w/o ulceration healed biopsy of Left sup. back s/p Shave Biopsy, Dx Malignant Melanoma w/o ulceration.

## 2024-05-16 NOTE — H&P PST ADULT - GENITOURINARY COMMENTS
nephrolithiasis, right nephrostomy tube in place, intact, her is taking Cefpodoxime, completing therapy prior to surgery.

## 2024-05-16 NOTE — H&P PST ADULT - EKG AND INTERPRETATION
sinus bradycardia VR58 indeterminate axis, cannot rule out anterior infarct, age undetermined  pending medical and cardio clearance

## 2024-05-16 NOTE — H&P PST ADULT - PROBLEM SELECTOR PLAN 1
scheduled 05/24/2024 for wide local excision of left upper back melanoma, sentinel lymph node biopsy with DR. Remy.  Patient educated on surgical scrub, labs/diagnostics performed, preadmission instructions, clearances, and day of procedure medications- verbalized understanding, teach back method utilized.   pending and cardiology and medical clearance   Stop vitamins/ supplements/ NSAIDs 5 days prior to procedure.  adjust cefpodoxime and Sildenafil as per primary MD scheduled 05/24/2024 for wide local excision of left upper back melanoma, sentinel lymph node biopsy with DR. Remy.  Patient educated on surgical scrub, labs/diagnostics performed, preadmission instructions, clearances, and day of procedure medications- verbalized understanding, teach back method utilized.   pending and cardiology and medical clearance   Stop vitamins/ supplements/ NSAIDs 5 days prior to procedure.  adjust cefpodoxime and Sildenafil as per urology

## 2024-05-16 NOTE — H&P PST ADULT - MUSCULOSKELETAL
details… ROM intact/no calf tenderness/strength 5/5 bilateral upper extremities/strength 5/5 bilateral lower extremities

## 2024-05-17 ENCOUNTER — APPOINTMENT (OUTPATIENT)
Dept: PHYSICAL MEDICINE AND REHAB | Facility: CLINIC | Age: 64
End: 2024-05-17
Payer: MEDICARE

## 2024-05-17 VITALS
OXYGEN SATURATION: 94 % | DIASTOLIC BLOOD PRESSURE: 68 MMHG | HEIGHT: 69 IN | BODY MASS INDEX: 46.65 KG/M2 | SYSTOLIC BLOOD PRESSURE: 123 MMHG | HEART RATE: 63 BPM | TEMPERATURE: 97.9 F | WEIGHT: 315 LBS

## 2024-05-17 PROCEDURE — 99203 OFFICE O/P NEW LOW 30 MIN: CPT | Mod: 25

## 2024-05-17 PROCEDURE — 93702 BIS XTRACELL FLUID ANALYSIS: CPT

## 2024-05-17 NOTE — PHYSICAL EXAM
[FreeTextEntry1] : Gen: Patient is A&O x 3, NAD HEENT: EOMI, hearing grossly normal Resp: regular, non - labored CV: pulses regular Skin: no rashes, erythema Lymph: + B/L LE edema, no erythema or increased warmth, no edema in b/l UE  Inspection: no instability  Reflexes: 1+ and symmetric throughout Strength: 5/5 throughout Special tests: +B/L Stemmer sign  Gait: antalgic, RW   Bioimpedance spectroscopy performed today with L-Dex 4.0 LUE (pre-op baseline).

## 2024-05-17 NOTE — ASSESSMENT
[FreeTextEntry1] : 63 year old female presenting for evaluation.    #B/L LE Lymphedema: -Likely primary vs obesity associated lymphedema -No clinical signs to suggest acute DVT -Continue compression stockings -Continue pneumatic compression pump  -Discussed complete decongestive therapy after surgery    #At risk for lymphedema in UE: -No clinical signs of lymphedema on exam -Lymphedema education provided to patient -Denies any electronic implantable devices  -Bioimpedance spectroscopy performed today with L-dex baseline obtained  -Next surveillance after surgery  Follow up in 6-8 weeks.  The patient had a baseline SOZO measurement, which I reviewed today.  Bioimpedance spectroscopy helps identify the onset of lymphedema in an arm or leg before patients experience noticeable swelling. Research has shown that the early detection of lymphedema using L-Dex combined with treatment can reduce progression to chronic lymphedema by 95% in breast cancer patients. Whenever possible, patients are tested for baseline L-Dex score before cancer treatment begins and then are reassessed during regular follow-up visits using the SOZO device. Otherwise, this can be started postoperatively and continued during regular follow-up visits. If the patients L-Dex score increases above normal levels, that is a sign that lymphedema is developing, and a referral is made to physical therapy for further evaluation and/or early compression treatment. Lymphedema assessment with the SOZO L-Dex score is recommended to be done every 3 months for the first 3 years and then every 6 months for years 4 and 5, followed by annually afterwards.

## 2024-05-17 NOTE — HISTORY OF PRESENT ILLNESS
[FreeTextEntry1] : Mr. Croft is a 63 year old male with history of left superior upper back malignant melanoma, pT1a, at least 0.7mm deep.   Pending surgery.  He also has a history of chronic bilateral lower extremity lymphedema.  Also recently had a nephrostomy tube placed in his right kidney for kidney stones.  He reports that he has had chronic bilateral lower extremity lymphedema for over 10 years.  Reports he believes his hereditary as his other family members have had it.  Denies any erythema pain or increased warmth in bilateral lower extremities.  He is previously done lymphedema therapy.  He has compression stockings.  Has pneumatic compression pump.  Denies any swelling or heaviness in his upper extremities.

## 2024-05-22 ENCOUNTER — APPOINTMENT (OUTPATIENT)
Dept: CARDIOLOGY | Facility: CLINIC | Age: 64
End: 2024-05-22
Payer: MEDICARE

## 2024-05-22 ENCOUNTER — NON-APPOINTMENT (OUTPATIENT)
Age: 64
End: 2024-05-22

## 2024-05-22 ENCOUNTER — RESULT REVIEW (OUTPATIENT)
Age: 64
End: 2024-05-22

## 2024-05-22 VITALS
RESPIRATION RATE: 15 BRPM | WEIGHT: 315 LBS | BODY MASS INDEX: 46.65 KG/M2 | OXYGEN SATURATION: 96 % | SYSTOLIC BLOOD PRESSURE: 117 MMHG | HEART RATE: 56 BPM | DIASTOLIC BLOOD PRESSURE: 77 MMHG | HEIGHT: 69 IN

## 2024-05-22 DIAGNOSIS — Z01.818 ENCOUNTER FOR OTHER PREPROCEDURAL EXAMINATION: ICD-10-CM

## 2024-05-22 DIAGNOSIS — Z82.49 FAMILY HISTORY OF ISCHEMIC HEART DISEASE AND OTHER DISEASES OF THE CIRCULATORY SYSTEM: ICD-10-CM

## 2024-05-22 DIAGNOSIS — R94.31 ABNORMAL ELECTROCARDIOGRAM [ECG] [EKG]: ICD-10-CM

## 2024-05-22 DIAGNOSIS — C43.9 MALIGNANT MELANOMA OF SKIN, UNSPECIFIED: ICD-10-CM

## 2024-05-22 PROCEDURE — G2211 COMPLEX E/M VISIT ADD ON: CPT

## 2024-05-22 PROCEDURE — 99204 OFFICE O/P NEW MOD 45 MIN: CPT

## 2024-05-22 PROCEDURE — 93000 ELECTROCARDIOGRAM COMPLETE: CPT

## 2024-05-22 RX ORDER — POTASSIUM CITRATE 10 MEQ/1
10 MEQ TABLET, EXTENDED RELEASE ORAL TWICE DAILY
Qty: 360 | Refills: 3 | Status: DISCONTINUED | COMMUNITY
Start: 2018-11-12 | End: 2024-05-22

## 2024-05-22 RX ORDER — APIXABAN 2.5 MG/1
2.5 TABLET, FILM COATED ORAL
Refills: 0 | Status: ACTIVE | COMMUNITY

## 2024-05-22 RX ORDER — ENEMA 19; 7 G/133ML; G/133ML
7-19 ENEMA RECTAL
Qty: 1 | Refills: 0 | Status: DISCONTINUED | COMMUNITY
Start: 2019-03-29 | End: 2024-05-22

## 2024-05-22 RX ORDER — FUROSEMIDE 40 MG/1
40 TABLET ORAL
Refills: 0 | Status: ACTIVE | COMMUNITY

## 2024-05-22 RX ORDER — METOPROLOL TARTRATE 50 MG/1
50 TABLET, FILM COATED ORAL
Refills: 0 | Status: ACTIVE | COMMUNITY

## 2024-05-22 RX ORDER — DULAGLUTIDE 1.5 MG/.5ML
1.5 INJECTION, SOLUTION SUBCUTANEOUS
Refills: 0 | Status: ACTIVE | COMMUNITY

## 2024-05-22 RX ORDER — AMLODIPINE BESYLATE 10 MG/1
10 TABLET ORAL
Refills: 0 | Status: ACTIVE | COMMUNITY

## 2024-05-22 RX ORDER — FAMOTIDINE 20 MG/1
20 TABLET, FILM COATED ORAL
Refills: 0 | Status: ACTIVE | COMMUNITY

## 2024-05-22 RX ORDER — CIPROFLOXACIN HYDROCHLORIDE 500 MG/1
500 TABLET, FILM COATED ORAL
Qty: 10 | Refills: 0 | Status: DISCONTINUED | COMMUNITY
Start: 2019-03-29 | End: 2024-05-22

## 2024-05-22 RX ORDER — CEFPODOXIME PROXETIL 200 MG/1
200 TABLET, FILM COATED ORAL
Refills: 0 | Status: ACTIVE | COMMUNITY

## 2024-05-22 NOTE — CARDIOLOGY SUMMARY
[de-identified] : 5/222/24: Sinus bradycardia, RSR V1, Poor R wave progression, nonspecific T wave abnormalities.

## 2024-05-22 NOTE — REASON FOR VISIT
Pt would like steroid pill for his rash x 4 mos     When he had it for 12 days it got better  Dermatologist will not prescribe b/c he is diabetic     [facility]     Best number to reach him is [phone] [Other: ____] : [unfilled]

## 2024-05-22 NOTE — HISTORY OF PRESENT ILLNESS
[FreeTextEntry1] : 63M with hx of HTN, Obesity, MEGHA, neuropathy, chronic lymphedema, kidney stones, who comes to the office for a preoperative cardiac evaluation prior to a  wide excision with 1 cm margins after his recent diagnosis of melanoma.  Patient denies chest pain, no palpitations, no MCLAUGHLIN, no PND, no orthopnea, no leg edema,  no claudication, no syncope.

## 2024-05-22 NOTE — ASSESSMENT
[FreeTextEntry1] : 63M with hx of HTN, Obesity, MEGHA, neuropathy, chronic lymphedema, kidney stones, who comes to the office for a preoperative cardiac evaluation prior to a  wide excision with 1 cm margins after his recent diagnosis of melanoma.  Patient denies chest pain, no palpitations, no MCLAUGHLIN, no PND, no orthopnea, no leg edema,  no claudication, no syncope.   #Pre-op Based on patient history, clinical risk and otherwise stable cardiac pattern, there is no absolute cardiac contraindication for her/him to undergo the proposed surgical procedure.    #Abnormal EKG will proceed with TTE (non intended for pre-operative purposes)  #Obesity  Diet and lifestyle modification discussed including low sodium, low fat and low carbohydrate weight reducing diet. Patient is to implement aerobic exercise regimen few days per week. zepbound to be started   RTC post testing  I appreciate the opportunity of working with you in the care of LEONA RODRIGUEZ . If I may be of additional assistance, please do not hesitate to contact me.

## 2024-05-23 ENCOUNTER — TRANSCRIPTION ENCOUNTER (OUTPATIENT)
Age: 64
End: 2024-05-23

## 2024-05-23 NOTE — ASU PATIENT PROFILE, ADULT - NSICDXPASTMEDICALHX_GEN_ALL_CORE_FT
PAST MEDICAL HISTORY:  Acquired lymphedema of leg     Anemia s/p 10 units of blood [facility] 7/18, on iron now   due to GI bleeding stable now    Calculus of kidney and ureter     Diabetes mellitus type II     Duodenal ulcer     DVT (deep venous thrombosis) 7/18 ivc filter inserted    Gastritis     GI bleed recently transfused 10 units    H/O sleep apnea no CPAP used, to follow up ASAP    had sleep study done no pending result    HTN (hypertension)     Hx of peripheral neuropathy     Morbid obesity     OA (osteoarthritis) right hip    Respiratory failure     Respiratory failure, post-operative post endoscopy [facility] 7/18    Tracheostomy dependent     Ventricular arrhythmia post endoscopy  echo done EF 60%

## 2024-05-24 ENCOUNTER — TRANSCRIPTION ENCOUNTER (OUTPATIENT)
Age: 64
End: 2024-05-24

## 2024-05-24 ENCOUNTER — RESULT REVIEW (OUTPATIENT)
Age: 64
End: 2024-05-24

## 2024-05-24 ENCOUNTER — OUTPATIENT (OUTPATIENT)
Dept: OUTPATIENT SERVICES | Facility: HOSPITAL | Age: 64
LOS: 1 days | End: 2024-05-24
Payer: MEDICARE

## 2024-05-24 ENCOUNTER — APPOINTMENT (OUTPATIENT)
Dept: SURGICAL ONCOLOGY | Facility: HOSPITAL | Age: 64
End: 2024-05-24

## 2024-05-24 VITALS
HEART RATE: 54 BPM | TEMPERATURE: 97 F | OXYGEN SATURATION: 98 % | RESPIRATION RATE: 12 BRPM | DIASTOLIC BLOOD PRESSURE: 64 MMHG | SYSTOLIC BLOOD PRESSURE: 121 MMHG

## 2024-05-24 VITALS
HEIGHT: 67 IN | WEIGHT: 315 LBS | RESPIRATION RATE: 18 BRPM | OXYGEN SATURATION: 99 % | HEART RATE: 54 BPM | DIASTOLIC BLOOD PRESSURE: 73 MMHG | TEMPERATURE: 98 F | SYSTOLIC BLOOD PRESSURE: 134 MMHG

## 2024-05-24 DIAGNOSIS — Z98.890 OTHER SPECIFIED POSTPROCEDURAL STATES: Chronic | ICD-10-CM

## 2024-05-24 DIAGNOSIS — Z93.6 OTHER ARTIFICIAL OPENINGS OF URINARY TRACT STATUS: Chronic | ICD-10-CM

## 2024-05-24 DIAGNOSIS — C43.9 MALIGNANT MELANOMA OF SKIN, UNSPECIFIED: ICD-10-CM

## 2024-05-24 DIAGNOSIS — Z95.828 PRESENCE OF OTHER VASCULAR IMPLANTS AND GRAFTS: Chronic | ICD-10-CM

## 2024-05-24 LAB
ANION GAP SERPL CALC-SCNC: 9 MMOL/L — SIGNIFICANT CHANGE UP (ref 5–17)
BUN SERPL-MCNC: 33.7 MG/DL — HIGH (ref 8–20)
CALCIUM SERPL-MCNC: 8.8 MG/DL — SIGNIFICANT CHANGE UP (ref 8.4–10.5)
CHLORIDE SERPL-SCNC: 100 MMOL/L — SIGNIFICANT CHANGE UP (ref 96–108)
CO2 SERPL-SCNC: 27 MMOL/L — SIGNIFICANT CHANGE UP (ref 22–29)
CREAT SERPL-MCNC: 1.54 MG/DL — HIGH (ref 0.5–1.3)
EGFR: 50 ML/MIN/1.73M2 — LOW
GLUCOSE BLDC GLUCOMTR-MCNC: 92 MG/DL — SIGNIFICANT CHANGE UP (ref 70–99)
GLUCOSE BLDC GLUCOMTR-MCNC: 94 MG/DL — SIGNIFICANT CHANGE UP (ref 70–99)
GLUCOSE SERPL-MCNC: 94 MG/DL — SIGNIFICANT CHANGE UP (ref 70–99)
POTASSIUM SERPL-MCNC: 4.9 MMOL/L — SIGNIFICANT CHANGE UP (ref 3.5–5.3)
POTASSIUM SERPL-SCNC: 4.9 MMOL/L — SIGNIFICANT CHANGE UP (ref 3.5–5.3)
SODIUM SERPL-SCNC: 136 MMOL/L — SIGNIFICANT CHANGE UP (ref 135–145)

## 2024-05-24 PROCEDURE — 88305 TISSUE EXAM BY PATHOLOGIST: CPT | Mod: 26

## 2024-05-24 PROCEDURE — 13102 CMPLX RPR TRUNK ADDL 5CM/<: CPT | Mod: XS

## 2024-05-24 PROCEDURE — 38500 BIOPSY/REMOVAL LYMPH NODES: CPT | Mod: LT

## 2024-05-24 PROCEDURE — C9399: CPT

## 2024-05-24 PROCEDURE — 82962 GLUCOSE BLOOD TEST: CPT

## 2024-05-24 PROCEDURE — 88341 IMHCHEM/IMCYTCHM EA ADD ANTB: CPT | Mod: 26

## 2024-05-24 PROCEDURE — 13101 CMPLX RPR TRUNK 2.6-7.5 CM: CPT | Mod: XS

## 2024-05-24 PROCEDURE — 88342 IMHCHEM/IMCYTCHM 1ST ANTB: CPT

## 2024-05-24 PROCEDURE — 11606 EXC TR-EXT MAL+MARG >4 CM: CPT

## 2024-05-24 PROCEDURE — 88305 TISSUE EXAM BY PATHOLOGIST: CPT

## 2024-05-24 PROCEDURE — 38525 BIOPSY/REMOVAL LYMPH NODES: CPT | Mod: LT

## 2024-05-24 PROCEDURE — 88307 TISSUE EXAM BY PATHOLOGIST: CPT

## 2024-05-24 PROCEDURE — A9541: CPT

## 2024-05-24 PROCEDURE — 88342 IMHCHEM/IMCYTCHM 1ST ANTB: CPT | Mod: 26

## 2024-05-24 PROCEDURE — 78195 LYMPH SYSTEM IMAGING: CPT

## 2024-05-24 PROCEDURE — 38900 IO MAP OF SENT LYMPH NODE: CPT

## 2024-05-24 PROCEDURE — 80048 BASIC METABOLIC PNL TOTAL CA: CPT

## 2024-05-24 PROCEDURE — 38900 IO MAP OF SENT LYMPH NODE: CPT | Mod: LT

## 2024-05-24 PROCEDURE — 88307 TISSUE EXAM BY PATHOLOGIST: CPT | Mod: 26

## 2024-05-24 PROCEDURE — 78195 LYMPH SYSTEM IMAGING: CPT | Mod: 26,MH

## 2024-05-24 PROCEDURE — 88341 IMHCHEM/IMCYTCHM EA ADD ANTB: CPT

## 2024-05-24 PROCEDURE — 36415 COLL VENOUS BLD VENIPUNCTURE: CPT

## 2024-05-24 RX ORDER — APIXABAN 2.5 MG/1
1 TABLET, FILM COATED ORAL
Refills: 0 | DISCHARGE

## 2024-05-24 RX ORDER — HYDROMORPHONE HYDROCHLORIDE 2 MG/ML
1 INJECTION INTRAMUSCULAR; INTRAVENOUS; SUBCUTANEOUS
Refills: 0 | Status: DISCONTINUED | OUTPATIENT
Start: 2024-05-24 | End: 2024-05-24

## 2024-05-24 RX ORDER — AMLODIPINE BESYLATE 2.5 MG/1
1 TABLET ORAL
Refills: 0 | DISCHARGE

## 2024-05-24 RX ORDER — BUPIVACAINE 13.3 MG/ML
20 INJECTION, SUSPENSION, LIPOSOMAL INFILTRATION ONCE
Refills: 0 | Status: DISCONTINUED | OUTPATIENT
Start: 2024-05-24 | End: 2024-05-24

## 2024-05-24 RX ORDER — METOPROLOL TARTRATE 50 MG
1 TABLET ORAL
Refills: 0 | DISCHARGE

## 2024-05-24 RX ORDER — GABAPENTIN 400 MG/1
1 CAPSULE ORAL
Refills: 0 | DISCHARGE

## 2024-05-24 RX ORDER — FUROSEMIDE 40 MG
1 TABLET ORAL
Refills: 0 | DISCHARGE

## 2024-05-24 RX ORDER — FAMOTIDINE 10 MG/ML
1 INJECTION INTRAVENOUS
Refills: 0 | DISCHARGE

## 2024-05-24 RX ORDER — CEFAZOLIN SODIUM 1 G
3000 VIAL (EA) INJECTION ONCE
Refills: 0 | Status: COMPLETED | OUTPATIENT
Start: 2024-05-24 | End: 2024-05-24

## 2024-05-24 RX ORDER — DULAGLUTIDE 4.5 MG/.5ML
1.5 INJECTION, SOLUTION SUBCUTANEOUS
Refills: 0 | DISCHARGE

## 2024-05-24 RX ORDER — SODIUM CHLORIDE 9 MG/ML
3 INJECTION INTRAMUSCULAR; INTRAVENOUS; SUBCUTANEOUS ONCE
Refills: 0 | Status: DISCONTINUED | OUTPATIENT
Start: 2024-05-24 | End: 2024-05-24

## 2024-05-24 NOTE — BRIEF OPERATIVE NOTE - OPERATION/FINDINGS
3 Axillary nodes dissected on left region identified with probe.  No remaining nodes encountered.  Melanoma on upper midback marked with 1cm surrounding margins and excised.
No

## 2024-05-24 NOTE — ASU DISCHARGE PLAN (ADULT/PEDIATRIC) - CARE PROVIDER_API CALL
Mehdi Remy  [facility] General Surgical Oncology  [address] [place] [address]  Phone: [phone]  Fax: [phone]  Follow Up Time: 2 weeks

## 2024-05-24 NOTE — BRIEF OPERATIVE NOTE - NSICDXBRIEFPROCEDURE_GEN_ALL_CORE_FT
PROCEDURES:  Wide excision of melanoma of torso with sentinel lymph node biopsy 24-May-2024 15:10:02  Erin Kaye

## 2024-05-28 DIAGNOSIS — N13.30 UNSPECIFIED HYDRONEPHROSIS: ICD-10-CM

## 2024-05-29 RX ORDER — TIRZEPATIDE 2.5 MG/.5ML
2.5 INJECTION, SOLUTION SUBCUTANEOUS
Qty: 4 | Refills: 2 | Status: ACTIVE | COMMUNITY
Start: 2024-05-22 | End: 1900-01-01

## 2024-06-05 ENCOUNTER — APPOINTMENT (OUTPATIENT)
Dept: UROLOGY | Facility: CLINIC | Age: 64
End: 2024-06-05

## 2024-06-06 ENCOUNTER — APPOINTMENT (OUTPATIENT)
Dept: SURGICAL ONCOLOGY | Facility: CLINIC | Age: 64
End: 2024-06-06
Payer: MEDICARE

## 2024-06-06 VITALS
WEIGHT: 315 LBS | HEART RATE: 55 BPM | DIASTOLIC BLOOD PRESSURE: 86 MMHG | HEIGHT: 69 IN | BODY MASS INDEX: 46.65 KG/M2 | OXYGEN SATURATION: 97 % | SYSTOLIC BLOOD PRESSURE: 141 MMHG

## 2024-06-06 LAB — SURGICAL PATHOLOGY STUDY: SIGNIFICANT CHANGE UP

## 2024-06-06 PROCEDURE — 99213 OFFICE O/P EST LOW 20 MIN: CPT

## 2024-06-06 PROCEDURE — G2211 COMPLEX E/M VISIT ADD ON: CPT

## 2024-06-06 NOTE — ASSESSMENT
[FreeTextEntry1] : Mr. Croft is a 62 y/o M referred by Dr. Barnes presents for treatment of a left superior upper back malignant melanoma, at least 0.7mm deep, pT1a. He is s/p WLE of the Left superior back with SLNB and plastics closure (Dr. Ross) on 05/24/2024.  Clinically, the patient's surgical excision wound is healing well without evidence of infection. I have examined the Left axilla for the patient's reported swelling, this was mild and will likely resolve with time. The patient's pathology report was not finalized for discussion during the visit. I will call the patient to discuss pathology and any questions once received.  At this time, I have recommended that Mr. Croft follow up with Dr. Ross for a final plastics evaluation. Additionally, I will follow up with Mr. Croft in 6 months to ensure the wound is well healed and that no further surgical intervention will be required. All questions and concerns were addressed. Patient vocalized understanding and agreement to assessment and treatment plan.   PLAN 1) Call patient with pathology results 2) Sign patient up for patient portal 3) Follow up with plastics 4) RTO in 6 months  Today, I personally spent 20 minutes in total time including reviewing imaging and studies, discussing complex treatment regimens, direct face to face time with the patient, patient education, answering patient questions and counseling, excluding separately billable procedures and billing time.   This note was written by Kandy Estevez on 05/02/2024, acting solely as a scribe for Dr. Mehdi Remy MD. I have documented the information dictated during the patient encounter for the following sections: RFV, HPI, ROS, PE, ASSESSMENT/PLAN.  I personally performed the services described in the documentation, reviewed the documentation recorded by the scribe in my presence, and it accurately and completely records my words and actions.  Mehdi Remy MD  Assistant Professor of Surgery [employer] [facility] Division of Surgical Oncology [facility] Phone: [phone] Fax: [phone]

## 2024-06-06 NOTE — HISTORY OF PRESENT ILLNESS
[de-identified] : Mr. Croft is a 64 y/o M referred by Dr. Barnes presents for treatment of a left superior upper back malignant melanoma, at least 0.7mm deep, pT1a. He is s/p WLE of the Left superior back with SLNB and plastics closure (Dr. Ross) on 05/24/2024.   Patient states that he was previously bed-ridden for 2 years, weighing approximately 520 lbs. Per report, patient was in a pneumonia induced coma for some time. He was in and out of rehab, and upon returning home from rehab his partner identified an abnormal lesion on the patient's back. Patient was then evaluated by his PCP regarding this and was advised to follow up with dermatology. Mr. Croft was then evaluated by Dr. Barnes and underwent punch biopsy. Pathology results demonstrated malignant melanoma. Path showed a melanoma at least 0.7mm deep, pT1a, no evidence of ulceration or mitoses. Established care with me for definitive treatment of the melanocytic lesion. Of note, the patient is ambulating with assistive walker device and is heavy/has lymphedema in his legs.  Today, Mr. Croft presents for post-operative evaluation #1. He is doing well and has been adhering to wound care instructions. Denies wound dehiscence, pus, infection, etc. He has not yet followed up with Dr. Ross. He has noticed some mild swelling in the Left axilla post-operatively but otherwise has no complaints. Path is still pending.  PMHx: DM2; DVT; ED; HTN; Nephrolithiasis; obesity; MEGHA FMHx: heart disease; grandfather - lung CA (smoker); father - prostate CA SHx: cystoscopy   SocHx: retired ; never a smoker; social alcohol use.

## 2024-06-06 NOTE — REVIEW OF SYSTEMS
[Negative] : Endocrine [de-identified] : healing biopsy wound of Left sup. back [FreeTextEntry1] : see hpi

## 2024-06-06 NOTE — RESULTS/DATA
[FreeTextEntry1] : ***SURGERY*** 05/24/2024 Wide local excision of left superior upper back with SLNB and plastics closure ***LYMPHOSCINTIGRAPHY*** 05/24/2024 - IMPRESSION: Prompt radiotracer drainage to left axillary lymph nodes is noted on the dynamic imaging series. There left proximal axillary and subpectoral lymph node drainage on the static images. ***PATHOLOGY*** 05/24/2024 1.)  Left Superior Melanoma ~pending   ***SHAVE BIOPSY*** 04/23/24 Specimen 1)	Left Superior Upper Back, Shave Biopsy FINAL DIAGNOSIS Malignant Melanoma w/o ulceration Breslow: 0.7mm T Classification: pT1a

## 2024-06-06 NOTE — CONSULT LETTER
[Dear  ___] : Dear  [unfilled], [Consult Letter:] : I had the pleasure of evaluating your patient, [unfilled]. [Consult Closing:] : Thank you very much for allowing me to participate in the care of this patient.  If you have any questions, please do not hesitate to contact me. [FreeTextEntry3] : Mehdi Remy MD Division of Surgical Oncology [facility] Phone: [phone] Fax: [phone]

## 2024-06-07 ENCOUNTER — APPOINTMENT (OUTPATIENT)
Dept: UROLOGY | Facility: CLINIC | Age: 64
End: 2024-06-07

## 2024-06-25 ENCOUNTER — NON-APPOINTMENT (OUTPATIENT)
Age: 64
End: 2024-06-25

## 2024-06-26 ENCOUNTER — APPOINTMENT (OUTPATIENT)
Dept: UROLOGY | Facility: CLINIC | Age: 64
End: 2024-06-26
Payer: MEDICARE

## 2024-06-26 ENCOUNTER — TRANSCRIPTION ENCOUNTER (OUTPATIENT)
Age: 64
End: 2024-06-26

## 2024-06-26 VITALS
WEIGHT: 315 LBS | SYSTOLIC BLOOD PRESSURE: 124 MMHG | RESPIRATION RATE: 17 BRPM | HEART RATE: 60 BPM | BODY MASS INDEX: 46.65 KG/M2 | TEMPERATURE: 97.5 F | HEIGHT: 69 IN | DIASTOLIC BLOOD PRESSURE: 80 MMHG

## 2024-06-26 DIAGNOSIS — E66.01 MORBID (SEVERE) OBESITY DUE TO EXCESS CALORIES: ICD-10-CM

## 2024-06-26 DIAGNOSIS — N20.0 CALCULUS OF KIDNEY: ICD-10-CM

## 2024-06-26 DIAGNOSIS — N52.9 MALE ERECTILE DYSFUNCTION, UNSPECIFIED: ICD-10-CM

## 2024-06-26 DIAGNOSIS — R97.20 ELEVATED PROSTATE, SPECIFIC ANTIGEN [PSA]: ICD-10-CM

## 2024-06-26 PROCEDURE — 99205 OFFICE O/P NEW HI 60 MIN: CPT

## 2024-06-26 RX ORDER — GABAPENTIN 100 MG/1
100 CAPSULE ORAL
Refills: 0 | Status: ACTIVE | COMMUNITY

## 2024-06-28 ENCOUNTER — APPOINTMENT (OUTPATIENT)
Dept: PHYSICAL MEDICINE AND REHAB | Facility: CLINIC | Age: 64
End: 2024-06-28
Payer: MEDICARE

## 2024-06-28 VITALS
TEMPERATURE: 97.4 F | DIASTOLIC BLOOD PRESSURE: 72 MMHG | HEART RATE: 70 BPM | SYSTOLIC BLOOD PRESSURE: 128 MMHG | RESPIRATION RATE: 16 BRPM | OXYGEN SATURATION: 95 %

## 2024-06-28 DIAGNOSIS — Z91.89 OTHER SPECIFIED PERSONAL RISK FACTORS, NOT ELSEWHERE CLASSIFIED: ICD-10-CM

## 2024-06-28 DIAGNOSIS — I89.0 LYMPHEDEMA, NOT ELSEWHERE CLASSIFIED: ICD-10-CM

## 2024-06-28 DIAGNOSIS — M79.2 NEURALGIA AND NEURITIS, UNSPECIFIED: ICD-10-CM

## 2024-06-28 PROCEDURE — 99214 OFFICE O/P EST MOD 30 MIN: CPT

## 2024-06-30 LAB — BACTERIA UR CULT: NORMAL

## 2024-07-10 ENCOUNTER — APPOINTMENT (OUTPATIENT)
Dept: CARDIOLOGY | Facility: CLINIC | Age: 64
End: 2024-07-10
Payer: MEDICARE

## 2024-07-10 PROCEDURE — 93306 TTE W/DOPPLER COMPLETE: CPT

## 2024-07-10 RX ORDER — PERFLUTREN 6.52 MG/ML
6.52 INJECTION, SUSPENSION INTRAVENOUS
Qty: 2 | Refills: 0 | Status: COMPLETED | OUTPATIENT
Start: 2024-07-10

## 2024-07-10 RX ADMIN — PERFLUTREN MG/ML: 6.52 INJECTION, SUSPENSION INTRAVENOUS at 00:00

## 2024-07-25 ENCOUNTER — NON-APPOINTMENT (OUTPATIENT)
Age: 64
End: 2024-07-25

## 2024-07-25 ENCOUNTER — APPOINTMENT (OUTPATIENT)
Dept: CARDIOLOGY | Facility: CLINIC | Age: 64
End: 2024-07-25
Payer: MEDICARE

## 2024-07-25 VITALS
DIASTOLIC BLOOD PRESSURE: 68 MMHG | WEIGHT: 315 LBS | HEART RATE: 63 BPM | BODY MASS INDEX: 46.65 KG/M2 | SYSTOLIC BLOOD PRESSURE: 101 MMHG | RESPIRATION RATE: 16 BRPM | HEIGHT: 69 IN | OXYGEN SATURATION: 95 %

## 2024-07-25 DIAGNOSIS — R94.31 ABNORMAL ELECTROCARDIOGRAM [ECG] [EKG]: ICD-10-CM

## 2024-07-25 DIAGNOSIS — Z01.818 ENCOUNTER FOR OTHER PREPROCEDURAL EXAMINATION: ICD-10-CM

## 2024-07-25 DIAGNOSIS — E66.9 OBESITY, UNSPECIFIED: ICD-10-CM

## 2024-07-25 DIAGNOSIS — I77.810 THORACIC AORTIC ECTASIA: ICD-10-CM

## 2024-07-25 PROCEDURE — G2211 COMPLEX E/M VISIT ADD ON: CPT

## 2024-07-25 PROCEDURE — 99214 OFFICE O/P EST MOD 30 MIN: CPT

## 2024-07-25 PROCEDURE — 93000 ELECTROCARDIOGRAM COMPLETE: CPT

## 2024-07-25 NOTE — HISTORY OF PRESENT ILLNESS
[FreeTextEntry1] : 64M with hx of HTN, Obesity, MEGHA, neuropathy, chronic lymphedema, kidney stones, recent diagnosis of melanoma s/p  s/p WLE of the Left superior back with SLNB.  Patient comes for Preoperative cardiac evaluation prior to a Percutaneous Nephrostolithotomy on 8/20 by Dr Teixeira On her last visit patient was ordered a TTE for better evaluation of an abnormal EKG.  Patient denies chest pain, no palpitations, no MCLAUGHLIN, no PND, no orthopnea, no leg edema,  no claudication, no syncope.

## 2024-07-25 NOTE — CARDIOLOGY SUMMARY
[de-identified] : 5/222/24: Sinus bradycardia, RSR V1, Poor R wave progression, nonspecific T wave abnormalities.  7/25/24: NSR, low voltage, poor R wave progression.  [de-identified] : TTE 7/10/24: 1. Left ventricular systolic function is normal with an ejection fraction visually estimated at 55 to 60 %. There are no regional wall motion abnormalities seen. 2. Normal left ventricular diastolic function. 3. Normal right ventricular cavity size and normal right ventricular systolic function. 4. No significant valvular disease. 5. Aortic root at the sinuses of Valsalva is dilated, measuring 3.90 cm (indexed 1.54 cm/m). Ascending aorta diameter is dilated, measuring 4.20 cm (indexed 1.66 cm/m). 6. No prior echocardiogram is available for comparison.

## 2024-07-25 NOTE — ASSESSMENT
[FreeTextEntry1] : 64M with hx of HTN, Obesity, MEGHA, neuropathy, chronic lymphedema, kidney stones, recent diagnosis of melanoma s/p  s/p WLE of the Left superior back with SLNB.  Patient comes for Preoperative cardiac evaluation prior to a Percutaneous Nephrostolithotomy on 8/20 by Dr Teixeira On her last visit patient was ordered a TTE for better evaluation of an abnormal EKG.  Patient denies chest pain, no palpitations, no MCLAUGHLIN, no PND, no orthopnea, no leg edema,  no claudication, no syncope.  #Preop Based on patient history, clinical risk and otherwise stable cardiac pattern, there is no absolute cardiac contraindication for her/him to undergo the proposed surgical procedure.  intermediate risk intervention  low risk of MACE METs>4  #Abnormal EKG TTE with evidence of dilated ascending aorta.   #dilated ascending aorta will proceed with CT angio aorta.    #Obesity  Diet and lifestyle modification discussed including low sodium, low fat and low carbohydrate weight reducing diet. Patient is to implement aerobic exercise regimen few days per week. zepbound denied by insurance.   RTC post testing

## 2024-08-02 NOTE — PROGRESS NOTE ADULT - PROBLEM SELECTOR PROBLEM 6
Pt arrived to phase 2 from PACU. Pt awake and alert. Abd soft. Dressings with small amount drainage noted. Pt rates surgical pain 4/10.  
Type 2 diabetes mellitus

## 2024-08-08 ENCOUNTER — OUTPATIENT (OUTPATIENT)
Dept: OUTPATIENT SERVICES | Facility: HOSPITAL | Age: 64
LOS: 1 days | End: 2024-08-08
Payer: MEDICARE

## 2024-08-08 VITALS
RESPIRATION RATE: 22 BRPM | HEIGHT: 69 IN | TEMPERATURE: 98 F | SYSTOLIC BLOOD PRESSURE: 114 MMHG | DIASTOLIC BLOOD PRESSURE: 74 MMHG | HEART RATE: 95 BPM | OXYGEN SATURATION: 97 % | WEIGHT: 315 LBS

## 2024-08-08 DIAGNOSIS — E66.01 MORBID (SEVERE) OBESITY DUE TO EXCESS CALORIES: ICD-10-CM

## 2024-08-08 DIAGNOSIS — I82.409 ACUTE EMBOLISM AND THROMBOSIS OF UNSPECIFIED DEEP VEINS OF UNSPECIFIED LOWER EXTREMITY: ICD-10-CM

## 2024-08-08 DIAGNOSIS — E11.9 TYPE 2 DIABETES MELLITUS WITHOUT COMPLICATIONS: ICD-10-CM

## 2024-08-08 DIAGNOSIS — G47.33 OBSTRUCTIVE SLEEP APNEA (ADULT) (PEDIATRIC): ICD-10-CM

## 2024-08-08 DIAGNOSIS — Z98.890 OTHER SPECIFIED POSTPROCEDURAL STATES: Chronic | ICD-10-CM

## 2024-08-08 DIAGNOSIS — N20.0 CALCULUS OF KIDNEY: ICD-10-CM

## 2024-08-08 DIAGNOSIS — Z93.6 OTHER ARTIFICIAL OPENINGS OF URINARY TRACT STATUS: Chronic | ICD-10-CM

## 2024-08-08 DIAGNOSIS — Z01.818 ENCOUNTER FOR OTHER PREPROCEDURAL EXAMINATION: ICD-10-CM

## 2024-08-08 DIAGNOSIS — Z95.828 PRESENCE OF OTHER VASCULAR IMPLANTS AND GRAFTS: Chronic | ICD-10-CM

## 2024-08-08 LAB
ANION GAP SERPL CALC-SCNC: 15 MMOL/L — SIGNIFICANT CHANGE UP (ref 5–17)
BLD GP AB SCN SERPL QL: NEGATIVE — SIGNIFICANT CHANGE UP
BUN SERPL-MCNC: 24 MG/DL — HIGH (ref 7–23)
CALCIUM SERPL-MCNC: 8.9 MG/DL — SIGNIFICANT CHANGE UP (ref 8.4–10.5)
CHLORIDE SERPL-SCNC: 103 MMOL/L — SIGNIFICANT CHANGE UP (ref 96–108)
CO2 SERPL-SCNC: 22 MMOL/L — SIGNIFICANT CHANGE UP (ref 22–31)
CREAT SERPL-MCNC: 1.35 MG/DL — HIGH (ref 0.5–1.3)
EGFR: 59 ML/MIN/1.73M2 — LOW
GLUCOSE SERPL-MCNC: 118 MG/DL — HIGH (ref 70–99)
HCT VFR BLD CALC: 46.4 % — SIGNIFICANT CHANGE UP (ref 39–50)
HGB BLD-MCNC: 15 G/DL — SIGNIFICANT CHANGE UP (ref 13–17)
MCHC RBC-ENTMCNC: 29 PG — SIGNIFICANT CHANGE UP (ref 27–34)
MCHC RBC-ENTMCNC: 32.3 GM/DL — SIGNIFICANT CHANGE UP (ref 32–36)
MCV RBC AUTO: 89.6 FL — SIGNIFICANT CHANGE UP (ref 80–100)
NRBC # BLD: 0 /100 WBCS — SIGNIFICANT CHANGE UP (ref 0–0)
PLATELET # BLD AUTO: 214 K/UL — SIGNIFICANT CHANGE UP (ref 150–400)
POTASSIUM SERPL-MCNC: 3.6 MMOL/L — SIGNIFICANT CHANGE UP (ref 3.5–5.3)
POTASSIUM SERPL-SCNC: 3.6 MMOL/L — SIGNIFICANT CHANGE UP (ref 3.5–5.3)
RBC # BLD: 5.18 M/UL — SIGNIFICANT CHANGE UP (ref 4.2–5.8)
RBC # FLD: 14 % — SIGNIFICANT CHANGE UP (ref 10.3–14.5)
RH IG SCN BLD-IMP: POSITIVE — SIGNIFICANT CHANGE UP
SODIUM SERPL-SCNC: 140 MMOL/L — SIGNIFICANT CHANGE UP (ref 135–145)
WBC # BLD: 7.95 K/UL — SIGNIFICANT CHANGE UP (ref 3.8–10.5)
WBC # FLD AUTO: 7.95 K/UL — SIGNIFICANT CHANGE UP (ref 3.8–10.5)

## 2024-08-08 PROCEDURE — 80048 BASIC METABOLIC PNL TOTAL CA: CPT

## 2024-08-08 PROCEDURE — 86900 BLOOD TYPING SEROLOGIC ABO: CPT

## 2024-08-08 PROCEDURE — 86850 RBC ANTIBODY SCREEN: CPT

## 2024-08-08 PROCEDURE — 87086 URINE CULTURE/COLONY COUNT: CPT

## 2024-08-08 PROCEDURE — 36415 COLL VENOUS BLD VENIPUNCTURE: CPT

## 2024-08-08 PROCEDURE — 86901 BLOOD TYPING SEROLOGIC RH(D): CPT

## 2024-08-08 PROCEDURE — 85027 COMPLETE CBC AUTOMATED: CPT

## 2024-08-08 PROCEDURE — G0463: CPT

## 2024-08-08 PROCEDURE — 87186 SC STD MICRODIL/AGAR DIL: CPT

## 2024-08-08 PROCEDURE — 87077 CULTURE AEROBIC IDENTIFY: CPT

## 2024-08-08 RX ORDER — SODIUM CHLORIDE 9 MG/ML
3 INJECTION INTRAMUSCULAR; INTRAVENOUS; SUBCUTANEOUS EVERY 8 HOURS
Refills: 0 | Status: DISCONTINUED | OUTPATIENT
Start: 2024-08-20 | End: 2024-08-20

## 2024-08-08 RX ORDER — CHLORHEXIDINE GLUCONATE 40 MG/ML
1 SOLUTION TOPICAL ONCE
Refills: 0 | Status: DISCONTINUED | OUTPATIENT
Start: 2024-08-20 | End: 2024-08-20

## 2024-08-08 RX ORDER — LIDOCAINE HCL 20 MG/ML
0.2 VIAL (ML) INJECTION ONCE
Refills: 0 | Status: DISCONTINUED | OUTPATIENT
Start: 2024-08-20 | End: 2024-08-20

## 2024-08-08 NOTE — H&P PST ADULT - NSICDXFAMILYHX_GEN_ALL_CORE_FT
FAMILY HISTORY:  Family history of coronary artery disease  Family history of prostate cancer in father  FH: lung cancer    Grandparent  Still living? Unknown  FH: stroke, Age at diagnosis: Age Unknown

## 2024-08-08 NOTE — H&P PST ADULT - BIRTH SEX
9/15/2021         RE: Kristie Cornejo  [address]  [facility] [address]        Dear Colleague,    Thank you for referring your patient, Kristie Cornejo, to the [facility] [place]. Please see a copy of my visit note below.    Hematology Clinic Note    Kristie Cornejo MRN# [id number]   Age: 51 year old YOB: 1970       Reason for consult: plasmacytoma of bone            History of Present Illness:   Kristie Cornejo  Is a 51 year old female with past medical history significant for Irritable bowel disease , allergies is here with newly diagnosed plasmacytoma.     Early in April 2021 , she noted to have left sided groin pain. Pain was constant and was affecting her daily activities , hence she was referred to an orthopedic physician. Imaging was done showed  large, expansile, permeative mass with indistinct borders involving the left superior and inferior pubic rami, pubic symphysis and with possible extension to the left acetabulum.    She underwent a Biopsy of the lesion on 9/7/2021. CT chest abdomen pelvis was done it showed no primary malignancy is seen to explain the destructive osteolytic mass in the left pubic bone which was better characterizedon prior plain film and MRI. No evidence of additional metastases. There was a renal cyst of unknown significant.     The Pathology results showed plasmacytoma.  Flow cytometry showed kappa monotypic plasma cells, polytypic B cells . She had a Chromosome analysis and FISH is still pending.     INTERIM HISTORY:  She is doing well, continues to have left sided groin pain, with no radiation. No numbness and tingling. She uses a cane to walk. Moving , sitting up and standing has been difficult. She is able to do daily activities using the cane. The pain doesn't keep her awake at night. She does have oxycodone prescription that she does not take. She uses as needed tylenol.     She denies  any other symptoms, no fever or chills . No recent infections. Fairly healthy. She had h/o hernia repairs in the past and earlier this year was admitted with a seroma , but cultures were negative.   Denies any weight loss, no appetite change , no bleeding or bruising. No constipation or diarrhea. No other joint pains.    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 She lives with her  Jesse, who is a . She has 2 kids of age 20yr and 15 yr old.     She was fairly active prior to this , including walking and swimming. She does not smoke cigarettes, drinks alcohol occasionally 4 drinks per month     Family history , no h/o myeloma or blood cancers in the family. Father diagnosed with Pancreatic cancer at 78 yrs, grandmother had breast cancer, mother had endometrial cancer.          Review of Systems:     10-point review of systems was otherwise negative except as noted in HPI.          Past Medical History:   Past medical history was reviewed.   Past Medical History:   Diagnosis Date     Allergic rhinitis, cause unspecified      Anxiety state, unspecified 12/03    Started postpartum . On Paxil x 1+years. Off meds- 10/04     Irritable bowel syndrome 2004     PLANTAR WARTS     resolved     SINUS DYSRHYTHMIA 10/02    wnl     Unspecified hemorrhoids without mention of complication 4/04    colonoscopy 4/04 spastic colon, int roids; bx neg             Past Surgical History:   Past surgical history was reviewed.   Past Surgical History:   Procedure Laterality Date     BIOPSY BONE PELVIS Left 9/7/2021    Procedure: Biopsy left pubic ramis;  Surgeon: Edu Philip MD;  Location:  OR     [facility] NONSPECIFIC PROCEDURE  1987    [place] teeth removed     [facility] NONSPECIFIC PROCEDURE  1993    (R) knee surgery     [facility] NONSPECIFIC PROCEDURE  2003    (R) breast bx-negative 6/03; bx neg 10/02     [facility] NONSPECIFIC PROCEDURE      10/02  cardiac echo normal     [facility] NONSPECIFIC PROCEDURE  2004 4/04 colonosc int roids, spastic colon     [facility] NONSPECIFIC PROCEDURE  8/05    D & C for missed AB     [facility] COLONOSCOPY THRU STOMA, DIAGNOSTIC  2004             Social History:   Social history was reviewed.   Social History     Tobacco Use     Smoking status: Never Smoker     Smokeless tobacco: Never Used   Substance Use Topics     Alcohol use: Yes     Comment: none to a couple. occasional             Family History:   Family history was reviewed.  Family History   Problem Relation Age of Onset     Neurologic Disorder Mother         toxo     Lipids Mother         low cholestrol     Lipids Father         high cholestrol     C.A.D. Maternal Grandfather         strokes and mi, chronic respiratory from 2 hand smoke     Hypertension Paternal Grandmother         rheumatoid arthritis     Diabetes Maternal Grandmother         breast cancer,stroke ischemic     Breast Cancer Maternal Grandmother         79yo/and autoimmune skin condition     Respiratory Paternal Grandfather         emphasema     Cancer - colorectal No family hx of      Prostate Cancer No family hx of              Allergies:     Allergies   Allergen Reactions     Erythromycin      upsets stomach             Medications:   Medications Reviewed.   Current Outpatient Medications   Medication Sig     acetaminophen (TYLENOL) 325 MG tablet Take 2 tablets (650 mg) by mouth every 4 hours as needed for mild pain     aspirin (ASA) 81 MG EC tablet Take 2 tablets (162 mg) by mouth daily     BUSPIRONE HCL 10 MG PO TABS 1 TABLET 3 TIMES DAILY as needed     calcium carbonate 600 mg-vitamin D 400 units (CALTRATE) 600-400 MG-UNIT per tablet      cetirizine HCl 10 MG CAPS Take 20 mg by mouth     fluticasone (FLONASE) 50 MCG/ACT nasal spray 1 spray     GLYCOLAX OR POWD 17 GM DAILY as needed     levonorgestrel (MIRENA) 20 MCG/24HR IUD 1 each by Intrauterine route     MIRENA 20 MCG/24HR IU IUD use for up to 5 years, then  "remove     omalizumab (XOLAIR) 150 MG/ML SOSY injection      oxyCODONE (ROXICODONE) 5 MG tablet Take 1-2 tablets (5-10 mg) by mouth every 4 hours as needed for moderate to severe pain     polyethylene glycol (MIRALAX) 17 GM/Dose powder Take 17 g by mouth     senna-docusate (SENOKOT-S/PERICOLACE) 8.6-50 MG tablet Take 1-2 tablets by mouth 2 times daily     UNABLE TO FIND MEDICATION NAME: Probiotic per pt     UNABLE TO FIND MEDICATION NAME: Multi-vitamin per pt     UNABLE TO FIND MEDICATION NAME: Glucosamine Chondroit per pt     No current facility-administered medications for this visit.     Facility-Administered Medications Ordered in Other Visits   Medication     CT Flush             Physical Exam:   Vitals were reviewed.  Blood pressure 123/84, pulse 56, temperature 98.4  F (36.9  C), temperature source Oral, height 1.72 m (5' 7.72\"), weight 74.6 kg (164 lb 8 oz), SpO2 100 %.    Constitutional: awake, in a chair, appears comfortable, in NAD  HEENT: MMM, EOM intact, sclerae clear and anicteric  CV: RRR, no murmurs appreciated, JVD  Resp: Clear to auscultation bilaterally, breathing comfortably on RA, no wheezes, rhonchi  Abd: Soft, tenderness + in the lower abdomen incision site.   MSK:  Warm, FROM, no joint swelling  Skin: no rash or lesions on limited exam  Neuro: CN2-12 grossly intact, moves all four extremities  Psych: Mood and affect WNL    LABS :  Hemoglobin was 14.2   Creatinine 0.94  Calcium 9.8   Lab Results   Component Value Date    WBC 7.0 08/26/2021    HGB 14.2 08/26/2021    HCT 42.4 08/26/2021     08/26/2021     08/26/2021    POTASSIUM 4.6 08/26/2021    CHLORIDE 107 08/26/2021    CO2 27 08/26/2021    GLC 90 09/07/2021    BUN 21 08/26/2021    CR 0.94 08/26/2021    AST 14 08/26/2021    ALT 22 08/26/2021     Path:  Plasmacytoma  Sections show sheets of plasma cells.  On B1, IHC is ordered.  Stains for , kappa, and lambda show sheets of kappa monotypic plasma cells.      IMAGING  CT " Chest/Abdomen/Pelvis w Contrast    Result Date: 8/26/2021  IMPRESSION: 1. No new primary malignancy is seen to explain the destructive osteolytic mass in the left pubic bone which was better characterized on prior plain film and MRI. No evidence of additional metastases. 2. Mild dilation of some loops of ileum in the right upper quadrant of uncertain clinical significance, potentially transient. 3. Uterine fibroid. 4. Nonobstructing left renal stone.       XR Pelvis 1/2 Views  Result Date: 9/7/2021  Impression: Redemonstrated lucency of the left pubic body better delineated on prior CT and MRI with new cortical irregularity consistent with recent biopsy.           Assessment and Recommendations   Kristie Cornejo is a 51 year old female who was diagnosed with Plasmacytoma after she presented with left sided groin pain.   There are no CRAB complications at this time.     # Plasmacytoma VS Rule out Multiple Myeloma   Patient had left sided groin pain , imaging showed left pubic mass which was osteolytic destructive in nature. Underwent biopsy and it showed plasmacytoma. Flow cytometry showed Kappa-monotypic plasma cell.    She has a solitary extramedullary plasmacytoma with the limited evaluation, she needs more wok up for differentiate between plasmacytoma and multiple myeloma . We discussed about the further testing needed including:   Bone marrow biopsy to determine the treatment and prognosis.   - PET scan   - Blood work including Uric acid, LDH, beta -2 microglobulin , albumin .  - Will get infectious work up including Hep B , Hep C, and HIV status .  - 24 hour urine for UPEP  - Bone marrow biopsy for further evalaution.  - Radiation Oncology referral in the setting of painful left pubic bone lytic lesion , possible plasmacytoma but awaiting further work up to rule out myeloma. The rad onc is indicated to stabilize the bone and prevent fracture of weight bearing bones.    - for increased pain, we can consider  steroids , patient deferred at this time .   - She will start Zomata but before we recommended dental evaluation and start vit D.       Patient was seen and plan discussed with attending physician, Dr.Bachanova Tsering Meyer  Hematology, Oncology & Transplantation fellow  Pager: [phone]  09/15/2021    Today, I  saw and examined the patient independently in clinic and discussed the recommendations. I reviewed the case with the fellow and agree with the note as above.   WE had a 60 min discussion about need for further work-up and start of rad onc to aleviate pain and address bone stability.   I will see the patient back in 2 weeks.     Patient agreed to bone marrow biopsy and voluntarily signed the consent to collect a sample to [facility].      Payton Reinoso MD  Professor of Medicine                Again, thank you for allowing me to participate in the care of your patient.        Sincerely,        Payton Reinoso MD     Male

## 2024-08-08 NOTE — H&P PST ADULT - NEGATIVE GENERAL GENITOURINARY SYMPTOMS
no hematuria/no incontinence/no dysuria/no urinary hesitancy/normal urinary frequency right nephrostomy/no hematuria/no flank pain L/no flank pain R/no incontinence/no dysuria/no urinary hesitancy

## 2024-08-08 NOTE — H&P PST ADULT - HISTORY OF COVID-19 VACCINATION
PT Evaluation    Today's date: 2020   Patient name: Marline Cohn  : 1964  MRN: [id number]  Referring provider: Houston Adams MD  Dx:   Encounter Diagnosis     ICD-10-CM    1  Abdominal pain R10 9 Ambulatory referral to Physical Therapy           Subjective Evaluation     History of Present Illness    Patient presents with c/o weakness from being in and out of the hospital for a few months  She is unable to stand to brush her teeth s breaks  At the hospital she walked 200ft  She feels her endurance is poor and has weak legs  All her toes are amputated on the L foot and R foot has the big toe amputated  She states she does not feel pain due to neuropathy as she has had splinters unknowingly and now she checks her feet multiple times/day  She was dx with multiple myeloma for which she is taking oral chemotherapy- for a couple of months  She was not taking it for 6 weeks while in the hospital  At this phase she walks with a cane or walker infrequently  She had an admission to the hospital with chronic urinary tract infection which this happens quite frequently      Another admission to the hospital in the beginning of 2020 due to having lost control of bowels and she was given antibiotics which made it worse  She is going to her PCP to discuss this as she is still unable to control her bladder especially when exercises  She is taking miralax which she will discuss c MD on best way to help tx colitis  Neuro signs: Tingling numbness in both knees down to feet- >10 years  Red flags: multiple myeloma  Occupation: Disability- >10 years [employer] [address]      Pain    At worst pain ratin      Social Support  Lives by herself and states it is getting hard in her apartment  She has 2 JESSI which are difficult  She is driving s difficulty  She has difficulty c bathing but has a shower chair/railings/toilet chair        Patient Goals  Patient goals for therapy: improve balance, weakness, endurance    STGs  1  Decrease pain by 20% in 2-4 weeks  2  Improve hip add ROM by 10 degrees in 2-4 weeks  3  Improve hip strength by 1/3 grade in 2-4 weeks  4  Improve standing tolerance to performing ADLs at home    LTGs  1  Decrease pain by 60% in 6-8 weeks  2  Improve walking tolerance to 2-3 blocks s being SOB in 10 weeks  3  Perform standing activities >10 mins in 6-8 weeks  Objective Measurements:    Observation: yellow tint-jaundice like appearance in skin  Gait: c cane LLE in ER due to no toes  Sensation: WFL except in B feet  Reflexes:NT  Functional squat: C UE support heel raise and pain in B knees  Slump:-  SLR: 3 reps each side c stomach pulling    Balance: SLS 2 finger support 5 sec R  3 sec L    NBOS: mod wobble 3 sec no UE   Tandem: R 1 finger 5 sec / L 1 finger 3 sec  TU 58 sec c cane  5XSST: 21 58 sec c  L UE support R cane    Knee A/PROM L R Strength L R   Flex   /  / Flex 4 4   Ext  /  / Ext 4+ 4+           [facility]      Flex   Flex 4 4   ER at 90   ER     IR at 90 0 0 IR     Abd 14 16 tight Abd 4 4-   Ext   Ext             Lumbar AROM   Ankle     Flex Holding onto railing WFL  DF     Ext ~15 LOB- mod A  PF        Toes ROM  Poor toe ext/flex ROM           Assessment:    Cheli Prakash is a pleasant 54 y o  female who presents with referring diagnosis of decreased endurance  No further referral appears necessary at this time based upon examination results  The primary movement impairment diagnosis is decreased balance and conditioning limiting her ability to stand and walk for longer durations to perform home activities and ADLs  Impairments include:  1) Decreased endurance  2) Decreased gait speed   3) Decreased balance     Etiologic factors include multiple hospitalizations and poor  Negative prognostic indicators:multiple comorbidities  Positive prognostic indicators: good attitude  Please contact me if you have any further questions or recommendations  Thank you very much for the kind referral         Plan  Patient would benefit from:Skilled physical therapy  Planned therapy interventions: manual therapy, neuromuscular re-education, stretching, strengthening, therapeutic activities, therapeutic exercise, patient education, home exercise program, and activity modification      Frequency: 2x week  Duration in weeks: 8  Treatment plan discussed with: patient         Goals: Patient's goal is to improve standing, walking and balance    Precautions: HTN, DM, kidney/pancreas transplant, multiple myeloma, Depression, PTSD- Amputation- all L toes, R great toe,   Dx: Decreased balance and gait deficits      Daily Treatment Diary       Manuals 1/16/2020        Assess HR/SP02 when fatigued                                              Exercise Diary         Bike         Steps 6 in         Seated toe towel curls R 10x  Transition to HEP      Alphabet R foot 1x  Transition to HEP      NBOS close supervision         Standing on foam         Standing marching         Sit to stand         Single LP #30         sidesteps         Supine marching 10x        SLR visit 6-7         Supine clamshells GTB         Gait around clinic for endurance                                                                        Modalities Yes

## 2024-08-08 NOTE — H&P PST ADULT - MUSCULOSKELETAL COMMENTS
BLE lymphedema, ambulates with walker. LE feel heavy from lymphedema, uses compression stockings and leg pump, under mgmt Dr. Joaquin

## 2024-08-08 NOTE — H&P PST ADULT - HEMATOLOGY/LYMPHATICS COMMENTS
hx DVT on Eliquis, has IVC filter, hx hematoma and GIB requiring transfusions x10un hx DVT on Eliquis, has IVC filter, hx hematoma and GIB requiring transfusions

## 2024-08-08 NOTE — H&P PST ADULT - TOBACCO USE
Current some day smoker Azathioprine Pregnancy And Lactation Text: This medication is Pregnancy Category D and isn't considered safe during pregnancy. It is unknown if this medication is excreted in breast milk.

## 2024-08-08 NOTE — H&P PST ADULT - NSICDXPASTSURGICALHX_GEN_ALL_CORE_FT
PAST SURGICAL HISTORY:  H/O nephrostomy bilateral  May 2018 clamped  JUNE and had ureteral stent    Hx of tonsillectomy at 6 years old    Presence of IVC filter 7/18    S/P cystoscopy with ureteral stent placement     S/P endoscopy     S/P evacuation of hematoma     Ureteral stents placement, bilateral      PAST SURGICAL HISTORY:  H/O melanoma excision     H/O nephrostomy bilateral  May 2018 clamped  JUNE and had ureteral stent    Hx of tonsillectomy at 6 years old    Presence of IVC filter 7/18    S/P cystoscopy with ureteral stent placement     S/P endoscopy     S/P evacuation of hematoma     Ureteral stents placement, bilateral

## 2024-08-08 NOTE — H&P PST ADULT - ASSESSMENT
DASI score:  DASI activity:  Loose teeth or dentures:  Airway:  DASI score: 4.6 METS  DASI activity: ADLs, can walk indoors/outdoors with walker and can, can climb stairs, does PT  Loose teeth or dentures: Denies loose teeth or dentures, broken/missing cap on upper mouth  Airway:     CAPRINI SCORE    AGE RELATED RISK FACTORS                                                             [ ] Age 41-60 years                                            (1 Point)  [x ] Age: 61-74 years                                           (2 Points)                 [ ] Age= 75 years                                                (3 Points)             DISEASE RELATED RISK FACTORS                                                       [x ] Edema in the lower extremities                 (1 Point)                     [ ] Varicose veins                                               (1 Point)                                 [x ] BMI > 25 Kg/m2                                            (1 Point)                                  [ ] Serious infection (ie PNA)                            (1 Point)                     [ ] Lung disease ( COPD, Emphysema)            (1 Point)                                                                          [ ] Acute myocardial infarction                         (1 Point)                  [ ] Congestive heart failure (in the previous month)  (1 Point)         [ ] Inflammatory bowel disease                            (1 Point)                  [ ] Central venous access, PICC or Port               (2 points)       (within the last month)                                                                [ ] Stroke (in the previous month)                        (5 Points)    [x ] Previous or present malignancy                       (2 points)                                                                                                                                                         HEMATOLOGY RELATED FACTORS                                                         [ x] Prior episodes of VTE                                     (3 Points)                     [ ] Positive family history for VTE                      (3 Points)                  [ ] Prothrombin 64127 A                                     (3 Points)                     [ ] Factor V Leiden                                                (3 Points)                        [ ] Lupus anticoagulants                                      (3 Points)                                                           [ ] Anticardiolipin antibodies                              (3 Points)                                                       [ ] High homocysteine in the blood                   (3 Points)                                             [ ] Other congenital or acquired thrombophilia      (3 Points)                                                [ ] Heparin induced thrombocytopenia                  (3 Points)                                        MOBILITY RELATED FACTORS  [ ] Bed rest                                                         (1 Point)  [ ] Plaster cast                                                    (2 points)  [ ] Bed bound for more than 72 hours           (2 Points)    GENDER SPECIFIC FACTORS  [ ] Pregnancy or had a baby within the last month   (1 Point)  [ ] Post-partum < 6 weeks                                   (1 Point)  [ ] Hormonal therapy  or oral contraception   (1 Point)  [ ] History of pregnancy complications              (1 point)  [ ] Unexplained or recurrent              (1 Point)    OTHER RISK FACTORS                                           (1 Point)  [x ] BMI >40, smoking, diabetes requiring insulin, chemotherapy  blood transfusions and length of surgery over 2 hours    SURGERY RELATED RISK FACTORS  [ ]  Section within the last month     (1 Point)  [ ] Minor surgery                                                  (1 Point)  [ ] Arthroscopic surgery                                       (2 Points)  [x ] Planned major surgery lasting more            (2 Points)      than 45 minutes     [ ] Elective hip or knee joint replacement       (5 points)       surgery                                                TRAUMA RELATED RISK FACTORS  [ ] Fracture of the hip, pelvis, or leg                       (5 Points)  [ ] Spinal cord injury resulting in paralysis             (5 points)       (in the previous month)    [ ] Paralysis  (less than 1 month)                             (5 Points)  [ ] Multiple Trauma within 1 month                        (5 Points)    Total Score [  12      ]    Caprini Score 0-2: Low Risk, NO VTE prophylaxis required for most patients, encourage ambulation  Caprini Score 3-6: Moderate Risk , pharmacologic VTE prophylaxis is indicated for most patients (in the absence of contraindications)  Caprini Score Greater than or =7: High risk, pharmocologic VTE prophylaxis indicated for most patients (in the absence of contraindications)

## 2024-08-08 NOTE — H&P PST ADULT - NEUROLOGICAL COMMENTS
neuropathy LLE. ambulates with walker, uses WC mild neuropathy LLE, ambulates with walker, uses WC and sometimes cane

## 2024-08-08 NOTE — H&P PST ADULT - HISTORY OF PRESENT ILLNESS
64yo M with PMHx HTN, T2DM2, DVT on Eliquis, MEGHA on CPAP, BPH,  lymphedema BLE, malignant melanoma on back-s/p excision May 2024, nephrolithiasis.  Pt was also treated at [facility] in May for nephrolithiasis, right nephrostomy tube in placed.  Plan for 1st stage right percutaneous nephrolithotomy on 8/20/24 with Dr. Teixeira.      patient reports hx bed-ridden for 2 years, weighing approximately 520 lbs, was in a pneumonia induced coma, was in and out of rehab, and upon returning home from rehab his partner identified an abnormal lesion on his back.  patient ambulates with walker, BLE lymphedema, patient reports o/w feeling well today with no other acute concerns. of note- patient was treated at [facility] this month for nephrolithiasis, intact, urology DR. Wang, he is taking Cefpodoxime, completing therapy prior to surgery.        64yo M with PMHx HTN, DVT on Eliquis, has IVC filter, morbid obesity BM! 48.1-on Trulicity for weight loss-pt reports he does not have T2DM, HA1c 5.6 in May 2024, MEGHA on CPAP, BPH, lymphedema BLE, malignant melanoma on back-s/p Left upper back with SLNB and plastics closure on 05/24/2024, h/o nephrolithiasis.  He had pneumonia and sepsis during COVID, but not COVID. Was in coma and then rehab for several months before home PT, improved-d/c'd from home therapy, plan to start aquatic therapy.  Pt was treated at [facility] in May for nephrolithiasis, right nephrostomy tube in placed.  Plan for 1st stage right percutaneous nephrolithotomy on 8/20/24 with Dr. Teixeira.

## 2024-08-08 NOTE — H&P PST ADULT - NSICDXPASTMEDICALHX_GEN_ALL_CORE_FT
PAST MEDICAL HISTORY:  Acquired lymphedema of leg     Anemia s/p 10 units of blood [facility] 7/18, on iron now   due to GI bleeding stable now    Calculus of kidney and ureter     Diabetes mellitus type II     Duodenal ulcer     DVT (deep venous thrombosis) 7/18 ivc filter inserted    Gastritis     GI bleed recently transfused 10 units    H/O sleep apnea no CPAP used, to follow up ASAP    had sleep study done no pending result    History of BPH     HTN (hypertension)     Hx of peripheral neuropathy     Malignant melanoma of skin     Morbid obesity     OA (osteoarthritis) right hip    MEGHA on CPAP     Respiratory failure     Respiratory failure, post-operative post endoscopy [facility] 7/18    Tracheostomy dependent     Ventricular arrhythmia post endoscopy  echo done EF 60%     PAST MEDICAL HISTORY:  Acquired lymphedema of leg     Anemia s/p 10 units of blood [facility] 7/18, on iron now   due to GI bleeding stable now    Calculus of kidney and ureter     Duodenal ulcer     DVT (deep venous thrombosis) 7/18 ivc filter inserted    Gastritis     GI bleed recently transfused 10 units    H/O erectile dysfunction     H/O sepsis     H/O sleep apnea no CPAP used, to follow up ASAP    had sleep study done no pending result    History of BPH     HTN (hypertension)     Hx of peripheral neuropathy     Malignant melanoma of skin     Morbid obesity     OA (osteoarthritis) right hip    MEGHA on CPAP     Respiratory failure     Respiratory failure, post-operative post endoscopy [facility] 7/18    Tracheostomy dependent     Ventricular arrhythmia post endoscopy  echo done EF 60%

## 2024-08-08 NOTE — H&P PST ADULT - GENITOURINARY COMMENTS
nephrolithiasis, right nephrostomy tube in place, intact, her is taking Cefpodoxime, completing therapy prior to surgery. nephrolithiasis, right nephrostomy tube in place

## 2024-08-08 NOTE — H&P PST ADULT - SKIN/BREAST COMMENTS
healed biopsy of Left sup. back s/p Shave Biopsy, Dx Malignant Melanoma w/o ulceration. healed left upper back wound healed

## 2024-08-08 NOTE — H&P PST ADULT - NEGATIVE GASTROINTESTINAL SYMPTOMS
no nausea/no vomiting/no diarrhea/no constipation/no change in bowel habits/no flatulence/no abdominal pain/no melena/no hematochezia/no jaundice no nausea/no vomiting/no diarrhea/no constipation/no change in bowel habits/no abdominal pain/no jaundice

## 2024-08-08 NOTE — H&P PST ADULT - PROBLEM SELECTOR PLAN 1
Plan for 1st stage right percutaneous nephrolithotomy on 8/20/24 with Dr. Teixeira.    PST labs sent (CBC, BMP, T&S, ur cx x2-clean catch and from R nephrostomy tube  Pre procedure surgical scrub instructions discussed  Pre-op education provided - all questions answered

## 2024-08-09 LAB
CULTURE RESULTS: SIGNIFICANT CHANGE UP
SPECIMEN SOURCE: SIGNIFICANT CHANGE UP

## 2024-08-12 LAB
-  AMOXICILLIN/CLAVULANIC ACID: SIGNIFICANT CHANGE UP
-  AMOXICILLIN/CLAVULANIC ACID: SIGNIFICANT CHANGE UP
-  AMPICILLIN/SULBACTAM: SIGNIFICANT CHANGE UP
-  AMPICILLIN/SULBACTAM: SIGNIFICANT CHANGE UP
-  AMPICILLIN: SIGNIFICANT CHANGE UP
-  AMPICILLIN: SIGNIFICANT CHANGE UP
-  AZTREONAM: SIGNIFICANT CHANGE UP
-  AZTREONAM: SIGNIFICANT CHANGE UP
-  CEFAZOLIN: SIGNIFICANT CHANGE UP
-  CEFAZOLIN: SIGNIFICANT CHANGE UP
-  CEFEPIME: SIGNIFICANT CHANGE UP
-  CEFEPIME: SIGNIFICANT CHANGE UP
-  CEFOXITIN: SIGNIFICANT CHANGE UP
-  CEFOXITIN: SIGNIFICANT CHANGE UP
-  CEFTRIAXONE: SIGNIFICANT CHANGE UP
-  CEFTRIAXONE: SIGNIFICANT CHANGE UP
-  CEFUROXIME: SIGNIFICANT CHANGE UP
-  CIPROFLOXACIN: SIGNIFICANT CHANGE UP
-  CIPROFLOXACIN: SIGNIFICANT CHANGE UP
-  ERTAPENEM: SIGNIFICANT CHANGE UP
-  ERTAPENEM: SIGNIFICANT CHANGE UP
-  GENTAMICIN: SIGNIFICANT CHANGE UP
-  LEVOFLOXACIN: SIGNIFICANT CHANGE UP
-  LEVOFLOXACIN: SIGNIFICANT CHANGE UP
-  MEROPENEM: SIGNIFICANT CHANGE UP
-  MEROPENEM: SIGNIFICANT CHANGE UP
-  PIPERACILLIN/TAZOBACTAM: SIGNIFICANT CHANGE UP
-  PIPERACILLIN/TAZOBACTAM: SIGNIFICANT CHANGE UP
-  TOBRAMYCIN: SIGNIFICANT CHANGE UP
-  TRIMETHOPRIM/SULFAMETHOXAZOLE: SIGNIFICANT CHANGE UP
-  TRIMETHOPRIM/SULFAMETHOXAZOLE: SIGNIFICANT CHANGE UP
METHOD TYPE: SIGNIFICANT CHANGE UP
METHOD TYPE: SIGNIFICANT CHANGE UP

## 2024-08-13 LAB
-  AMOXICILLIN/CLAVULANIC ACID: SIGNIFICANT CHANGE UP
-  AMPICILLIN/SULBACTAM: SIGNIFICANT CHANGE UP
-  AMPICILLIN: SIGNIFICANT CHANGE UP
-  AZTREONAM: SIGNIFICANT CHANGE UP
-  CEFAZOLIN: SIGNIFICANT CHANGE UP
-  CEFEPIME: SIGNIFICANT CHANGE UP
-  CEFOXITIN: SIGNIFICANT CHANGE UP
-  CEFTRIAXONE: SIGNIFICANT CHANGE UP
-  CIPROFLOXACIN: SIGNIFICANT CHANGE UP
-  ERTAPENEM: SIGNIFICANT CHANGE UP
-  GENTAMICIN: SIGNIFICANT CHANGE UP
-  IMIPENEM: SIGNIFICANT CHANGE UP
-  LEVOFLOXACIN: SIGNIFICANT CHANGE UP
-  MEROPENEM: SIGNIFICANT CHANGE UP
-  NITROFURANTOIN: SIGNIFICANT CHANGE UP
-  PIPERACILLIN/TAZOBACTAM: SIGNIFICANT CHANGE UP
-  TOBRAMYCIN: SIGNIFICANT CHANGE UP
-  TRIMETHOPRIM/SULFAMETHOXAZOLE: SIGNIFICANT CHANGE UP
CULTURE RESULTS: ABNORMAL
METHOD TYPE: SIGNIFICANT CHANGE UP
ORGANISM # SPEC MICROSCOPIC CNT: ABNORMAL
SPECIMEN SOURCE: SIGNIFICANT CHANGE UP

## 2024-08-13 RX ORDER — SULFAMETHOXAZOLE AND TRIMETHOPRIM 800; 160 MG/1; MG/1
800-160 TABLET ORAL
Qty: 10 | Refills: 0 | Status: ACTIVE | COMMUNITY
Start: 2024-08-12 | End: 1900-01-01

## 2024-08-19 ENCOUNTER — TRANSCRIPTION ENCOUNTER (OUTPATIENT)
Age: 64
End: 2024-08-19

## 2024-08-20 ENCOUNTER — RESULT REVIEW (OUTPATIENT)
Age: 64
End: 2024-08-20

## 2024-08-20 ENCOUNTER — INPATIENT (INPATIENT)
Facility: HOSPITAL | Age: 64
LOS: 5 days | Discharge: HOME | DRG: 694 | End: 2024-08-26
Attending: UROLOGY | Admitting: UROLOGY
Payer: MEDICARE

## 2024-08-20 ENCOUNTER — APPOINTMENT (OUTPATIENT)
Dept: UROLOGY | Facility: HOSPITAL | Age: 64
End: 2024-08-20

## 2024-08-20 VITALS
WEIGHT: 315 LBS | RESPIRATION RATE: 20 BRPM | HEART RATE: 45 BPM | SYSTOLIC BLOOD PRESSURE: 116 MMHG | OXYGEN SATURATION: 99 % | TEMPERATURE: 97 F | HEIGHT: 69.02 IN | DIASTOLIC BLOOD PRESSURE: 79 MMHG

## 2024-08-20 DIAGNOSIS — Z93.6 OTHER ARTIFICIAL OPENINGS OF URINARY TRACT STATUS: Chronic | ICD-10-CM

## 2024-08-20 DIAGNOSIS — Z95.828 PRESENCE OF OTHER VASCULAR IMPLANTS AND GRAFTS: Chronic | ICD-10-CM

## 2024-08-20 DIAGNOSIS — Z98.890 OTHER SPECIFIED POSTPROCEDURAL STATES: Chronic | ICD-10-CM

## 2024-08-20 DIAGNOSIS — N20.0 CALCULUS OF KIDNEY: ICD-10-CM

## 2024-08-20 LAB
ANION GAP SERPL CALC-SCNC: 15 MMOL/L — SIGNIFICANT CHANGE UP (ref 5–17)
BASOPHILS # BLD AUTO: 0.02 K/UL — SIGNIFICANT CHANGE UP (ref 0–0.2)
BASOPHILS NFR BLD AUTO: 0.3 % — SIGNIFICANT CHANGE UP (ref 0–2)
BUN SERPL-MCNC: 22 MG/DL — SIGNIFICANT CHANGE UP (ref 7–23)
CALCIUM SERPL-MCNC: 8.4 MG/DL — SIGNIFICANT CHANGE UP (ref 8.4–10.5)
CHLORIDE SERPL-SCNC: 104 MMOL/L — SIGNIFICANT CHANGE UP (ref 96–108)
CO2 SERPL-SCNC: 20 MMOL/L — LOW (ref 22–31)
CREAT SERPL-MCNC: 1.64 MG/DL — HIGH (ref 0.5–1.3)
EGFR: 46 ML/MIN/1.73M2 — LOW
EOSINOPHIL # BLD AUTO: 0.06 K/UL — SIGNIFICANT CHANGE UP (ref 0–0.5)
EOSINOPHIL NFR BLD AUTO: 0.8 % — SIGNIFICANT CHANGE UP (ref 0–6)
GLUCOSE SERPL-MCNC: 115 MG/DL — HIGH (ref 70–99)
HCT VFR BLD CALC: 48.7 % — SIGNIFICANT CHANGE UP (ref 39–50)
HGB BLD-MCNC: 15.4 G/DL — SIGNIFICANT CHANGE UP (ref 13–17)
IMM GRANULOCYTES NFR BLD AUTO: 0.4 % — SIGNIFICANT CHANGE UP (ref 0–0.9)
LYMPHOCYTES # BLD AUTO: 0.95 K/UL — LOW (ref 1–3.3)
LYMPHOCYTES # BLD AUTO: 12.6 % — LOW (ref 13–44)
MCHC RBC-ENTMCNC: 29.2 PG — SIGNIFICANT CHANGE UP (ref 27–34)
MCHC RBC-ENTMCNC: 31.6 GM/DL — LOW (ref 32–36)
MCV RBC AUTO: 92.4 FL — SIGNIFICANT CHANGE UP (ref 80–100)
MONOCYTES # BLD AUTO: 0.15 K/UL — SIGNIFICANT CHANGE UP (ref 0–0.9)
MONOCYTES NFR BLD AUTO: 2 % — SIGNIFICANT CHANGE UP (ref 2–14)
NEUTROPHILS # BLD AUTO: 6.35 K/UL — SIGNIFICANT CHANGE UP (ref 1.8–7.4)
NEUTROPHILS NFR BLD AUTO: 83.9 % — HIGH (ref 43–77)
NRBC # BLD: 0 /100 WBCS — SIGNIFICANT CHANGE UP (ref 0–0)
PLATELET # BLD AUTO: 183 K/UL — SIGNIFICANT CHANGE UP (ref 150–400)
POTASSIUM SERPL-MCNC: 4.1 MMOL/L — SIGNIFICANT CHANGE UP (ref 3.5–5.3)
POTASSIUM SERPL-SCNC: 4.1 MMOL/L — SIGNIFICANT CHANGE UP (ref 3.5–5.3)
RBC # BLD: 5.27 M/UL — SIGNIFICANT CHANGE UP (ref 4.2–5.8)
RBC # FLD: 14.2 % — SIGNIFICANT CHANGE UP (ref 10.3–14.5)
SODIUM SERPL-SCNC: 139 MMOL/L — SIGNIFICANT CHANGE UP (ref 135–145)
WBC # BLD: 7.56 K/UL — SIGNIFICANT CHANGE UP (ref 3.8–10.5)
WBC # FLD AUTO: 7.56 K/UL — SIGNIFICANT CHANGE UP (ref 3.8–10.5)

## 2024-08-20 PROCEDURE — 71045 X-RAY EXAM CHEST 1 VIEW: CPT | Mod: 26

## 2024-08-20 PROCEDURE — 50081 PERQ NL/PL LITHOTRP CPLX>2CM: CPT | Mod: RT

## 2024-08-20 PROCEDURE — 50437 DILAT XST TRC NEW ACCESS RCS: CPT | Mod: RT,59

## 2024-08-20 PROCEDURE — 88300 SURGICAL PATH GROSS: CPT | Mod: 26

## 2024-08-20 PROCEDURE — 50389 REMOVE RENAL TUBE W/FLUORO: CPT | Mod: RT,59

## 2024-08-20 DEVICE — GUIDEWIRE SENSOR DUAL-FLEX NITINOL STRAIGHT .035" X 150CM: Type: IMPLANTABLE DEVICE | Site: RIGHT | Status: FUNCTIONAL

## 2024-08-20 DEVICE — NEPHROSTOMY BALLOON CATH NEPHROMAX 24FR 17CM PTFE: Type: IMPLANTABLE DEVICE | Site: RIGHT | Status: FUNCTIONAL

## 2024-08-20 DEVICE — URETERAL CATH SOF-FLEX OPEN END 6FR .040" X 70CM: Type: IMPLANTABLE DEVICE | Site: RIGHT | Status: FUNCTIONAL

## 2024-08-20 DEVICE — DILATOR SHEATH SET 8/10: Type: IMPLANTABLE DEVICE | Site: RIGHT | Status: FUNCTIONAL

## 2024-08-20 DEVICE — KIT A-LINE 1LUM 20G X 12CM SAFE KIT: Type: IMPLANTABLE DEVICE | Site: RIGHT | Status: FUNCTIONAL

## 2024-08-20 RX ORDER — HYDROMORPHONE HYDROCHLORIDE 2 MG/1
1 TABLET ORAL
Refills: 0 | Status: DISCONTINUED | OUTPATIENT
Start: 2024-08-20 | End: 2024-08-21

## 2024-08-20 RX ORDER — FAMOTIDINE 10 MG/ML
20 INJECTION INTRAVENOUS
Refills: 0 | Status: DISCONTINUED | OUTPATIENT
Start: 2024-08-20 | End: 2024-08-22

## 2024-08-20 RX ORDER — GABAPENTIN 100 MG
100 CAPSULE ORAL
Refills: 0 | Status: DISCONTINUED | OUTPATIENT
Start: 2024-08-20 | End: 2024-08-22

## 2024-08-20 RX ORDER — METOPROLOL TARTRATE 100 MG/1
50 TABLET ORAL
Refills: 0 | Status: DISCONTINUED | OUTPATIENT
Start: 2024-08-20 | End: 2024-08-22

## 2024-08-20 RX ORDER — FUROSEMIDE 40 MG
40 TABLET ORAL DAILY
Refills: 0 | Status: DISCONTINUED | OUTPATIENT
Start: 2024-08-20 | End: 2024-08-22

## 2024-08-20 RX ORDER — SENNA 187 MG
2 TABLET ORAL AT BEDTIME
Refills: 0 | Status: DISCONTINUED | OUTPATIENT
Start: 2024-08-20 | End: 2024-08-22

## 2024-08-20 RX ORDER — CEFAZOLIN SODIUM 2 G/100ML
2000 INJECTION, SOLUTION INTRAVENOUS ONCE
Refills: 0 | Status: COMPLETED | OUTPATIENT
Start: 2024-08-20 | End: 2024-08-20

## 2024-08-20 RX ORDER — AMLODIPINE BESYLATE 10 MG/1
10 TABLET ORAL DAILY
Refills: 0 | Status: DISCONTINUED | OUTPATIENT
Start: 2024-08-20 | End: 2024-08-22

## 2024-08-20 RX ORDER — ACETAMINOPHEN 325 MG/1
975 TABLET ORAL EVERY 6 HOURS
Refills: 0 | Status: DISCONTINUED | OUTPATIENT
Start: 2024-08-20 | End: 2024-08-22

## 2024-08-20 RX ORDER — FUROSEMIDE 10 MG/ML
1 INJECTION, SOLUTION INTRAVENOUS
Refills: 0 | DISCHARGE

## 2024-08-20 RX ORDER — ONDANSETRON 2 MG/ML
4 INJECTION, SOLUTION INTRAMUSCULAR; INTRAVENOUS ONCE
Refills: 0 | Status: DISCONTINUED | OUTPATIENT
Start: 2024-08-20 | End: 2024-08-21

## 2024-08-20 RX ORDER — PIPERACILLIN SODIUM AND TAZOBACTAM SODIUM 3; .375 G/15ML; G/15ML
3.38 INJECTION, POWDER, FOR SOLUTION INTRAVENOUS EVERY 8 HOURS
Refills: 0 | Status: DISCONTINUED | OUTPATIENT
Start: 2024-08-20 | End: 2024-08-22

## 2024-08-20 RX ORDER — HYDROMORPHONE HYDROCHLORIDE 2 MG/1
0.5 TABLET ORAL
Refills: 0 | Status: DISCONTINUED | OUTPATIENT
Start: 2024-08-20 | End: 2024-08-21

## 2024-08-20 RX ADMIN — Medication 75 MILLILITER(S): at 21:00

## 2024-08-20 RX ADMIN — PIPERACILLIN SODIUM AND TAZOBACTAM SODIUM 25 GRAM(S): 3; .375 INJECTION, POWDER, FOR SOLUTION INTRAVENOUS at 22:45

## 2024-08-20 NOTE — PROGRESS NOTE ADULT - SUBJECTIVE AND OBJECTIVE BOX
Post op Check    Pt seen and examined without complaints. Pain is controlled. Denies SOB/CP/N/V.     Vital Signs Last 24 Hrs  T(C): 36.4 (20 Aug 2024 23:00), Max: 36.4 (20 Aug 2024 22:00)  T(F): 97.5 (20 Aug 2024 23:00), Max: 97.5 (20 Aug 2024 22:00)  HR: 55 (20 Aug 2024 23:00) (44 - 61)  BP: 135/79 (20 Aug 2024 23:00) (116/79 - 139/66)  BP(mean): 89 (20 Aug 2024 22:00) (80 - 96)  RR: 16 (20 Aug 2024 23:00) (16 - 20)  SpO2: 99% (20 Aug 2024 23:00) (96% - 100%)    Parameters below as of 20 Aug 2024 23:00  Patient On (Oxygen Delivery Method): room air        I&O's Summary    20 Aug 2024 07:01  -  20 Aug 2024 23:36  --------------------------------------------------------  IN: 325 mL / OUT: 1050 mL / NET: -725 mL        Physical Exam  Gen: NAD, A&Ox3  Pulm: On NC, No respiratory distress, no subcostal retractions  Abd: Soft, NT, ND  Back: NU in place, draining well, translucent light pink colored urine, dressing clean/dry/intact  : Bae catheter in place, draining well translucent light peach colored urine                           15.4   7.56  )-----------( 183      ( 20 Aug 2024 21:33 )             48.7       08-20    139  |  104  |  22  ----------------------------<  115<H>  4.1   |  20<L>  |  1.64<H>    Ca    8.4      20 Aug 2024 21:33

## 2024-08-20 NOTE — PROGRESS NOTE ADULT - ASSESSMENT
A/P: 64y Male s/p R PCNL    DVT prophylaxis/OOB  Incentive spirometry  Strict I&O's  Analgesia and antiemetics as needed  Diet - regular   AM labs  F/U PACU CXR final read  AM TOV  AM CT  Plan for 2nd stage on contralateral side on Thursday

## 2024-08-21 ENCOUNTER — TRANSCRIPTION ENCOUNTER (OUTPATIENT)
Age: 64
End: 2024-08-21

## 2024-08-21 PROBLEM — Z87.438 PERSONAL HISTORY OF OTHER DISEASES OF MALE GENITAL ORGANS: Chronic | Status: ACTIVE | Noted: 2024-08-08

## 2024-08-21 LAB
ANION GAP SERPL CALC-SCNC: 17 MMOL/L — SIGNIFICANT CHANGE UP (ref 5–17)
BASOPHILS # BLD AUTO: 0.01 K/UL — SIGNIFICANT CHANGE UP (ref 0–0.2)
BASOPHILS NFR BLD AUTO: 0.1 % — SIGNIFICANT CHANGE UP (ref 0–2)
BUN SERPL-MCNC: 23 MG/DL — SIGNIFICANT CHANGE UP (ref 7–23)
CALCIUM SERPL-MCNC: 8.6 MG/DL — SIGNIFICANT CHANGE UP (ref 8.4–10.5)
CHLORIDE SERPL-SCNC: 99 MMOL/L — SIGNIFICANT CHANGE UP (ref 96–108)
CO2 SERPL-SCNC: 23 MMOL/L — SIGNIFICANT CHANGE UP (ref 22–31)
CREAT SERPL-MCNC: 1.81 MG/DL — HIGH (ref 0.5–1.3)
EGFR: 41 ML/MIN/1.73M2 — LOW
EOSINOPHIL # BLD AUTO: 0 K/UL — SIGNIFICANT CHANGE UP (ref 0–0.5)
EOSINOPHIL NFR BLD AUTO: 0 % — SIGNIFICANT CHANGE UP (ref 0–6)
GLUCOSE SERPL-MCNC: 164 MG/DL — HIGH (ref 70–99)
HCT VFR BLD CALC: 48 % — SIGNIFICANT CHANGE UP (ref 39–50)
HGB BLD-MCNC: 15 G/DL — SIGNIFICANT CHANGE UP (ref 13–17)
IMM GRANULOCYTES NFR BLD AUTO: 0.6 % — SIGNIFICANT CHANGE UP (ref 0–0.9)
LYMPHOCYTES # BLD AUTO: 0.68 K/UL — LOW (ref 1–3.3)
LYMPHOCYTES # BLD AUTO: 7 % — LOW (ref 13–44)
MCHC RBC-ENTMCNC: 28.7 PG — SIGNIFICANT CHANGE UP (ref 27–34)
MCHC RBC-ENTMCNC: 31.3 GM/DL — LOW (ref 32–36)
MCV RBC AUTO: 92 FL — SIGNIFICANT CHANGE UP (ref 80–100)
MONOCYTES # BLD AUTO: 0.33 K/UL — SIGNIFICANT CHANGE UP (ref 0–0.9)
MONOCYTES NFR BLD AUTO: 3.4 % — SIGNIFICANT CHANGE UP (ref 2–14)
NEUTROPHILS # BLD AUTO: 8.59 K/UL — HIGH (ref 1.8–7.4)
NEUTROPHILS NFR BLD AUTO: 88.9 % — HIGH (ref 43–77)
NRBC # BLD: 0 /100 WBCS — SIGNIFICANT CHANGE UP (ref 0–0)
PLATELET # BLD AUTO: 202 K/UL — SIGNIFICANT CHANGE UP (ref 150–400)
POTASSIUM SERPL-MCNC: 4.1 MMOL/L — SIGNIFICANT CHANGE UP (ref 3.5–5.3)
POTASSIUM SERPL-SCNC: 4.1 MMOL/L — SIGNIFICANT CHANGE UP (ref 3.5–5.3)
RBC # BLD: 5.22 M/UL — SIGNIFICANT CHANGE UP (ref 4.2–5.8)
RBC # FLD: 14.2 % — SIGNIFICANT CHANGE UP (ref 10.3–14.5)
SODIUM SERPL-SCNC: 139 MMOL/L — SIGNIFICANT CHANGE UP (ref 135–145)
WBC # BLD: 9.67 K/UL — SIGNIFICANT CHANGE UP (ref 3.8–10.5)
WBC # FLD AUTO: 9.67 K/UL — SIGNIFICANT CHANGE UP (ref 3.8–10.5)

## 2024-08-21 PROCEDURE — 74176 CT ABD & PELVIS W/O CONTRAST: CPT | Mod: 26

## 2024-08-21 PROCEDURE — 93010 ELECTROCARDIOGRAM REPORT: CPT

## 2024-08-21 RX ORDER — OXYCODONE HYDROCHLORIDE 5 MG/1
5 TABLET ORAL EVERY 6 HOURS
Refills: 0 | Status: DISCONTINUED | OUTPATIENT
Start: 2024-08-21 | End: 2024-08-22

## 2024-08-21 RX ADMIN — PIPERACILLIN SODIUM AND TAZOBACTAM SODIUM 25 GRAM(S): 3; .375 INJECTION, POWDER, FOR SOLUTION INTRAVENOUS at 22:40

## 2024-08-21 RX ADMIN — Medication 100 MILLIGRAM(S): at 18:26

## 2024-08-21 RX ADMIN — ACETAMINOPHEN 975 MILLIGRAM(S): 325 TABLET ORAL at 18:55

## 2024-08-21 RX ADMIN — ACETAMINOPHEN 975 MILLIGRAM(S): 325 TABLET ORAL at 01:42

## 2024-08-21 RX ADMIN — Medication 100 MILLIGRAM(S): at 06:32

## 2024-08-21 RX ADMIN — Medication 40 MILLIGRAM(S): at 06:33

## 2024-08-21 RX ADMIN — ACETAMINOPHEN 975 MILLIGRAM(S): 325 TABLET ORAL at 18:25

## 2024-08-21 RX ADMIN — ACETAMINOPHEN 975 MILLIGRAM(S): 325 TABLET ORAL at 00:42

## 2024-08-21 RX ADMIN — AMLODIPINE BESYLATE 10 MILLIGRAM(S): 10 TABLET ORAL at 06:33

## 2024-08-21 RX ADMIN — METOPROLOL TARTRATE 50 MILLIGRAM(S): 100 TABLET ORAL at 18:26

## 2024-08-21 RX ADMIN — METOPROLOL TARTRATE 50 MILLIGRAM(S): 100 TABLET ORAL at 06:33

## 2024-08-21 RX ADMIN — FAMOTIDINE 20 MILLIGRAM(S): 10 INJECTION INTRAVENOUS at 18:26

## 2024-08-21 RX ADMIN — FAMOTIDINE 20 MILLIGRAM(S): 10 INJECTION INTRAVENOUS at 06:33

## 2024-08-21 RX ADMIN — PIPERACILLIN SODIUM AND TAZOBACTAM SODIUM 25 GRAM(S): 3; .375 INJECTION, POWDER, FOR SOLUTION INTRAVENOUS at 13:09

## 2024-08-21 RX ADMIN — PIPERACILLIN SODIUM AND TAZOBACTAM SODIUM 25 GRAM(S): 3; .375 INJECTION, POWDER, FOR SOLUTION INTRAVENOUS at 06:29

## 2024-08-21 RX ADMIN — Medication 75 MILLILITER(S): at 06:27

## 2024-08-21 NOTE — PROGRESS NOTE ADULT - SUBJECTIVE AND OBJECTIVE BOX
UROLOGY DAILY PROGRESS NOTE:     Subjective: Patient seen and examined at bedside. No overnight events.        Objective:  Vital signs  T(F): , Max: 97.9 (08-21-24 @ 01:54)  HR: 62 (08-21-24 @ 05:44)  BP: 119/80 (08-21-24 @ 05:44)  SpO2: 98% (08-21-24 @ 05:44)  Wt(kg): --    I&O's Summary    20 Aug 2024 07:01  -  21 Aug 2024 07:00  --------------------------------------------------------  IN: 325 mL / OUT: 2175 mL / NET: -1850 mL        Gen: NAD  Pulm: No respiratory distress, no subcostal retractions  CV: RRR, no JVD  Abd: Soft, NT, ND  Back: R NT draining light urine  : Bae- clear urine     Labs:  08-20  7.56  / 48.7  /1.64                           15.4   7.56  )-----------( 183      ( 20 Aug 2024 21:33 )             48.7     08-20    139  |  104  |  22  ----------------------------<  115<H>  4.1   |  20<L>  |  1.64<H>    Ca    8.4      20 Aug 2024 21:33          Urine Cx:

## 2024-08-21 NOTE — PRE PROCEDURE NOTE - PRE PROCEDURE EVALUATION
Urology Preop Note     Diagnosis:  L kidney stones  Procedure: L PCNL  Surgeon: Lluvia      T(C): 36.7 (08-21-24 @ 13:16), Max: 36.7 (08-21-24 @ 13:16)  HR: 60 (08-21-24 @ 13:16) (44 - 65)  BP: 110/78 (08-21-24 @ 13:16) (106/68 - 153/80)  RR: 18 (08-21-24 @ 13:16) (16 - 20)  SpO2: 98% (08-21-24 @ 13:16) (94% - 100%)  Wt(kg): --        Ucx:  Culture - Urine (08.08.24 @ 18:44)    -  Amoxicillin/Clavulanic Acid: R >16/8   -  Amoxicillin/Clavulanic Acid: R >16/8   -  Amoxicillin/Clavulanic Acid: S <=8/4   -  Ampicillin: R >16 These ampicillin results predict results for amoxicillin   -  Ampicillin: R <=8 These ampicillin results predict results for amoxicillin   -  Ampicillin: S <=8 These ampicillin results predict results for amoxicillin   -  Ampicillin/Sulbactam: S <=4/2   -  Ampicillin/Sulbactam: S 8/4   -  Ampicillin/Sulbactam: S <=4/2   -  Aztreonam: S <=4   -  Aztreonam: S <=4   -  Aztreonam: S <=4   -  Cefazolin: R 8   -  Cefazolin: R >16   -  Cefazolin: S <=2 For uncomplicated UTI with K. pneumoniae, E. coli, or P. mirablis: ARTHUR <=16 is sensitive and ARTHUR >=32 is resistant. This also predicts results for oral agents cefaclor, cefdinir, cefpodoxime, cefprozil, cefuroxime axetil, cephalexin and locarbef for uncomplicated UTI. Note that some isolates may be susceptible to these agents while testing resistant to cefazolin.   -  Cefepime: S <=2   -  Cefepime: S <=2   -  Cefepime: S <=2   -  Cefoxitin: S <=8   -  Cefoxitin: S <=8   -  Cefoxitin: S <=8   -  Ceftriaxone: S <=1   -  Ceftriaxone: S <=1   -  Ceftriaxone: S <=1   -  Cefuroxime: S <=4   -  Ciprofloxacin: R >2   -  Ciprofloxacin: S <=0.25   -  Ciprofloxacin: R >2   -  Ertapenem: S <=0.5   -  Ertapenem: S <=0.5   -  Ertapenem: S <=0.5   -  Gentamicin: S <=2   -  Gentamicin: S <=2   -  Imipenem: S <=1   -  Levofloxacin: S <=0.5   -  Levofloxacin: R >4   -  Levofloxacin: R >4   -  Meropenem: S <=1   -  Meropenem: S <=1   -  Meropenem: S <=1   -  Nitrofurantoin: R >64 Should not be used to treat pyelonephritis   -  Piperacillin/Tazobactam: S <=8   -  Piperacillin/Tazobactam: S <=8   -  Piperacillin/Tazobactam: S <=8   -  Tobramycin: S <=2   -  Tobramycin: S <=2   -  Trimethoprim/Sulfamethoxazole: S <=0.5/9.5   -  Trimethoprim/Sulfamethoxazole: S <=0.5/9.5   -  Trimethoprim/Sulfamethoxazole: S 1/19   Specimen Source: Kidney Nephrostomy - Right   Culture Results:   >100,000 CFU/ml Proteus mirabilis  >100,000 CFU/ml Providencia stuartii  >100,000 CFU/ml Providencia rettgeri   Organism Identification: Proteus mirabilis  Providencia stuartii  Providencia rettgeri   Organism: Proteus mirabilis   Organism: Providencia stuartii   Organism: Providencia rettgeri   Method Type: ARTHUR   Method Type: ARTHUR   Method Type: ARTHUR        EKG: pending         CXR: NAD    64yMale admitted for L kidney stones going to OR for L PCNL  - NPO p MN  - EKG pending   - IV fluids p MN   - consent pending  - 2 units pRBC on hold  - T x S in am  - COnt Zosyn

## 2024-08-21 NOTE — PROGRESS NOTE ADULT - ASSESSMENT
A/P: 64y Male s/p R PCNL  Labs   TOV   CT today   DVT prophylaxis/OOB  Incentive spirometry  Strict I&O's  Analgesia and antiemetics as needed  Diet - regular   Plan for 2nd stage on contralateral side on Thursday

## 2024-08-22 ENCOUNTER — APPOINTMENT (OUTPATIENT)
Dept: UROLOGY | Facility: HOSPITAL | Age: 64
End: 2024-08-22

## 2024-08-22 ENCOUNTER — RESULT REVIEW (OUTPATIENT)
Age: 64
End: 2024-08-22

## 2024-08-22 LAB
ANION GAP SERPL CALC-SCNC: 14 MMOL/L — SIGNIFICANT CHANGE UP (ref 5–17)
ANION GAP SERPL CALC-SCNC: 16 MMOL/L — SIGNIFICANT CHANGE UP (ref 5–17)
BLD GP AB SCN SERPL QL: NEGATIVE — SIGNIFICANT CHANGE UP
BUN SERPL-MCNC: 29 MG/DL — HIGH (ref 7–23)
BUN SERPL-MCNC: 32 MG/DL — HIGH (ref 7–23)
CALCIUM SERPL-MCNC: 8.4 MG/DL — SIGNIFICANT CHANGE UP (ref 8.4–10.5)
CALCIUM SERPL-MCNC: 8.5 MG/DL — SIGNIFICANT CHANGE UP (ref 8.4–10.5)
CHLORIDE SERPL-SCNC: 101 MMOL/L — SIGNIFICANT CHANGE UP (ref 96–108)
CHLORIDE SERPL-SCNC: 99 MMOL/L — SIGNIFICANT CHANGE UP (ref 96–108)
CO2 SERPL-SCNC: 22 MMOL/L — SIGNIFICANT CHANGE UP (ref 22–31)
CO2 SERPL-SCNC: 23 MMOL/L — SIGNIFICANT CHANGE UP (ref 22–31)
CREAT SERPL-MCNC: 1.82 MG/DL — HIGH (ref 0.5–1.3)
CREAT SERPL-MCNC: 1.85 MG/DL — HIGH (ref 0.5–1.3)
EGFR: 40 ML/MIN/1.73M2 — LOW
EGFR: 41 ML/MIN/1.73M2 — LOW
GLUCOSE SERPL-MCNC: 113 MG/DL — HIGH (ref 70–99)
GLUCOSE SERPL-MCNC: 133 MG/DL — HIGH (ref 70–99)
HCT VFR BLD CALC: 48 % — SIGNIFICANT CHANGE UP (ref 39–50)
HCT VFR BLD CALC: 49.7 % — SIGNIFICANT CHANGE UP (ref 39–50)
HGB BLD-MCNC: 15.3 G/DL — SIGNIFICANT CHANGE UP (ref 13–17)
HGB BLD-MCNC: 15.8 G/DL — SIGNIFICANT CHANGE UP (ref 13–17)
MCHC RBC-ENTMCNC: 28.5 PG — SIGNIFICANT CHANGE UP (ref 27–34)
MCHC RBC-ENTMCNC: 29 PG — SIGNIFICANT CHANGE UP (ref 27–34)
MCHC RBC-ENTMCNC: 31.8 GM/DL — LOW (ref 32–36)
MCHC RBC-ENTMCNC: 31.9 GM/DL — LOW (ref 32–36)
MCV RBC AUTO: 89.6 FL — SIGNIFICANT CHANGE UP (ref 80–100)
MCV RBC AUTO: 91.2 FL — SIGNIFICANT CHANGE UP (ref 80–100)
NRBC # BLD: 0 /100 WBCS — SIGNIFICANT CHANGE UP (ref 0–0)
NRBC # BLD: 0 /100 WBCS — SIGNIFICANT CHANGE UP (ref 0–0)
PLATELET # BLD AUTO: 180 K/UL — SIGNIFICANT CHANGE UP (ref 150–400)
PLATELET # BLD AUTO: 190 K/UL — SIGNIFICANT CHANGE UP (ref 150–400)
POTASSIUM SERPL-MCNC: 4 MMOL/L — SIGNIFICANT CHANGE UP (ref 3.5–5.3)
POTASSIUM SERPL-MCNC: 4 MMOL/L — SIGNIFICANT CHANGE UP (ref 3.5–5.3)
POTASSIUM SERPL-SCNC: 4 MMOL/L — SIGNIFICANT CHANGE UP (ref 3.5–5.3)
POTASSIUM SERPL-SCNC: 4 MMOL/L — SIGNIFICANT CHANGE UP (ref 3.5–5.3)
RBC # BLD: 5.36 M/UL — SIGNIFICANT CHANGE UP (ref 4.2–5.8)
RBC # BLD: 5.45 M/UL — SIGNIFICANT CHANGE UP (ref 4.2–5.8)
RBC # FLD: 14.3 % — SIGNIFICANT CHANGE UP (ref 10.3–14.5)
RBC # FLD: 14.4 % — SIGNIFICANT CHANGE UP (ref 10.3–14.5)
RH IG SCN BLD-IMP: POSITIVE — SIGNIFICANT CHANGE UP
SODIUM SERPL-SCNC: 137 MMOL/L — SIGNIFICANT CHANGE UP (ref 135–145)
SODIUM SERPL-SCNC: 138 MMOL/L — SIGNIFICANT CHANGE UP (ref 135–145)
WBC # BLD: 11.17 K/UL — HIGH (ref 3.8–10.5)
WBC # BLD: 9.05 K/UL — SIGNIFICANT CHANGE UP (ref 3.8–10.5)
WBC # FLD AUTO: 11.17 K/UL — HIGH (ref 3.8–10.5)
WBC # FLD AUTO: 9.05 K/UL — SIGNIFICANT CHANGE UP (ref 3.8–10.5)

## 2024-08-22 PROCEDURE — 52005 CYSTO W/URTRL CATHJ: CPT | Mod: LT,59,79

## 2024-08-22 PROCEDURE — 50437 DILAT XST TRC NEW ACCESS RCS: CPT | Mod: LT,59,79

## 2024-08-22 PROCEDURE — 50081 PERQ NL/PL LITHOTRP CPLX>2CM: CPT | Mod: 50,79

## 2024-08-22 PROCEDURE — 88305 TISSUE EXAM BY PATHOLOGIST: CPT | Mod: 26

## 2024-08-22 PROCEDURE — 74176 CT ABD & PELVIS W/O CONTRAST: CPT | Mod: 26

## 2024-08-22 PROCEDURE — 50389 REMOVE RENAL TUBE W/FLUORO: CPT | Mod: RT,59,79

## 2024-08-22 PROCEDURE — 74420 UROGRAPHY RTRGR +-KUB: CPT | Mod: 26

## 2024-08-22 PROCEDURE — 88300 SURGICAL PATH GROSS: CPT | Mod: 26

## 2024-08-22 PROCEDURE — 71045 X-RAY EXAM CHEST 1 VIEW: CPT | Mod: 26

## 2024-08-22 DEVICE — AMPLATZ RENAL DILATOR 6FR-30FR X 16CM: Type: IMPLANTABLE DEVICE | Site: LEFT | Status: FUNCTIONAL

## 2024-08-22 DEVICE — URETERAL CATH IMAGER II BERN 5FR 65CM: Type: IMPLANTABLE DEVICE | Site: LEFT | Status: FUNCTIONAL

## 2024-08-22 DEVICE — STONE BASKET ZEROTIP NITINOL 4-WIRE 1.9FR 120CM X 12MM: Type: IMPLANTABLE DEVICE | Site: LEFT | Status: FUNCTIONAL

## 2024-08-22 DEVICE — GWIRE TIP STR 0.038INX150CM: Type: IMPLANTABLE DEVICE | Site: LEFT | Status: FUNCTIONAL

## 2024-08-22 DEVICE — TRILOGY PROBE KIT 3.9MM X 440MM (BLUE): Type: IMPLANTABLE DEVICE | Site: LEFT | Status: FUNCTIONAL

## 2024-08-22 DEVICE — STENT URET PERCFLX 8X24 DBL J: Type: IMPLANTABLE DEVICE | Site: LEFT | Status: FUNCTIONAL

## 2024-08-22 RX ORDER — SENNA 187 MG
2 TABLET ORAL AT BEDTIME
Refills: 0 | Status: DISCONTINUED | OUTPATIENT
Start: 2024-08-22 | End: 2024-08-26

## 2024-08-22 RX ORDER — ACETAMINOPHEN 325 MG/1
1000 TABLET ORAL ONCE
Refills: 0 | Status: COMPLETED | OUTPATIENT
Start: 2024-08-22 | End: 2024-08-22

## 2024-08-22 RX ORDER — FUROSEMIDE 40 MG
40 TABLET ORAL DAILY
Refills: 0 | Status: DISCONTINUED | OUTPATIENT
Start: 2024-08-22 | End: 2024-08-26

## 2024-08-22 RX ORDER — GABAPENTIN 100 MG
100 CAPSULE ORAL
Refills: 0 | Status: DISCONTINUED | OUTPATIENT
Start: 2024-08-22 | End: 2024-08-26

## 2024-08-22 RX ORDER — CHLORHEXIDINE GLUCONATE 40 MG/ML
1 SOLUTION TOPICAL ONCE
Refills: 0 | Status: DISCONTINUED | OUTPATIENT
Start: 2024-08-22 | End: 2024-08-26

## 2024-08-22 RX ORDER — FAMOTIDINE 10 MG/ML
20 INJECTION INTRAVENOUS
Refills: 0 | Status: DISCONTINUED | OUTPATIENT
Start: 2024-08-22 | End: 2024-08-26

## 2024-08-22 RX ORDER — PIPERACILLIN SODIUM AND TAZOBACTAM SODIUM 3; .375 G/15ML; G/15ML
3.38 INJECTION, POWDER, FOR SOLUTION INTRAVENOUS EVERY 8 HOURS
Refills: 0 | Status: COMPLETED | OUTPATIENT
Start: 2024-08-22 | End: 2024-08-25

## 2024-08-22 RX ORDER — ONDANSETRON 2 MG/ML
4 INJECTION, SOLUTION INTRAMUSCULAR; INTRAVENOUS ONCE
Refills: 0 | Status: DISCONTINUED | OUTPATIENT
Start: 2024-08-22 | End: 2024-08-22

## 2024-08-22 RX ORDER — HYDROMORPHONE HYDROCHLORIDE 2 MG/1
0.5 TABLET ORAL
Refills: 0 | Status: DISCONTINUED | OUTPATIENT
Start: 2024-08-22 | End: 2024-08-22

## 2024-08-22 RX ORDER — OXYCODONE HYDROCHLORIDE 5 MG/1
5 TABLET ORAL ONCE
Refills: 0 | Status: DISCONTINUED | OUTPATIENT
Start: 2024-08-22 | End: 2024-08-22

## 2024-08-22 RX ORDER — AMLODIPINE BESYLATE 10 MG/1
10 TABLET ORAL DAILY
Refills: 0 | Status: DISCONTINUED | OUTPATIENT
Start: 2024-08-22 | End: 2024-08-26

## 2024-08-22 RX ORDER — METOPROLOL TARTRATE 100 MG/1
50 TABLET ORAL
Refills: 0 | Status: DISCONTINUED | OUTPATIENT
Start: 2024-08-22 | End: 2024-08-26

## 2024-08-22 RX ORDER — ACETAMINOPHEN 325 MG/1
650 TABLET ORAL EVERY 6 HOURS
Refills: 0 | Status: DISCONTINUED | OUTPATIENT
Start: 2024-08-22 | End: 2024-08-26

## 2024-08-22 RX ADMIN — FAMOTIDINE 20 MILLIGRAM(S): 10 INJECTION INTRAVENOUS at 05:47

## 2024-08-22 RX ADMIN — Medication 40 MILLIGRAM(S): at 05:46

## 2024-08-22 RX ADMIN — PIPERACILLIN SODIUM AND TAZOBACTAM SODIUM 25 GRAM(S): 3; .375 INJECTION, POWDER, FOR SOLUTION INTRAVENOUS at 05:58

## 2024-08-22 RX ADMIN — ACETAMINOPHEN 1000 MILLIGRAM(S): 325 TABLET ORAL at 16:47

## 2024-08-22 RX ADMIN — ACETAMINOPHEN 650 MILLIGRAM(S): 325 TABLET ORAL at 21:14

## 2024-08-22 RX ADMIN — Medication 75 MILLILITER(S): at 16:03

## 2024-08-22 RX ADMIN — Medication 100 MILLIGRAM(S): at 05:45

## 2024-08-22 RX ADMIN — ACETAMINOPHEN 975 MILLIGRAM(S): 325 TABLET ORAL at 06:40

## 2024-08-22 RX ADMIN — METOPROLOL TARTRATE 50 MILLIGRAM(S): 100 TABLET ORAL at 17:06

## 2024-08-22 RX ADMIN — AMLODIPINE BESYLATE 10 MILLIGRAM(S): 10 TABLET ORAL at 05:48

## 2024-08-22 RX ADMIN — FAMOTIDINE 20 MILLIGRAM(S): 10 INJECTION INTRAVENOUS at 17:06

## 2024-08-22 RX ADMIN — Medication 100 MILLIGRAM(S): at 17:06

## 2024-08-22 RX ADMIN — ACETAMINOPHEN 975 MILLIGRAM(S): 325 TABLET ORAL at 05:44

## 2024-08-22 RX ADMIN — PIPERACILLIN SODIUM AND TAZOBACTAM SODIUM 25 GRAM(S): 3; .375 INJECTION, POWDER, FOR SOLUTION INTRAVENOUS at 21:22

## 2024-08-22 RX ADMIN — ACETAMINOPHEN 400 MILLIGRAM(S): 325 TABLET ORAL at 16:02

## 2024-08-22 RX ADMIN — Medication 75 MILLILITER(S): at 00:22

## 2024-08-22 RX ADMIN — ACETAMINOPHEN 650 MILLIGRAM(S): 325 TABLET ORAL at 22:14

## 2024-08-22 NOTE — PRE-OP CHECKLIST - 1.
Patient and family preoperative education and comfort measures given
patient oriented to unit and procedure

## 2024-08-22 NOTE — PACU DISCHARGE NOTE - COMMENTS
No anesthesia complications. Patient okay for discharge to floor on 2L NC with cont pulse ox.
No anesthesia complications

## 2024-08-22 NOTE — PROGRESS NOTE ADULT - SUBJECTIVE AND OBJECTIVE BOX
The patient was seen and examined at bedside.  No acute events overnight and the patient is without acute complaints this AM.    T(C): 36.4 (08-22-24 @ 05:16), Max: 36.9 (08-21-24 @ 17:25)  HR: 43 (08-22-24 @ 05:16) (43 - 72)  BP: 149/75 (08-22-24 @ 05:16) (106/68 - 149/75)  RR: 18 (08-22-24 @ 05:16) (18 - 18)  SpO2: 100% (08-22-24 @ 05:16) (96% - 100%)  Wt(kg): --    Physical Exam:    General: NAD, A+Ox3  Abdomen: soft, non-tender, non-distended  Back: dressing with serosanguinous drainage and was changed; no swelling or ecchymosis    08-21 @ 07:01  -  08-22 @ 07:00  --------------------------------------------------------  IN: 1645 mL / OUT: 2125 mL / NET: -480 mL      void - 650cc    R NT - 500cc blood tinged   The patient was seen and examined at bedside.  No acute events overnight and the patient is without acute complaints this AM.    T(C): 36.4 (08-22-24 @ 05:16), Max: 36.9 (08-21-24 @ 17:25)  HR: 43 (08-22-24 @ 05:16) (43 - 72)  BP: 149/75 (08-22-24 @ 05:16) (106/68 - 149/75)  RR: 18 (08-22-24 @ 05:16) (18 - 18)  SpO2: 100% (08-22-24 @ 05:16) (96% - 100%)  Wt(kg): --    Physical Exam:    General: NAD, A+Ox3  Abdomen: soft, non-tender, non-distended  Back: dressing with serosanguinous drainage and was changed; no swelling or ecchymosis    08-21 @ 07:01  -  08-22 @ 07:00  --------------------------------------------------------  IN: 1645 mL / OUT: 2125 mL / NET: -480 mL      void - 650cc    R NT - 500cc blood tinged        CT (prelim): Interval placement of right nephroureteral catheter with expected   postprocedural air in the renal collecting system. Unchanged fat   stranding surrounding the right ureter consistent with ureteritis. Right   obstructing ureteropelvic stone is no longer visualized. Few punctate   right renal nonobstructing stones with a right lower pole 5 mm renal   stone.  Unchanged nonobstructing left renal calculi measuring up to 1.9 cm.  IVC filter is unchanged in position.  Follow-up official report in a.m.

## 2024-08-22 NOTE — PROGRESS NOTE ADULT - ASSESSMENT
64 year old male s/p second stage R PCNL and R PCNU exchange, L PCNL    -regular diet  -analgesia as needed  -CT scan tonight 6PM, f/u results  -CXR f/u read  -AM: labs, TOV, remove R PCNU, remove L [place] if clear

## 2024-08-22 NOTE — PRE-ANESTHESIA EVALUATION ADULT - NSANTHASARD_GEN_ALL_CORE
Patient called back in regards to her lab results per your message but there were no instructions/ results  Please advise lab results  Thanks    Patient's # [phone]  
3
3

## 2024-08-22 NOTE — PROGRESS NOTE ADULT - SUBJECTIVE AND OBJECTIVE BOX
The patient was seen and examined at bedside.  No acute events overnight and the patient is without acute complaints this AM.    T(C): 36.4 (08-22-24 @ 17:30), Max: 36.8 (08-21-24 @ 21:40)  HR: 66 (08-22-24 @ 17:30) (43 - 71)  BP: 134/63 (08-22-24 @ 14:20) (120/61 - 149/75)  RR: 16 (08-22-24 @ 17:30) (16 - 18)  SpO2: 100% (08-22-24 @ 17:30) (96% - 100%)  Wt(kg): --    Physical Exam:    General: NAD, A+Ox3  Abdomen: soft, non-tender, non-distended        08-21 @ 07:01  -  08-22 @ 07:00  --------------------------------------------------------  IN: 1645 mL / OUT: 2675 mL / NET: -1030 mL    08-22 @ 07:01  -  08-22 @ 17:50  --------------------------------------------------------  IN: 825 mL / OUT: 745 mL / NET: 80 mL      F -110cc clear    R NU - 310cc clear    L NU - 90cc light red    L NT - 235cc light red                            15.3   11.17 )-----------( 180               48.0     138  |  101  |  29  ----------------------------<  133  4.0   |  23  |  1.85    Ca    8.4

## 2024-08-22 NOTE — PRE-ANESTHESIA EVALUATION ADULT - NSANTHPMHFT_GEN_ALL_CORE
64yo M with PMHx HTN, DVT on Eliquis, has IVC filter, morbid obesity BM! 48.1-on Trulicity for weight loss-pt reports he does not have T2DM, HA1c 5.6 in May 2024, MEGHA on CPAP, BPH, lymphedema BLE, malignant melanoma on back-s/p Left upper back with SLNB and plastics closure on 05/24/2024, h/o nephrolithiasis.  He had pneumonia and sepsis during COVID, but not COVID. Was in coma and then rehab for several months before home PT, improved-d/c'd from home therapy, plan to start aquatic therapy.  Pt was treated at [facility] in May for nephrolithiasis, right nephrostomy tube in placed.  Plan for 2NDstage right percutaneous nephrolithotomy Dr. Teixeira.

## 2024-08-22 NOTE — PROVIDER CONTACT NOTE (OTHER) - ASSESSMENT
Right Nephroureteral site saturated with pink/clear drainage. Pt VSS, denies discomfort at this time.

## 2024-08-22 NOTE — PROGRESS NOTE ADULT - ASSESSMENT
A/P: 64y Male s/p R PCNL, POD#2    -AM Labs, type and screen  -second stage, b/l PCNL today, post op check   -Analgesia and antiemetics as needed  -OOB/DVT ppx/IS A/P: 64y Male s/p R PCNL, POD#2    -AM Labs, type and screen  -second stage, b/l PCNL today, post op check   -f/u final read, CT scan  -Analgesia and antiemetics as needed  -OOB/DVT ppx/IS

## 2024-08-23 ENCOUNTER — TRANSCRIPTION ENCOUNTER (OUTPATIENT)
Age: 64
End: 2024-08-23

## 2024-08-23 LAB
ANION GAP SERPL CALC-SCNC: 10 MMOL/L — SIGNIFICANT CHANGE UP (ref 5–17)
BUN SERPL-MCNC: 34 MG/DL — HIGH (ref 7–23)
CALCIUM SERPL-MCNC: 8.1 MG/DL — LOW (ref 8.4–10.5)
CHLORIDE SERPL-SCNC: 101 MMOL/L — SIGNIFICANT CHANGE UP (ref 96–108)
CO2 SERPL-SCNC: 25 MMOL/L — SIGNIFICANT CHANGE UP (ref 22–31)
CREAT SERPL-MCNC: 2.08 MG/DL — HIGH (ref 0.5–1.3)
CULTURE RESULTS: SIGNIFICANT CHANGE UP
EGFR: 35 ML/MIN/1.73M2 — LOW
GLUCOSE SERPL-MCNC: 129 MG/DL — HIGH (ref 70–99)
HCT VFR BLD CALC: 45.2 % — SIGNIFICANT CHANGE UP (ref 39–50)
HGB BLD-MCNC: 14.4 G/DL — SIGNIFICANT CHANGE UP (ref 13–17)
MCHC RBC-ENTMCNC: 29.1 PG — SIGNIFICANT CHANGE UP (ref 27–34)
MCHC RBC-ENTMCNC: 31.9 GM/DL — LOW (ref 32–36)
MCV RBC AUTO: 91.3 FL — SIGNIFICANT CHANGE UP (ref 80–100)
NRBC # BLD: 0 /100 WBCS — SIGNIFICANT CHANGE UP (ref 0–0)
PLATELET # BLD AUTO: 206 K/UL — SIGNIFICANT CHANGE UP (ref 150–400)
POTASSIUM SERPL-MCNC: 4.6 MMOL/L — SIGNIFICANT CHANGE UP (ref 3.5–5.3)
POTASSIUM SERPL-SCNC: 4.6 MMOL/L — SIGNIFICANT CHANGE UP (ref 3.5–5.3)
RBC # BLD: 4.95 M/UL — SIGNIFICANT CHANGE UP (ref 4.2–5.8)
RBC # FLD: 14.6 % — HIGH (ref 10.3–14.5)
SODIUM SERPL-SCNC: 136 MMOL/L — SIGNIFICANT CHANGE UP (ref 135–145)
SPECIMEN SOURCE: SIGNIFICANT CHANGE UP
WBC # BLD: 12.53 K/UL — HIGH (ref 3.8–10.5)
WBC # FLD AUTO: 12.53 K/UL — HIGH (ref 3.8–10.5)

## 2024-08-23 PROCEDURE — 99221 1ST HOSP IP/OBS SF/LOW 40: CPT

## 2024-08-23 RX ORDER — NYSTATIN 100000/G
1 CREAM (GRAM) TOPICAL
Refills: 0 | Status: DISCONTINUED | OUTPATIENT
Start: 2024-08-23 | End: 2024-08-26

## 2024-08-23 RX ADMIN — AMLODIPINE BESYLATE 10 MILLIGRAM(S): 10 TABLET ORAL at 05:28

## 2024-08-23 RX ADMIN — PIPERACILLIN SODIUM AND TAZOBACTAM SODIUM 25 GRAM(S): 3; .375 INJECTION, POWDER, FOR SOLUTION INTRAVENOUS at 14:05

## 2024-08-23 RX ADMIN — METOPROLOL TARTRATE 50 MILLIGRAM(S): 100 TABLET ORAL at 18:22

## 2024-08-23 RX ADMIN — Medication 100 MILLIGRAM(S): at 18:22

## 2024-08-23 RX ADMIN — PIPERACILLIN SODIUM AND TAZOBACTAM SODIUM 25 GRAM(S): 3; .375 INJECTION, POWDER, FOR SOLUTION INTRAVENOUS at 05:44

## 2024-08-23 RX ADMIN — ACETAMINOPHEN 650 MILLIGRAM(S): 325 TABLET ORAL at 12:41

## 2024-08-23 RX ADMIN — Medication 100 MILLIGRAM(S): at 05:27

## 2024-08-23 RX ADMIN — FAMOTIDINE 20 MILLIGRAM(S): 10 INJECTION INTRAVENOUS at 18:22

## 2024-08-23 RX ADMIN — ACETAMINOPHEN 650 MILLIGRAM(S): 325 TABLET ORAL at 19:22

## 2024-08-23 RX ADMIN — FAMOTIDINE 20 MILLIGRAM(S): 10 INJECTION INTRAVENOUS at 05:32

## 2024-08-23 RX ADMIN — ACETAMINOPHEN 650 MILLIGRAM(S): 325 TABLET ORAL at 11:41

## 2024-08-23 RX ADMIN — Medication 1 APPLICATION(S): at 18:23

## 2024-08-23 RX ADMIN — METOPROLOL TARTRATE 50 MILLIGRAM(S): 100 TABLET ORAL at 05:44

## 2024-08-23 RX ADMIN — ACETAMINOPHEN 650 MILLIGRAM(S): 325 TABLET ORAL at 18:22

## 2024-08-23 RX ADMIN — PIPERACILLIN SODIUM AND TAZOBACTAM SODIUM 25 GRAM(S): 3; .375 INJECTION, POWDER, FOR SOLUTION INTRAVENOUS at 21:18

## 2024-08-23 RX ADMIN — Medication 40 MILLIGRAM(S): at 05:28

## 2024-08-23 NOTE — CONSULT NOTE ADULT - ASSESSMENT
Impression:    Sacral/bilateral Buttocks deep tissue injury present on admission  Intertrigo  Incontinence of bowel and bladder  Incontinence Dermatitis    Recommend:  1.) topical therapy: sacral/buttock/groin/lowerabd/allskinfolds – cleanse with soap and water, pat dry, apply Nystatin powder BID   2.) Incontinence Management - incontinence cleanser, pads, pericare BID  3.) Maintain on an alternating air with low air loss surface  4.) Turn and reposition Q 2 hours  5.) Nutrition optimization - please add Christiano  6.) Offload heels/feet with complete cair air fluidized boots/pillows; ensure that the soles of the feet are not resting on the foot board of the bed.  7.) chair cushion for chair sitting    Care as per medicine. Will not actively follow but will remain available. Please recall for new issues or deterioration.  Upon discharge [facility] [address] [place] [address] [phone]  Thank you for this consult  Nina Presley, KANG-C, CWOCN via TEAMS

## 2024-08-23 NOTE — PROGRESS NOTE ADULT - SUBJECTIVE AND OBJECTIVE BOX
The patient was seen and examined at bedside.  No acute events overnight and the patient is without acute complaints this AM.  Pt's holman catheter, R PCNU, and L [place] NT were removed this AM.    T(C): 36.3 (08-23-24 @ 05:27), Max: 36.7 (08-22-24 @ 19:49)  HR: 63 (08-23-24 @ 05:27) (44 - 71)  BP: 105/63 (08-23-24 @ 05:27) (105/63 - 134/63)  RR: 18 (08-23-24 @ 05:27) (16 - 18)  SpO2: 95% (08-23-24 @ 05:27) (95% - 100%)  Wt(kg): --    Physical Exam:    General: NAD, A+Ox3  Abdomen: soft, non-tender, non-distended  Back: b/l dressings with serous drainage and were changed this AM.  No swelling or ecchymosis.      08-22 @ 07:01  -  08-23 @ 07:00  --------------------------------------------------------  IN: 1425 mL / OUT: 1830 mL / NET: -405 mL      F - 20cc clear    R PCNU - 600cc clear    L [place] NT - 150cc light red    L PCNU - 315cc light red        CT:       CXR:    The patient was seen and examined at bedside.  No acute events overnight and the patient is without acute complaints this AM.  Pt's holman catheter, R PCNU, and L [place] NT were removed this AM.    T(C): 36.3 (08-23-24 @ 05:27), Max: 36.7 (08-22-24 @ 19:49)  HR: 63 (08-23-24 @ 05:27) (44 - 71)  BP: 105/63 (08-23-24 @ 05:27) (105/63 - 134/63)  RR: 18 (08-23-24 @ 05:27) (16 - 18)  SpO2: 95% (08-23-24 @ 05:27) (95% - 100%)  Wt(kg): --    Physical Exam:    General: NAD, A+Ox3  Abdomen: soft, non-tender, non-distended  Back: b/l dressings with serous drainage and were changed this AM.  No swelling or ecchymosis.      08-22 @ 07:01  -  08-23 @ 07:00  --------------------------------------------------------  IN: 1425 mL / OUT: 1830 mL / NET: -405 mL      F - 20cc clear    R PCNU - 600cc clear    L [place] NT - 150cc light red    L PCNU - 315cc light red        CT: Bilateral percutaneous nephrostomy tubes are noted. Small 2 mm distal   left ureteral calculus. Small amount air noted within the bilateral renal   collecting system.      CXR: No pneumothorax.

## 2024-08-23 NOTE — DISCHARGE NOTE PROVIDER - HOSPITAL COURSE
62y/o M with PMHx of HTN, DVT on Eliquis, has IVC filter, morbid obesity on Trulicity for weight loss (no T2DM), MEGHA on CPAP, BPH, lymphedema BLE, malignant melanoma on back-s/p Left upper back with SLNB and plastics closure on 05/24/2024, nephrolithiasis underwent a right PCNL on 8/20/24. Patient tolerated procedure well. Please see operative report  for further details. Bae was removed postop day 1, pt voided with minimal post void residual. CT scan performed showed improved stone burden. Right nephrostomy tubes was removed Post op day 8/23/24. Dressing applied. Upon discharge.   Pt will follow up with Dr. Teixeira in 2 weeks. 64y/o M with PMHx of HTN, DVT on Eliquis, has IVC filter, morbid obesity on Trulicity for weight loss (no T2DM), MEGHA on CPAP, BPH, lymphedema BLE, malignant melanoma on back-s/p Left upper back with SLNB and plastics closure on 05/24/2024, nephrolithiasis underwent a right PCNL on 8/20/24. Patient tolerated procedure well. Please see operative report  for further details. Bae was removed postop day 1, pt voided with minimal post void residual. CT scan performed showed improved stone burden. Right nephrostomy tubes was removed Post op day 8/23/24. Dressing applied. 8/24/24 L PCNU was removed without events. Upon discharge pt transitioned to bactrim and educated to restart Eliquis on 8/26. Pt will follow up with Dr. Teixeira in 2 weeks. 62y/o M with PMHx of HTN, DVT on Eliquis, has IVC filter, morbid obesity on Trulicity for weight loss (no T2DM), MEGHA on CPAP, BPH, lymphedema BLE, malignant melanoma on back-s/p Left upper back with SLNB and plastics closure on 05/24/2024, nephrolithiasis underwent a right PCNL on 8/20/24. Patient tolerated procedure well. Please see operative report  for further details. Bae was removed postop day 1, pt voided with minimal post void residual. CT scan performed showed improved stone burden. Right nephrostomy tubes was removed Post op day 8/23/24. Dressing applied. 8/24/24 L PCNU was removed without events. Upon discharge pt transitioned to bactrim and educated to restart Eliquis on 8/28. Pt will follow up with Dr. Teixeira in 2 weeks.

## 2024-08-23 NOTE — DISCHARGE NOTE PROVIDER - NSDCMRMEDTOKEN_GEN_ALL_CORE_FT
amLODIPine 10 mg oral tablet: 1 tab(s) orally once a day  Eliquis 2.5 mg oral tablet: 1 tab(s) orally 2 times a day  famotidine 20 mg oral tablet: 1 tab(s) orally 2 times a day  gabapentin 100 mg oral tablet: 1 tab(s) orally 2 times a day  Lasix 40 mg oral tablet: 1 tab(s) orally once a day  Lopressor 50 mg oral tablet: 1 tab(s) orally 2 times a day  Trulicity Pen 1.5 mg/0.5 mL subcutaneous solution: 1.5 milligram(s) subcutaneously once a week Sundays   acetaminophen 325 mg oral tablet: 2 tab(s) orally every 6 hours As needed Temp greater or equal to 38C (100.4F), Mild Pain (1 - 3)  amLODIPine 10 mg oral tablet: 1 tab(s) orally once a day  Bactrim  mg-160 mg oral tablet: 1 tab(s) orally 2 times a day  Eliquis 2.5 mg oral tablet: 1 tab(s) orally 2 times a day  famotidine 20 mg oral tablet: 1 tab(s) orally 2 times a day  gabapentin 100 mg oral tablet: 1 tab(s) orally 2 times a day  Lasix 40 mg oral tablet: 1 tab(s) orally once a day  Lopressor 50 mg oral tablet: 1 tab(s) orally 2 times a day  Trulicity Pen 1.5 mg/0.5 mL subcutaneous solution: 1.5 milligram(s) subcutaneously once a week Sundays

## 2024-08-23 NOTE — CONSULT NOTE ADULT - SUBJECTIVE AND OBJECTIVE BOX
Wound Surgery Consult Note:    HPI:  64yo M with PMHx HTN, DVT on Eliquis, has IVC filter, morbid obesity BM! 48.1-on Trulicity for weight loss-pt reports he does not have T2DM, HA1c 5.6 in May 2024, MEGHA on CPAP, BPH, lymphedema BLE, malignant melanoma on back-s/p Left upper back with SLNB and plastics closure on 05/24/2024, h/o nephrolithiasis.  He had pneumonia and sepsis during COVID, but not COVID. Was in coma and then rehab for several months before home PT, improved-d/c'd from home therapy, plan to start aquatic therapy.  Pt was treated at [facility] in May for nephrolithiasis, right nephrostomy tube in placed.  Plan for 1st stage right percutaneous nephrolithotomy on 8/20/24 with Dr. Teixeira.  (08 Aug 2024 14:09)    Request for wound care evaluation of the sacrum and bilateral buttocks received from nursing. Mr. Croft was encountered on an alternating air with low air loss surface.  He is continent of stool and urine but is moist in skin folds due to body habitus. He complains of intermittent pruritus in skin folds. His immobility and inactivity, contribute to his risk of pressure injury development and hinder healing. Principles of pressure injury prevention including but not limited to turning and positioning reviewed with patient.     PAST MEDICAL & SURGICAL HISTORY:  HTN (hypertension)  Calculus of kidney and ureter  Acquired lymphedema of leg  H/O sleep apnea, no CPAP used, to follow up ASAP,  had sleep study done no pending result  Morbid obesity  Hx of peripheral neuropathy  DVT (deep venous thrombosis), 7/18 ivc filter inserted  GI bleed, recently transfused 10 units  Duodenal ulcer  Gastritis  Respiratory failure, post-operative, post endoscopy [facility] 7/18  Ventricular arrhythmia, post endoscopy, echo done EF 60%  Anemia, s/p 10 units of blood [facility] 7/18, on iron now,  due to GI bleeding stable now  OA (osteoarthritis), right hip  Respiratory failure  Tracheostomy dependent  History of BPH  Malignant melanoma of skin  H/O sepsis  H/O erectile dysfunction  MEGHA on CPAP  Hx of tonsillectomy, at 6 years old  Ureteral stents placement, bilateral  Presence of IVC filter, 7/18  S/P endoscopy  H/O nephrostomy, bilateral  May 2018 clamped  JUNE and had ureteral stent  S/P cystoscopy with ureteral stent placement  S/P evacuation of hematoma  H/O melanoma excision    REVIEW OF SYSTEMS:  CONSTITUTIONAL: + weakness, no fevers or chills  EYES/ENT: No visual changes;  No vertigo or throat pain   MOUTH: No oral lesion, moist  NECK: No pain or stiffness  RESPIRATORY: No cough, wheezing, hemoptysis; No shortness of breath  CARDIOVASCULAR: No chest pain or palpitations  GASTROINTESTINAL: No abdominal or epigastric pain. No nausea, vomiting, or hematemesis; No diarrhea or constipation. No melena or hematochezia.  GENITOURINARY: No dysuria, frequency or hematuria  NEUROLOGICAL: + weakness  SKIN: No itching, rashes  PSYCH: no confusion or altered mental status    MEDICATIONS  (STANDING):  amLODIPine   Tablet 10 milliGRAM(s) Oral daily  chlorhexidine 2% Cloths 1 Application(s) Topical once  famotidine    Tablet 20 milliGRAM(s) Oral two times a day  furosemide    Tablet 40 milliGRAM(s) Oral daily  gabapentin 100 milliGRAM(s) Oral two times a day  metoprolol tartrate 50 milliGRAM(s) Oral two times a day  piperacillin/tazobactam IVPB.. 3.375 Gram(s) IV Intermittent every 8 hours    MEDICATIONS  (PRN):  acetaminophen     Tablet .. 650 milliGRAM(s) Oral every 6 hours PRN Temp greater or equal to 38C (100.4F), Mild Pain (1 - 3)  senna 2 Tablet(s) Oral at bedtime PRN Constipation    Allergies    heparin (Other)    Intolerances    SOCIAL HISTORY:  , Denies smoking, ETOH, drugs    FAMILY HISTORY:  Family history of prostate cancer in father    Family history of coronary artery disease    FH: lung cancer    FH: stroke (Grandparent)    Vital Signs Last 24 Hrs  T(C): 36.8 (23 Aug 2024 10:22), Max: 36.8 (23 Aug 2024 10:22)  T(F): 98.3 (23 Aug 2024 10:22), Max: 98.3 (23 Aug 2024 10:22)  HR: 54 (23 Aug 2024 09:08) (49 - 71)  BP: 125/69 (23 Aug 2024 09:08) (105/63 - 134/63)  BP(mean): 91 (22 Aug 2024 14:20) (91 - 91)  RR: 18 (23 Aug 2024 10:22) (16 - 18)  SpO2: 95% (23 Aug 2024 10:22) (95% - 100%)    Parameters below as of 23 Aug 2024 10:22  Patient On (Oxygen Delivery Method): room air    Physical Exam:  General: alert, obese  Ophthamology: sclera clear  ENMT: moist mucous membranes, trachea midline  Respiratory: equal chest rise with respirations  Gastrointestinal: soft NT/ND  Neurology: verbal,  following commands  Psych: calm, cooperative  Musculoskeletal: no contractures  Vascular: BLE edema equal  Skin:  sacral/bilateral buttocks thigh dark maroon discolored 100% intact  skin L 3cm x W 3cm x D none, no drainage  Left posterior thigh dark maroon discolored 100% intact  skin L 15cm x W 10cm x D none, no drainage  No odor, erythema, increased warmth, tenderness, induration, fluctuance    LABS:  08-23    136  |  101  |  34<H>  ----------------------------<  129<H>  4.6   |  25  |  2.08<H>    Ca    8.1<L>      23 Aug 2024 07:44                            14.4   12.53 )-----------( 206      ( 23 Aug 2024 07:44 )             45.2       Urinalysis Basic - ( 23 Aug 2024 07:44 )    Color: x / Appearance: x / SG: x / pH: x  Gluc: 129 mg/dL / Ketone: x  / Bili: x / Urobili: x   Blood: x / Protein: x / Nitrite: x   Leuk Esterase: x / RBC: x / WBC x   Sq Epi: x / Non Sq Epi: x / Bacteria: x

## 2024-08-23 NOTE — DISCHARGE NOTE PROVIDER - NSDCFUADDINST_GEN_ALL_CORE_FT
It is common to have blood in the urine after your procedure.  It may be pink or even red; inform your doctor if you have a significant amount of clots in the urine     -Provided that you are not restricted with fluids by your physician, you should drink 6-8 (8 oz.) glasses of fluid per day.  -You may resume your regular diet and regular medication regimen.    -You may shower.    -You may take over the counter pain medications such as Tylenol, do not exceed 4 grams daily.   -You may take over the counter stool softeners/laxatives such as Senna, Miralax, Dulcolax for constipation and avoid straining   - No heavy lifting greater than 10lbs  -Return to daily living activities slowly as tolerated.  -Make a follow up appointment for with your urologist when you arrive home   -Call your urologist during normal business hours with any other routine questions.    Please call the office or go to ER if:   • Fever of 101 degrees Fahrenheit or higher.  • Pain not relieved by oral medication  • Inability to tolerate food or liquids  • If you are unable to void.  • Redness around incision site that is spreading or getting larger  • Discolored or foul-smelling discharge (pus) from wound site

## 2024-08-23 NOTE — DISCHARGE NOTE PROVIDER - NSDCCPCAREPLAN_GEN_ALL_CORE_FT
PRINCIPAL DISCHARGE DIAGNOSIS  Diagnosis: Renal calculi  Assessment and Plan of Treatment: You may experience drainage from your incision, you may change the gauze and secure with tape as needed. The incision will close on its own in a few days.      SECONDARY DISCHARGE DIAGNOSES  Diagnosis: MEGHA on CPAP  Assessment and Plan of Treatment: -Continue home regimen     PRINCIPAL DISCHARGE DIAGNOSIS  Diagnosis: Renal calculi  Assessment and Plan of Treatment: You may experience drainage from your incision, you may change the gauze and secure with tape as needed. The incision will close on its own in a few days. Finish your course of antibiotics (Bactrim). Call the office if you have fever greater than 101, difficulty urinating, pain not relieved with pain medication, nausea/vomiting or other worrisome symptoms arise.      SECONDARY DISCHARGE DIAGNOSES  Diagnosis: DVT (deep venous thrombosis)  Assessment and Plan of Treatment: restart Eliquis on Monday, 8/26 if urine appears clear. Call Dr. Teixeira if urine is bloody or any questions.    Diagnosis: MEGHA on CPAP  Assessment and Plan of Treatment: -Continue home regimen     PRINCIPAL DISCHARGE DIAGNOSIS  Diagnosis: Renal calculi  Assessment and Plan of Treatment: You may experience drainage from your incision, you may change the gauze and secure with tape as needed. The incision will close on its own in a few days. Finish your course of antibiotics (Bactrim). Call the office if you have fever greater than 101, difficulty urinating, pain not relieved with pain medication, nausea/vomiting or other worrisome symptoms arise.      SECONDARY DISCHARGE DIAGNOSES  Diagnosis: MEGHA on CPAP  Assessment and Plan of Treatment: -Continue home regimen    Diagnosis: DVT (deep venous thrombosis)  Assessment and Plan of Treatment: restart Eliquis on Wednesday, 8/28 if urine appears clear. Call Dr. Teixeira if urine is bloody or any questions.     PRINCIPAL DISCHARGE DIAGNOSIS  Diagnosis: Renal calculi  Assessment and Plan of Treatment: You may experience drainage from your incision, you may change the gauze and secure with tape as needed. The incision will close on its own in a few days. Finish your course of antibiotics (Bactrim). Call the office if you have fever greater than 101, difficulty urinating, pain not relieved with pain medication, nausea/vomiting or other worrisome symptoms arise.      SECONDARY DISCHARGE DIAGNOSES  Diagnosis: MEGHA on CPAP  Assessment and Plan of Treatment: -Continue home regimen    Diagnosis: DVT (deep venous thrombosis)  Assessment and Plan of Treatment: restart Eliquis on FRIDAY, 8/30 if urine appears clear. Call Dr. Teixeira if urine is bloody or any questions.

## 2024-08-23 NOTE — DISCHARGE NOTE PROVIDER - NSDCFUSCHEDAPPT_GEN_ALL_CORE_FT
[facility]  UROLOGY [address]  Scheduled Appointment: 09/10/2024    Keenan Joaquin  [facility]  PHYSMED [address]  Scheduled Appointment: 09/13/2024    Keo aKye  [facility]  CARDIOLOGY [address]  Scheduled Appointment: 11/15/2024

## 2024-08-23 NOTE — DISCHARGE NOTE PROVIDER - NSDCCPTREATMENT_GEN_ALL_CORE_FT
PRINCIPAL PROCEDURE  Procedure: Cystoscopy with percutaneous nephrolithotomy  Findings and Treatment: RIGHT      SECONDARY PROCEDURE  Procedure: Cystoscopy with percutaneous nephrolithotomy  Findings and Treatment: LEFT

## 2024-08-24 LAB
ANION GAP SERPL CALC-SCNC: 14 MMOL/L — SIGNIFICANT CHANGE UP (ref 5–17)
BLD GP AB SCN SERPL QL: NEGATIVE — SIGNIFICANT CHANGE UP
BUN SERPL-MCNC: 35 MG/DL — HIGH (ref 7–23)
CALCIUM SERPL-MCNC: 7.9 MG/DL — LOW (ref 8.4–10.5)
CHLORIDE SERPL-SCNC: 97 MMOL/L — SIGNIFICANT CHANGE UP (ref 96–108)
CO2 SERPL-SCNC: 25 MMOL/L — SIGNIFICANT CHANGE UP (ref 22–31)
CREAT SERPL-MCNC: 2.08 MG/DL — HIGH (ref 0.5–1.3)
EGFR: 35 ML/MIN/1.73M2 — LOW
GLUCOSE SERPL-MCNC: 163 MG/DL — HIGH (ref 70–99)
HCT VFR BLD CALC: 45.6 % — SIGNIFICANT CHANGE UP (ref 39–50)
HCT VFR BLD CALC: 52.8 % — HIGH (ref 39–50)
HGB BLD-MCNC: 14.3 G/DL — SIGNIFICANT CHANGE UP (ref 13–17)
HGB BLD-MCNC: 15.9 G/DL — SIGNIFICANT CHANGE UP (ref 13–17)
MCHC RBC-ENTMCNC: 28.9 PG — SIGNIFICANT CHANGE UP (ref 27–34)
MCHC RBC-ENTMCNC: 29.1 PG — SIGNIFICANT CHANGE UP (ref 27–34)
MCHC RBC-ENTMCNC: 30.1 GM/DL — LOW (ref 32–36)
MCHC RBC-ENTMCNC: 31.4 GM/DL — LOW (ref 32–36)
MCV RBC AUTO: 92.1 FL — SIGNIFICANT CHANGE UP (ref 80–100)
MCV RBC AUTO: 96.5 FL — SIGNIFICANT CHANGE UP (ref 80–100)
NRBC # BLD: 0 /100 WBCS — SIGNIFICANT CHANGE UP (ref 0–0)
NRBC # BLD: 0 /100 WBCS — SIGNIFICANT CHANGE UP (ref 0–0)
PLATELET # BLD AUTO: 178 K/UL — SIGNIFICANT CHANGE UP (ref 150–400)
PLATELET # BLD AUTO: 194 K/UL — SIGNIFICANT CHANGE UP (ref 150–400)
POTASSIUM SERPL-MCNC: 4.2 MMOL/L — SIGNIFICANT CHANGE UP (ref 3.5–5.3)
POTASSIUM SERPL-SCNC: 4.2 MMOL/L — SIGNIFICANT CHANGE UP (ref 3.5–5.3)
RBC # BLD: 4.95 M/UL — SIGNIFICANT CHANGE UP (ref 4.2–5.8)
RBC # BLD: 5.47 M/UL — SIGNIFICANT CHANGE UP (ref 4.2–5.8)
RBC # FLD: 14.7 % — HIGH (ref 10.3–14.5)
RBC # FLD: 14.9 % — HIGH (ref 10.3–14.5)
RH IG SCN BLD-IMP: POSITIVE — SIGNIFICANT CHANGE UP
SODIUM SERPL-SCNC: 136 MMOL/L — SIGNIFICANT CHANGE UP (ref 135–145)
WBC # BLD: 12 K/UL — HIGH (ref 3.8–10.5)
WBC # BLD: 13.2 K/UL — HIGH (ref 3.8–10.5)
WBC # FLD AUTO: 12 K/UL — HIGH (ref 3.8–10.5)
WBC # FLD AUTO: 13.2 K/UL — HIGH (ref 3.8–10.5)

## 2024-08-24 RX ORDER — ACETAMINOPHEN 325 MG/1
2 TABLET ORAL
Qty: 0 | Refills: 0 | DISCHARGE
Start: 2024-08-24

## 2024-08-24 RX ORDER — SULFAMETHOXAZOLE AND TRIMETHOPRIM 800; 160 MG/1; MG/1
1 TABLET ORAL
Qty: 10 | Refills: 0
Start: 2024-08-24 | End: 2024-08-28

## 2024-08-24 RX ORDER — OXYCODONE HYDROCHLORIDE 5 MG/1
5 TABLET ORAL ONCE
Refills: 0 | Status: DISCONTINUED | OUTPATIENT
Start: 2024-08-24 | End: 2024-08-24

## 2024-08-24 RX ADMIN — ACETAMINOPHEN 650 MILLIGRAM(S): 325 TABLET ORAL at 09:41

## 2024-08-24 RX ADMIN — Medication 100 MILLIGRAM(S): at 05:34

## 2024-08-24 RX ADMIN — ACETAMINOPHEN 650 MILLIGRAM(S): 325 TABLET ORAL at 08:41

## 2024-08-24 RX ADMIN — PIPERACILLIN SODIUM AND TAZOBACTAM SODIUM 25 GRAM(S): 3; .375 INJECTION, POWDER, FOR SOLUTION INTRAVENOUS at 22:23

## 2024-08-24 RX ADMIN — METOPROLOL TARTRATE 50 MILLIGRAM(S): 100 TABLET ORAL at 05:33

## 2024-08-24 RX ADMIN — PIPERACILLIN SODIUM AND TAZOBACTAM SODIUM 25 GRAM(S): 3; .375 INJECTION, POWDER, FOR SOLUTION INTRAVENOUS at 05:33

## 2024-08-24 RX ADMIN — PIPERACILLIN SODIUM AND TAZOBACTAM SODIUM 25 GRAM(S): 3; .375 INJECTION, POWDER, FOR SOLUTION INTRAVENOUS at 13:57

## 2024-08-24 RX ADMIN — Medication 1 APPLICATION(S): at 18:48

## 2024-08-24 RX ADMIN — Medication 40 MILLIGRAM(S): at 05:33

## 2024-08-24 RX ADMIN — AMLODIPINE BESYLATE 10 MILLIGRAM(S): 10 TABLET ORAL at 05:33

## 2024-08-24 RX ADMIN — FAMOTIDINE 20 MILLIGRAM(S): 10 INJECTION INTRAVENOUS at 05:33

## 2024-08-24 RX ADMIN — OXYCODONE HYDROCHLORIDE 5 MILLIGRAM(S): 5 TABLET ORAL at 13:56

## 2024-08-24 RX ADMIN — OXYCODONE HYDROCHLORIDE 5 MILLIGRAM(S): 5 TABLET ORAL at 14:56

## 2024-08-24 RX ADMIN — Medication 1 APPLICATION(S): at 05:35

## 2024-08-24 RX ADMIN — Medication 100 MILLIGRAM(S): at 18:48

## 2024-08-24 RX ADMIN — Medication 2 TABLET(S): at 08:41

## 2024-08-24 RX ADMIN — FAMOTIDINE 20 MILLIGRAM(S): 10 INJECTION INTRAVENOUS at 18:48

## 2024-08-24 RX ADMIN — METOPROLOL TARTRATE 50 MILLIGRAM(S): 100 TABLET ORAL at 18:48

## 2024-08-24 NOTE — PROGRESS NOTE ADULT - SUBJECTIVE AND OBJECTIVE BOX
Subjective: Pt seen and examined. No acute overnight events. No acute complaints.     Objective    Vital signs  T(F): , Max: 98.3 (08-23-24 @ 10:22)  HR: 57 (08-24-24 @ 05:25)  BP: 116/71 (08-24-24 @ 05:25)  SpO2: 98% (08-24-24 @ 05:25)    Output     OUT:    Nephrostomy Tube (mL): 1400 mL    Voided (mL): 150 mL  Total OUT: 1550 mL    Total NET: -1550 mL      Gen: NAD  Abd: s/nt/nd  : L PCNU in place with pink urine. removed intact at the bedside without events    Labs                        14.3   12.00 )-----------( 194      ( 24 Aug 2024 08:29 )             45.6   08-24    136  |  97  |  35<H>  ----------------------------<  163<H>  4.2   |  25  |  2.08<H>    Ca    7.9<L>      24 Aug 2024 08:29      Urine Cx: Culture - Urine (08.08.24 @ 18:44)    -  Amoxicillin/Clavulanic Acid: R >16/8   -  Amoxicillin/Clavulanic Acid: R >16/8   -  Amoxicillin/Clavulanic Acid: S <=8/4   -  Ampicillin: S <=8 These ampicillin results predict results for amoxicillin   -  Ampicillin: R <=8 These ampicillin results predict results for amoxicillin   -  Ampicillin: R >16 These ampicillin results predict results for amoxicillin   -  Ampicillin/Sulbactam: S 8/4   -  Ampicillin/Sulbactam: S <=4/2   -  Ampicillin/Sulbactam: S <=4/2   -  Aztreonam: S <=4   -  Aztreonam: S <=4   -  Aztreonam: S <=4   -  Cefazolin: R >16   -  Cefazolin: R 8   -  Cefazolin: S <=2 For uncomplicated UTI with K. pneumoniae, E. coli, or P. mirablis: ARTHUR <=16 is sensitive and ARTHUR >=32 is resistant. This also predicts results for oral agents cefaclor, cefdinir, cefpodoxime, cefprozil, cefuroxime axetil, cephalexin and locarbef for uncomplicated UTI. Note that some isolates may be susceptible to these agents while testing resistant to cefazolin.   -  Cefepime: S <=2   -  Cefepime: S <=2   -  Cefepime: S <=2   -  Trimethoprim/Sulfamethoxazole: S <=0.5/9.5   -  Trimethoprim/Sulfamethoxazole: S 1/19   -  Trimethoprim/Sulfamethoxazole: S <=0.5/9.5   -  Tobramycin: S <=2   -  Tobramycin: S <=2   -  Meropenem: S <=1   -  Meropenem: S <=1   -  Meropenem: S <=1   -  Nitrofurantoin: R >64 Should not be used to treat pyelonephritis   -  Piperacillin/Tazobactam: S <=8   -  Piperacillin/Tazobactam: S <=8   -  Piperacillin/Tazobactam: S <=8   -  Cefoxitin: S <=8   -  Cefoxitin: S <=8   -  Cefoxitin: S <=8   -  Ceftriaxone: S <=1   -  Ceftriaxone: S <=1   -  Ceftriaxone: S <=1   -  Cefuroxime: S <=4   -  Ciprofloxacin: R >2   -  Ciprofloxacin: S <=0.25   -  Ciprofloxacin: R >2   -  Ertapenem: S <=0.5   -  Ertapenem: S <=0.5   -  Ertapenem: S <=0.5   -  Gentamicin: S <=2   -  Gentamicin: S <=2   -  Imipenem: S <=1   -  Levofloxacin: S <=0.5   -  Levofloxacin: R >4   -  Levofloxacin: R >4   Specimen Source: Kidney Nephrostomy - Right   Culture Results:   >100,000 CFU/ml Proteus mirabilis  >100,000 CFU/ml Providencia stuartii  >100,000 CFU/ml Providencia rettgeri   Organism Identification: Proteus mirabilis  Providencia stuartii  Providencia rettgeri   Organism: Proteus mirabilis   Organism: Providencia stuartii   Organism: Providencia rettgeri   Method Type: ARTHUR   Method Type: ARTHUR   Method Type: ARTHUR    Culture - Other (08.22.24 @ 16:41)    Specimen Source: .Other left kidney stone   Culture Results:   No growth to date.    Culture - Fungal, Other (08.21.24 @ 10:16)    Specimen Source: .Other   Culture Results:   Testing in progress    Culture - Other (08.21.24 @ 10:16)    Specimen Source: .Other   Culture Results:   Culture yields growth of greater than 3 colony types of  bacteria,  which may indicate contamination and normal kan  Call client services within 7 days if further workup is clinically  indicated.  Imaging  < from: CT Abdomen and Pelvis No Cont (08.22.24 @ 18:39) >  ACC: [id number] EXAM:  CT ABDOMEN AND PELVIS   ORDERED BY: DONNA CHING     PROCEDURE DATE:  08/22/2024          INTERPRETATION:  CLINICAL INFORMATION: Status post PCNL    COMPARISON: CT abdomen pelvis 8/21/2024    CONTRAST/COMPLICATIONS:  IV Contrast:NONE  Oral Contrast: NONE  Complications: None reported at time of study completion    PROCEDURE:  CT of the Abdomen and Pelvis was performed.  Sagittal and coronal reformats were performed.    FINDINGS:  LOWER CHEST: Moderate cardiomegaly. Coronary artery calcification. Mild   pericardial effusion. Basilar atelectasis. Bilateral gynecomastia.    LIVER: Hepatomegaly.  BILE DUCTS: Normal caliber.  GALLBLADDER: Cholelithiasis.  SPLEEN: Within normal limits.  PANCREAS: Within normal limits.  ADRENALS: Within normal limits.  KIDNEYS/URETERS: Right-sided percutaneous nephroureteral catheter. Small   amount air noted within the right renal collecting system. A left-sided   percutaneous nephroureteral catheter noted. Additional left-sided   percutaneous nephrostomy tube with a balloon. Small amount air noted   within the left renal collecting system. 2 mm distal left ureteral   calculus Bilateral periureteral and perinephric stranding noted.    BLADDER: Bae catheter. Small amount air noted.  REPRODUCTIVE ORGANS: Prostate is enlarged.    BOWEL: No bowel obstruction. Colonic diverticulosis. Appendix is normal.  PERITONEUM/RETROPERITONEUM: Within normal limits.  VESSELS: Atherosclerotic changes. IVC filter noted.  LYMPH NODES: No lymphadenopathy.  ABDOMINAL WALL: Within normal limits.  BONES: Degenerative changes. Chronic flattening of the right femoral head   with subluxation and remodeling right hip joint.    IMPRESSION:  Bilateral percutaneous nephrostomy tubes are noted. Small 2 mm distal   left ureteral calculus. Small amount air noted within the bilateral renal   collecting system.    --- End of Report ---          ZINA LOPEZ DO; Resident Radiologist  This document has been electronically signed.    < end of copied text >

## 2024-08-24 NOTE — PROGRESS NOTE ADULT - ASSESSMENT
64 year old male s/p 2nd stage R PCNL and R PCNU exchange, L PCNL, POD#1    - [facility] inpatient -> DC on Bactrim  - AM labs  - back check after PCNU removal  - TOV/PVR  - analgesia as needed  - OOB/DVT ppx/IS  - Dispo planning

## 2024-08-25 LAB
-  AMOXICILLIN/CLAVULANIC ACID: SIGNIFICANT CHANGE UP
-  AMPICILLIN/SULBACTAM: SIGNIFICANT CHANGE UP
-  AMPICILLIN: SIGNIFICANT CHANGE UP
-  AZTREONAM: SIGNIFICANT CHANGE UP
-  CEFAZOLIN: SIGNIFICANT CHANGE UP
-  CEFEPIME: SIGNIFICANT CHANGE UP
-  CEFOXITIN: SIGNIFICANT CHANGE UP
-  CEFTRIAXONE: SIGNIFICANT CHANGE UP
-  CIPROFLOXACIN: SIGNIFICANT CHANGE UP
-  ERTAPENEM: SIGNIFICANT CHANGE UP
-  LEVOFLOXACIN: SIGNIFICANT CHANGE UP
-  MEROPENEM: SIGNIFICANT CHANGE UP
-  PIPERACILLIN/TAZOBACTAM: SIGNIFICANT CHANGE UP
-  TRIMETHOPRIM/SULFAMETHOXAZOLE: SIGNIFICANT CHANGE UP
ANION GAP SERPL CALC-SCNC: 13 MMOL/L — SIGNIFICANT CHANGE UP (ref 5–17)
BUN SERPL-MCNC: 52 MG/DL — HIGH (ref 7–23)
CALCIUM SERPL-MCNC: 8.3 MG/DL — LOW (ref 8.4–10.5)
CHLORIDE SERPL-SCNC: 96 MMOL/L — SIGNIFICANT CHANGE UP (ref 96–108)
CO2 SERPL-SCNC: 24 MMOL/L — SIGNIFICANT CHANGE UP (ref 22–31)
CREAT SERPL-MCNC: 3.23 MG/DL — HIGH (ref 0.5–1.3)
CULTURE RESULTS: ABNORMAL
EGFR: 21 ML/MIN/1.73M2 — LOW
GLUCOSE SERPL-MCNC: 95 MG/DL — SIGNIFICANT CHANGE UP (ref 70–99)
HCT VFR BLD CALC: 45.6 % — SIGNIFICANT CHANGE UP (ref 39–50)
HGB BLD-MCNC: 14.5 G/DL — SIGNIFICANT CHANGE UP (ref 13–17)
MCHC RBC-ENTMCNC: 29.8 PG — SIGNIFICANT CHANGE UP (ref 27–34)
MCHC RBC-ENTMCNC: 31.8 GM/DL — LOW (ref 32–36)
MCV RBC AUTO: 93.6 FL — SIGNIFICANT CHANGE UP (ref 80–100)
METHOD TYPE: SIGNIFICANT CHANGE UP
NRBC # BLD: 0 /100 WBCS — SIGNIFICANT CHANGE UP (ref 0–0)
ORGANISM # SPEC MICROSCOPIC CNT: ABNORMAL
ORGANISM # SPEC MICROSCOPIC CNT: ABNORMAL
PLATELET # BLD AUTO: 193 K/UL — SIGNIFICANT CHANGE UP (ref 150–400)
POTASSIUM SERPL-MCNC: 4 MMOL/L — SIGNIFICANT CHANGE UP (ref 3.5–5.3)
POTASSIUM SERPL-SCNC: 4 MMOL/L — SIGNIFICANT CHANGE UP (ref 3.5–5.3)
RBC # BLD: 4.87 M/UL — SIGNIFICANT CHANGE UP (ref 4.2–5.8)
RBC # FLD: 14.8 % — HIGH (ref 10.3–14.5)
SODIUM SERPL-SCNC: 133 MMOL/L — LOW (ref 135–145)
SPECIMEN SOURCE: SIGNIFICANT CHANGE UP
WBC # BLD: 12.35 K/UL — HIGH (ref 3.8–10.5)
WBC # FLD AUTO: 12.35 K/UL — HIGH (ref 3.8–10.5)

## 2024-08-25 RX ORDER — PIPERACILLIN SODIUM AND TAZOBACTAM SODIUM 3; .375 G/15ML; G/15ML
3.38 INJECTION, POWDER, FOR SOLUTION INTRAVENOUS EVERY 8 HOURS
Refills: 0 | Status: DISCONTINUED | OUTPATIENT
Start: 2024-08-25 | End: 2024-08-26

## 2024-08-25 RX ADMIN — Medication 100 MILLIGRAM(S): at 06:27

## 2024-08-25 RX ADMIN — AMLODIPINE BESYLATE 10 MILLIGRAM(S): 10 TABLET ORAL at 06:27

## 2024-08-25 RX ADMIN — Medication 1 APPLICATION(S): at 17:32

## 2024-08-25 RX ADMIN — PIPERACILLIN SODIUM AND TAZOBACTAM SODIUM 25 GRAM(S): 3; .375 INJECTION, POWDER, FOR SOLUTION INTRAVENOUS at 14:07

## 2024-08-25 RX ADMIN — METOPROLOL TARTRATE 50 MILLIGRAM(S): 100 TABLET ORAL at 06:28

## 2024-08-25 RX ADMIN — FAMOTIDINE 20 MILLIGRAM(S): 10 INJECTION INTRAVENOUS at 06:28

## 2024-08-25 RX ADMIN — Medication 1 APPLICATION(S): at 06:28

## 2024-08-25 RX ADMIN — METOPROLOL TARTRATE 50 MILLIGRAM(S): 100 TABLET ORAL at 17:32

## 2024-08-25 RX ADMIN — PIPERACILLIN SODIUM AND TAZOBACTAM SODIUM 25 GRAM(S): 3; .375 INJECTION, POWDER, FOR SOLUTION INTRAVENOUS at 21:19

## 2024-08-25 RX ADMIN — ACETAMINOPHEN 650 MILLIGRAM(S): 325 TABLET ORAL at 02:40

## 2024-08-25 RX ADMIN — FAMOTIDINE 20 MILLIGRAM(S): 10 INJECTION INTRAVENOUS at 17:32

## 2024-08-25 RX ADMIN — Medication 5 MILLIGRAM(S): at 11:30

## 2024-08-25 RX ADMIN — Medication 100 MILLIGRAM(S): at 17:31

## 2024-08-25 RX ADMIN — ACETAMINOPHEN 650 MILLIGRAM(S): 325 TABLET ORAL at 20:17

## 2024-08-25 RX ADMIN — ACETAMINOPHEN 650 MILLIGRAM(S): 325 TABLET ORAL at 01:57

## 2024-08-25 RX ADMIN — ACETAMINOPHEN 650 MILLIGRAM(S): 325 TABLET ORAL at 20:59

## 2024-08-25 RX ADMIN — Medication 40 MILLIGRAM(S): at 06:28

## 2024-08-25 RX ADMIN — PIPERACILLIN SODIUM AND TAZOBACTAM SODIUM 25 GRAM(S): 3; .375 INJECTION, POWDER, FOR SOLUTION INTRAVENOUS at 06:28

## 2024-08-25 NOTE — PROGRESS NOTE ADULT - ASSESSMENT
64 year old male s/p 2nd stage R PCNL and R PCNU exchange, L PCNL, POD#3    - [facility] inpatient -> DC on Bactrim  - AM labs  - analgesia as needed  - OOB/DVT ppx/IS  - Dispo planning

## 2024-08-25 NOTE — PROGRESS NOTE ADULT - SUBJECTIVE AND OBJECTIVE BOX
Subjective: Pt seen and examined. Yesterday developed L flank hematoma after PCN removed. No overnight events. Currently feeling well. Denies acute complaints. No difficulty voiding.    Objective    Vital signs  T(F): , Max: 99.7 (08-24-24 @ 21:18)  HR: 57 (08-25-24 @ 05:44)  BP: 103/60 (08-25-24 @ 05:44)  SpO2: 98% (08-25-24 @ 05:44)  Wt(kg): --    Output     OUT:    Nephrostomy Tube (mL): 1400 mL    Voided (mL): 150 mL  Total OUT: 1550 mL    Total NET: -1550 mL      OUT:    Nephrostomy Tube (mL): 950 mL    Voided (mL): 925 mL  Total OUT: 1875 mL    Total NET: -1875 mL          Gen: NAD  Abd: soft, nontender  : L flank hematoma significantly improved this AM. dressing c/d/i. R flank with urostomy bag with peach colored urine. voided urine peach    Labs      AM labs pending    Urine Cx: Culture - Other (08.21.24 @ 10:16)    Specimen Source: .Other   Culture Results:   Culture yields growth of greater than 3 colony types of  bacteria,  which may indicate contamination and normal kan  Call client services within 7 days if further workup is clinically  indicated.    Culture - Fungal, Other (08.21.24 @ 10:16)    Specimen Source: .Other   Culture Results:   Culture is being performed. Fungal cultures are held for 4 weeks.    Culture - Other (08.22.24 @ 16:41)    Specimen Source: .Other left kidney stone   Culture Results:   Rare Providencia stuartii

## 2024-08-26 ENCOUNTER — TRANSCRIPTION ENCOUNTER (OUTPATIENT)
Age: 64
End: 2024-08-26

## 2024-08-26 VITALS
OXYGEN SATURATION: 94 % | HEART RATE: 55 BPM | DIASTOLIC BLOOD PRESSURE: 74 MMHG | RESPIRATION RATE: 18 BRPM | TEMPERATURE: 98 F | SYSTOLIC BLOOD PRESSURE: 118 MMHG

## 2024-08-26 LAB
ANION GAP SERPL CALC-SCNC: 18 MMOL/L — HIGH (ref 5–17)
BUN SERPL-MCNC: 55 MG/DL — HIGH (ref 7–23)
CALCIUM SERPL-MCNC: 8.2 MG/DL — LOW (ref 8.4–10.5)
CHLORIDE SERPL-SCNC: 98 MMOL/L — SIGNIFICANT CHANGE UP (ref 96–108)
CO2 SERPL-SCNC: 20 MMOL/L — LOW (ref 22–31)
CREAT SERPL-MCNC: 2.83 MG/DL — HIGH (ref 0.5–1.3)
EGFR: 24 ML/MIN/1.73M2 — LOW
GLUCOSE SERPL-MCNC: 108 MG/DL — HIGH (ref 70–99)
HCT VFR BLD CALC: 41.9 % — SIGNIFICANT CHANGE UP (ref 39–50)
HGB BLD-MCNC: 13.4 G/DL — SIGNIFICANT CHANGE UP (ref 13–17)
MCHC RBC-ENTMCNC: 28.9 PG — SIGNIFICANT CHANGE UP (ref 27–34)
MCHC RBC-ENTMCNC: 32 GM/DL — SIGNIFICANT CHANGE UP (ref 32–36)
MCV RBC AUTO: 90.5 FL — SIGNIFICANT CHANGE UP (ref 80–100)
NRBC # BLD: 0 /100 WBCS — SIGNIFICANT CHANGE UP (ref 0–0)
PLATELET # BLD AUTO: 221 K/UL — SIGNIFICANT CHANGE UP (ref 150–400)
POTASSIUM SERPL-MCNC: 4.1 MMOL/L — SIGNIFICANT CHANGE UP (ref 3.5–5.3)
POTASSIUM SERPL-SCNC: 4.1 MMOL/L — SIGNIFICANT CHANGE UP (ref 3.5–5.3)
RBC # BLD: 4.63 M/UL — SIGNIFICANT CHANGE UP (ref 4.2–5.8)
RBC # FLD: 14.8 % — HIGH (ref 10.3–14.5)
SODIUM SERPL-SCNC: 136 MMOL/L — SIGNIFICANT CHANGE UP (ref 135–145)
WBC # BLD: 10.75 K/UL — HIGH (ref 3.8–10.5)
WBC # FLD AUTO: 10.75 K/UL — HIGH (ref 3.8–10.5)

## 2024-08-26 PROCEDURE — 88305 TISSUE EXAM BY PATHOLOGIST: CPT

## 2024-08-26 PROCEDURE — 93005 ELECTROCARDIOGRAM TRACING: CPT

## 2024-08-26 PROCEDURE — 82365 CALCULUS SPECTROSCOPY: CPT

## 2024-08-26 PROCEDURE — C1758: CPT

## 2024-08-26 PROCEDURE — C2617: CPT

## 2024-08-26 PROCEDURE — 86900 BLOOD TYPING SEROLOGIC ABO: CPT

## 2024-08-26 PROCEDURE — 87102 FUNGUS ISOLATION CULTURE: CPT

## 2024-08-26 PROCEDURE — 76000 FLUOROSCOPY <1 HR PHYS/QHP: CPT

## 2024-08-26 PROCEDURE — 85027 COMPLETE CBC AUTOMATED: CPT

## 2024-08-26 PROCEDURE — C1769: CPT

## 2024-08-26 PROCEDURE — C1726: CPT

## 2024-08-26 PROCEDURE — 86901 BLOOD TYPING SEROLOGIC RH(D): CPT

## 2024-08-26 PROCEDURE — 87070 CULTURE OTHR SPECIMN AEROBIC: CPT

## 2024-08-26 PROCEDURE — 74176 CT ABD & PELVIS W/O CONTRAST: CPT | Mod: MC

## 2024-08-26 PROCEDURE — 85025 COMPLETE CBC W/AUTO DIFF WBC: CPT

## 2024-08-26 PROCEDURE — 87077 CULTURE AEROBIC IDENTIFY: CPT

## 2024-08-26 PROCEDURE — 87186 SC STD MICRODIL/AGAR DIL: CPT

## 2024-08-26 PROCEDURE — 88300 SURGICAL PATH GROSS: CPT

## 2024-08-26 PROCEDURE — 86850 RBC ANTIBODY SCREEN: CPT

## 2024-08-26 PROCEDURE — C9399: CPT

## 2024-08-26 PROCEDURE — C1887: CPT

## 2024-08-26 PROCEDURE — 80048 BASIC METABOLIC PNL TOTAL CA: CPT

## 2024-08-26 PROCEDURE — C1894: CPT

## 2024-08-26 PROCEDURE — 71045 X-RAY EXAM CHEST 1 VIEW: CPT

## 2024-08-26 PROCEDURE — C1889: CPT

## 2024-08-26 RX ORDER — SULFAMETHOXAZOLE AND TRIMETHOPRIM 800; 160 MG/1; MG/1
1 TABLET ORAL
Qty: 10 | Refills: 0
Start: 2024-08-26 | End: 2024-08-30

## 2024-08-26 RX ORDER — ONDANSETRON 2 MG/ML
4 INJECTION, SOLUTION INTRAMUSCULAR; INTRAVENOUS ONCE
Refills: 0 | Status: COMPLETED | OUTPATIENT
Start: 2024-08-26 | End: 2024-08-26

## 2024-08-26 RX ADMIN — FAMOTIDINE 20 MILLIGRAM(S): 10 INJECTION INTRAVENOUS at 06:11

## 2024-08-26 RX ADMIN — Medication 40 MILLIGRAM(S): at 06:11

## 2024-08-26 RX ADMIN — AMLODIPINE BESYLATE 10 MILLIGRAM(S): 10 TABLET ORAL at 06:12

## 2024-08-26 RX ADMIN — ONDANSETRON 4 MILLIGRAM(S): 2 INJECTION, SOLUTION INTRAMUSCULAR; INTRAVENOUS at 14:42

## 2024-08-26 RX ADMIN — PIPERACILLIN SODIUM AND TAZOBACTAM SODIUM 25 GRAM(S): 3; .375 INJECTION, POWDER, FOR SOLUTION INTRAVENOUS at 06:09

## 2024-08-26 RX ADMIN — Medication 100 MILLIGRAM(S): at 06:11

## 2024-08-26 RX ADMIN — Medication 1 APPLICATION(S): at 06:11

## 2024-08-26 RX ADMIN — METOPROLOL TARTRATE 50 MILLIGRAM(S): 100 TABLET ORAL at 06:11

## 2024-08-26 RX ADMIN — PIPERACILLIN SODIUM AND TAZOBACTAM SODIUM 25 GRAM(S): 3; .375 INJECTION, POWDER, FOR SOLUTION INTRAVENOUS at 13:29

## 2024-08-26 NOTE — PROGRESS NOTE ADULT - ASSESSMENT
64 year old male s/p 2nd stage R PCNL and R PCNU exchange, L PCNL, POD#4    - AM labs  - trend creatinine - uptrending to 3.23 (8/25)  - [facility] inpatient -> DC on Bactrim vs cefpodoxime pending creatinine today  - analgesia as needed  - OOB/DVT ppx/IS  - Dispo planning

## 2024-08-26 NOTE — DISCHARGE NOTE NURSING/CASE MANAGEMENT/SOCIAL WORK - NSDCVIVACCINE_GEN_ALL_CORE_FT
influenza, injectable, quadrivalent, preservative free; 04-Oct-2018 18:35; Dolly Caraballo (RN); [employer]; [id number] (Exp. Date: 30-Jun-2019); IntraMuscular; Deltoid Left.; 0.5 milliLiter(s); VIS (VIS Published: 07-Aug-2015, VIS Presented: 04-Oct-2018);

## 2024-08-26 NOTE — PROGRESS NOTE ADULT - SUBJECTIVE AND OBJECTIVE BOX
The patient was seen and examined at bedside.  No acute events overnight and the patient is without acute complaints this AM.    T(C): 36.6 (08-26-24 @ 05:14), Max: 37.1 (08-25-24 @ 13:06)  HR: 53 (08-26-24 @ 05:14) (53 - 75)  BP: 114/71 (08-26-24 @ 05:14) (105/63 - 137/79)  RR: 18 (08-26-24 @ 05:14) (18 - 18)  SpO2: 100% (08-26-24 @ 05:14) (94% - 100%)  Wt(kg): --    Physical Exam:    General: NAD, A+Ox3  Abdomen: soft, non-tender, non-distended  Back: R back urostomy with minimal clear output, L back - mild swelling      08-25 @ 07:01  -  08-26 @ 07:00  --------------------------------------------------------  IN: 480 mL / OUT: 2450 mL / NET: -1970 mL      void - 1250cc    R flank urostomy - 0cc

## 2024-08-26 NOTE — DISCHARGE NOTE NURSING/CASE MANAGEMENT/SOCIAL WORK - NSDCPEFALRISK_GEN_ALL_CORE
For information on Fall & Injury Prevention, visit: [email or web address] OR  [email or web address] OR  https://www.cdc.gov/steadi/patient.html

## 2024-08-26 NOTE — DISCHARGE NOTE NURSING/CASE MANAGEMENT/SOCIAL WORK - PATIENT PORTAL LINK FT
You can access the FollowMyHealth Patient Portal offered by [facility] by registering at the following website: [email or web address]. By joining [facility]’s FollowMyHealth portal, you will also be able to view your health information using other applications (apps) compatible with our system.

## 2024-08-26 NOTE — PROGRESS NOTE ADULT - ATTENDING COMMENTS
Patient seen and examined. Agree with assessment and plan.
POD#1 s/p RIGHT PCNL  CT pending  Check labs  Incentive spirometer
Patient seen and examined. Agree with assessment and plan.   DC planning
as above  outputs, labs noted  transient post op rise in Cr , appears not of clinical significance  will repeat as outpatient
As above  Check PVR  Flomax if PVR high or high output via nephrostomy sites  OOB
OR today for second stage RIGHT PCNL and LEFT PCNL  Consent obtained

## 2024-08-29 LAB
CELL MATERIAL STONE EST-MCNT: SIGNIFICANT CHANGE UP
LABORATORY COMMENT REPORT: SIGNIFICANT CHANGE UP
NIDUS STONE QN: SIGNIFICANT CHANGE UP
SURGICAL PATHOLOGY STUDY: SIGNIFICANT CHANGE UP

## 2024-08-31 LAB — SURGICAL PATHOLOGY STUDY: SIGNIFICANT CHANGE UP

## 2024-09-10 ENCOUNTER — APPOINTMENT (OUTPATIENT)
Dept: UROLOGY | Facility: CLINIC | Age: 64
End: 2024-09-10
Payer: MEDICARE

## 2024-09-10 VITALS
OXYGEN SATURATION: 100 % | SYSTOLIC BLOOD PRESSURE: 162 MMHG | HEART RATE: 63 BPM | WEIGHT: 315 LBS | BODY MASS INDEX: 46.65 KG/M2 | DIASTOLIC BLOOD PRESSURE: 77 MMHG | RESPIRATION RATE: 16 BRPM | HEIGHT: 69 IN

## 2024-09-10 DIAGNOSIS — N40.1 BENIGN PROSTATIC HYPERPLASIA WITH LOWER URINARY TRACT SYMPMS: ICD-10-CM

## 2024-09-10 DIAGNOSIS — N52.9 MALE ERECTILE DYSFUNCTION, UNSPECIFIED: ICD-10-CM

## 2024-09-10 DIAGNOSIS — R97.20 ELEVATED PROSTATE, SPECIFIC ANTIGEN [PSA]: ICD-10-CM

## 2024-09-10 DIAGNOSIS — N13.8 BENIGN PROSTATIC HYPERPLASIA WITH LOWER URINARY TRACT SYMPMS: ICD-10-CM

## 2024-09-10 DIAGNOSIS — N20.0 CALCULUS OF KIDNEY: ICD-10-CM

## 2024-09-10 DIAGNOSIS — Z71.3 DIETARY COUNSELING AND SURVEILLANCE: ICD-10-CM

## 2024-09-10 PROCEDURE — 99215 OFFICE O/P EST HI 40 MIN: CPT | Mod: 24,25

## 2024-09-10 RX ORDER — TADALAFIL 20 MG/1
20 TABLET ORAL
Qty: 30 | Refills: 5 | Status: ACTIVE | COMMUNITY
Start: 2024-09-10 | End: 1900-01-01

## 2024-09-11 NOTE — ASSESSMENT
[FreeTextEntry1] : right flank incision- healed left flank incision- healed  dietary counseling Diet modification reviewed at length- increasing fluids (primarily water), citrus is good, and decreasing/moderating salt, animal flesh protein, oxalate containing foods, and moderation of calcium intake (1000 mg/day is USRDA).  I reviewed with the patient the risks of metabolic stone disease given their underlying risk parameters (all of which include large stones, multiple stones, bilateral stones, family history, and young age), and the indications for 24 hour urine metabolic assessment.  We also discussed benefits of regular exercise and weight loss as independent risk reducers for stones.  Elevated creatinine - repeat creatinine - follow up with Nephrology   urine culture - repeat today  - if urine is positive- may need HIprex for suppression   PSA - patient will obtain records from Dr. Hal Wang  ED - tried Viagra in the past- somewhat effective  - Trial of Cialis - discussed side effects   plan 1) BMP today and urine culture today 2) 24 hr urine before next visit  3) Renal sono at or before next visit  4) Follow up in 2 months

## 2024-09-11 NOTE — PHYSICAL EXAM
[General Appearance - Well Developed] : well developed [Abdomen Soft] : soft [de-identified] : bilateral flank incsion healed

## 2024-09-11 NOTE — HISTORY OF PRESENT ILLNESS
[FreeTextEntry1] : Visit unrelated to kidney stones surgery  64 yr old male presents to follow up with PSA, ED, dietary counseling. Pt returns for metabolic evaluation and prevention of future stone disease following recent surgery for stone. At issue today is review of the stone analysis, risk factors for future stone episodes and establishing whether they have pure dietary, metabolic, or mixed causes for their stone risks which is unrelated to the surgical management of their stone disease.  Lost 250 lbs since 2019.  HX s/p right PCNL on 8/20/24  stone- 60% Calcium phosphate (apatite). 20% Magnesium ammonium phosphate (struvite). 20% Ammonium urate.  s/p left PCNL on 8/22/24 stone- 70% Magnesium ammonium phosphate (struvite). 30% Calcium phosphate (apatite).

## 2024-09-13 ENCOUNTER — APPOINTMENT (OUTPATIENT)
Dept: PHYSICAL MEDICINE AND REHAB | Facility: CLINIC | Age: 64
End: 2024-09-13

## 2024-09-13 RX ORDER — CEFPODOXIME PROXETIL 200 MG/1
200 TABLET, FILM COATED ORAL
Qty: 20 | Refills: 0 | Status: ACTIVE | COMMUNITY
Start: 2024-09-13 | End: 1900-01-01

## 2024-09-16 DIAGNOSIS — N39.0 URINARY TRACT INFECTION, SITE NOT SPECIFIED: ICD-10-CM

## 2024-09-16 LAB
ANION GAP SERPL CALC-SCNC: 13 MMOL/L
BACTERIA UR CULT: ABNORMAL
BUN SERPL-MCNC: 28 MG/DL
CALCIUM SERPL-MCNC: 8.9 MG/DL
CHLORIDE SERPL-SCNC: 102 MMOL/L
CO2 SERPL-SCNC: 23 MMOL/L
CREAT SERPL-MCNC: 1.42 MG/DL
EGFR: 55 ML/MIN/1.73M2
GLUCOSE SERPL-MCNC: 92 MG/DL
POTASSIUM SERPL-SCNC: 4.2 MMOL/L
SODIUM SERPL-SCNC: 138 MMOL/L

## 2024-09-16 RX ORDER — METHENAMINE HIPPURATE 1 G/1
1 TABLET ORAL
Qty: 180 | Refills: 3 | Status: ACTIVE | COMMUNITY
Start: 2024-09-16 | End: 1900-01-01

## 2024-09-18 PROBLEM — G47.33 OBSTRUCTIVE SLEEP APNEA (ADULT) (PEDIATRIC): Chronic | Status: ACTIVE | Noted: 2024-08-08

## 2024-09-18 PROBLEM — Z86.19 PERSONAL HISTORY OF OTHER INFECTIOUS AND PARASITIC DISEASES: Chronic | Status: ACTIVE | Noted: 2024-08-08

## 2024-09-18 LAB
CULTURE RESULTS: SIGNIFICANT CHANGE UP
SPECIMEN SOURCE: SIGNIFICANT CHANGE UP

## 2024-09-23 NOTE — ED ADULT TRIAGE NOTE - NS ED NURSE BANDS TYPE
Name band; [No Acute Distress] : no acute distress [Well Nourished] : well nourished [Well-Appearing] : well-appearing [Normal Voice/Communication] : normal voice/communication [No JVD] : no jugular venous distention [No Respiratory Distress] : no respiratory distress  [Clear to Auscultation] : lungs were clear to auscultation bilaterally [No Accessory Muscle Use] : no accessory muscle use [Normal Rate] : normal rate  [Regular Rhythm] : with a regular rhythm [Normal S1, S2] : normal S1 and S2 [Pedal Pulses Present] : the pedal pulses are present [No Extremity Clubbing/Cyanosis] : no extremity clubbing/cyanosis [Soft] : abdomen soft [Non Tender] : non-tender [Non-distended] : non-distended [Normal Bowel Sounds] : normal bowel sounds [Alert and Oriented x3] : oriented to person, place, and time [de-identified] : BLE edema with trace pitting [de-identified] : aquacell to right knee removed, 23 staples area is intact with no s/s of infection.  [de-identified] : uses walker for ambulation, rolling walker [de-identified] : anxious

## 2024-09-24 ENCOUNTER — NON-APPOINTMENT (OUTPATIENT)
Age: 64
End: 2024-09-24

## 2024-10-02 ENCOUNTER — NON-APPOINTMENT (OUTPATIENT)
Age: 64
End: 2024-10-02

## 2024-10-08 ENCOUNTER — APPOINTMENT (OUTPATIENT)
Dept: NEPHROLOGY | Facility: CLINIC | Age: 64
End: 2024-10-08
Payer: MEDICARE

## 2024-10-08 VITALS — HEART RATE: 71 BPM | SYSTOLIC BLOOD PRESSURE: 137 MMHG | DIASTOLIC BLOOD PRESSURE: 82 MMHG

## 2024-10-08 VITALS — WEIGHT: 315 LBS | BODY MASS INDEX: 46.65 KG/M2 | HEIGHT: 69 IN

## 2024-10-08 DIAGNOSIS — N20.0 CALCULUS OF KIDNEY: ICD-10-CM

## 2024-10-08 DIAGNOSIS — N17.9 ACUTE KIDNEY FAILURE, UNSPECIFIED: ICD-10-CM

## 2024-10-08 DIAGNOSIS — N39.0 URINARY TRACT INFECTION, SITE NOT SPECIFIED: ICD-10-CM

## 2024-10-08 DIAGNOSIS — E66.01 MORBID (SEVERE) OBESITY DUE TO EXCESS CALORIES: ICD-10-CM

## 2024-10-08 PROCEDURE — G2211 COMPLEX E/M VISIT ADD ON: CPT

## 2024-10-08 PROCEDURE — 99205 OFFICE O/P NEW HI 60 MIN: CPT

## 2024-10-29 ENCOUNTER — APPOINTMENT (OUTPATIENT)
Dept: UROLOGY | Facility: CLINIC | Age: 64
End: 2024-10-29

## 2024-10-29 VITALS — DIASTOLIC BLOOD PRESSURE: 88 MMHG | SYSTOLIC BLOOD PRESSURE: 163 MMHG

## 2024-10-29 PROCEDURE — 99213 OFFICE O/P EST LOW 20 MIN: CPT | Mod: 24

## 2024-11-05 ENCOUNTER — OUTPATIENT (OUTPATIENT)
Dept: OUTPATIENT SERVICES | Facility: HOSPITAL | Age: 64
LOS: 1 days | End: 2024-11-05
Payer: MEDICARE

## 2024-11-05 ENCOUNTER — APPOINTMENT (OUTPATIENT)
Dept: UROLOGY | Facility: CLINIC | Age: 64
End: 2024-11-05

## 2024-11-05 VITALS
RESPIRATION RATE: 16 BRPM | HEIGHT: 69 IN | WEIGHT: 230 LBS | SYSTOLIC BLOOD PRESSURE: 136 MMHG | TEMPERATURE: 98 F | DIASTOLIC BLOOD PRESSURE: 78 MMHG | OXYGEN SATURATION: 100 % | BODY MASS INDEX: 34.07 KG/M2 | HEART RATE: 59 BPM

## 2024-11-05 DIAGNOSIS — R35.0 FREQUENCY OF MICTURITION: ICD-10-CM

## 2024-11-05 DIAGNOSIS — Z98.890 OTHER SPECIFIED POSTPROCEDURAL STATES: Chronic | ICD-10-CM

## 2024-11-05 DIAGNOSIS — Z93.6 OTHER ARTIFICIAL OPENINGS OF URINARY TRACT STATUS: Chronic | ICD-10-CM

## 2024-11-05 DIAGNOSIS — R97.20 ELEVATED PROSTATE, SPECIFIC ANTIGEN [PSA]: ICD-10-CM

## 2024-11-05 DIAGNOSIS — Z95.828 PRESENCE OF OTHER VASCULAR IMPLANTS AND GRAFTS: Chronic | ICD-10-CM

## 2024-11-05 LAB — PSA SERPL-MCNC: 11.6 NG/ML

## 2024-11-05 PROCEDURE — 55700: CPT | Mod: 79

## 2024-11-05 PROCEDURE — 76872 US TRANSRECTAL: CPT | Mod: 26

## 2024-11-06 DIAGNOSIS — R97.20 ELEVATED PROSTATE SPECIFIC ANTIGEN [PSA]: ICD-10-CM

## 2024-11-10 LAB — PROSTATE BIOPSY: NORMAL

## 2024-11-11 ENCOUNTER — OUTPATIENT (OUTPATIENT)
Dept: OUTPATIENT SERVICES | Facility: HOSPITAL | Age: 64
LOS: 1 days | Discharge: HOME | End: 2024-11-11
Payer: MEDICARE

## 2024-11-11 DIAGNOSIS — Z93.6 OTHER ARTIFICIAL OPENINGS OF URINARY TRACT STATUS: Chronic | ICD-10-CM

## 2024-11-11 DIAGNOSIS — Z98.890 OTHER SPECIFIED POSTPROCEDURAL STATES: Chronic | ICD-10-CM

## 2024-11-11 DIAGNOSIS — Z95.828 PRESENCE OF OTHER VASCULAR IMPLANTS AND GRAFTS: Chronic | ICD-10-CM

## 2024-11-15 ENCOUNTER — APPOINTMENT (OUTPATIENT)
Dept: CARDIOLOGY | Facility: CLINIC | Age: 64
End: 2024-11-15

## 2024-11-20 ENCOUNTER — OUTPATIENT (OUTPATIENT)
Dept: OUTPATIENT SERVICES | Facility: HOSPITAL | Age: 64
LOS: 1 days | Discharge: HOME | End: 2024-11-20

## 2024-11-20 ENCOUNTER — APPOINTMENT (OUTPATIENT)
Dept: RADIATION ONCOLOGY | Facility: CLINIC | Age: 64
End: 2024-11-20
Payer: MEDICARE

## 2024-11-20 ENCOUNTER — NON-APPOINTMENT (OUTPATIENT)
Age: 64
End: 2024-11-20

## 2024-11-20 VITALS
TEMPERATURE: 96.98 F | HEIGHT: 69 IN | RESPIRATION RATE: 16 BRPM | OXYGEN SATURATION: 98 % | SYSTOLIC BLOOD PRESSURE: 131 MMHG | DIASTOLIC BLOOD PRESSURE: 73 MMHG | HEART RATE: 54 BPM

## 2024-11-20 DIAGNOSIS — Z98.890 OTHER SPECIFIED POSTPROCEDURAL STATES: Chronic | ICD-10-CM

## 2024-11-20 DIAGNOSIS — C61 MALIGNANT NEOPLASM OF PROSTATE: ICD-10-CM

## 2024-11-20 DIAGNOSIS — Z95.828 PRESENCE OF OTHER VASCULAR IMPLANTS AND GRAFTS: Chronic | ICD-10-CM

## 2024-11-20 DIAGNOSIS — Z93.6 OTHER ARTIFICIAL OPENINGS OF URINARY TRACT STATUS: Chronic | ICD-10-CM

## 2024-11-20 PROCEDURE — 76872 US TRANSRECTAL: CPT

## 2024-11-20 PROCEDURE — 99205 OFFICE O/P NEW HI 60 MIN: CPT

## 2024-11-20 PROCEDURE — 55700: CPT

## 2024-11-20 RX ORDER — GABAPENTIN 100 MG/1
100 CAPSULE ORAL
Refills: 0 | Status: ACTIVE | COMMUNITY
Start: 2024-11-20

## 2024-11-22 DIAGNOSIS — C61 MALIGNANT NEOPLASM OF PROSTATE: ICD-10-CM

## 2024-11-22 RX ADMIN — ACETAMINOPHEN 0 MG: 325 TABLET ORAL at 00:00

## 2024-11-22 RX ADMIN — ONDANSETRON 0 MG: 4 TABLET, ORALLY DISINTEGRATING ORAL at 00:00

## 2024-11-22 RX ADMIN — LORAZEPAM 0 MG: 0.5 TABLET ORAL at 00:00

## 2024-11-25 ENCOUNTER — APPOINTMENT (OUTPATIENT)
Dept: UROLOGY | Facility: CLINIC | Age: 64
End: 2024-11-25
Payer: MEDICARE

## 2024-11-25 ENCOUNTER — OUTPATIENT (OUTPATIENT)
Dept: OUTPATIENT SERVICES | Facility: HOSPITAL | Age: 64
LOS: 1 days | End: 2024-11-25
Payer: MEDICARE

## 2024-11-25 DIAGNOSIS — Z71.3 DIETARY COUNSELING AND SURVEILLANCE: ICD-10-CM

## 2024-11-25 DIAGNOSIS — N20.0 CALCULUS OF KIDNEY: ICD-10-CM

## 2024-11-25 DIAGNOSIS — R35.0 FREQUENCY OF MICTURITION: ICD-10-CM

## 2024-11-25 DIAGNOSIS — Z98.890 OTHER SPECIFIED POSTPROCEDURAL STATES: Chronic | ICD-10-CM

## 2024-11-25 DIAGNOSIS — N20.1 CALCULUS OF URETER: ICD-10-CM

## 2024-11-25 DIAGNOSIS — Z93.6 OTHER ARTIFICIAL OPENINGS OF URINARY TRACT STATUS: Chronic | ICD-10-CM

## 2024-11-25 DIAGNOSIS — Z95.828 PRESENCE OF OTHER VASCULAR IMPLANTS AND GRAFTS: Chronic | ICD-10-CM

## 2024-11-25 PROCEDURE — 76775 US EXAM ABDO BACK WALL LIM: CPT

## 2024-11-25 PROCEDURE — 76775 US EXAM ABDO BACK WALL LIM: CPT | Mod: 26

## 2024-11-25 PROCEDURE — 99214 OFFICE O/P EST MOD 30 MIN: CPT

## 2024-12-02 DIAGNOSIS — N20.0 CALCULUS OF KIDNEY: ICD-10-CM

## 2024-12-02 DIAGNOSIS — Z71.3 DIETARY COUNSELING AND SURVEILLANCE: ICD-10-CM

## 2024-12-02 DIAGNOSIS — N20.1 CALCULUS OF URETER: ICD-10-CM

## 2024-12-04 ENCOUNTER — NON-APPOINTMENT (OUTPATIENT)
Age: 64
End: 2024-12-04

## 2024-12-05 ENCOUNTER — NON-APPOINTMENT (OUTPATIENT)
Age: 64
End: 2024-12-05

## 2024-12-05 ENCOUNTER — APPOINTMENT (OUTPATIENT)
Dept: SURGICAL ONCOLOGY | Facility: CLINIC | Age: 64
End: 2024-12-05

## 2024-12-05 PROCEDURE — 55876 PLACE RT DEVICE/MARKER PROS: CPT

## 2024-12-05 PROCEDURE — 76872 US TRANSRECTAL: CPT | Mod: 26

## 2024-12-05 PROCEDURE — 55874 TPRNL PLMT BIODEGRDABL MATRL: CPT

## 2024-12-05 RX ORDER — LIDOCAINE AND PRILOCAINE 25; 25 MG/G; MG/G
2.5-2.5 CREAM TOPICAL
Qty: 0 | Refills: 0 | Status: COMPLETED | OUTPATIENT
Start: 2024-12-05

## 2024-12-11 ENCOUNTER — OUTPATIENT (OUTPATIENT)
Dept: OUTPATIENT SERVICES | Facility: HOSPITAL | Age: 64
LOS: 1 days | Discharge: HOME | End: 2024-12-11
Payer: MEDICARE

## 2024-12-11 DIAGNOSIS — Z98.890 OTHER SPECIFIED POSTPROCEDURAL STATES: Chronic | ICD-10-CM

## 2024-12-11 DIAGNOSIS — Z93.6 OTHER ARTIFICIAL OPENINGS OF URINARY TRACT STATUS: Chronic | ICD-10-CM

## 2024-12-12 DIAGNOSIS — C61 MALIGNANT NEOPLASM OF PROSTATE: ICD-10-CM

## 2024-12-13 ENCOUNTER — APPOINTMENT (OUTPATIENT)
Dept: RADIATION ONCOLOGY | Facility: CLINIC | Age: 64
End: 2024-12-13
Payer: MEDICARE

## 2024-12-13 ENCOUNTER — NON-APPOINTMENT (OUTPATIENT)
Age: 64
End: 2024-12-13

## 2024-12-13 VITALS
BODY MASS INDEX: 46.65 KG/M2 | OXYGEN SATURATION: 99 % | WEIGHT: 315 LBS | HEIGHT: 69 IN | HEART RATE: 65 BPM | RESPIRATION RATE: 16 BRPM

## 2024-12-13 DIAGNOSIS — C61 MALIGNANT NEOPLASM OF PROSTATE: ICD-10-CM

## 2024-12-13 PROCEDURE — 99214 OFFICE O/P EST MOD 30 MIN: CPT | Mod: 25

## 2024-12-13 PROCEDURE — 77263 THER RADIOLOGY TX PLNG CPLX: CPT

## 2024-12-13 PROCEDURE — 77332 RADIATION TREATMENT AID(S): CPT | Mod: 26

## 2024-12-19 ENCOUNTER — NON-APPOINTMENT (OUTPATIENT)
Age: 64
End: 2024-12-19

## 2024-12-23 NOTE — PROGRESS NOTE ADULT - ASSESSMENT
64 year old male s/p 2nd stage R PCNL and R PCNU exchange, L PCNL, POD#1    -AM labs  -back check after R PCNU and L [place] NT removal - no bleeding seen, minimal serosanguinous drainage  -TOV/PVR  -monitor heart rate, bradycardic to the 40s  -analgesia as needed  -OOB/DVT ppx/IS Dr. Santacruz Dr. Grace (Query x 2)

## 2024-12-31 ENCOUNTER — NON-APPOINTMENT (OUTPATIENT)
Age: 64
End: 2024-12-31

## 2025-01-03 ENCOUNTER — NON-APPOINTMENT (OUTPATIENT)
Age: 65
End: 2025-01-03

## 2025-01-08 ENCOUNTER — NON-APPOINTMENT (OUTPATIENT)
Age: 65
End: 2025-01-08

## 2025-01-09 ENCOUNTER — NON-APPOINTMENT (OUTPATIENT)
Age: 65
End: 2025-01-09

## 2025-01-14 ENCOUNTER — NON-APPOINTMENT (OUTPATIENT)
Age: 65
End: 2025-01-14

## 2025-01-14 VITALS
BODY MASS INDEX: 46.65 KG/M2 | WEIGHT: 315 LBS | OXYGEN SATURATION: 98 % | RESPIRATION RATE: 16 BRPM | HEART RATE: 56 BPM | HEIGHT: 69 IN

## 2025-01-21 ENCOUNTER — NON-APPOINTMENT (OUTPATIENT)
Age: 65
End: 2025-01-21

## 2025-01-28 ENCOUNTER — NON-APPOINTMENT (OUTPATIENT)
Age: 65
End: 2025-01-28

## 2025-01-28 VITALS
OXYGEN SATURATION: 98 % | WEIGHT: 315 LBS | RESPIRATION RATE: 16 BRPM | HEART RATE: 63 BPM | HEIGHT: 69 IN | BODY MASS INDEX: 46.65 KG/M2

## 2025-02-04 ENCOUNTER — NON-APPOINTMENT (OUTPATIENT)
Age: 65
End: 2025-02-04

## 2025-02-05 ENCOUNTER — NON-APPOINTMENT (OUTPATIENT)
Age: 65
End: 2025-02-05

## 2025-02-10 DIAGNOSIS — N17.9 ACUTE KIDNEY FAILURE, UNSPECIFIED: ICD-10-CM

## 2025-02-12 ENCOUNTER — LABORATORY RESULT (OUTPATIENT)
Age: 65
End: 2025-02-12

## 2025-02-26 ENCOUNTER — APPOINTMENT (OUTPATIENT)
Dept: NEPHROLOGY | Facility: CLINIC | Age: 65
End: 2025-02-26
Payer: MEDICARE

## 2025-02-26 DIAGNOSIS — N18.31 CHRONIC KIDNEY DISEASE, STAGE 3A: ICD-10-CM

## 2025-02-26 DIAGNOSIS — N20.0 CALCULUS OF KIDNEY: ICD-10-CM

## 2025-02-26 PROCEDURE — G2211 COMPLEX E/M VISIT ADD ON: CPT | Mod: 2W

## 2025-02-26 PROCEDURE — 99215 OFFICE O/P EST HI 40 MIN: CPT | Mod: 2W

## 2025-02-26 RX ORDER — ERGOCALCIFEROL 1.25 MG/1
1.25 MG CAPSULE, LIQUID FILLED ORAL
Qty: 9 | Refills: 1 | Status: ACTIVE | COMMUNITY
Start: 2025-02-26 | End: 1900-01-01

## 2025-03-06 ENCOUNTER — APPOINTMENT (OUTPATIENT)
Dept: RADIATION ONCOLOGY | Facility: CLINIC | Age: 65
End: 2025-03-06
Payer: MEDICARE

## 2025-03-06 DIAGNOSIS — C61 MALIGNANT NEOPLASM OF PROSTATE: ICD-10-CM

## 2025-03-06 PROCEDURE — 99024 POSTOP FOLLOW-UP VISIT: CPT

## 2025-03-26 NOTE — ED ADULT NURSE NOTE - CAS DISCH BELONGINGS RETURNED
Pt's  is inquiring about her old records from her previous PCP. Can we work on getting these please? She filled out a FRANK form before.  
Refaxed auth to obtain medical records to [facility]. Fax [phone]. Notified pt  
Yes

## 2025-04-03 ENCOUNTER — APPOINTMENT (OUTPATIENT)
Dept: PHYSICAL MEDICINE AND REHAB | Facility: CLINIC | Age: 65
End: 2025-04-03
Payer: MEDICARE

## 2025-04-03 VITALS
BODY MASS INDEX: 46.65 KG/M2 | RESPIRATION RATE: 14 BRPM | WEIGHT: 315 LBS | HEIGHT: 69 IN | DIASTOLIC BLOOD PRESSURE: 77 MMHG | SYSTOLIC BLOOD PRESSURE: 144 MMHG | HEART RATE: 57 BPM

## 2025-04-03 DIAGNOSIS — M54.50 LOW BACK PAIN, UNSPECIFIED: ICD-10-CM

## 2025-04-03 DIAGNOSIS — G89.29 LOW BACK PAIN, UNSPECIFIED: ICD-10-CM

## 2025-04-03 DIAGNOSIS — M79.2 NEURALGIA AND NEURITIS, UNSPECIFIED: ICD-10-CM

## 2025-04-03 DIAGNOSIS — Z91.89 OTHER SPECIFIED PERSONAL RISK FACTORS, NOT ELSEWHERE CLASSIFIED: ICD-10-CM

## 2025-04-03 DIAGNOSIS — I89.0 LYMPHEDEMA, NOT ELSEWHERE CLASSIFIED: ICD-10-CM

## 2025-04-03 PROCEDURE — 99214 OFFICE O/P EST MOD 30 MIN: CPT | Mod: 25

## 2025-04-03 PROCEDURE — 93702 BIS XTRACELL FLUID ANALYSIS: CPT

## 2025-04-11 ENCOUNTER — APPOINTMENT (OUTPATIENT)
Dept: CARDIOLOGY | Facility: CLINIC | Age: 65
End: 2025-04-11
Payer: MEDICARE

## 2025-04-11 ENCOUNTER — NON-APPOINTMENT (OUTPATIENT)
Age: 65
End: 2025-04-11

## 2025-04-11 VITALS
DIASTOLIC BLOOD PRESSURE: 80 MMHG | RESPIRATION RATE: 16 BRPM | WEIGHT: 315 LBS | HEART RATE: 60 BPM | HEIGHT: 69 IN | BODY MASS INDEX: 46.65 KG/M2 | SYSTOLIC BLOOD PRESSURE: 140 MMHG

## 2025-04-11 DIAGNOSIS — I10 ESSENTIAL (PRIMARY) HYPERTENSION: ICD-10-CM

## 2025-04-11 DIAGNOSIS — E66.9 OBESITY, UNSPECIFIED: ICD-10-CM

## 2025-04-11 DIAGNOSIS — R94.31 ABNORMAL ELECTROCARDIOGRAM [ECG] [EKG]: ICD-10-CM

## 2025-04-11 DIAGNOSIS — I77.810 THORACIC AORTIC ECTASIA: ICD-10-CM

## 2025-04-11 PROCEDURE — 93000 ELECTROCARDIOGRAM COMPLETE: CPT

## 2025-04-11 PROCEDURE — G2211 COMPLEX E/M VISIT ADD ON: CPT

## 2025-04-11 PROCEDURE — 99214 OFFICE O/P EST MOD 30 MIN: CPT

## 2025-04-14 ENCOUNTER — APPOINTMENT (OUTPATIENT)
Dept: RADIOLOGY | Facility: CLINIC | Age: 65
End: 2025-04-14
Payer: MEDICARE

## 2025-04-14 ENCOUNTER — RESULT REVIEW (OUTPATIENT)
Age: 65
End: 2025-04-14

## 2025-04-14 ENCOUNTER — OUTPATIENT (OUTPATIENT)
Dept: OUTPATIENT SERVICES | Facility: HOSPITAL | Age: 65
LOS: 1 days | End: 2025-04-14
Payer: MEDICARE

## 2025-04-14 DIAGNOSIS — Z98.890 OTHER SPECIFIED POSTPROCEDURAL STATES: Chronic | ICD-10-CM

## 2025-04-14 DIAGNOSIS — Z95.828 PRESENCE OF OTHER VASCULAR IMPLANTS AND GRAFTS: Chronic | ICD-10-CM

## 2025-04-14 DIAGNOSIS — M54.50 LOW BACK PAIN, UNSPECIFIED: ICD-10-CM

## 2025-04-14 DIAGNOSIS — Z93.6 OTHER ARTIFICIAL OPENINGS OF URINARY TRACT STATUS: Chronic | ICD-10-CM

## 2025-04-14 PROCEDURE — 72100 X-RAY EXAM L-S SPINE 2/3 VWS: CPT

## 2025-04-14 PROCEDURE — 72100 X-RAY EXAM L-S SPINE 2/3 VWS: CPT | Mod: 26

## 2025-04-15 ENCOUNTER — APPOINTMENT (OUTPATIENT)
Dept: SURGICAL ONCOLOGY | Facility: CLINIC | Age: 65
End: 2025-04-15
Payer: MEDICARE

## 2025-04-15 ENCOUNTER — NON-APPOINTMENT (OUTPATIENT)
Age: 65
End: 2025-04-15

## 2025-04-15 VITALS
SYSTOLIC BLOOD PRESSURE: 165 MMHG | WEIGHT: 315 LBS | BODY MASS INDEX: 46.65 KG/M2 | OXYGEN SATURATION: 97 % | HEART RATE: 62 BPM | DIASTOLIC BLOOD PRESSURE: 91 MMHG | HEIGHT: 69 IN | TEMPERATURE: 98 F

## 2025-04-15 PROCEDURE — G2211 COMPLEX E/M VISIT ADD ON: CPT

## 2025-04-15 PROCEDURE — 99214 OFFICE O/P EST MOD 30 MIN: CPT

## 2025-04-17 DIAGNOSIS — Z74.09 OTHER REDUCED MOBILITY: ICD-10-CM

## 2025-04-18 DIAGNOSIS — M25.551 PAIN IN RIGHT HIP: ICD-10-CM

## 2025-04-21 ENCOUNTER — NON-APPOINTMENT (OUTPATIENT)
Age: 65
End: 2025-04-21

## 2025-04-28 ENCOUNTER — NON-APPOINTMENT (OUTPATIENT)
Age: 65
End: 2025-04-28

## 2025-05-07 ENCOUNTER — APPOINTMENT (OUTPATIENT)
Dept: UROLOGY | Facility: CLINIC | Age: 65
End: 2025-05-07
Payer: MEDICARE

## 2025-05-07 ENCOUNTER — OUTPATIENT (OUTPATIENT)
Dept: OUTPATIENT SERVICES | Facility: HOSPITAL | Age: 65
LOS: 1 days | End: 2025-05-07
Payer: MEDICARE

## 2025-05-07 VITALS
RESPIRATION RATE: 17 BRPM | WEIGHT: 315 LBS | SYSTOLIC BLOOD PRESSURE: 112 MMHG | BODY MASS INDEX: 46.65 KG/M2 | DIASTOLIC BLOOD PRESSURE: 71 MMHG | HEIGHT: 69 IN | TEMPERATURE: 98.4 F | HEART RATE: 75 BPM

## 2025-05-07 DIAGNOSIS — Z98.890 OTHER SPECIFIED POSTPROCEDURAL STATES: Chronic | ICD-10-CM

## 2025-05-07 DIAGNOSIS — Z71.3 DIETARY COUNSELING AND SURVEILLANCE: ICD-10-CM

## 2025-05-07 DIAGNOSIS — N18.31 CHRONIC KIDNEY DISEASE, STAGE 3A: ICD-10-CM

## 2025-05-07 DIAGNOSIS — C61 MALIGNANT NEOPLASM OF PROSTATE: ICD-10-CM

## 2025-05-07 DIAGNOSIS — R82.991 HYPOCITRATURIA: ICD-10-CM

## 2025-05-07 DIAGNOSIS — Z95.828 PRESENCE OF OTHER VASCULAR IMPLANTS AND GRAFTS: Chronic | ICD-10-CM

## 2025-05-07 DIAGNOSIS — R35.0 FREQUENCY OF MICTURITION: ICD-10-CM

## 2025-05-07 DIAGNOSIS — Z93.6 OTHER ARTIFICIAL OPENINGS OF URINARY TRACT STATUS: Chronic | ICD-10-CM

## 2025-05-07 DIAGNOSIS — N20.0 CALCULUS OF KIDNEY: ICD-10-CM

## 2025-05-07 LAB — PSA SERPL-MCNC: 3.22 NG/ML

## 2025-05-07 PROCEDURE — 76775 US EXAM ABDO BACK WALL LIM: CPT | Mod: 26

## 2025-05-07 PROCEDURE — G2211 COMPLEX E/M VISIT ADD ON: CPT

## 2025-05-07 PROCEDURE — 99214 OFFICE O/P EST MOD 30 MIN: CPT

## 2025-05-07 PROCEDURE — 76775 US EXAM ABDO BACK WALL LIM: CPT

## 2025-05-08 ENCOUNTER — NON-APPOINTMENT (OUTPATIENT)
Age: 65
End: 2025-05-08

## 2025-05-08 DIAGNOSIS — R82.991 HYPOCITRATURIA: ICD-10-CM

## 2025-05-08 DIAGNOSIS — N20.0 CALCULUS OF KIDNEY: ICD-10-CM

## 2025-05-08 DIAGNOSIS — Z71.3 DIETARY COUNSELING AND SURVEILLANCE: ICD-10-CM

## 2025-05-08 DIAGNOSIS — C61 MALIGNANT NEOPLASM OF PROSTATE: ICD-10-CM

## 2025-05-12 ENCOUNTER — APPOINTMENT (OUTPATIENT)
Dept: RADIOLOGY | Facility: CLINIC | Age: 65
End: 2025-05-12
Payer: MEDICARE

## 2025-05-12 ENCOUNTER — OUTPATIENT (OUTPATIENT)
Dept: OUTPATIENT SERVICES | Facility: HOSPITAL | Age: 65
LOS: 1 days | End: 2025-05-12
Payer: MEDICARE

## 2025-05-12 DIAGNOSIS — Z93.6 OTHER ARTIFICIAL OPENINGS OF URINARY TRACT STATUS: Chronic | ICD-10-CM

## 2025-05-12 DIAGNOSIS — Z98.890 OTHER SPECIFIED POSTPROCEDURAL STATES: Chronic | ICD-10-CM

## 2025-05-12 DIAGNOSIS — Z95.828 PRESENCE OF OTHER VASCULAR IMPLANTS AND GRAFTS: Chronic | ICD-10-CM

## 2025-05-12 DIAGNOSIS — M25.551 PAIN IN RIGHT HIP: ICD-10-CM

## 2025-05-12 PROCEDURE — 73502 X-RAY EXAM HIP UNI 2-3 VIEWS: CPT | Mod: 26,RT

## 2025-05-12 PROCEDURE — 73502 X-RAY EXAM HIP UNI 2-3 VIEWS: CPT

## 2025-05-19 ENCOUNTER — APPOINTMENT (OUTPATIENT)
Dept: RADIATION ONCOLOGY | Facility: CLINIC | Age: 65
End: 2025-05-19
Payer: MEDICARE

## 2025-05-19 DIAGNOSIS — C61 MALIGNANT NEOPLASM OF PROSTATE: ICD-10-CM

## 2025-05-19 PROCEDURE — 99213 OFFICE O/P EST LOW 20 MIN: CPT | Mod: 2W

## 2025-05-29 ENCOUNTER — APPOINTMENT (OUTPATIENT)
Dept: PHYSICAL MEDICINE AND REHAB | Facility: CLINIC | Age: 65
End: 2025-05-29
Payer: MEDICARE

## 2025-05-29 VITALS
WEIGHT: 315 LBS | SYSTOLIC BLOOD PRESSURE: 106 MMHG | HEIGHT: 69 IN | HEART RATE: 76 BPM | RESPIRATION RATE: 14 BRPM | BODY MASS INDEX: 46.65 KG/M2 | DIASTOLIC BLOOD PRESSURE: 77 MMHG

## 2025-05-29 DIAGNOSIS — I89.0 LYMPHEDEMA, NOT ELSEWHERE CLASSIFIED: ICD-10-CM

## 2025-05-29 DIAGNOSIS — M79.2 NEURALGIA AND NEURITIS, UNSPECIFIED: ICD-10-CM

## 2025-05-29 DIAGNOSIS — Z91.89 OTHER SPECIFIED PERSONAL RISK FACTORS, NOT ELSEWHERE CLASSIFIED: ICD-10-CM

## 2025-05-29 DIAGNOSIS — Z74.09 OTHER REDUCED MOBILITY: ICD-10-CM

## 2025-05-29 PROCEDURE — 99214 OFFICE O/P EST MOD 30 MIN: CPT

## 2025-05-29 RX ORDER — PREGABALIN 50 MG/1
50 CAPSULE ORAL
Qty: 60 | Refills: 1 | Status: ACTIVE | COMMUNITY
Start: 2025-05-29 | End: 1900-01-01

## 2025-06-04 ENCOUNTER — APPOINTMENT (OUTPATIENT)
Dept: PULMONOLOGY | Facility: CLINIC | Age: 65
End: 2025-06-04
Payer: MEDICARE

## 2025-06-04 VITALS
RESPIRATION RATE: 16 BRPM | DIASTOLIC BLOOD PRESSURE: 73 MMHG | BODY MASS INDEX: 46.65 KG/M2 | WEIGHT: 315 LBS | SYSTOLIC BLOOD PRESSURE: 110 MMHG | HEART RATE: 80 BPM | HEIGHT: 69 IN | OXYGEN SATURATION: 97 %

## 2025-06-04 DIAGNOSIS — G47.33 OBSTRUCTIVE SLEEP APNEA (ADULT) (PEDIATRIC): ICD-10-CM

## 2025-06-04 DIAGNOSIS — E66.9 OBESITY, UNSPECIFIED: ICD-10-CM

## 2025-06-04 PROCEDURE — 99213 OFFICE O/P EST LOW 20 MIN: CPT

## 2025-06-08 ENCOUNTER — NON-APPOINTMENT (OUTPATIENT)
Age: 65
End: 2025-06-08

## 2025-06-08 ENCOUNTER — EMERGENCY (EMERGENCY)
Facility: HOSPITAL | Age: 65
LOS: 1 days | End: 2025-06-08
Attending: EMERGENCY MEDICINE
Payer: MEDICARE

## 2025-06-08 VITALS
TEMPERATURE: 98 F | SYSTOLIC BLOOD PRESSURE: 118 MMHG | DIASTOLIC BLOOD PRESSURE: 70 MMHG | WEIGHT: 315 LBS | RESPIRATION RATE: 18 BRPM | HEIGHT: 69 IN | OXYGEN SATURATION: 95 % | HEART RATE: 73 BPM

## 2025-06-08 VITALS
OXYGEN SATURATION: 95 % | DIASTOLIC BLOOD PRESSURE: 78 MMHG | TEMPERATURE: 99 F | RESPIRATION RATE: 18 BRPM | SYSTOLIC BLOOD PRESSURE: 127 MMHG | HEART RATE: 80 BPM

## 2025-06-08 DIAGNOSIS — Z93.6 OTHER ARTIFICIAL OPENINGS OF URINARY TRACT STATUS: Chronic | ICD-10-CM

## 2025-06-08 DIAGNOSIS — Z95.828 PRESENCE OF OTHER VASCULAR IMPLANTS AND GRAFTS: Chronic | ICD-10-CM

## 2025-06-08 DIAGNOSIS — Z98.890 OTHER SPECIFIED POSTPROCEDURAL STATES: Chronic | ICD-10-CM

## 2025-06-08 LAB
ALBUMIN SERPL ELPH-MCNC: 3.4 G/DL — SIGNIFICANT CHANGE UP (ref 3.3–5.2)
ALP SERPL-CCNC: 58 U/L — SIGNIFICANT CHANGE UP (ref 40–120)
ALT FLD-CCNC: 13 U/L — SIGNIFICANT CHANGE UP
ANION GAP SERPL CALC-SCNC: 15 MMOL/L — SIGNIFICANT CHANGE UP (ref 5–17)
AST SERPL-CCNC: 27 U/L — SIGNIFICANT CHANGE UP
BASOPHILS # BLD AUTO: 0.04 K/UL — SIGNIFICANT CHANGE UP (ref 0–0.2)
BASOPHILS NFR BLD AUTO: 0.5 % — SIGNIFICANT CHANGE UP (ref 0–2)
BILIRUB SERPL-MCNC: 0.3 MG/DL — LOW (ref 0.4–2)
BUN SERPL-MCNC: 22.2 MG/DL — HIGH (ref 8–20)
CALCIUM SERPL-MCNC: 8.4 MG/DL — SIGNIFICANT CHANGE UP (ref 8.4–10.5)
CHLORIDE SERPL-SCNC: 101 MMOL/L — SIGNIFICANT CHANGE UP (ref 96–108)
CO2 SERPL-SCNC: 22 MMOL/L — SIGNIFICANT CHANGE UP (ref 22–29)
CREAT SERPL-MCNC: 1.33 MG/DL — HIGH (ref 0.5–1.3)
EGFR: 60 ML/MIN/1.73M2 — SIGNIFICANT CHANGE UP
EGFR: 60 ML/MIN/1.73M2 — SIGNIFICANT CHANGE UP
EOSINOPHIL # BLD AUTO: 0.19 K/UL — SIGNIFICANT CHANGE UP (ref 0–0.5)
EOSINOPHIL NFR BLD AUTO: 2.4 % — SIGNIFICANT CHANGE UP (ref 0–6)
GLUCOSE SERPL-MCNC: 97 MG/DL — SIGNIFICANT CHANGE UP (ref 70–99)
HCT VFR BLD CALC: 45.7 % — SIGNIFICANT CHANGE UP (ref 39–50)
HGB BLD-MCNC: 14.4 G/DL — SIGNIFICANT CHANGE UP (ref 13–17)
IMM GRANULOCYTES # BLD AUTO: 0.02 K/UL — SIGNIFICANT CHANGE UP (ref 0–0.07)
IMM GRANULOCYTES NFR BLD AUTO: 0.3 % — SIGNIFICANT CHANGE UP (ref 0–0.9)
LYMPHOCYTES # BLD AUTO: 0.84 K/UL — LOW (ref 1–3.3)
LYMPHOCYTES NFR BLD AUTO: 10.6 % — LOW (ref 13–44)
MCHC RBC-ENTMCNC: 29.4 PG — SIGNIFICANT CHANGE UP (ref 27–34)
MCHC RBC-ENTMCNC: 31.5 G/DL — LOW (ref 32–36)
MCV RBC AUTO: 93.3 FL — SIGNIFICANT CHANGE UP (ref 80–100)
MONOCYTES # BLD AUTO: 0.73 K/UL — SIGNIFICANT CHANGE UP (ref 0–0.9)
MONOCYTES NFR BLD AUTO: 9.2 % — SIGNIFICANT CHANGE UP (ref 2–14)
NEUTROPHILS # BLD AUTO: 6.14 K/UL — SIGNIFICANT CHANGE UP (ref 1.8–7.4)
NEUTROPHILS NFR BLD AUTO: 77 % — SIGNIFICANT CHANGE UP (ref 43–77)
NRBC # BLD AUTO: 0 K/UL — SIGNIFICANT CHANGE UP (ref 0–0)
NRBC # FLD: 0 K/UL — SIGNIFICANT CHANGE UP (ref 0–0)
NRBC BLD AUTO-RTO: 0 /100 WBCS — SIGNIFICANT CHANGE UP (ref 0–0)
PLATELET # BLD AUTO: 193 K/UL — SIGNIFICANT CHANGE UP (ref 150–400)
PMV BLD: 10.7 FL — SIGNIFICANT CHANGE UP (ref 7–13)
POTASSIUM SERPL-MCNC: 4.3 MMOL/L — SIGNIFICANT CHANGE UP (ref 3.5–5.3)
POTASSIUM SERPL-SCNC: 4.3 MMOL/L — SIGNIFICANT CHANGE UP (ref 3.5–5.3)
PROT SERPL-MCNC: 6.7 G/DL — SIGNIFICANT CHANGE UP (ref 6.6–8.7)
RBC # BLD: 4.9 M/UL — SIGNIFICANT CHANGE UP (ref 4.2–5.8)
RBC # FLD: 13.5 % — SIGNIFICANT CHANGE UP (ref 10.3–14.5)
SODIUM SERPL-SCNC: 138 MMOL/L — SIGNIFICANT CHANGE UP (ref 135–145)
WBC # BLD: 7.96 K/UL — SIGNIFICANT CHANGE UP (ref 3.8–10.5)
WBC # FLD AUTO: 7.96 K/UL — SIGNIFICANT CHANGE UP (ref 3.8–10.5)

## 2025-06-08 PROCEDURE — 85025 COMPLETE CBC W/AUTO DIFF WBC: CPT

## 2025-06-08 PROCEDURE — 80053 COMPREHEN METABOLIC PANEL: CPT

## 2025-06-08 PROCEDURE — 73610 X-RAY EXAM OF ANKLE: CPT

## 2025-06-08 PROCEDURE — 36415 COLL VENOUS BLD VENIPUNCTURE: CPT

## 2025-06-08 PROCEDURE — 99284 EMERGENCY DEPT VISIT MOD MDM: CPT | Mod: GC

## 2025-06-08 PROCEDURE — 73610 X-RAY EXAM OF ANKLE: CPT | Mod: 26,LT

## 2025-06-08 PROCEDURE — 96372 THER/PROPH/DIAG INJ SC/IM: CPT

## 2025-06-08 PROCEDURE — 99283 EMERGENCY DEPT VISIT LOW MDM: CPT | Mod: 25

## 2025-06-08 RX ORDER — CEFTRIAXONE 500 MG/1
1000 INJECTION, POWDER, FOR SOLUTION INTRAMUSCULAR; INTRAVENOUS ONCE
Refills: 0 | Status: DISCONTINUED | OUTPATIENT
Start: 2025-06-08 | End: 2025-06-08

## 2025-06-08 RX ORDER — AMOXICILLIN AND CLAVULANATE POTASSIUM 500; 125 MG/1; MG/1
1 TABLET, FILM COATED ORAL
Qty: 20 | Refills: 0
Start: 2025-06-08 | End: 2025-06-17

## 2025-06-08 RX ORDER — CEFTRIAXONE 500 MG/1
1000 INJECTION, POWDER, FOR SOLUTION INTRAMUSCULAR; INTRAVENOUS ONCE
Refills: 0 | Status: COMPLETED | OUTPATIENT
Start: 2025-06-08 | End: 2025-06-08

## 2025-06-08 RX ADMIN — CEFTRIAXONE 1000 MILLIGRAM(S): 500 INJECTION, POWDER, FOR SOLUTION INTRAMUSCULAR; INTRAVENOUS at 14:50

## 2025-06-10 NOTE — PHYSICAL EXAM
Wound/ostomy - spoke to patient in follow up to see how his transition home is going. He tells me that [facility] nurse has been out to see him one time, he is unsure when they will be out next and he doesn't have a phone number to call them, does not see where any home printed materials were left. [facility] did have some ostomy supplies sent to his home but it is the lock and roll pouches that he has a hard time using, he prefers the clamp closure. He did received the samples from [employer] that I requested be sent to his home, he is very happy with those supplies. Reviewed with him which supplies he prefers and they are:      -[employer]onna barrier ring 8805  -Shonna clamp pouch 1816  -[place] skin barrier cut to fit 12614    He wanted to know how to order his supplies from [employer]. I explained to him that I will contact [employer] and give them the order numbers of the products that he likes and they will call him. I explained that since he is current with [facility] he may not be able to place an order yet until after he is discharged but they can try. Told him to call me back if he runs into any trouble with all of this. He has our office number.     I called and spoke to Barbara phoenix with [employer]. Provided her with the above order numbers and also requested for some lubricating deodorant drops be sent as well, patient and I had talked about them during his inpatient admission. Suggested that they only do a month supply order and not 3 months at a time due to patient only having [facility] (currently has no supplement) and want to limit his OOP expenses. [employer] will send for product orders to Dr. Ishaan Mccoy and they will contact patient for additional authorization.    [Normal] : oriented to person, place and time, with appropriate affect [de-identified] : ambulating with assistive walker device; morbid obesity [de-identified] : RRR [de-identified] : easy WOB [de-identified] : No appreciable groin or axillary lymph nodes. SLNB of Left axilla wound is healing well.  [de-identified] : Loose skin (cutis laxa) bilateral axillary  [de-identified] : Well healing wound of Left superior upper back, no evidence of drainage, dehiscence, pus, or infection. No evidence of in-transit or satellite metastases. No other skin changes.

## 2025-06-19 ENCOUNTER — APPOINTMENT (OUTPATIENT)
Dept: CT IMAGING | Facility: CLINIC | Age: 65
End: 2025-06-19
Payer: MEDICARE

## 2025-06-19 ENCOUNTER — OUTPATIENT (OUTPATIENT)
Dept: OUTPATIENT SERVICES | Facility: HOSPITAL | Age: 65
LOS: 1 days | End: 2025-06-19
Payer: MEDICARE

## 2025-06-19 DIAGNOSIS — Z93.6 OTHER ARTIFICIAL OPENINGS OF URINARY TRACT STATUS: Chronic | ICD-10-CM

## 2025-06-19 DIAGNOSIS — I77.810 THORACIC AORTIC ECTASIA: ICD-10-CM

## 2025-06-19 DIAGNOSIS — Z98.890 OTHER SPECIFIED POSTPROCEDURAL STATES: Chronic | ICD-10-CM

## 2025-06-19 DIAGNOSIS — Z95.828 PRESENCE OF OTHER VASCULAR IMPLANTS AND GRAFTS: Chronic | ICD-10-CM

## 2025-06-19 PROCEDURE — 71275 CT ANGIOGRAPHY CHEST: CPT | Mod: 26

## 2025-06-19 PROCEDURE — 71275 CT ANGIOGRAPHY CHEST: CPT

## 2025-06-24 ENCOUNTER — APPOINTMENT (OUTPATIENT)
Dept: PHYSICAL MEDICINE AND REHAB | Facility: CLINIC | Age: 65
End: 2025-06-24

## 2025-06-26 ENCOUNTER — APPOINTMENT (OUTPATIENT)
Dept: DERMATOLOGY | Facility: CLINIC | Age: 65
End: 2025-06-26

## 2025-07-02 ENCOUNTER — APPOINTMENT (OUTPATIENT)
Dept: CARDIOLOGY | Facility: CLINIC | Age: 65
End: 2025-07-02
Payer: MEDICARE

## 2025-07-02 ENCOUNTER — RX RENEWAL (OUTPATIENT)
Age: 65
End: 2025-07-02

## 2025-07-02 VITALS
SYSTOLIC BLOOD PRESSURE: 140 MMHG | DIASTOLIC BLOOD PRESSURE: 80 MMHG | BODY MASS INDEX: 46.65 KG/M2 | HEIGHT: 69 IN | RESPIRATION RATE: 16 BRPM | HEART RATE: 57 BPM | WEIGHT: 315 LBS

## 2025-07-02 PROBLEM — I25.10 CORONARY ARTERY CALCIFICATION: Status: ACTIVE | Noted: 2025-07-02

## 2025-07-02 PROCEDURE — 93000 ELECTROCARDIOGRAM COMPLETE: CPT

## 2025-07-02 PROCEDURE — G2211 COMPLEX E/M VISIT ADD ON: CPT

## 2025-07-02 PROCEDURE — 99214 OFFICE O/P EST MOD 30 MIN: CPT

## 2025-07-02 RX ORDER — ATORVASTATIN CALCIUM 20 MG/1
20 TABLET, FILM COATED ORAL
Qty: 90 | Refills: 1 | Status: ACTIVE | COMMUNITY
Start: 2025-07-02 | End: 1900-01-01

## 2025-07-07 ENCOUNTER — OUTPATIENT (OUTPATIENT)
Dept: OUTPATIENT SERVICES | Facility: HOSPITAL | Age: 65
LOS: 1 days | End: 2025-07-07
Payer: MEDICARE

## 2025-07-07 DIAGNOSIS — Z98.890 OTHER SPECIFIED POSTPROCEDURAL STATES: Chronic | ICD-10-CM

## 2025-07-07 DIAGNOSIS — Z95.828 PRESENCE OF OTHER VASCULAR IMPLANTS AND GRAFTS: Chronic | ICD-10-CM

## 2025-07-07 DIAGNOSIS — Z93.6 OTHER ARTIFICIAL OPENINGS OF URINARY TRACT STATUS: Chronic | ICD-10-CM

## 2025-07-07 DIAGNOSIS — G47.33 OBSTRUCTIVE SLEEP APNEA (ADULT) (PEDIATRIC): ICD-10-CM

## 2025-07-07 PROCEDURE — 95810 POLYSOM 6/> YRS 4/> PARAM: CPT

## 2025-07-07 PROCEDURE — G0400: CPT | Mod: 26

## 2025-07-08 ENCOUNTER — APPOINTMENT (OUTPATIENT)
Dept: ORTHOPEDIC SURGERY | Facility: CLINIC | Age: 65
End: 2025-07-08
Payer: MEDICARE

## 2025-07-08 VITALS
SYSTOLIC BLOOD PRESSURE: 144 MMHG | HEART RATE: 66 BPM | BODY MASS INDEX: 46.65 KG/M2 | DIASTOLIC BLOOD PRESSURE: 75 MMHG | WEIGHT: 315 LBS | HEIGHT: 69 IN

## 2025-07-08 PROBLEM — M16.11 ARTHRITIS OF RIGHT HIP: Status: ACTIVE | Noted: 2025-07-08

## 2025-07-08 PROCEDURE — 73502 X-RAY EXAM HIP UNI 2-3 VIEWS: CPT

## 2025-07-08 PROCEDURE — G2211 COMPLEX E/M VISIT ADD ON: CPT

## 2025-07-08 PROCEDURE — 99204 OFFICE O/P NEW MOD 45 MIN: CPT

## 2025-07-28 ENCOUNTER — OUTPATIENT (OUTPATIENT)
Dept: OUTPATIENT SERVICES | Facility: HOSPITAL | Age: 65
LOS: 1 days | End: 2025-07-28
Payer: MEDICARE

## 2025-07-28 ENCOUNTER — APPOINTMENT (OUTPATIENT)
Dept: CT IMAGING | Facility: CLINIC | Age: 65
End: 2025-07-28
Payer: MEDICARE

## 2025-07-28 DIAGNOSIS — Z95.828 PRESENCE OF OTHER VASCULAR IMPLANTS AND GRAFTS: Chronic | ICD-10-CM

## 2025-07-28 DIAGNOSIS — Z98.890 OTHER SPECIFIED POSTPROCEDURAL STATES: Chronic | ICD-10-CM

## 2025-07-28 DIAGNOSIS — Z93.6 OTHER ARTIFICIAL OPENINGS OF URINARY TRACT STATUS: Chronic | ICD-10-CM

## 2025-07-28 DIAGNOSIS — I25.10 ATHEROSCLEROTIC HEART DISEASE OF NATIVE CORONARY ARTERY WITHOUT ANGINA PECTORIS: ICD-10-CM

## 2025-07-28 PROCEDURE — 75574 CT ANGIO HRT W/3D IMAGE: CPT

## 2025-07-28 PROCEDURE — 75574 CT ANGIO HRT W/3D IMAGE: CPT | Mod: 26

## 2025-07-30 ENCOUNTER — OUTPATIENT (OUTPATIENT)
Dept: OUTPATIENT SERVICES | Facility: HOSPITAL | Age: 65
LOS: 1 days | End: 2025-07-30
Payer: MEDICARE

## 2025-07-30 ENCOUNTER — RESULT REVIEW (OUTPATIENT)
Age: 65
End: 2025-07-30

## 2025-07-30 DIAGNOSIS — R07.89 OTHER CHEST PAIN: ICD-10-CM

## 2025-07-30 DIAGNOSIS — Z95.828 PRESENCE OF OTHER VASCULAR IMPLANTS AND GRAFTS: Chronic | ICD-10-CM

## 2025-07-30 DIAGNOSIS — Z98.890 OTHER SPECIFIED POSTPROCEDURAL STATES: Chronic | ICD-10-CM

## 2025-07-30 DIAGNOSIS — Z93.6 OTHER ARTIFICIAL OPENINGS OF URINARY TRACT STATUS: Chronic | ICD-10-CM

## 2025-07-30 PROCEDURE — 75580 N-INVAS EST C FFR SW ALY CTA: CPT

## 2025-07-30 PROCEDURE — 75580 N-INVAS EST C FFR SW ALY CTA: CPT | Mod: 26

## 2025-08-04 ENCOUNTER — APPOINTMENT (OUTPATIENT)
Dept: DERMATOLOGY | Facility: CLINIC | Age: 65
End: 2025-08-04

## 2025-08-14 ENCOUNTER — APPOINTMENT (OUTPATIENT)
Dept: PHYSICAL MEDICINE AND REHAB | Facility: CLINIC | Age: 65
End: 2025-08-14
Payer: MEDICARE

## 2025-08-14 VITALS
HEIGHT: 69 IN | RESPIRATION RATE: 14 BRPM | WEIGHT: 315 LBS | DIASTOLIC BLOOD PRESSURE: 80 MMHG | SYSTOLIC BLOOD PRESSURE: 135 MMHG | HEART RATE: 90 BPM | BODY MASS INDEX: 46.65 KG/M2

## 2025-08-14 DIAGNOSIS — M79.2 NEURALGIA AND NEURITIS, UNSPECIFIED: ICD-10-CM

## 2025-08-14 DIAGNOSIS — Z91.89 OTHER SPECIFIED PERSONAL RISK FACTORS, NOT ELSEWHERE CLASSIFIED: ICD-10-CM

## 2025-08-14 DIAGNOSIS — I89.0 LYMPHEDEMA, NOT ELSEWHERE CLASSIFIED: ICD-10-CM

## 2025-08-14 DIAGNOSIS — M25.551 PAIN IN RIGHT HIP: ICD-10-CM

## 2025-08-14 PROCEDURE — 99214 OFFICE O/P EST MOD 30 MIN: CPT

## 2025-08-18 ENCOUNTER — NON-APPOINTMENT (OUTPATIENT)
Age: 65
End: 2025-08-18

## 2025-08-19 ENCOUNTER — APPOINTMENT (OUTPATIENT)
Dept: FAMILY MEDICINE | Facility: CLINIC | Age: 65
End: 2025-08-19
Payer: MEDICARE

## 2025-08-19 DIAGNOSIS — I25.10 ATHEROSCLEROTIC HEART DISEASE OF NATIVE CORONARY ARTERY W/OUT ANGINA PECTORIS: ICD-10-CM

## 2025-08-19 DIAGNOSIS — N18.31 CHRONIC KIDNEY DISEASE, STAGE 3A: ICD-10-CM

## 2025-08-19 PROCEDURE — 99205 OFFICE O/P NEW HI 60 MIN: CPT | Mod: 2W

## 2025-08-21 ENCOUNTER — APPOINTMENT (OUTPATIENT)
Dept: CARDIOLOGY | Facility: CLINIC | Age: 65
End: 2025-08-21
Payer: MEDICARE

## 2025-08-21 VITALS
DIASTOLIC BLOOD PRESSURE: 70 MMHG | BODY MASS INDEX: 50.65 KG/M2 | RESPIRATION RATE: 15 BRPM | OXYGEN SATURATION: 96 % | HEART RATE: 79 BPM | SYSTOLIC BLOOD PRESSURE: 116 MMHG | WEIGHT: 315 LBS

## 2025-08-21 DIAGNOSIS — I77.810 THORACIC AORTIC ECTASIA: ICD-10-CM

## 2025-08-21 DIAGNOSIS — I10 ESSENTIAL (PRIMARY) HYPERTENSION: ICD-10-CM

## 2025-08-21 DIAGNOSIS — C61 MALIGNANT NEOPLASM OF PROSTATE: ICD-10-CM

## 2025-08-21 DIAGNOSIS — E66.01 MORBID (SEVERE) OBESITY DUE TO EXCESS CALORIES: ICD-10-CM

## 2025-08-21 DIAGNOSIS — R00.2 PALPITATIONS: ICD-10-CM

## 2025-08-21 DIAGNOSIS — I25.10 ATHEROSCLEROTIC HEART DISEASE OF NATIVE CORONARY ARTERY W/OUT ANGINA PECTORIS: ICD-10-CM

## 2025-08-21 PROCEDURE — 99214 OFFICE O/P EST MOD 30 MIN: CPT

## 2025-08-21 PROCEDURE — G2211 COMPLEX E/M VISIT ADD ON: CPT

## 2025-08-21 PROCEDURE — 93000 ELECTROCARDIOGRAM COMPLETE: CPT

## 2025-08-27 RX ORDER — SEMAGLUTIDE 0.25 MG/.5ML
0.25 INJECTION, SOLUTION SUBCUTANEOUS
Qty: 1 | Refills: 0 | Status: ACTIVE | COMMUNITY
Start: 2025-08-19

## 2025-08-28 ENCOUNTER — APPOINTMENT (OUTPATIENT)
Dept: PULMONOLOGY | Facility: CLINIC | Age: 65
End: 2025-08-28
Payer: MEDICARE

## 2025-08-28 VITALS — HEART RATE: 71 BPM | SYSTOLIC BLOOD PRESSURE: 124 MMHG | OXYGEN SATURATION: 97 % | DIASTOLIC BLOOD PRESSURE: 84 MMHG

## 2025-08-28 VITALS — HEIGHT: 69 IN | BODY MASS INDEX: 46.65 KG/M2 | RESPIRATION RATE: 16 BRPM | WEIGHT: 315 LBS

## 2025-08-28 DIAGNOSIS — G47.33 OBSTRUCTIVE SLEEP APNEA (ADULT) (PEDIATRIC): ICD-10-CM

## 2025-08-28 DIAGNOSIS — E66.9 OBESITY, UNSPECIFIED: ICD-10-CM

## 2025-08-28 PROCEDURE — G2211 COMPLEX E/M VISIT ADD ON: CPT

## 2025-08-28 PROCEDURE — 99213 OFFICE O/P EST LOW 20 MIN: CPT

## 2025-08-28 RX ORDER — ATORVASTATIN CALCIUM 40 MG/1
40 TABLET, FILM COATED ORAL
Refills: 0 | Status: ACTIVE | COMMUNITY

## 2025-09-08 ENCOUNTER — APPOINTMENT (OUTPATIENT)
Dept: BARIATRICS | Facility: CLINIC | Age: 65
End: 2025-09-08
Payer: MEDICARE

## 2025-09-08 ENCOUNTER — RX RENEWAL (OUTPATIENT)
Age: 65
End: 2025-09-08

## 2025-09-08 VITALS
WEIGHT: 315 LBS | TEMPERATURE: 97.9 F | DIASTOLIC BLOOD PRESSURE: 63 MMHG | SYSTOLIC BLOOD PRESSURE: 131 MMHG | BODY MASS INDEX: 51.85 KG/M2 | RESPIRATION RATE: 16 BRPM | HEIGHT: 65.5 IN | HEART RATE: 58 BPM | OXYGEN SATURATION: 97 %

## 2025-09-08 DIAGNOSIS — E66.01 MORBID (SEVERE) OBESITY DUE TO EXCESS CALORIES: ICD-10-CM

## 2025-09-08 PROCEDURE — 97802 MEDICAL NUTRITION INDIV IN: CPT

## 2025-09-10 ENCOUNTER — APPOINTMENT (OUTPATIENT)
Dept: UROLOGY | Facility: CLINIC | Age: 65
End: 2025-09-10
Payer: MEDICARE

## 2025-09-10 DIAGNOSIS — N52.9 MALE ERECTILE DYSFUNCTION, UNSPECIFIED: ICD-10-CM

## 2025-09-10 DIAGNOSIS — N20.0 CALCULUS OF KIDNEY: ICD-10-CM

## 2025-09-10 DIAGNOSIS — R82.991 HYPOCITRATURIA: ICD-10-CM

## 2025-09-10 DIAGNOSIS — Z71.3 DIETARY COUNSELING AND SURVEILLANCE: ICD-10-CM

## 2025-09-10 PROCEDURE — 99213 OFFICE O/P EST LOW 20 MIN: CPT

## 2025-09-10 PROCEDURE — 76775 US EXAM ABDO BACK WALL LIM: CPT | Mod: 26

## 2025-09-10 PROCEDURE — G2211 COMPLEX E/M VISIT ADD ON: CPT

## 2025-09-17 ENCOUNTER — APPOINTMENT (OUTPATIENT)
Dept: DERMATOLOGY | Facility: CLINIC | Age: 65
End: 2025-09-17
Payer: MEDICARE

## 2025-09-17 PROCEDURE — 99213 OFFICE O/P EST LOW 20 MIN: CPT | Mod: 25

## 2025-09-17 PROCEDURE — 11102 TANGNTL BX SKIN SINGLE LES: CPT

## 2025-09-17 PROCEDURE — 11103 TANGNTL BX SKIN EA SEP/ADDL: CPT | Mod: 59

## (undated) DEVICE — GOWN TRIMAX LG

## (undated) DEVICE — ELCTR GROUNDING PAD ADULT COVIDIEN

## (undated) DEVICE — STAPLER SKIN PROXIMATE

## (undated) DEVICE — DRAPE HAND 77" X 146"

## (undated) DEVICE — TUBING CONNECTING FOR DRAINAGE BAG MALE / FEMALE 14FR 30CM

## (undated) DEVICE — SPECIMEN CONTAINER 100ML

## (undated) DEVICE — DRSG MASTISOL

## (undated) DEVICE — SOL IRR POUR NS 0.9% 500ML

## (undated) DEVICE — SOL IRR BAG NS 0.9% 3000ML

## (undated) DEVICE — LIGASURE SMALL JAW

## (undated) DEVICE — SUT PDS II 2-0 27" CT-2

## (undated) DEVICE — VENODYNE/SCD SLEEVE CALF MEDIUM

## (undated) DEVICE — SUT VICRYL 3-0 27" SH UNDYED

## (undated) DEVICE — SUT SILK 2-0 30" TIES

## (undated) DEVICE — BAG URINE W METER 2L

## (undated) DEVICE — FOLEY CATH 2-WAY 16FR 5CC LATEX COUNCIL RED

## (undated) DEVICE — GLV 8 PROTEXIS (WHITE)

## (undated) DEVICE — FOLEY CATH 2-WAY 20FR 30CC SILICONE

## (undated) DEVICE — SUT POLYSORB 2-0 18" V-20

## (undated) DEVICE — SUT VICRYL 0 27" CT-1

## (undated) DEVICE — NDL HYPO REGULAR BEVEL 25G X 1.5" (BLUE)

## (undated) DEVICE — SUT SILK 0 30" SH

## (undated) DEVICE — TUBING THERMADX UROLOGY

## (undated) DEVICE — ADAPTER CHECK FLO 9FR STERILE

## (undated) DEVICE — ELCTR ROCKER SWITCH PENCIL BLUE 10FT

## (undated) DEVICE — SUT MONOCRYL 4-0 27" PS-2 UNDYED

## (undated) DEVICE — POSITIONER FOAM HEADREST (PINK)

## (undated) DEVICE — NDL BIOPSY TROCAR TWO-PART 18G X 20CM

## (undated) DEVICE — SYR CONTROL LUER LOK 10CC

## (undated) DEVICE — WARMING BLANKET UPPER ADULT

## (undated) DEVICE — Device

## (undated) DEVICE — FOLEY HOLDER STATLOCK 2 WAY ADULT

## (undated) DEVICE — AMPLATZ RENAL DILATOR 6FR-30FR X 20CM

## (undated) DEVICE — SOL IRR BAG H2O 3000ML

## (undated) DEVICE — DRAPE TOWEL BLUE 17" X 24"

## (undated) DEVICE — DRAPE U 47X51" NON STERILE

## (undated) DEVICE — BOSTON SCIENTIFC ENCORE 26 INFLATOR

## (undated) DEVICE — DRSG TEGADERM 6"X8"

## (undated) DEVICE — DRAPE TOWEL BLUE STICKY

## (undated) DEVICE — DRAPE 1/2 SHEET 40X57"

## (undated) DEVICE — SOL IRR POUR H2O 1000ML

## (undated) DEVICE — ELCTR BOVIE TIP BLADE INSULATED 4" EDGE

## (undated) DEVICE — IRR BULB PATHFINDER + 10"

## (undated) DEVICE — WARMING BLANKET LOWER ADULT

## (undated) DEVICE — TUBING SUCTION 20FT

## (undated) DEVICE — DRAPE SPLIT SHEET 77" X 108"

## (undated) DEVICE — SOL IRR POUR NS 0.9% 1000ML

## (undated) DEVICE — NDL 20CM TROCAR

## (undated) DEVICE — DRSG STERISTRIPS 0.5 X 4"

## (undated) DEVICE — GLV 7.5 PROTEXIS (WHITE)

## (undated) DEVICE — SPONGE PEANUT AUTO COUNT

## (undated) DEVICE — PACK MINOR WITH LAP

## (undated) DEVICE — POSITIONER CUSHION INSERT PRONE VIEW LG

## (undated) DEVICE — SUT SILK 3-0 30" TIES

## (undated) DEVICE — DRAPE NEPHROSCOPY 72X118"

## (undated) DEVICE — PREP BETADINE KIT

## (undated) DEVICE — DRSG TELFA 3 X 8

## (undated) DEVICE — POSITIONER FOAM EGG CRATE ULNAR 2PCS (PINK)